# Patient Record
Sex: FEMALE | Race: BLACK OR AFRICAN AMERICAN | Employment: FULL TIME | URBAN - METROPOLITAN AREA
[De-identification: names, ages, dates, MRNs, and addresses within clinical notes are randomized per-mention and may not be internally consistent; named-entity substitution may affect disease eponyms.]

---

## 2017-01-10 ENCOUNTER — TELEPHONE (OUTPATIENT)
Dept: ORTHOPEDIC SURGERY | Age: 57
End: 2017-01-10

## 2017-01-10 ENCOUNTER — CARE COORDINATION (OUTPATIENT)
Dept: CARE COORDINATION | Age: 57
End: 2017-01-10

## 2017-01-18 ENCOUNTER — HOSPITAL ENCOUNTER (OUTPATIENT)
Dept: ULTRASOUND IMAGING | Age: 57
Discharge: HOME | End: 2017-01-18
Attending: OBSTETRICS & GYNECOLOGY | Admitting: OBSTETRICS & GYNECOLOGY

## 2017-01-18 DIAGNOSIS — N60.02 BREAST CYST, LEFT: ICD-10-CM

## 2017-01-23 ENCOUNTER — TELEPHONE (OUTPATIENT)
Dept: PRIMARY CARE CLINIC | Age: 57
End: 2017-01-23

## 2017-02-15 ENCOUNTER — PAT TELEPHONE (OUTPATIENT)
Dept: PREADMISSION TESTING | Age: 57
End: 2017-02-15

## 2017-02-15 VITALS — WEIGHT: 290 LBS | BODY MASS INDEX: 45.52 KG/M2 | HEIGHT: 67 IN

## 2017-02-16 ENCOUNTER — HOSPITAL ENCOUNTER (OUTPATIENT)
Dept: ENDOSCOPY | Age: 57
Discharge: HOME | End: 2017-02-16
Attending: INTERNAL MEDICINE | Admitting: INTERNAL MEDICINE

## 2017-02-16 VITALS
DIASTOLIC BLOOD PRESSURE: 82 MMHG | TEMPERATURE: 97.7 F | HEIGHT: 67 IN | BODY MASS INDEX: 44.71 KG/M2 | SYSTOLIC BLOOD PRESSURE: 153 MMHG | HEART RATE: 102 BPM | OXYGEN SATURATION: 95 % | WEIGHT: 284.83 LBS | RESPIRATION RATE: 18 BRPM

## 2017-02-16 LAB
ANION GAP SERPL CALCULATED.3IONS-SCNC: 14 MMOL/L (ref 3–16)
BUN BLDV-MCNC: 11 MG/DL (ref 7–20)
CALCIUM SERPL-MCNC: 9.1 MG/DL (ref 8.3–10.6)
CHLORIDE BLD-SCNC: 98 MMOL/L (ref 99–110)
CO2: 27 MMOL/L (ref 21–32)
CREAT SERPL-MCNC: 0.9 MG/DL (ref 0.6–1.1)
GFR AFRICAN AMERICAN: >60
GFR NON-AFRICAN AMERICAN: >60
GLUCOSE BLD-MCNC: 133 MG/DL (ref 70–99)
GLUCOSE BLD-MCNC: 185 MG/DL (ref 70–99)
GLUCOSE BLD-MCNC: 232 MG/DL (ref 70–99)
HCT VFR BLD CALC: 44.6 % (ref 36–48)
HEMOGLOBIN: 14 G/DL (ref 12–16)
MCH RBC QN AUTO: 24.3 PG (ref 26–34)
MCHC RBC AUTO-ENTMCNC: 31.4 G/DL (ref 31–36)
MCV RBC AUTO: 77.5 FL (ref 80–100)
PDW BLD-RTO: 15.5 % (ref 12.4–15.4)
PERFORMED ON: ABNORMAL
PERFORMED ON: ABNORMAL
PLATELET # BLD: 273 K/UL (ref 135–450)
PMV BLD AUTO: 8.2 FL (ref 5–10.5)
POTASSIUM SERPL-SCNC: 4.2 MMOL/L (ref 3.5–5.1)
RBC # BLD: 5.75 M/UL (ref 4–5.2)
SODIUM BLD-SCNC: 139 MMOL/L (ref 136–145)
WBC # BLD: 11.6 K/UL (ref 4–11)

## 2017-02-16 PROCEDURE — 93010 ELECTROCARDIOGRAM REPORT: CPT | Performed by: INTERNAL MEDICINE

## 2017-02-16 RX ORDER — ONDANSETRON 2 MG/ML
4 INJECTION INTRAMUSCULAR; INTRAVENOUS
Status: COMPLETED | OUTPATIENT
Start: 2017-02-16 | End: 2017-02-16

## 2017-02-16 RX ORDER — ONDANSETRON 2 MG/ML
4 INJECTION INTRAMUSCULAR; INTRAVENOUS EVERY 6 HOURS PRN
Status: CANCELLED | OUTPATIENT
Start: 2017-02-16

## 2017-02-16 RX ORDER — PANTOPRAZOLE SODIUM 40 MG/10ML
40 INJECTION, POWDER, LYOPHILIZED, FOR SOLUTION INTRAVENOUS 2 TIMES DAILY
Status: CANCELLED | OUTPATIENT
Start: 2017-02-16

## 2017-02-16 RX ORDER — LATANOPROST 50 UG/ML
1 SOLUTION/ DROPS OPHTHALMIC NIGHTLY
Status: CANCELLED | OUTPATIENT
Start: 2017-02-16

## 2017-02-16 RX ORDER — ACETAMINOPHEN 325 MG/1
650 TABLET ORAL EVERY 4 HOURS PRN
Status: CANCELLED | OUTPATIENT
Start: 2017-02-16

## 2017-02-16 RX ORDER — SODIUM CHLORIDE 0.9 % (FLUSH) 0.9 %
10 SYRINGE (ML) INJECTION PRN
Status: CANCELLED | OUTPATIENT
Start: 2017-02-16

## 2017-02-16 RX ORDER — TIMOLOL MALEATE 5 MG/ML
1 SOLUTION/ DROPS OPHTHALMIC DAILY
Status: CANCELLED | OUTPATIENT
Start: 2017-02-16

## 2017-02-16 RX ORDER — PROMETHAZINE HYDROCHLORIDE 25 MG/ML
12.5 INJECTION, SOLUTION INTRAMUSCULAR; INTRAVENOUS EVERY 6 HOURS PRN
Status: CANCELLED | OUTPATIENT
Start: 2017-02-16

## 2017-02-16 RX ORDER — SODIUM CHLORIDE 0.9 % (FLUSH) 0.9 %
10 SYRINGE (ML) INJECTION PRN
Status: DISCONTINUED | OUTPATIENT
Start: 2017-02-16 | End: 2017-02-17 | Stop reason: HOSPADM

## 2017-02-16 RX ORDER — LISINOPRIL 10 MG/1
40 TABLET ORAL
Status: CANCELLED | OUTPATIENT
Start: 2017-02-16

## 2017-02-16 RX ORDER — FLUTICASONE PROPIONATE 50 MCG
1 SPRAY, SUSPENSION (ML) NASAL DAILY
Status: CANCELLED | OUTPATIENT
Start: 2017-02-16

## 2017-02-16 RX ORDER — SODIUM CHLORIDE 0.9 % (FLUSH) 0.9 %
10 SYRINGE (ML) INJECTION EVERY 12 HOURS SCHEDULED
Status: DISCONTINUED | OUTPATIENT
Start: 2017-02-16 | End: 2017-02-17 | Stop reason: HOSPADM

## 2017-02-16 RX ORDER — PROMETHAZINE HYDROCHLORIDE 25 MG/ML
6.25 INJECTION, SOLUTION INTRAMUSCULAR; INTRAVENOUS ONCE
Status: COMPLETED | OUTPATIENT
Start: 2017-02-16 | End: 2017-02-16

## 2017-02-16 RX ORDER — SODIUM CHLORIDE 0.9 % (FLUSH) 0.9 %
10 SYRINGE (ML) INJECTION EVERY 12 HOURS SCHEDULED
Status: CANCELLED | OUTPATIENT
Start: 2017-02-16

## 2017-02-16 RX ORDER — SODIUM CHLORIDE 9 MG/ML
INJECTION, SOLUTION INTRAVENOUS CONTINUOUS
Status: DISCONTINUED | OUTPATIENT
Start: 2017-02-16 | End: 2017-02-17 | Stop reason: HOSPADM

## 2017-02-16 RX ORDER — 0.9 % SODIUM CHLORIDE 0.9 %
10 VIAL (ML) INJECTION DAILY
Status: CANCELLED | OUTPATIENT
Start: 2017-02-16

## 2017-02-16 RX ADMIN — PROMETHAZINE HYDROCHLORIDE 6.25 MG: 25 INJECTION, SOLUTION INTRAMUSCULAR; INTRAVENOUS at 14:40

## 2017-02-16 RX ADMIN — SODIUM CHLORIDE: 9 INJECTION, SOLUTION INTRAVENOUS at 10:55

## 2017-02-16 RX ADMIN — ONDANSETRON 4 MG: 2 INJECTION INTRAMUSCULAR; INTRAVENOUS at 13:24

## 2017-02-16 ASSESSMENT — PAIN SCALES - GENERAL
PAINLEVEL_OUTOF10: 8
PAINLEVEL_OUTOF10: 0

## 2017-02-16 ASSESSMENT — PAIN DESCRIPTION - PAIN TYPE
TYPE: SURGICAL PAIN

## 2017-02-16 ASSESSMENT — PAIN DESCRIPTION - LOCATION: LOCATION: OTHER (COMMENT)

## 2017-02-16 ASSESSMENT — PAIN - FUNCTIONAL ASSESSMENT: PAIN_FUNCTIONAL_ASSESSMENT: 0-10

## 2017-02-22 ENCOUNTER — CARE COORDINATION (OUTPATIENT)
Dept: CARE COORDINATION | Age: 57
End: 2017-02-22

## 2017-02-23 ENCOUNTER — TELEPHONE (OUTPATIENT)
Dept: ENDOCRINOLOGY | Age: 57
End: 2017-02-23

## 2017-02-23 ENCOUNTER — TELEPHONE (OUTPATIENT)
Dept: FAMILY MEDICINE CLINIC | Age: 57
End: 2017-02-23

## 2017-02-28 LAB
EKG ATRIAL RATE: 67 BPM
EKG DIAGNOSIS: NORMAL
EKG P AXIS: 32 DEGREES
EKG P-R INTERVAL: 148 MS
EKG Q-T INTERVAL: 424 MS
EKG QRS DURATION: 80 MS
EKG QTC CALCULATION (BAZETT): 448 MS
EKG R AXIS: -1 DEGREES
EKG T AXIS: 29 DEGREES
EKG VENTRICULAR RATE: 67 BPM

## 2017-03-07 ENCOUNTER — OFFICE VISIT (OUTPATIENT)
Dept: PRIMARY CARE CLINIC | Age: 57
End: 2017-03-07

## 2017-03-07 VITALS
TEMPERATURE: 97.3 F | WEIGHT: 274 LBS | SYSTOLIC BLOOD PRESSURE: 111 MMHG | BODY MASS INDEX: 42.91 KG/M2 | DIASTOLIC BLOOD PRESSURE: 65 MMHG | HEART RATE: 80 BPM

## 2017-03-07 DIAGNOSIS — R51.9 ACUTE INTRACTABLE HEADACHE, UNSPECIFIED HEADACHE TYPE: Primary | ICD-10-CM

## 2017-03-07 PROCEDURE — 99213 OFFICE O/P EST LOW 20 MIN: CPT | Performed by: FAMILY MEDICINE

## 2017-03-07 RX ORDER — ONDANSETRON 4 MG/1
8 TABLET, ORALLY DISINTEGRATING ORAL
COMMUNITY
Start: 2017-02-24 | End: 2017-03-29 | Stop reason: SDUPTHER

## 2017-03-07 RX ORDER — METOCLOPRAMIDE 10 MG/1
10 TABLET ORAL
COMMUNITY
Start: 2017-02-22 | End: 2017-03-29 | Stop reason: ALTCHOICE

## 2017-03-07 RX ORDER — SUMATRIPTAN 50 MG/1
50 TABLET, FILM COATED ORAL
Qty: 9 TABLET | Refills: 3 | Status: ON HOLD | OUTPATIENT
Start: 2017-03-07 | End: 2021-09-20 | Stop reason: ALTCHOICE

## 2017-03-07 RX ORDER — PROMETHAZINE HYDROCHLORIDE 25 MG/1
25 SUPPOSITORY RECTAL
COMMUNITY
Start: 2017-02-22 | End: 2017-07-18

## 2017-03-07 ASSESSMENT — PATIENT HEALTH QUESTIONNAIRE - PHQ9
SUM OF ALL RESPONSES TO PHQ QUESTIONS 1-9: 0
SUM OF ALL RESPONSES TO PHQ9 QUESTIONS 1 & 2: 0
1. LITTLE INTEREST OR PLEASURE IN DOING THINGS: 0
2. FEELING DOWN, DEPRESSED OR HOPELESS: 0

## 2017-03-29 ENCOUNTER — OFFICE VISIT (OUTPATIENT)
Dept: ENDOCRINOLOGY | Age: 57
End: 2017-03-29

## 2017-03-29 VITALS
RESPIRATION RATE: 16 BRPM | BODY MASS INDEX: 44.26 KG/M2 | HEART RATE: 82 BPM | HEIGHT: 67 IN | DIASTOLIC BLOOD PRESSURE: 76 MMHG | SYSTOLIC BLOOD PRESSURE: 121 MMHG | WEIGHT: 282 LBS | OXYGEN SATURATION: 98 %

## 2017-03-29 DIAGNOSIS — K31.84 GASTROPARESIS: ICD-10-CM

## 2017-03-29 LAB — HBA1C MFR BLD: 9.2 %

## 2017-03-29 PROCEDURE — 83036 HEMOGLOBIN GLYCOSYLATED A1C: CPT | Performed by: INTERNAL MEDICINE

## 2017-03-29 PROCEDURE — 99214 OFFICE O/P EST MOD 30 MIN: CPT | Performed by: INTERNAL MEDICINE

## 2017-03-30 LAB
CREATININE URINE: 144.5 MG/DL (ref 28–259)
MICROALBUMIN UR-MCNC: <1.2 MG/DL
MICROALBUMIN/CREAT UR-RTO: NORMAL MG/G (ref 0–30)

## 2017-04-10 ENCOUNTER — HOSPITAL ENCOUNTER (OUTPATIENT)
Dept: ENDOSCOPY | Age: 57
Discharge: HOME | End: 2017-04-10
Attending: INTERNAL MEDICINE | Admitting: INTERNAL MEDICINE

## 2017-04-10 VITALS
HEART RATE: 68 BPM | BODY MASS INDEX: 42.2 KG/M2 | DIASTOLIC BLOOD PRESSURE: 92 MMHG | SYSTOLIC BLOOD PRESSURE: 143 MMHG | HEIGHT: 67 IN | OXYGEN SATURATION: 98 % | RESPIRATION RATE: 17 BRPM | WEIGHT: 268.85 LBS | TEMPERATURE: 97.1 F

## 2017-04-10 LAB
GLUCOSE BLD-MCNC: 147 MG/DL (ref 70–99)
PERFORMED ON: ABNORMAL

## 2017-04-10 RX ORDER — SODIUM CHLORIDE 0.9 % (FLUSH) 0.9 %
10 SYRINGE (ML) INJECTION EVERY 12 HOURS SCHEDULED
Status: DISCONTINUED | OUTPATIENT
Start: 2017-04-10 | End: 2017-04-11 | Stop reason: HOSPADM

## 2017-04-10 RX ORDER — ONDANSETRON 2 MG/ML
4 INJECTION INTRAMUSCULAR; INTRAVENOUS
Status: ACTIVE | OUTPATIENT
Start: 2017-04-10 | End: 2017-04-10

## 2017-04-10 RX ORDER — ONDANSETRON 2 MG/ML
4 INJECTION INTRAMUSCULAR; INTRAVENOUS ONCE
Status: COMPLETED | OUTPATIENT
Start: 2017-04-10 | End: 2017-04-10

## 2017-04-10 RX ORDER — SODIUM CHLORIDE 0.9 % (FLUSH) 0.9 %
10 SYRINGE (ML) INJECTION PRN
Status: DISCONTINUED | OUTPATIENT
Start: 2017-04-10 | End: 2017-04-11 | Stop reason: HOSPADM

## 2017-04-10 RX ORDER — SODIUM CHLORIDE 9 MG/ML
INJECTION, SOLUTION INTRAVENOUS CONTINUOUS
Status: DISCONTINUED | OUTPATIENT
Start: 2017-04-10 | End: 2017-04-11 | Stop reason: HOSPADM

## 2017-04-10 RX ORDER — SODIUM CHLORIDE 9 MG/ML
INJECTION, SOLUTION INTRAVENOUS ONCE
Status: DISCONTINUED | OUTPATIENT
Start: 2017-04-10 | End: 2017-04-10 | Stop reason: ALTCHOICE

## 2017-04-10 RX ADMIN — SODIUM CHLORIDE: 9 INJECTION, SOLUTION INTRAVENOUS at 09:55

## 2017-04-10 RX ADMIN — ONDANSETRON 4 MG: 2 INJECTION INTRAMUSCULAR; INTRAVENOUS at 09:53

## 2017-04-10 ASSESSMENT — PAIN SCALES - GENERAL
PAINLEVEL_OUTOF10: 0
PAINLEVEL_OUTOF10: 0

## 2017-04-10 ASSESSMENT — PAIN - FUNCTIONAL ASSESSMENT: PAIN_FUNCTIONAL_ASSESSMENT: 0-10

## 2017-04-14 RX ORDER — NAPROXEN SODIUM 220 MG
1 TABLET ORAL DAILY
Qty: 100 SYRINGE | Refills: 3 | Status: SHIPPED | OUTPATIENT
Start: 2017-04-14 | End: 2022-10-24

## 2017-06-26 ENCOUNTER — TELEPHONE (OUTPATIENT)
Dept: GYNECOLOGY | Age: 57
End: 2017-06-26

## 2017-06-26 DIAGNOSIS — N60.02 BREAST CYST, LEFT: Primary | ICD-10-CM

## 2017-07-28 ENCOUNTER — TELEPHONE (OUTPATIENT)
Dept: ENDOCRINOLOGY | Age: 57
End: 2017-07-28

## 2017-07-31 ENCOUNTER — OFFICE VISIT (OUTPATIENT)
Dept: ENDOCRINOLOGY | Age: 57
End: 2017-07-31

## 2017-07-31 VITALS
BODY MASS INDEX: 42.82 KG/M2 | HEIGHT: 67 IN | HEART RATE: 86 BPM | OXYGEN SATURATION: 98 % | DIASTOLIC BLOOD PRESSURE: 74 MMHG | SYSTOLIC BLOOD PRESSURE: 130 MMHG | WEIGHT: 272.8 LBS | RESPIRATION RATE: 16 BRPM

## 2017-07-31 DIAGNOSIS — I15.2 HYPERTENSION COMPLICATING DIABETES (HCC): ICD-10-CM

## 2017-07-31 DIAGNOSIS — E11.59 HYPERTENSION COMPLICATING DIABETES (HCC): ICD-10-CM

## 2017-07-31 DIAGNOSIS — R11.2 NAUSEA AND VOMITING, INTRACTABILITY OF VOMITING NOT SPECIFIED, UNSPECIFIED VOMITING TYPE: ICD-10-CM

## 2017-07-31 LAB — HBA1C MFR BLD: 9.2 %

## 2017-07-31 PROCEDURE — 99214 OFFICE O/P EST MOD 30 MIN: CPT | Performed by: INTERNAL MEDICINE

## 2017-07-31 PROCEDURE — 83036 HEMOGLOBIN GLYCOSYLATED A1C: CPT | Performed by: INTERNAL MEDICINE

## 2017-08-16 ENCOUNTER — HOSPITAL ENCOUNTER (OUTPATIENT)
Dept: ULTRASOUND IMAGING | Age: 57
Discharge: HOME | End: 2017-08-17
Admitting: OBSTETRICS & GYNECOLOGY

## 2017-08-16 ENCOUNTER — HOSPITAL ENCOUNTER (OUTPATIENT)
Dept: WOMENS IMAGING | Age: 57
Discharge: HOME | End: 2017-08-16
Attending: OBSTETRICS & GYNECOLOGY | Admitting: OBSTETRICS & GYNECOLOGY

## 2017-08-16 DIAGNOSIS — N60.02 BREAST CYST, LEFT: ICD-10-CM

## 2017-08-16 DIAGNOSIS — R92.8 ABNORMAL MAMMOGRAM: ICD-10-CM

## 2017-09-22 ENCOUNTER — OFFICE VISIT (OUTPATIENT)
Dept: PRIMARY CARE CLINIC | Age: 57
End: 2017-09-22

## 2017-09-22 VITALS
TEMPERATURE: 97.3 F | OXYGEN SATURATION: 97 % | HEIGHT: 67 IN | BODY MASS INDEX: 41.44 KG/M2 | WEIGHT: 264 LBS | HEART RATE: 92 BPM

## 2017-09-22 DIAGNOSIS — R11.15 INTRACTABLE CYCLICAL VOMITING WITH NAUSEA: ICD-10-CM

## 2017-09-22 DIAGNOSIS — K52.9 AGE (ACUTE GASTROENTERITIS): Primary | ICD-10-CM

## 2017-09-22 PROCEDURE — 99213 OFFICE O/P EST LOW 20 MIN: CPT | Performed by: FAMILY MEDICINE

## 2017-09-22 RX ORDER — PROMETHAZINE HYDROCHLORIDE 25 MG/1
25 SUPPOSITORY RECTAL PRN
COMMUNITY
Start: 2017-09-13 | End: 2021-08-18

## 2017-09-25 RX ORDER — INSULIN HUMAN 500 [IU]/ML
INJECTION, SOLUTION SUBCUTANEOUS
Qty: 20 ML | Refills: 0 | Status: SHIPPED | OUTPATIENT
Start: 2017-09-25 | End: 2017-09-26 | Stop reason: SDUPTHER

## 2017-12-07 DIAGNOSIS — E11.59 HYPERTENSION COMPLICATING DIABETES (HCC): ICD-10-CM

## 2017-12-07 DIAGNOSIS — I15.2 HYPERTENSION COMPLICATING DIABETES (HCC): ICD-10-CM

## 2017-12-08 RX ORDER — INSULIN HUMAN 500 [IU]/ML
INJECTION, SOLUTION SUBCUTANEOUS
Qty: 10 ML | Refills: 0 | Status: SHIPPED | OUTPATIENT
Start: 2017-12-08 | End: 2017-12-11 | Stop reason: SDUPTHER

## 2017-12-08 RX ORDER — LISINOPRIL 40 MG/1
TABLET ORAL
Qty: 30 TABLET | Refills: 0 | Status: ON HOLD | OUTPATIENT
Start: 2017-12-08 | End: 2021-09-20 | Stop reason: ALTCHOICE

## 2017-12-08 NOTE — TELEPHONE ENCOUNTER
LOV: 7/31/17  NOV: none    Patient has canceled one appointment and no showed another since her last visit

## 2017-12-11 ENCOUNTER — TELEPHONE (OUTPATIENT)
Dept: ENDOCRINOLOGY | Age: 57
End: 2017-12-11

## 2017-12-11 NOTE — TELEPHONE ENCOUNTER
Pt called to get Humilin R 500 refilled, called Stella Childs on [address]. Her appt is scheduled for April 2018. If any questions, call pt back.

## 2020-11-03 PROBLEM — K21.9 ACID REFLUX: Status: RESOLVED | Noted: 2020-11-03 | Resolved: 2020-11-03

## 2021-06-11 ENCOUNTER — APPOINTMENT (OUTPATIENT)
Dept: GENERAL RADIOLOGY | Age: 61
End: 2021-06-11
Payer: COMMERCIAL

## 2021-06-11 ENCOUNTER — HOSPITAL ENCOUNTER (EMERGENCY)
Age: 61
Discharge: HOME | End: 2021-06-11
Payer: COMMERCIAL

## 2021-06-11 VITALS
HEART RATE: 73 BPM | HEIGHT: 67 IN | OXYGEN SATURATION: 100 % | RESPIRATION RATE: 16 BRPM | BODY MASS INDEX: 34.12 KG/M2 | WEIGHT: 217.37 LBS | SYSTOLIC BLOOD PRESSURE: 132 MMHG | DIASTOLIC BLOOD PRESSURE: 82 MMHG | TEMPERATURE: 97.8 F

## 2021-06-11 DIAGNOSIS — K31.84 DIABETIC GASTROPARESIS (HCC): Primary | ICD-10-CM

## 2021-06-11 DIAGNOSIS — E11.43 DIABETIC GASTROPARESIS (HCC): Primary | ICD-10-CM

## 2021-06-11 LAB
A/G RATIO: 1.2 (ref 1.1–2.2)
ALBUMIN SERPL-MCNC: 4.2 G/DL (ref 3.4–5)
ALP BLD-CCNC: 127 U/L (ref 40–129)
ALT SERPL-CCNC: 30 U/L (ref 10–40)
ANION GAP SERPL CALCULATED.3IONS-SCNC: 13 MMOL/L (ref 3–16)
AST SERPL-CCNC: 24 U/L (ref 15–37)
BASOPHILS ABSOLUTE: 0.1 K/UL (ref 0–0.2)
BASOPHILS RELATIVE PERCENT: 0.7 %
BILIRUB SERPL-MCNC: 1 MG/DL (ref 0–1)
BUN BLDV-MCNC: 17 MG/DL (ref 7–20)
CALCIUM SERPL-MCNC: 9.6 MG/DL (ref 8.3–10.6)
CHLORIDE BLD-SCNC: 87 MMOL/L (ref 99–110)
CO2: 31 MMOL/L (ref 21–32)
CREAT SERPL-MCNC: 0.8 MG/DL (ref 0.6–1.2)
EOSINOPHILS ABSOLUTE: 0.1 K/UL (ref 0–0.6)
EOSINOPHILS RELATIVE PERCENT: 0.6 %
GFR AFRICAN AMERICAN: >60
GFR NON-AFRICAN AMERICAN: >60
GLOBULIN: 3.4 G/DL
GLUCOSE BLD-MCNC: 235 MG/DL (ref 70–99)
HCT VFR BLD CALC: 46.6 % (ref 36–48)
HEMOGLOBIN: 15.6 G/DL (ref 12–16)
LACTIC ACID: 1.9 MMOL/L (ref 0.4–2)
LYMPHOCYTES ABSOLUTE: 5 K/UL (ref 1–5.1)
LYMPHOCYTES RELATIVE PERCENT: 29.8 %
MCH RBC QN AUTO: 25.9 PG (ref 26–34)
MCHC RBC AUTO-ENTMCNC: 33.4 G/DL (ref 31–36)
MCV RBC AUTO: 77.5 FL (ref 80–100)
MONOCYTES ABSOLUTE: 1.1 K/UL (ref 0–1.3)
MONOCYTES RELATIVE PERCENT: 6.8 %
NEUTROPHILS ABSOLUTE: 10.3 K/UL (ref 1.7–7.7)
NEUTROPHILS RELATIVE PERCENT: 62.1 %
PDW BLD-RTO: 14.3 % (ref 12.4–15.4)
PLATELET # BLD: 298 K/UL (ref 135–450)
PMV BLD AUTO: 8.3 FL (ref 5–10.5)
POTASSIUM REFLEX MAGNESIUM: 4 MMOL/L (ref 3.5–5.1)
PRO-BNP: 124 PG/ML (ref 0–124)
RBC # BLD: 6.02 M/UL (ref 4–5.2)
SODIUM BLD-SCNC: 131 MMOL/L (ref 136–145)
TOTAL PROTEIN: 7.6 G/DL (ref 6.4–8.2)
TROPONIN: <0.01 NG/ML
WBC # BLD: 16.6 K/UL (ref 4–11)

## 2021-06-11 PROCEDURE — 84484 ASSAY OF TROPONIN QUANT: CPT

## 2021-06-11 PROCEDURE — 96375 TX/PRO/DX INJ NEW DRUG ADDON: CPT

## 2021-06-11 PROCEDURE — 96365 THER/PROPH/DIAG IV INF INIT: CPT

## 2021-06-11 PROCEDURE — 96366 THER/PROPH/DIAG IV INF ADDON: CPT

## 2021-06-11 PROCEDURE — 99284 EMERGENCY DEPT VISIT MOD MDM: CPT

## 2021-06-11 PROCEDURE — 6360000002 HC RX W HCPCS: Performed by: PHYSICIAN ASSISTANT

## 2021-06-11 PROCEDURE — 83605 ASSAY OF LACTIC ACID: CPT

## 2021-06-11 PROCEDURE — 80053 COMPREHEN METABOLIC PANEL: CPT

## 2021-06-11 PROCEDURE — 2580000003 HC RX 258: Performed by: PHYSICIAN ASSISTANT

## 2021-06-11 PROCEDURE — 83880 ASSAY OF NATRIURETIC PEPTIDE: CPT

## 2021-06-11 PROCEDURE — 93005 ELECTROCARDIOGRAM TRACING: CPT | Performed by: PHYSICIAN ASSISTANT

## 2021-06-11 PROCEDURE — 71045 X-RAY EXAM CHEST 1 VIEW: CPT

## 2021-06-11 PROCEDURE — 85025 COMPLETE CBC W/AUTO DIFF WBC: CPT

## 2021-06-11 RX ORDER — 0.9 % SODIUM CHLORIDE 0.9 %
1000 INTRAVENOUS SOLUTION INTRAVENOUS ONCE
Status: COMPLETED | OUTPATIENT
Start: 2021-06-11 | End: 2021-06-11

## 2021-06-11 RX ORDER — METOCLOPRAMIDE HYDROCHLORIDE 5 MG/ML
20 INJECTION INTRAMUSCULAR; INTRAVENOUS ONCE
Status: COMPLETED | OUTPATIENT
Start: 2021-06-11 | End: 2021-06-11

## 2021-06-11 RX ORDER — KETOROLAC TROMETHAMINE 30 MG/ML
30 INJECTION, SOLUTION INTRAMUSCULAR; INTRAVENOUS ONCE
Status: COMPLETED | OUTPATIENT
Start: 2021-06-11 | End: 2021-06-11

## 2021-06-11 RX ORDER — ONDANSETRON 2 MG/ML
4 INJECTION INTRAMUSCULAR; INTRAVENOUS ONCE
Status: COMPLETED | OUTPATIENT
Start: 2021-06-11 | End: 2021-06-11

## 2021-06-11 RX ADMIN — Medication 25 MG: at 17:54

## 2021-06-11 RX ADMIN — SODIUM CHLORIDE 1000 ML: 9 INJECTION, SOLUTION INTRAVENOUS at 16:25

## 2021-06-11 RX ADMIN — METOCLOPRAMIDE HYDROCHLORIDE 20 MG: 5 INJECTION INTRAMUSCULAR; INTRAVENOUS at 16:25

## 2021-06-11 RX ADMIN — ONDANSETRON 4 MG: 2 INJECTION INTRAMUSCULAR; INTRAVENOUS at 17:54

## 2021-06-11 RX ADMIN — KETOROLAC TROMETHAMINE 30 MG: 30 INJECTION, SOLUTION INTRAMUSCULAR at 16:25

## 2021-06-11 ASSESSMENT — PAIN DESCRIPTION - ORIENTATION: ORIENTATION: UPPER

## 2021-06-11 ASSESSMENT — PAIN DESCRIPTION - PAIN TYPE: TYPE: ACUTE PAIN

## 2021-06-11 ASSESSMENT — PAIN DESCRIPTION - LOCATION: LOCATION: ABDOMEN

## 2021-06-11 ASSESSMENT — PAIN SCALES - GENERAL
PAINLEVEL_OUTOF10: 6
PAINLEVEL_OUTOF10: 6

## 2021-06-11 NOTE — ED NOTES
Bed: A-16  Expected date: 6/11/21  Expected time: 3:47 PM  Means of arrival: [facility]  Comments:  N/v     Billy Saba RN  06/11/21 8175

## 2021-06-11 NOTE — LETTER
[facility] Emergency Department  [address] [facility] [address]  Phone: [phone]               June 11, 2021    Patient: Basilia Roy   YOB: 1960   Date of Visit: 6/11/2021       To Whom It May Concern:    Basilia Roy was seen and treated in our emergency department on 6/11/2021 and may be excused from work that day.       Sincerely,       Bernabe Pete         Signature:__________________________________

## 2021-06-12 LAB
EKG ATRIAL RATE: 78 BPM
EKG DIAGNOSIS: NORMAL
EKG P AXIS: 60 DEGREES
EKG P-R INTERVAL: 146 MS
EKG Q-T INTERVAL: 450 MS
EKG QRS DURATION: 92 MS
EKG QTC CALCULATION (BAZETT): 513 MS
EKG R AXIS: 2 DEGREES
EKG T AXIS: 28 DEGREES
EKG VENTRICULAR RATE: 78 BPM

## 2021-06-12 PROCEDURE — 93010 ELECTROCARDIOGRAM REPORT: CPT | Performed by: INTERNAL MEDICINE

## 2021-06-12 NOTE — ED PROVIDER NOTES
Musculoskeletal:  Negative for myalgias, arthralgias, neck pain or stiffness. Neurological:  Negative for dizziness, seizures, syncope, focal weakness, numbness, headache. Except as noted above the remainder of the review of systems was reviewed and negative.        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 insulin regular human (HUMULIN R) 500 UNIT/ML concentrated injection vial INJECT 0.23ML( 23 UNITS ON U-100) INTO THE SKIN BEFORE BREAKFAST AND SUPPER, Disp-10 mL, R-0Needs to be seen before any more refillNormal      lisinopril (PRINIVIL;ZESTRIL) 40 MG tablet TAKE 1 TABLET BY MOUTH EVERY DAY, Disp-30 tablet, R-0Normal      !! insulin regular human (HUMULIN R) 500 UNIT/ML concentrated injection vial INJECT 0.23ML(23 UNITS IN U-100) INTO THE SKIN BEFORE BREAKFAST AND SUPPER, Disp-20 mL, R-0Normal      aspirin 81 MG tablet Take 81 mg by mouth dailyHistorical Med      promethazine (PHENERGAN) 25 MG suppository Place 25 mg rectally as neededHistorical Med 1 drop into both eyes nightly. !! - Potential duplicate medications found. Please discuss with provider. ALLERGIES     Avelox [moxifloxacin], Bactrim, Cefuroxime, Codeine, Medrol [methylprednisolone], Morphine, Niacin, Oat, Percocet [oxycodone-acetaminophen], Reglan [metoclopramide], Spironolactone, Statins, Sulfamethoxazole-trimethoprim, and Vicodin [hydrocodone-acetaminophen]    FAMILY HISTORY           Problem Relation Age of Onset    Hypertension Mother     Breast Cancer Mother     Colon Cancer Mother     Cancer Father         Pancreatic    Prostate Cancer Father     No Known Problems Brother     No Known Problems Sister     Hypertension Maternal Aunt     Cancer Maternal Uncle     Hypertension Maternal Grandmother     Stroke Maternal Grandmother     Cancer Maternal Grandfather     No Known Problems Paternal Aunt     No Known Problems Paternal Uncle     No Known Problems Paternal Grandmother     No Known Problems Paternal Grandfather     No Known Problems Other     Rheum Arthritis Neg Hx     Osteoarthritis Neg Hx     Asthma Neg Hx     Diabetes Neg Hx     Heart Failure Neg Hx     High Cholesterol Neg Hx     Migraines Neg Hx     Ovarian Cancer Neg Hx     Rashes/Skin Problems Neg Hx     Seizures Neg Hx     Thyroid Disease Neg Hx      Family Status   Relation Name Status    Mother   at age 59        cancer    Torres Saliva Father   at age 72        cancer   Torres Saliva Brother  [de-identified]    Sister  Alive    [address]  (Not Specified)    MUnc  (Not Specified)    MGM  (Not Specified)    MGF  (Not Specified)    PAunt  (Not Specified)    PUnc  (Not Specified)    PGM  (Not Specified)    PGF  (Not Specified)    Other  (Not Specified)    Neg Hx  (Not Specified)        SOCIAL HISTORY      reports that she quit smoking about 31 years ago. Her smoking use included cigarettes. She has never used smokeless tobacco. She reports current alcohol use. She reports current drug use.  Frequency: 2.00 times per week. Drug: Marijuana. PHYSICAL EXAM    (up to 7 for level 4, 8 or more for level 5)     ED Triage Vitals [06/11/21 1557]   BP Temp Temp Source Pulse Resp SpO2 Height Weight   (!) 171/96 97.8 °F (36.6 °C) Oral 79 12 100 % 5' 7\" (1.702 m) 217 lb 6 oz (98.6 kg)       Constitutional:  Appearing well-developed and well-nourished. No distress. HENT:  Normocephalic and atraumatic. Conjunctivae and EOM normal. PERRLA. Neck:  Normal range of motion. No tracheal deviation. Cardiovascular:  Normal rate, regular rhythm, normal heart sounds, intact distal pulses. Pulmonary/Chest:  Effort and breath sounds normal. No respiratory distress, wheezes or rales. Abdominal:  Soft. Bowel sounds normal.  Mild tenderness to palpation reported throughout the abdomen. No distension, mass, rebound or guarding. Musculoskeletal:  Normal range of motion. No edema exhibited. Neurological:  Alert and oriented to person, place, and time. No cranial nerve deficit observed. Skin:  Skin is warm and dry. Not diaphoretic. Psychiatric:  Normal mood, affect, behavior, judgment, thought content. DIAGNOSTIC RESULTS     RADIOLOGY:     Interpretation per the Radiologist below, if available at the time of this note:    XR CHEST PORTABLE   Final Result   Stable chest with no acute abnormality seen.              LABS:  Labs Reviewed   CBC WITH AUTO DIFFERENTIAL - Abnormal; Notable for the following components:       Result Value    WBC 16.6 (*)     RBC 6.02 (*)     MCV 77.5 (*)     MCH 25.9 (*)     Neutrophils Absolute 10.3 (*)     All other components within normal limits    Narrative:     Performed at:  [address] [address]   Phone [phone]   COMPREHENSIVE METABOLIC PANEL W/ REFLEX TO MG FOR LOW K - Abnormal; Notable for the following components:    Sodium 131 (*)     Chloride 87 (*)     Glucose 235 (*)     All other components within normal limits    Narrative:     Performed at:  [facility]  [address] [facility] [address]   Phone [phone]   TROPONIN    Narrative:     Performed at:  [facility]  [address] [facility] [address]   Phone [phone]   BRAIN NATRIURETIC PEPTIDE    Narrative:     Performed at:  [facility]  [address] [facility] [address]   Phone [phone]   LACTIC ACID, PLASMA    Narrative:     Performed at:  [facility]  [address] [facility] [address]   Phone [phone]   URINE RT REFLEX TO CULTURE       All other labs were within normal range or not returned as of this dictation. EMERGENCY DEPARTMENT COURSE and DIFFERENTIAL DIAGNOSIS/MDM:   Vitals:    Vitals:    06/11/21 1557 06/11/21 1945   BP: (!) 171/96 132/82   Pulse: 79 73   Resp: 12 16   Temp: 97.8 °F (36.6 °C)    TempSrc: Oral    SpO2: 100%    Weight: 217 lb 6 oz (98.6 kg)    Height: 5' 7\" (1.702 m)        The patient's condition in the ED was good, the patient was afebrile and nontoxic in appearance, and the patient's physical exam was unremarkable. No actual episodes of vomiting while in the ED. Normal vital signs. She oxygenated well on room air, no signs of respiratory distress. Laboratory work-up showed leukocytosis of 16.6, consistent with recent results. Sodium slightly low at 131, and serum glucose was 235. Patient was given IV fluids and medications in the ED, reported some improvement. She expressed great frustration with her chronic gastroparesis, because she apparently has found no solution for this. She was given a scopolamine patch at her recent ED visit and says it has not been working at all. No indication for abdominal imaging here today, as there are no new or unusual symptoms reported. There was no indication for hospitalization or further workup.   Patient will be discharged with instructions to follow-up with primary care or her gastroenterologist.  The patient verbalized understanding and agreement with this plan of care. The patient was advised to return to the emergency department if symptoms should significantly worsen or if new and concerning symptoms should appear. I estimate there is LOW risk for ACUTE APPENDICITIS, BOWEL OBSTRUCTION, CHOLECYSTITIS, DIVERTICULITIS, INCARCERATED HERNIA, PANCREATITIS, PERITONITIS, PELVIC INFLAMMATORY DISEASE, OVARIAN TORSION, PERFORATED BOWEL, BOWEL ISCHEMIA, CARDIAC ISCHEMIA, ECTOPIC PREGNANCY, TUBO-OVARIAN ABSCESS, or SEPSIS, thus I consider the discharge disposition reasonable. PROCEDURES:  None    FINAL IMPRESSION      1.  Diabetic gastroparesis [facility])          DISPOSITION/PLAN   DISPOSITION Decision To Discharge 06/11/2021 08:02:48 PM      PATIENT REFERRED TO:  your primary care provider or gastroenterologist    Call   as needed, for follow-up care      DISCHARGE MEDICATIONS:  Discharge Medication List as of 6/11/2021  8:10 PM          (Please note that portions of this note were completed with a voice recognition program.  Efforts were made to edit the dictations but occasionally words are mis-transcribed.)    Eliseo Roach, [address] [address], [place]  06/11/21 2049

## 2021-06-12 NOTE — ED PROVIDER NOTES
The Ekg interpreted by me shows  normal sinus rhythm with sinus arrhythmia with a rate of 78  Axis is   Normal  QTc is  Prolonged   Minimal voltage criteria for LVH, may be normal range. ST Segments: nonspecific changes  No significant change from prior EKG dated 02/18/17    I did not see or evaluate this patient. This is EKG interpretation only.      Sae Ag MD  06/11/21 9015

## 2021-08-18 ENCOUNTER — HOSPITAL ENCOUNTER (INPATIENT)
Age: 61
LOS: 3 days | Discharge: HOME | DRG: 074 | End: 2021-08-21
Attending: EMERGENCY MEDICINE | Admitting: INTERNAL MEDICINE
Payer: COMMERCIAL

## 2021-08-18 ENCOUNTER — APPOINTMENT (OUTPATIENT)
Dept: CT IMAGING | Age: 61
DRG: 074 | End: 2021-08-18
Payer: COMMERCIAL

## 2021-08-18 DIAGNOSIS — E87.20 LACTIC ACID ACIDOSIS: ICD-10-CM

## 2021-08-18 DIAGNOSIS — R11.2 INTRACTABLE NAUSEA AND VOMITING: Primary | ICD-10-CM

## 2021-08-18 DIAGNOSIS — E11.43 DIABETIC GASTROPARESIS (HCC): ICD-10-CM

## 2021-08-18 DIAGNOSIS — R11.2 NAUSEA AND VOMITING, INTRACTABILITY OF VOMITING NOT SPECIFIED, UNSPECIFIED VOMITING TYPE: ICD-10-CM

## 2021-08-18 DIAGNOSIS — K31.84 DIABETIC GASTROPARESIS (HCC): ICD-10-CM

## 2021-08-18 PROBLEM — I10 ESSENTIAL HYPERTENSION: Status: ACTIVE | Noted: 2021-08-18

## 2021-08-18 PROBLEM — R10.84 GENERALIZED ABDOMINAL PAIN: Status: ACTIVE | Noted: 2021-08-18

## 2021-08-18 LAB
A/G RATIO: 1.2 (ref 1.1–2.2)
ALBUMIN SERPL-MCNC: 4.6 G/DL (ref 3.4–5)
ALP BLD-CCNC: 148 U/L (ref 40–129)
ALT SERPL-CCNC: 20 U/L (ref 10–40)
ANION GAP SERPL CALCULATED.3IONS-SCNC: 16 MMOL/L (ref 3–16)
AST SERPL-CCNC: 19 U/L (ref 15–37)
BACTERIA: ABNORMAL /HPF
BASE EXCESS VENOUS: 5.5 MMOL/L
BASOPHILS ABSOLUTE: 0.1 K/UL (ref 0–0.2)
BASOPHILS RELATIVE PERCENT: 0.6 %
BETA-HYDROXYBUTYRATE: 0.74 MMOL/L (ref 0–0.27)
BILIRUB SERPL-MCNC: 0.6 MG/DL (ref 0–1)
BILIRUBIN URINE: NEGATIVE
BLOOD, URINE: ABNORMAL
BUN BLDV-MCNC: 18 MG/DL (ref 7–20)
CALCIUM SERPL-MCNC: 10.2 MG/DL (ref 8.3–10.6)
CARBOXYHEMOGLOBIN: 3.8 %
CHLORIDE BLD-SCNC: 95 MMOL/L (ref 99–110)
CLARITY: CLEAR
CO2: 24 MMOL/L (ref 21–32)
COLOR: YELLOW
CREAT SERPL-MCNC: 0.7 MG/DL (ref 0.6–1.2)
EOSINOPHILS ABSOLUTE: 0 K/UL (ref 0–0.6)
EOSINOPHILS RELATIVE PERCENT: 0 %
EPITHELIAL CELLS, UA: 2 /HPF (ref 0–5)
GFR AFRICAN AMERICAN: >60
GFR NON-AFRICAN AMERICAN: >60
GLOBULIN: 3.8 G/DL
GLUCOSE BLD-MCNC: 204 MG/DL (ref 70–99)
GLUCOSE BLD-MCNC: 287 MG/DL (ref 70–99)
GLUCOSE BLD-MCNC: 298 MG/DL (ref 70–99)
GLUCOSE URINE: >=1000 MG/DL
HCO3 VENOUS: 33 MMOL/L (ref 23–29)
HCT VFR BLD CALC: 51.6 % (ref 36–48)
HEMOGLOBIN: 16.8 G/DL (ref 12–16)
HYALINE CASTS: 1 /LPF (ref 0–8)
KETONES, URINE: 40 MG/DL
LACTIC ACID, SEPSIS: 4.4 MMOL/L (ref 0.4–1.9)
LEUKOCYTE ESTERASE, URINE: NEGATIVE
LIPASE: 34 U/L (ref 13–60)
LYMPHOCYTES ABSOLUTE: 1.6 K/UL (ref 1–5.1)
LYMPHOCYTES RELATIVE PERCENT: 7.8 %
MCH RBC QN AUTO: 26.2 PG (ref 26–34)
MCHC RBC AUTO-ENTMCNC: 32.5 G/DL (ref 31–36)
MCV RBC AUTO: 80.5 FL (ref 80–100)
METHEMOGLOBIN VENOUS: 0.6 %
MICROSCOPIC EXAMINATION: YES
MONOCYTES ABSOLUTE: 0.5 K/UL (ref 0–1.3)
MONOCYTES RELATIVE PERCENT: 2.4 %
NEUTROPHILS ABSOLUTE: 18.4 K/UL (ref 1.7–7.7)
NEUTROPHILS RELATIVE PERCENT: 89.2 %
NITRITE, URINE: NEGATIVE
O2 SAT, VEN: 45 %
O2 THERAPY: ABNORMAL
PCO2, VEN: 57.7 MMHG (ref 40–50)
PDW BLD-RTO: 15.6 % (ref 12.4–15.4)
PERFORMED ON: ABNORMAL
PERFORMED ON: ABNORMAL
PH UA: 5 (ref 5–8)
PH VENOUS: 7.37 (ref 7.35–7.45)
PLATELET # BLD: 274 K/UL (ref 135–450)
PMV BLD AUTO: 8.2 FL (ref 5–10.5)
PO2, VEN: <30 MMHG
POTASSIUM REFLEX MAGNESIUM: 4 MMOL/L (ref 3.5–5.1)
PROTEIN UA: >=300 MG/DL
RBC # BLD: 6.4 M/UL (ref 4–5.2)
RBC UA: 7 /HPF (ref 0–4)
SODIUM BLD-SCNC: 135 MMOL/L (ref 136–145)
SPECIFIC GRAVITY UA: >1.03 (ref 1–1.03)
TCO2 CALC VENOUS: 35 MMOL/L
TOTAL PROTEIN: 8.4 G/DL (ref 6.4–8.2)
URINE REFLEX TO CULTURE: ABNORMAL
URINE TYPE: ABNORMAL
UROBILINOGEN, URINE: 1 E.U./DL
WBC # BLD: 20.6 K/UL (ref 4–11)
WBC UA: 1 /HPF (ref 0–5)

## 2021-08-18 PROCEDURE — 1200000000 HC SEMI PRIVATE

## 2021-08-18 PROCEDURE — 2580000003 HC RX 258: Performed by: EMERGENCY MEDICINE

## 2021-08-18 PROCEDURE — 6360000002 HC RX W HCPCS: Performed by: EMERGENCY MEDICINE

## 2021-08-18 PROCEDURE — 96374 THER/PROPH/DIAG INJ IV PUSH: CPT

## 2021-08-18 PROCEDURE — 96375 TX/PRO/DX INJ NEW DRUG ADDON: CPT

## 2021-08-18 PROCEDURE — 96376 TX/PRO/DX INJ SAME DRUG ADON: CPT

## 2021-08-18 PROCEDURE — 80053 COMPREHEN METABOLIC PANEL: CPT

## 2021-08-18 PROCEDURE — 82010 KETONE BODYS QUAN: CPT

## 2021-08-18 PROCEDURE — 96361 HYDRATE IV INFUSION ADD-ON: CPT

## 2021-08-18 PROCEDURE — 36415 COLL VENOUS BLD VENIPUNCTURE: CPT

## 2021-08-18 PROCEDURE — 83605 ASSAY OF LACTIC ACID: CPT

## 2021-08-18 PROCEDURE — 6360000004 HC RX CONTRAST MEDICATION: Performed by: EMERGENCY MEDICINE

## 2021-08-18 PROCEDURE — 74177 CT ABD & PELVIS W/CONTRAST: CPT

## 2021-08-18 PROCEDURE — 99284 EMERGENCY DEPT VISIT MOD MDM: CPT

## 2021-08-18 PROCEDURE — 81001 URINALYSIS AUTO W/SCOPE: CPT

## 2021-08-18 PROCEDURE — 82803 BLOOD GASES ANY COMBINATION: CPT

## 2021-08-18 PROCEDURE — 6370000000 HC RX 637 (ALT 250 FOR IP): Performed by: INTERNAL MEDICINE

## 2021-08-18 PROCEDURE — 85025 COMPLETE CBC W/AUTO DIFF WBC: CPT

## 2021-08-18 PROCEDURE — 83690 ASSAY OF LIPASE: CPT

## 2021-08-18 RX ORDER — EZETIMIBE 10 MG/1
10 TABLET ORAL DAILY
COMMUNITY

## 2021-08-18 RX ORDER — SODIUM CHLORIDE 0.9 % (FLUSH) 0.9 %
5-40 SYRINGE (ML) INJECTION PRN
Status: DISCONTINUED | OUTPATIENT
Start: 2021-08-18 | End: 2021-08-18 | Stop reason: SDUPTHER

## 2021-08-18 RX ORDER — AMLODIPINE BESYLATE 5 MG/1
5 TABLET ORAL DAILY
COMMUNITY

## 2021-08-18 RX ORDER — PANTOPRAZOLE SODIUM 40 MG/1
40 TABLET, DELAYED RELEASE ORAL
Status: DISCONTINUED | OUTPATIENT
Start: 2021-08-19 | End: 2021-08-19

## 2021-08-18 RX ORDER — DEXTROSE MONOHYDRATE 50 MG/ML
100 INJECTION, SOLUTION INTRAVENOUS PRN
Status: DISCONTINUED | OUTPATIENT
Start: 2021-08-18 | End: 2021-08-21 | Stop reason: HOSPADM

## 2021-08-18 RX ORDER — FLUTICASONE PROPIONATE 50 MCG
1 SPRAY, SUSPENSION (ML) NASAL DAILY
Status: DISCONTINUED | OUTPATIENT
Start: 2021-08-19 | End: 2021-08-18

## 2021-08-18 RX ORDER — DEXTROSE MONOHYDRATE 25 G/50ML
12.5 INJECTION, SOLUTION INTRAVENOUS PRN
Status: DISCONTINUED | OUTPATIENT
Start: 2021-08-18 | End: 2021-08-21 | Stop reason: HOSPADM

## 2021-08-18 RX ORDER — DICYCLOMINE HYDROCHLORIDE 10 MG/1
10 CAPSULE ORAL EVERY 6 HOURS PRN
Status: DISCONTINUED | OUTPATIENT
Start: 2021-08-18 | End: 2021-08-21 | Stop reason: HOSPADM

## 2021-08-18 RX ORDER — INSULIN GLARGINE 100 [IU]/ML
64 INJECTION, SOLUTION SUBCUTANEOUS NIGHTLY
Status: ON HOLD | COMMUNITY
End: 2021-09-22 | Stop reason: SDUPTHER

## 2021-08-18 RX ORDER — LISINOPRIL 40 MG/1
40 TABLET ORAL DAILY
Status: DISCONTINUED | OUTPATIENT
Start: 2021-08-19 | End: 2021-08-21 | Stop reason: HOSPADM

## 2021-08-18 RX ORDER — SODIUM CHLORIDE 9 MG/ML
INJECTION, SOLUTION INTRAVENOUS CONTINUOUS
Status: DISCONTINUED | OUTPATIENT
Start: 2021-08-18 | End: 2021-08-19

## 2021-08-18 RX ORDER — ACETAMINOPHEN 325 MG/1
650 TABLET ORAL EVERY 4 HOURS PRN
Status: DISCONTINUED | OUTPATIENT
Start: 2021-08-18 | End: 2021-08-21 | Stop reason: HOSPADM

## 2021-08-18 RX ORDER — ALBUTEROL SULFATE 90 UG/1
2 AEROSOL, METERED RESPIRATORY (INHALATION) EVERY 4 HOURS PRN
Status: DISCONTINUED | OUTPATIENT
Start: 2021-08-18 | End: 2021-08-21 | Stop reason: HOSPADM

## 2021-08-18 RX ORDER — ONDANSETRON 2 MG/ML
4 INJECTION INTRAMUSCULAR; INTRAVENOUS EVERY 4 HOURS PRN
Status: DISCONTINUED | OUTPATIENT
Start: 2021-08-18 | End: 2021-08-21 | Stop reason: HOSPADM

## 2021-08-18 RX ORDER — SODIUM CHLORIDE 0.9 % (FLUSH) 0.9 %
10 SYRINGE (ML) INJECTION PRN
Status: DISCONTINUED | OUTPATIENT
Start: 2021-08-18 | End: 2021-08-21 | Stop reason: HOSPADM

## 2021-08-18 RX ORDER — TIMOLOL MALEATE 5 MG/ML
1 SOLUTION/ DROPS OPHTHALMIC DAILY
Status: DISCONTINUED | OUTPATIENT
Start: 2021-08-19 | End: 2021-08-21 | Stop reason: HOSPADM

## 2021-08-18 RX ORDER — PROMETHAZINE HYDROCHLORIDE 25 MG/ML
12.5 INJECTION, SOLUTION INTRAMUSCULAR; INTRAVENOUS ONCE
Status: COMPLETED | OUTPATIENT
Start: 2021-08-18 | End: 2021-08-18

## 2021-08-18 RX ORDER — DIPHENHYDRAMINE HYDROCHLORIDE 50 MG/ML
50 INJECTION INTRAMUSCULAR; INTRAVENOUS ONCE
Status: COMPLETED | OUTPATIENT
Start: 2021-08-18 | End: 2021-08-18

## 2021-08-18 RX ORDER — SODIUM CHLORIDE 0.9 % (FLUSH) 0.9 %
10 SYRINGE (ML) INJECTION EVERY 12 HOURS SCHEDULED
Status: DISCONTINUED | OUTPATIENT
Start: 2021-08-19 | End: 2021-08-21 | Stop reason: HOSPADM

## 2021-08-18 RX ORDER — POLYETHYLENE GLYCOL 3350 17 G/17G
17 POWDER, FOR SOLUTION ORAL DAILY PRN
Status: DISCONTINUED | OUTPATIENT
Start: 2021-08-18 | End: 2021-08-21 | Stop reason: HOSPADM

## 2021-08-18 RX ORDER — LIDOCAINE HYDROCHLORIDE 10 MG/ML
5 INJECTION, SOLUTION EPIDURAL; INFILTRATION; INTRACAUDAL; PERINEURAL ONCE
Status: DISCONTINUED | OUTPATIENT
Start: 2021-08-18 | End: 2021-08-21 | Stop reason: HOSPADM

## 2021-08-18 RX ORDER — LATANOPROST 50 UG/ML
1 SOLUTION/ DROPS OPHTHALMIC NIGHTLY
Status: DISCONTINUED | OUTPATIENT
Start: 2021-08-19 | End: 2021-08-21 | Stop reason: HOSPADM

## 2021-08-18 RX ORDER — SODIUM CHLORIDE 9 MG/ML
25 INJECTION, SOLUTION INTRAVENOUS PRN
Status: DISCONTINUED | OUTPATIENT
Start: 2021-08-18 | End: 2021-08-18 | Stop reason: SDUPTHER

## 2021-08-18 RX ORDER — ACETAMINOPHEN 650 MG/1
650 SUPPOSITORY RECTAL EVERY 4 HOURS PRN
Status: DISCONTINUED | OUTPATIENT
Start: 2021-08-18 | End: 2021-08-21 | Stop reason: HOSPADM

## 2021-08-18 RX ORDER — 0.9 % SODIUM CHLORIDE 0.9 %
30 INTRAVENOUS SOLUTION INTRAVENOUS ONCE
Status: COMPLETED | OUTPATIENT
Start: 2021-08-18 | End: 2021-08-18

## 2021-08-18 RX ORDER — SODIUM CHLORIDE 0.9 % (FLUSH) 0.9 %
5-40 SYRINGE (ML) INJECTION EVERY 12 HOURS SCHEDULED
Status: DISCONTINUED | OUTPATIENT
Start: 2021-08-18 | End: 2021-08-18 | Stop reason: SDUPTHER

## 2021-08-18 RX ORDER — NICOTINE POLACRILEX 4 MG
15 LOZENGE BUCCAL PRN
Status: DISCONTINUED | OUTPATIENT
Start: 2021-08-18 | End: 2021-08-21 | Stop reason: HOSPADM

## 2021-08-18 RX ORDER — PROMETHAZINE HYDROCHLORIDE 25 MG/ML
25 INJECTION, SOLUTION INTRAMUSCULAR; INTRAVENOUS EVERY 4 HOURS PRN
Status: DISCONTINUED | OUTPATIENT
Start: 2021-08-18 | End: 2021-08-18

## 2021-08-18 RX ORDER — SODIUM CHLORIDE 9 MG/ML
25 INJECTION, SOLUTION INTRAVENOUS PRN
Status: DISCONTINUED | OUTPATIENT
Start: 2021-08-18 | End: 2021-08-21 | Stop reason: HOSPADM

## 2021-08-18 RX ORDER — DIPHENHYDRAMINE HYDROCHLORIDE 50 MG/ML
50 INJECTION INTRAMUSCULAR; INTRAVENOUS EVERY 6 HOURS
Status: DISPENSED | OUTPATIENT
Start: 2021-08-18 | End: 2021-08-19

## 2021-08-18 RX ORDER — ASPIRIN 81 MG/1
81 TABLET, CHEWABLE ORAL DAILY
Status: DISCONTINUED | OUTPATIENT
Start: 2021-08-19 | End: 2021-08-21 | Stop reason: HOSPADM

## 2021-08-18 RX ADMIN — INSULIN LISPRO 2 UNITS: 100 INJECTION, SOLUTION INTRAVENOUS; SUBCUTANEOUS at 23:09

## 2021-08-18 RX ADMIN — PROMETHAZINE HYDROCHLORIDE 12.5 MG: 25 INJECTION INTRAMUSCULAR; INTRAVENOUS at 18:19

## 2021-08-18 RX ADMIN — IOPAMIDOL 75 ML: 755 INJECTION, SOLUTION INTRAVENOUS at 18:31

## 2021-08-18 RX ADMIN — SODIUM CHLORIDE 3000 ML: 9 INJECTION, SOLUTION INTRAVENOUS at 18:18

## 2021-08-18 RX ADMIN — PROMETHAZINE HYDROCHLORIDE 12.5 MG: 25 INJECTION INTRAMUSCULAR; INTRAVENOUS at 20:23

## 2021-08-18 RX ADMIN — DIPHENHYDRAMINE HYDROCHLORIDE 50 MG: 50 INJECTION, SOLUTION INTRAMUSCULAR; INTRAVENOUS at 18:19

## 2021-08-18 ASSESSMENT — PAIN SCALES - GENERAL: PAINLEVEL_OUTOF10: 8

## 2021-08-18 ASSESSMENT — PAIN DESCRIPTION - PAIN TYPE: TYPE: ACUTE PAIN

## 2021-08-18 ASSESSMENT — PAIN DESCRIPTION - LOCATION: LOCATION: ABDOMEN

## 2021-08-18 NOTE — LETTER
[address]  Phone: [phone]             August 21, 2021    Patient: Monserrat Macias   YOB: 1960   Date of Visit: 8/18/2021       To Whom It May Concern:    Monserrat Macias was seen and treated in our facility beginning 8/18/2021 until 8/21/21. She may return to work 8/23/21.       Sincerely,     [facility]         Signature:__________________________________

## 2021-08-18 NOTE — ED PROVIDER NOTES
[facility]  eMERGENCY dEPARTMENT eNCOUnter      Pt Name: Marvin Tate  MRN: [id number]  Armstrongfrisa 1960  Date of evaluation: 8/18/2021  Provider: Inés Hyatt MD    [address] [place] [address]       Chief Complaint   Patient presents with    Emesis         CRITICAL CARE TIME   Total Critical Care time was 0 minutes, excluding separately reportable procedures. There was a high probability of clinically significant/life threatening deterioration in the patient's condition which required my urgent intervention. HISTORY OF PRESENT ILLNESS  (Location/Symptom, Timing/Onset, Context/Setting, Quality, Duration, Modifying Factors, Severity.)   Marvin Tate is a 62 y.o. female who presents to the emergency department complaining of nausea, vomiting, abdominal pain. Patient states her symptoms started on Monday. She vomited multiple times. She states Tuesday morning she was able to keep her Phenergan down so she was somewhat better although she did vomit yesterday. She states this morning the vomiting started all over again and she vomited multiple times. She complains of pain across her entire upper abdomen. No back pain. No hematemesis. No diarrhea. She has a history of diabetic gastroparesis with similar symptoms. She states that she did take her insulin yesterday, but not today. She states her blood sugars have been running low at home. Nursing Notes were reviewed and I agree. REVIEW OF SYSTEMS    (2-9 systems for level 4, 10 or more for level 5)     General: No fever or chills. ENT: No nasal congestion sore throat or earache. Cardiovascular: No chest pain. No palpitation. Pulmonary: No cough or shortness of breath. GI: Upper abdominal pain, bilateral, epigastric. Nausea and vomiting. No hematemesis. No diarrhea. : No frequency urgency or dysuria. Musculoskeletal: Chronic back pain unchanged.     Except as noted above the remainder of the review of systems was reviewed and negative. 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    Gastroparesis     Hirsutism     Hyperlipidemia LDL goal < 70     Hypertension     Major depressive disorder with single episode 2009    Pancreatitis     Seasonal allergic rhinitis due to pollen 11/10/2016    Sleep apnea     Vitamin D deficiency          SURGICAL HISTORY       Past Surgical History:   Procedure Laterality Date     SECTION      ENDOMETRIAL ABLATION  5/3/12    HYSTEROSCOPY  5/3/12    MANDIBLE SURGERY      TUBAL LIGATION           CURRENT MEDICATIONS       Previous Medications    ACETAMINOPHEN (APAP EXTRA STRENGTH) 500 MG TABLET    Take 1 tablet by mouth every 6 hours as needed for Pain    ALBUTEROL SULFATE (PROAIR RESPICLICK) 295 (90 BASE) MCG/ACT AEROSOL POWDER INHALATION    Inhale 2 puffs into the lungs every 4 hours as needed for Wheezing or Shortness of Breath    ASPIRIN 81 MG TABLET    Take 81 mg by mouth daily    DICYCLOMINE (BENTYL) 10 MG CAPSULE    Take 1 capsule by mouth every 6 hours as needed (cramps)    ERGOCALCIFEROL (DRISDOL) 22813 UNITS CAPSULE    Take 1 capsule by mouth once a week    FLUNISOLIDE (AEROSPAN) 80 MCG/ACT AERS INHALER    Inhale 1 puff into the lungs 2 times daily    FLUTICASONE (FLONASE) 50 MCG/ACT NASAL SPRAY    1 spray by Nasal route daily    GLUCOSE BLOOD VI TEST STRIPS (EXACTECH TEST) STRIP    4 times a day As needed.     INSULIN PEN NEEDLE 31G X 5 MM MISC    Pt uses three times a day    INSULIN REGULAR HUMAN (HUMULIN R) 500 UNIT/ML CONCENTRATED INJECTION VIAL    INJECT Paternal Aunt     No Known Problems Paternal Uncle     No Known Problems Paternal Grandmother     No Known Problems Paternal Grandfather     No Known Problems Other     Rheum Arthritis Neg Hx     Osteoarthritis Neg Hx     Asthma Neg Hx     Diabetes Neg Hx     Heart Failure Neg Hx     High Cholesterol Neg Hx     Migraines Neg Hx     Ovarian Cancer Neg Hx     Rashes/Skin Problems Neg Hx     Seizures Neg Hx     Thyroid Disease Neg Hx           SOCIAL HISTORY       Social History     Socioeconomic History    Marital status:      Spouse name: None    Number of children: 3    Years of education: None    Highest education level: None   Occupational History    Occupation:    Tobacco Use    Smoking status: Former Smoker     Types: Cigarettes     Quit date: 1990     Years since quittin.6    Smokeless tobacco: Never Used   Substance and Sexual Activity    Alcohol use: Yes     Alcohol/week: 0.0 standard drinks     Comment: less than once a month    Drug use: Yes     Frequency: 2.0 times per week     Types: Marijuana     Comment: used yesterday    Sexual activity: Not Currently     Partners: Male   Other Topics Concern    None   Social History Narrative    None     Social Determinants of Health     Financial Resource Strain:     Difficulty of Paying Living Expenses:    Food Insecurity:     Worried About Running Out of Food in the Last Year:     Ran Out of Food in the Last Year:    Transportation Needs:     Lack of Transportation (Medical):      Lack of Transportation (Non-Medical):    Physical Activity:     Days of Exercise per Week:     Minutes of Exercise per Session:    Stress:     Feeling of Stress :    Social Connections:     Frequency of Communication with Friends and Family:     Frequency of Social Gatherings with Friends and Family:     Attends [employer] Services:     Active Member of Clubs or Organizations:     Attends Club or Organization Meetings:    [facility] Marital Status:    Intimate Partner Violence:     Fear of Current or Ex-Partner:     Emotionally Abused:     Physically Abused:     Sexually Abused:          PHYSICAL EXAM    (up to 7 for level 4, 8 or more for level 5)     ED Triage Vitals   BP Temp Temp Source Pulse Resp SpO2 Height Weight   08/18/21 1558 08/18/21 1558 08/18/21 1558 08/18/21 1558 08/18/21 1558 08/18/21 1558 08/18/21 1600 08/18/21 1600   (!) 201/96 97.9 °F (36.6 °C) Temporal 104 20 96 % 5' 7\" (1.702 m) 217 lb 2.5 oz (98.5 kg)       General: Alert moderately obese black female no acute distress. Head: Atraumatic and normocephalic. Eyes: No conjunctival injection. No pallor. Pupils equal round reactive. No icterus. ENT: Paresh Critchley is clear. Oropharynx is moist without erythema. Neck: Supple without adenopathy, nontender. Heart: Regular rate and rhythm. No murmurs or gallops noted. Lungs: Breath sounds equal bilaterally and clear. Abdomen: Obese, soft, some moderate diffuse upper abdominal tenderness poorly localized. Guarding but no rebound. No mass organomegaly. Bowel sounds are normal.  Musculoskeletal: No lower extremity edema. Intact symmetrical distal pulses. Skin: Warm and dry, good turgor. No pallor or cyanosis. No diaphoresis. Neuro: Awake, alert, oriented. No focal motor deficits. DIFFERENTIAL DIAGNOSIS   Differential includes but is not limited to gastritis, gastroenteritis, peptic ulcer disease, diabetic gastroparesis, pancreatitis, biliary colic, cholecystitis, small bowel obstruction, DKA.       DIAGNOSTIC RESULTS     EKG: All EKG's are interpreted by Aminata Baldwin MD in the absence of a cardiologist.      RADIOLOGY:   Non-plain film images such as CT, Ultrasound and MRI are read by the radiologist. Plain radiographic images are visualized and preliminarily interpreted Aminata Baldwin MD with the below findings:      Interpretation per the Radiologist below, if available at the time of this note:    CT ABDOMEN PELVIS W IV CONTRAST Additional Contrast? None   Final Result   No acute abdominal or pelvic abnormality.                ED BEDSIDE ULTRASOUND:   Performed by ED Physician - none    LABS:  Labs Reviewed   CBC WITH AUTO DIFFERENTIAL - Abnormal; Notable for the following components:       Result Value    WBC 20.6 (*)     RBC 6.40 (*)     Hemoglobin 16.8 (*)     Hematocrit 51.6 (*)     RDW 15.6 (*)     Neutrophils Absolute 18.4 (*)     All other components within normal limits    Narrative:     Performed at:  [address] [place] [address]   Phone [phone]   COMPREHENSIVE METABOLIC PANEL W/ REFLEX TO MG FOR LOW K - Abnormal; Notable for the following components:    Sodium 135 (*)     Chloride 95 (*)     Glucose 298 (*)     Total Protein 8.4 (*)     Alkaline Phosphatase 148 (*)     All other components within normal limits    Narrative:     Performed at:  [address] [facility] [place] [address]   Phone [phone]   URINE RT REFLEX TO CULTURE - Abnormal; Notable for the following components:    Glucose, Ur >=1000 (*)     Ketones, Urine 40 (*)     Blood, Urine SMALL (*)     Protein, UA >=300 (*)     All other components within normal limits    Narrative:     Performed at:  [address] [facility] [place] [address]   Phone [phone]   MICROSCOPIC URINALYSIS - Abnormal; Notable for the following components:    Bacteria, UA RARE (*)     RBC, UA 7 (*)     All other components within normal limits    Narrative:     Performed at:  [address] [facility] [place] [address]   Phone [phone]   BLOOD GAS, VENOUS - Abnormal; Notable for the following components:    pCO2, Bernard 57.7 (*)     HCO3, Venous 33 (*)     All other components within normal limits    Narrative:     Performed at:  [facility]) - again today. She has some upper abdominal discomfort. She has some tenderness on exam.  She states this feels like her typical gastroparesis. Her white blood cell count is elevated at 20,600 with a left shift. She has a lactic acidosis with a lactic acid of 4.4. Her CT abdomen pelvis shows no acute findings. She has no source of infection. Her urinalysis is unremarkable. She has no intra-abdominal source of infection. No respiratory source of infection. I think her lactic acidosis is likely from volume depletion. Based on her work-up I do not think she is in DKA. He been aggressively hydrated. She has been given antiemetics. She continues to have nausea and vomiting. She will need to be admitted for continued hydration and control of her intractable nausea and vomiting. The hospitalist was consulted for admission. Test results, diagnosis, and treatment plan were discussed with the patient. She understands the treatment plan and need for admission and is agreeable. CONSULTS:  IP CONSULT TO HOSPITALIST    PROCEDURES:  None    FINAL IMPRESSION      1. Intractable nausea and vomiting    2. Lactic acid acidosis    3. Diabetic gastroparesis [facility])          DISPOSITION/PLAN   DISPOSITION Decision To Admit 08/18/2021 08:15:59 PM      PATIENT REFERRED TO:  No follow-up provider specified.     DISCHARGE MEDICATIONS:  New Prescriptions    No medications on file       (Please note that portions of this note were completed with a voice recognition program.  Efforts were made to edit the dictations but occasionally words are mis-transcribed.)    Nataly Rodriguez MD  Attending Emergency Physician        Chelsea Maddox MD  08/18/21 2022

## 2021-08-19 ENCOUNTER — APPOINTMENT (OUTPATIENT)
Dept: ULTRASOUND IMAGING | Age: 61
DRG: 074 | End: 2021-08-19
Payer: COMMERCIAL

## 2021-08-19 ENCOUNTER — ANESTHESIA EVENT (OUTPATIENT)
Dept: ENDOSCOPY | Age: 61
DRG: 074 | End: 2021-08-19
Payer: COMMERCIAL

## 2021-08-19 LAB
ANION GAP SERPL CALCULATED.3IONS-SCNC: 10 MMOL/L (ref 3–16)
BASOPHILS ABSOLUTE: 0.1 K/UL (ref 0–0.2)
BASOPHILS RELATIVE PERCENT: 0.6 %
BUN BLDV-MCNC: 13 MG/DL (ref 7–20)
CALCIUM SERPL-MCNC: 9.4 MG/DL (ref 8.3–10.6)
CHLORIDE BLD-SCNC: 95 MMOL/L (ref 99–110)
CO2: 32 MMOL/L (ref 21–32)
CREAT SERPL-MCNC: 0.7 MG/DL (ref 0.6–1.2)
EOSINOPHILS ABSOLUTE: 0 K/UL (ref 0–0.6)
EOSINOPHILS RELATIVE PERCENT: 0.1 %
GFR AFRICAN AMERICAN: >60
GFR NON-AFRICAN AMERICAN: >60
GLUCOSE BLD-MCNC: 113 MG/DL (ref 70–99)
GLUCOSE BLD-MCNC: 114 MG/DL (ref 70–99)
GLUCOSE BLD-MCNC: 122 MG/DL (ref 70–99)
GLUCOSE BLD-MCNC: 167 MG/DL (ref 70–99)
GLUCOSE BLD-MCNC: 97 MG/DL (ref 70–99)
HCT VFR BLD CALC: 46.5 % (ref 36–48)
HEMOGLOBIN: 15.3 G/DL (ref 12–16)
LACTIC ACID, SEPSIS: 1.3 MMOL/L (ref 0.4–1.9)
LYMPHOCYTES ABSOLUTE: 3.1 K/UL (ref 1–5.1)
LYMPHOCYTES RELATIVE PERCENT: 14.6 %
MCH RBC QN AUTO: 25.7 PG (ref 26–34)
MCHC RBC AUTO-ENTMCNC: 33 G/DL (ref 31–36)
MCV RBC AUTO: 77.7 FL (ref 80–100)
MONOCYTES ABSOLUTE: 1.5 K/UL (ref 0–1.3)
MONOCYTES RELATIVE PERCENT: 7.1 %
NEUTROPHILS ABSOLUTE: 16.5 K/UL (ref 1.7–7.7)
NEUTROPHILS RELATIVE PERCENT: 77.6 %
PDW BLD-RTO: 14.8 % (ref 12.4–15.4)
PERFORMED ON: ABNORMAL
PERFORMED ON: NORMAL
PLATELET # BLD: 269 K/UL (ref 135–450)
PMV BLD AUTO: 8.3 FL (ref 5–10.5)
POTASSIUM REFLEX MAGNESIUM: 3.7 MMOL/L (ref 3.5–5.1)
RBC # BLD: 5.98 M/UL (ref 4–5.2)
SARS-COV-2, NAAT: NOT DETECTED
SODIUM BLD-SCNC: 137 MMOL/L (ref 136–145)
WBC # BLD: 21.3 K/UL (ref 4–11)

## 2021-08-19 PROCEDURE — 2500000003 HC RX 250 WO HCPCS: Performed by: INTERNAL MEDICINE

## 2021-08-19 PROCEDURE — 83605 ASSAY OF LACTIC ACID: CPT

## 2021-08-19 PROCEDURE — 6370000000 HC RX 637 (ALT 250 FOR IP): Performed by: INTERNAL MEDICINE

## 2021-08-19 PROCEDURE — 2580000003 HC RX 258: Performed by: NURSE PRACTITIONER

## 2021-08-19 PROCEDURE — C9113 INJ PANTOPRAZOLE SODIUM, VIA: HCPCS | Performed by: INTERNAL MEDICINE

## 2021-08-19 PROCEDURE — 87635 SARS-COV-2 COVID-19 AMP PRB: CPT

## 2021-08-19 PROCEDURE — 1200000000 HC SEMI PRIVATE

## 2021-08-19 PROCEDURE — 6370000000 HC RX 637 (ALT 250 FOR IP): Performed by: NURSE PRACTITIONER

## 2021-08-19 PROCEDURE — 80048 BASIC METABOLIC PNL TOTAL CA: CPT

## 2021-08-19 PROCEDURE — 85025 COMPLETE CBC W/AUTO DIFF WBC: CPT

## 2021-08-19 PROCEDURE — 76937 US GUIDE VASCULAR ACCESS: CPT

## 2021-08-19 PROCEDURE — 02HV33Z INSERTION OF INFUSION DEVICE INTO SUPERIOR VENA CAVA, PERCUTANEOUS APPROACH: ICD-10-PCS | Performed by: FAMILY MEDICINE

## 2021-08-19 PROCEDURE — 36569 INSJ PICC 5 YR+ W/O IMAGING: CPT

## 2021-08-19 PROCEDURE — 6360000002 HC RX W HCPCS: Performed by: INTERNAL MEDICINE

## 2021-08-19 PROCEDURE — C1751 CATH, INF, PER/CENT/MIDLINE: HCPCS

## 2021-08-19 PROCEDURE — 76705 ECHO EXAM OF ABDOMEN: CPT

## 2021-08-19 PROCEDURE — 2580000003 HC RX 258: Performed by: INTERNAL MEDICINE

## 2021-08-19 PROCEDURE — 36415 COLL VENOUS BLD VENIPUNCTURE: CPT

## 2021-08-19 RX ORDER — SODIUM CHLORIDE, SODIUM LACTATE, POTASSIUM CHLORIDE, CALCIUM CHLORIDE 600; 310; 30; 20 MG/100ML; MG/100ML; MG/100ML; MG/100ML
INJECTION, SOLUTION INTRAVENOUS CONTINUOUS
Status: ACTIVE | OUTPATIENT
Start: 2021-08-19 | End: 2021-08-20

## 2021-08-19 RX ORDER — PANTOPRAZOLE SODIUM 40 MG/10ML
40 INJECTION, POWDER, LYOPHILIZED, FOR SOLUTION INTRAVENOUS 2 TIMES DAILY
Status: DISCONTINUED | OUTPATIENT
Start: 2021-08-19 | End: 2021-08-21 | Stop reason: HOSPADM

## 2021-08-19 RX ORDER — ENALAPRILAT 2.5 MG/2ML
1.25 INJECTION INTRAVENOUS EVERY 6 HOURS PRN
Status: DISCONTINUED | OUTPATIENT
Start: 2021-08-19 | End: 2021-08-21 | Stop reason: HOSPADM

## 2021-08-19 RX ORDER — SODIUM CHLORIDE 9 MG/ML
10 INJECTION INTRAVENOUS 2 TIMES DAILY
Status: DISCONTINUED | OUTPATIENT
Start: 2021-08-19 | End: 2021-08-21 | Stop reason: HOSPADM

## 2021-08-19 RX ORDER — LANOLIN ALCOHOL/MO/W.PET/CERES
6 CREAM (GRAM) TOPICAL NIGHTLY PRN
Status: DISCONTINUED | OUTPATIENT
Start: 2021-08-19 | End: 2021-08-21 | Stop reason: HOSPADM

## 2021-08-19 RX ADMIN — Medication 10 ML: at 20:59

## 2021-08-19 RX ADMIN — ASPIRIN 81 MG: 81 TABLET, CHEWABLE ORAL at 12:44

## 2021-08-19 RX ADMIN — TIMOLOL MALEATE 1 DROP: 5 SOLUTION OPHTHALMIC at 08:30

## 2021-08-19 RX ADMIN — TIMOLOL MALEATE 1 DROP: 5 SOLUTION OPHTHALMIC at 20:58

## 2021-08-19 RX ADMIN — Medication 6 MG: at 23:30

## 2021-08-19 RX ADMIN — DICYCLOMINE HYDROCHLORIDE 10 MG: 10 CAPSULE ORAL at 01:24

## 2021-08-19 RX ADMIN — SODIUM CHLORIDE, POTASSIUM CHLORIDE, SODIUM LACTATE AND CALCIUM CHLORIDE: 600; 310; 30; 20 INJECTION, SOLUTION INTRAVENOUS at 17:30

## 2021-08-19 RX ADMIN — ONDANSETRON 4 MG: 2 INJECTION INTRAMUSCULAR; INTRAVENOUS at 01:14

## 2021-08-19 RX ADMIN — ENALAPRILAT 1.25 MG: 1.25 INJECTION INTRAVENOUS at 01:25

## 2021-08-19 RX ADMIN — SODIUM CHLORIDE: 9 INJECTION, SOLUTION INTRAVENOUS at 12:44

## 2021-08-19 RX ADMIN — SODIUM CHLORIDE: 9 INJECTION, SOLUTION INTRAVENOUS at 01:16

## 2021-08-19 RX ADMIN — SODIUM CHLORIDE, PRESERVATIVE FREE 10 ML: 5 INJECTION INTRAVENOUS at 01:36

## 2021-08-19 RX ADMIN — Medication 10 ML: at 06:26

## 2021-08-19 RX ADMIN — PANTOPRAZOLE SODIUM 40 MG: 40 INJECTION, POWDER, FOR SOLUTION INTRAVENOUS at 20:59

## 2021-08-19 RX ADMIN — Medication 10 ML: at 08:29

## 2021-08-19 RX ADMIN — PANTOPRAZOLE SODIUM 40 MG: 40 INJECTION, POWDER, FOR SOLUTION INTRAVENOUS at 08:29

## 2021-08-19 RX ADMIN — LISINOPRIL 40 MG: 40 TABLET ORAL at 08:28

## 2021-08-19 RX ADMIN — DIPHENHYDRAMINE HYDROCHLORIDE 50 MG: 50 INJECTION, SOLUTION INTRAMUSCULAR; INTRAVENOUS at 08:28

## 2021-08-19 RX ADMIN — ENOXAPARIN SODIUM 40 MG: 40 INJECTION SUBCUTANEOUS at 12:44

## 2021-08-19 RX ADMIN — PANTOPRAZOLE SODIUM 40 MG: 40 INJECTION, POWDER, FOR SOLUTION INTRAVENOUS at 03:30

## 2021-08-19 RX ADMIN — LATANOPROST 1 DROP: 50 SOLUTION OPHTHALMIC at 21:05

## 2021-08-19 ASSESSMENT — PAIN SCALES - GENERAL
PAINLEVEL_OUTOF10: 0

## 2021-08-19 ASSESSMENT — PAIN SCALES - WONG BAKER
WONGBAKER_NUMERICALRESPONSE: 0
WONGBAKER_NUMERICALRESPONSE: 0

## 2021-08-19 NOTE — ED NOTES
Unable to obtain another peripheral line in pt at this time. Only 1200ml of NS given.      Natividad Samayoa RN  08/18/21 2120

## 2021-08-19 NOTE — ACP (ADVANCE CARE PLANNING)
Advance Care Planning     Advance Care Planning Activator (Inpatient)  Conversation Note      Date of ACP Conversation: 8/19/2021     Conversation Conducted with: Patient with Decision Making Capacity    ACP Activator: Charlette Atkinson RN    Health Care Decision Maker:     Current Designated Health Care Decision Maker:     Primary Decision Maker: Rosa Elena Ng Child - [phone]    Care Preferences    Ventilation: \"If you were in your present state of health and suddenly became very ill and were unable to breathe on your own, what would your preference be about the use of a ventilator (breathing machine) if it were available to you? \"      Would the patient desire the use of ventilator (breathing machine)?: yes    \"If your health worsens and it becomes clear that your chance of recovery is unlikely, what would your preference be about the use of a ventilator (breathing machine) if it were available to you? \"     Would the patient desire the use of ventilator (breathing machine)?: No      Resuscitation  \"CPR works best to restart the heart when there is a sudden event, like a heart attack, in someone who is otherwise healthy. Unfortunately, CPR does not typically restart the heart for people who have serious health conditions or who are very sick. \"    \"In the event your heart stopped as a result of an underlying serious health condition, would you want attempts to be made to restart your heart (answer \"yes\" for attempt to resuscitate) or would you prefer a natural death (answer \"no\" for do not attempt to resuscitate)? \" yes       [] Yes   [x] No   Educated Patient / Felicicharlie Sandoval regarding differences between Advance Directives and portable DNR orders.     Length of ACP Conversation in minutes:      Conversation Outcomes:  [x] ACP discussion completed  [] Existing advance directive reviewed with patient; no changes to patient's previously recorded wishes  [] New Advance Directive completed  [] Portable Do Not Rescitate prepared for Provider review and signature  [] POLST/POST/MOLST/MOST prepared for Provider review and signature      Follow-up plan:    [] Schedule follow-up conversation to continue planning  [] Referred individual to Provider for additional questions/concerns   [] Advised patient/agent/surrogate to review completed ACP document and update if needed with changes in condition, patient preferences or care setting    [x] This note routed to one or more involved healthcare providers  Electronically signed by Justin Donohue RN on 8/19/2021 at 4:09 PM

## 2021-08-19 NOTE — PROGRESS NOTES
4 Eyes Skin Assessment     NAME:  Ama Barclay  YOB: 1960  MEDICAL RECORD NUMBER:  [id number]    The patient is being assess for  Admission    I agree that 2 RN's have performed a thorough Head to Toe Skin Assessment on the patient. ALL assessment sites listed below have been assessed. Areas assessed by both nurses:    Head, Face, Ears, Shoulders, Back, Chest, Arms, Elbows, Hands, Sacrum. Buttock, Coccyx, Ischium and Legs. Feet and Heels        Does the Patient have a Wound?  No noted wound(s)       Steve Prevention initiated:  No   Wound Care Orders initiated:  No    Pressure Injury (Stage 3,4, Unstageable, DTI, NWPT, and Complex wounds) if present place consult order under [de-identified] No    New and Established Ostomies if present place consult order under : No      Nurse 1 eSignature: Electronically signed by Lisa Craig RN on 8/19/21 at 6:30 AM EDT    **SHARE this note so that the co-signing nurse is able to place an eSignature**    Nurse 2 eSignature: Electronically signed by Megan Pepe RN on 8/19/21 at 6:51 AM EDT

## 2021-08-19 NOTE — H&P
[oxycodone-acetaminophen], Reglan [metoclopramide], Spironolactone, Statins, Sulfamethoxazole-trimethoprim, and Vicodin [hydrocodone-acetaminophen]    Medications Prior to Admission:    Prior to Admission medications    Medication Sig Start Date End Date Taking? Authorizing Provider   ezetimibe (ZETIA) 10 MG tablet Take 10 mg by mouth daily   Yes Historical Provider, MD   amLODIPine (NORVASC) 5 MG tablet Take 5 mg by mouth daily   Yes Historical Provider, MD   insulin glargine (BASAGLAR KWIKPEN) 100 UNIT/ML injection pen Inject 64 Units into the skin nightly   Yes Historical Provider, MD   insulin lispro (HUMALOG) 100 UNIT/ML injection vial Inject 18 Units into the skin 3 times daily (before meals)   Yes Historical Provider, MD   insulin lispro (HUMALOG) 100 UNIT/ML injection vial Inject into the skin 3 times daily (before meals) Per sliding scale. 2 units >150 then additional 2 units for every 50   Yes Historical Provider, MD   lisinopril (PRINIVIL;ZESTRIL) 40 MG tablet TAKE 1 TABLET BY MOUTH EVERY DAY 12/8/17  Yes Abraham Figueroa MD   aspirin 81 MG tablet Take 81 mg by mouth daily   Yes Historical Provider, MD   omeprazole (PRILOSEC) 40 MG delayed release capsule TAKE 1 CAPSULE BY MOUTH ONCE DAILY  Patient taking differently: Take 40 mg by mouth Daily with supper TAKE 1 CAPSULE BY MOUTH ONCE DAILY 11/10/16  Yes Abraham Figueroa MD   timolol (TIMOPTIC) 0.5 % ophthalmic solution Place 1 drop into both eyes daily  8/7/16  Yes Historical Provider, MD   ergocalciferol (DRISDOL) 72674 UNITS capsule Take 1 capsule by mouth once a week 5/24/16  Yes Rajiv Crocker MD   albuterol sulfate (PROAIR RESPICLICK) 774 (90 BASE) MCG/ACT aerosol powder inhalation Inhale 2 puffs into the lungs every 4 hours as needed for Wheezing or Shortness of Breath 5/20/16  Yes Rajiv Crocker MD   latanoprost (XALATAN) 0.005 % ophthalmic solution Place 1 drop into both eyes nightly.    Yes Historical Provider, MD   Insulin Syringe-Needle U-100 (INSULIN SYRINGE .5CC/31GX5/16\") 31G X 5/16\" 0.5 ML MISC 1 each by Does not apply route daily 4/14/17   Rekha Barfield MD   SUMAtriptan (IMITREX) 50 MG tablet Take 1 tablet by mouth once as needed for Migraine May repeat in 2 hours x one 3/7/17 3/7/17  Bogdan Dennis MD   acetaminophen (APAP EXTRA STRENGTH) 500 MG tablet Take 1 tablet by mouth every 6 hours as needed for Pain 11/18/16   Mike Tucker [place]   Tens Unit MISC by Does not apply route 11/10/16   Bogdan Dennis MD   Insulin Pen Needle 31G X 5 MM MISC Pt uses three times a day 9/17/15   Rekha Barfield MD   glucose blood VI test strips (EXACTECH TEST) strip 4 times a day As needed. 3/23/15   Rekha Barfield MD   Lancets MISC 3-4 times a day 2/26/15   Rekha Barfield MD       Family History:       Problem Relation Age of Onset    Hypertension Mother     Breast Cancer Mother     Colon Cancer Mother     Cancer Father         Pancreatic    Prostate Cancer Father     No Known Problems Brother     No Known Problems Sister     Hypertension Maternal Aunt     Cancer Maternal Uncle     Hypertension Maternal Grandmother     Stroke Maternal Grandmother     Cancer Maternal Grandfather     No Known Problems Paternal Aunt     No Known Problems Paternal Uncle     No Known Problems Paternal Grandmother     No Known Problems Paternal Grandfather     No Known Problems Other     Rheum Arthritis Neg Hx     Osteoarthritis Neg Hx     Asthma Neg Hx     Diabetes Neg Hx     Heart Failure Neg Hx     High Cholesterol Neg Hx     Migraines Neg Hx     Ovarian Cancer Neg Hx     Rashes/Skin Problems Neg Hx     Seizures Neg Hx     Thyroid Disease Neg Hx      Social History:   TOBACCO:   reports that she quit smoking about 31 years ago. Her smoking use included cigarettes. She has never used smokeless tobacco.  ETOH:   reports current alcohol use.   OCCUPATION:      REVIEW OF SYSTEMS:  A full review of systems was performed Negative 08/18/2021    RBCUA 7 08/18/2021    SPECGRAV >1.030 08/18/2021    UROBILINOGEN 1.0 08/18/2021    BILIRUBINUR Negative 08/18/2021    BILIRUBINUR NEGATIVE 04/09/2012    BLOODU SMALL 08/18/2021    GLUCOSEU >=1000 08/18/2021    GLUCOSEU NEGATIVE 04/09/2012    PROTEINU >=300 08/18/2021         RAD:   I have independently reviewed and interpreted the imaging studies below and based my recommendations to the patient on those findings. CT ABDOMEN PELVIS W IV CONTRAST Additional Contrast? None    Result Date: 8/18/2021  EXAMINATION: CT OF THE ABDOMEN AND PELVIS WITH CONTRAST 8/18/2021 5:40 pm TECHNIQUE: CT of the abdomen and pelvis was performed with the administration of intravenous contrast. Multiplanar reformatted images are provided for review. Dose modulation, iterative reconstruction, and/or weight based adjustment of the mA/kV was utilized to reduce the radiation dose to as low as reasonably achievable. COMPARISON: None. HISTORY: ORDERING SYSTEM PROVIDED HISTORY: intractable vomiting TECHNOLOGIST PROVIDED HISTORY: Reason for exam:->intractable vomiting Additional Contrast?->None Decision Support Exception - unselect if not a suspected or confirmed emergency medical condition->Emergency Medical Condition (MA) Reason for Exam: intractable vomiting Acuity: Acute Type of Exam: Initial FINDINGS: Lower Chest: The lung bases are without consolidation effusion. Visualized cardiac structures are unremarkable. Organs: The liver and spleen are normal size and overall attenuation. The gallbladder, pancreas, and adrenal glands are unremarkable. The kidneys are without obstructive uropathy. The urinary bladder is unremarkable. GI/Bowel: The stomach is unremarkable. Loops of small bowel are normal in caliber without evidence for obstruction. The colon contains air and fecal residue and is otherwise unremarkable. The appendix is normal.  There is no intraperitoneal free air or free fluid. Pelvis:  The uterus is age-appropriate. Peritoneum/Retroperitoneum: The psoas muscles are symmetric. The abdominal aorta is normal in caliber. The inferior vena cava is unremarkable. There is no retroperitoneal or mesenteric adenopathy. Bones/Soft Tissues: The extra-abdominal soft tissues are unremarkable. There is no acute osseous abnormality. No acute abdominal or pelvic abnormality. Assessment:   Principal Problem:    Intractable nausea and vomiting  Active Problems:    Asthma    Hyperlipidemia with target LDL less than 70    COPD (chronic obstructive pulmonary disease)    DMII (diabetes mellitus, type 2) ([facility])    Gastroparesis diabeticorum (HCC)    Generalized abdominal pain    Essential hypertension  Resolved Problems:    * No resolved hospital problems. *      Plan:       Intractable nausea and vomiting - Will provide symptomatic treatment with Zofran and/or Phenergan as needed, maintenance of fluids and electrolytes. Underlying cause is unclear; possibly Gastroparesis? Will consult GI     Generalized abdominal pain - worse across the top of her abdomen when having emesis. Possible gastritis? Protonix bid. Gastroparesis diabeticorum ([address] - will consult GI top see if this is playing a role    Asthma/COPD (chronic obstructive pulmonary disease) - without acute exacerbation. Will provide Nebulizer treatments as needed and continue home medications. Patient will be monitored closely, and deep breathing and coughing will be encouraged while awake. Diabetes mellitus II - SSI and carb control diet    Essential (primary) hypertension - continue home meds and monitor blood pressure    Hyperlipidemia - Hold Zetia while in the hospital        DVT Prophylaxis: Lovenox  Diet: ADULT DIET; Regular; 5 carb choices (75 gm/meal)  Code Status: Full Code  (Advanced care planning has been discussed with patient and/or responsible family member and is reflected in the code status.  Further orders associated with this have been entered if appropriate)    Disposition: Anticipate that patient will remain in the hospital for 2 to 3 days depending on further evaluation and clinical course    Please note that over 50 minutes was spent in evaluating the patient, review of records and results, discussion with staff/family, etc.    Min Rodrigues MD, MD

## 2021-08-19 NOTE — CONSULTS
GASTROENTEROLOGY INPATIENT CONSULTATION        IDENTIFYING DATA/REASON FOR CONSULTATION   PATIENT:  aJck Carrasco  MRN:  [id number]  ADMIT DATE: 8/18/2021  TIME OF EVALUATION: 8/19/2021 8:09 AM  HOSPITAL STAY:   LOS: 1 day     REASON FOR CONSULTATION: Intractable nausea vomiting    HISTORY OF PRESENT ILLNESS   Jack Carrasco is a 62 y.o. female with a PMH of DM type II, gastroparesis, HTN, HLD, COPD who presented on 8/18/2021 with intractable nausea and vomiting. We have been consulted regarding the same. Patient reports she woke up Monday morning nauseated. She vomited throughout the day. Symptoms persisted Tuesday and last Wednesday, progressively worsened. She had associated abdominal soreness which she attributes to the vomiting. Her last vomiting episode was at 1 AM last night. She denies any blood in her vomit. She denies any associated fevers but has been experiencing chills. She states symptoms are typical of her prior gastroparesis flares. She has a listed allergy to Reglan. She had an EGD and colonoscopy in 2019 which was normal other than colon polyps. A prior EUS in 2017 was unremarkable, findings did not suggest chronic pancreatitis. CT A/P unremarkable with no acute intra-abdominal findings. Blood work shows elevated white count of 21, mildly elevated alk phos of 148 otherwise normal LFTs, normal lipase. Lactic acid 4.4 on admission which has since normalized. Prior Endoscopic Evaluations:  EGD/colonoscopy 9/2019 with Dr. Karly Travis  Normal appearing esophagus, stomach, duodenum. Antral biopsies negative for H. Pylori  1 cecal polyp, 3 transverse polyps biopsies showed tubular adenomas    EGD/EUS 2/2017 with Dr. Uche Rodgers sliding hiatal hernia. Otherwise normal EGD. Fayrene Fail dilation performed to 46 Fr given dysphagia and small bowel biopsies taken to evaluate for sprue given the bloating and diarrhea. 2 criteria for chronic pancreatitis.   No etiology of her recent attack identified. Normal gallbladder and bile duct. Normal pancreatic duct size.     Colonoscopy 4/10/2017 with Dr. Jose Espinoza  1. Ascending Polyp 2. Transverse Polyps 3.  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    Gastroparesis     Hirsutism     Hyperlipidemia LDL goal < 70     Hypertension     Major depressive disorder with single episode 2009    Pancreatitis     Seasonal allergic rhinitis due to pollen 11/10/2016    Sleep apnea     Vitamin D deficiency      Past Surgical History:   Procedure Laterality Date     SECTION      ENDOMETRIAL ABLATION  5/3/12    HYSTEROSCOPY  5/3/12    MANDIBLE SURGERY      TUBAL LIGATION       Family History   Problem Relation Age of Onset    Hypertension Mother     Breast Cancer Mother     Colon Cancer Mother     Cancer Father         Pancreatic    Prostate Cancer Father     No Known Problems Brother     No Known Problems Sister     Hypertension Maternal Aunt     Cancer Maternal Uncle     Hypertension Maternal Grandmother     Stroke Maternal Grandmother     Cancer Maternal Grandfather     No Known Problems Paternal Aunt     No Known Problems Paternal Uncle     No Known Problems Paternal Grandmother     No Known Problems Paternal Grandfather     No Known Problems Other     Rheum Arthritis Neg Hx     Osteoarthritis Neg Hx     Asthma Neg Hx     Diabetes Neg Hx     Heart Failure Neg Hx     High Cholesterol Neg Hx     Migraines Neg Hx     Ovarian Cancer Neg Hx     Rashes/Skin Problems Neg Hx     Seizures Neg Hx  Thyroid Disease Neg Hx      Social History     Socioeconomic History    Marital status:      Spouse name: None    Number of children: 3    Years of education: None    Highest education level: None   Occupational History    Occupation:    Tobacco Use    Smoking status: Former Smoker     Types: Cigarettes     Quit date: 1990     Years since quittin.6    Smokeless tobacco: Never Used   Substance and Sexual Activity    Alcohol use: Yes     Alcohol/week: 0.0 standard drinks     Comment: less than once a month    Drug use: Yes     Frequency: 2.0 times per week     Types: Marijuana     Comment: used yesterday    Sexual activity: Not Currently     Partners: Male   Other Topics Concern    None   Social History Narrative    None     Social Determinants of Health     Financial Resource Strain:     Difficulty of Paying Living Expenses:    Food Insecurity:     Worried About Running Out of Food in the Last Year:     Ran Out of Food in the Last Year:    Transportation Needs:     Lack of Transportation (Medical):      Lack of Transportation (Non-Medical):    Physical Activity:     Days of Exercise per Week:     Minutes of Exercise per Session:    Stress:     Feeling of Stress :    Social Connections:     Frequency of Communication with Friends and Family:     Frequency of Social Gatherings with Friends and Family:     Attends Religion Services:     Active Member of Clubs or Organizations:     Attends Club or Organization Meetings:     Marital Status:    Intimate Partner Violence:     Fear of Current or Ex-Partner:     Emotionally Abused:     Physically Abused:     Sexually Abused:        MEDICATIONS   SCHEDULED:  pantoprazole, 40 mg, BID   And  sodium chloride (PF), 10 mL, BID  timolol, 1 drop, Daily  lisinopril, 40 mg, Daily  latanoprost, 1 drop, Nightly  aspirin, 81 mg, Daily  sodium chloride flush, 10 mL, 2 times per day  enoxaparin, 40 mg, Daily  insulin lispro, 0-12 Units, TID WC  insulin lispro, 0-6 Units, Nightly  diphenhydrAMINE, 50 mg, Q6H  lidocaine 1 % injection, 5 mL, Once      FLUIDS/DRIPS:     dextrose      sodium chloride      sodium chloride 100 mL/hr at 08/19/21 0116     PRNs: enalaprilat, 1.25 mg, Q6H PRN  dicyclomine, 10 mg, Q6H PRN  albuterol sulfate HFA, 2 puff, Q4H PRN  glucose, 15 g, PRN  dextrose, 12.5 g, PRN  glucagon (rDNA), 1 mg, PRN  dextrose, 100 mL/hr, PRN  sodium chloride flush, 10 mL, PRN  sodium chloride, 25 mL, PRN  ondansetron, 4 mg, Q4H PRN  polyethylene glycol, 17 g, Daily PRN  acetaminophen, 650 mg, Q4H PRN   Or  acetaminophen, 650 mg, Q4H PRN  promethazine, 25 mg, Q4H PRN      ALLERGIES:  She   Allergies   Allergen Reactions    Avelox [Moxifloxacin] Other (See Comments)     Weakness, tingling, tingling mouth    Bactrim      Remote h/o diarrhea, swollen eyes, and weakness, tachycardia and tongue swelling    Cefuroxime      Pt does not recall what happened with this    Codeine     Medrol [Methylprednisolone] Swelling     Ok to take nasal steroids    Morphine     Niacin Other (See Comments)    Oat      Throat swells    Percocet [Oxycodone-Acetaminophen]     Reglan [Metoclopramide] Other (See Comments)     States has something to do with a mass unknown reaction     Spironolactone      Increased potassium    Statins      Muscle cramps    Sulfamethoxazole-Trimethoprim Swelling    Vicodin [Hydrocodone-Acetaminophen]        REVIEW OF SYSTEMS   Pertinent ROS noted in HPI    PHYSICAL EXAM     Vitals:    08/18/21 2200 08/18/21 2310 08/19/21 0115 08/19/21 0615   BP: (!) 209/85 (!) 206/96 (!) 195/99 (!) 158/80   Pulse: 99 94  86   Resp: 16 16  17   Temp:  97.6 °F (36.4 °C)  97.9 °F (36.6 °C)   TempSrc:  Oral  Oral   SpO2: 97% 98%  99%   Weight:    216 lb 7.9 oz (98.2 kg)   Height:           I/O last 3 completed shifts:   In: 1200 [IV Piggyback:1200]  Out: -       Physical Exam:  General appearance: alert, cooperative, no distress, appears stated age  Eyes: Anicteric  Head: Normocephalic, without obvious abnormality  Lungs: clear to auscultation bilaterally, Normal Effort  Heart: regular rate and rhythm, normal S1 and S2, no murmurs or rubs  Abdomen: soft, non-distended, non-tender. Bowel sounds normal. No masses,  no organomegaly. Extremities: atraumatic, no cyanosis or edema  Skin: warm and dry, no jaundice  Neuro: Grossly intact, A&OX3      LABS AND IMAGING   Laboratory   Recent Labs     08/18/21  1629 08/19/21  0320   WBC 20.6* 21.3*   HGB 16.8* 15.3   HCT 51.6* 46.5   MCV 80.5 77.7*    269     Recent Labs     08/18/21  1629 08/19/21  0320   * 137   K 4.0 3.7   CL 95* 95*   CO2 24 32   BUN 18 13   CREATININE 0.7 0.7     Recent Labs     08/18/21  1629   AST 19   ALT 20   BILITOT 0.6   ALKPHOS 148*     Recent Labs     08/18/21  1629   LIPASE 34.0     No results for input(s): PROTIME, INR in the last 72 hours. Imaging  CT ABDOMEN PELVIS W IV CONTRAST Additional Contrast? None   Final Result   No acute abdominal or pelvic abnormality. ASSESSMENT AND RECOMMENDATIONS   62 y.o. female with a  PMH of DM type II, gastroparesis, HTN, HLD, COPD who presented on 8/18/2021 with:      1. Intractable nausea and vomiting suspected 2/2 gastroparesis flare. CT A/P with no acute findings. Blood work notable for elevated white count which appears chronic, and mildly elevated alk phos otherwise normal LFTs, normal lipase. Will check a right upper quadrant ultrasound to rule out gallbladder etiology. If negative and symptoms persist despite supportive care will consider EGD tomorrow to evaluate for gastritis, esophagitis, PUD. She has a listed allergy to metoclopramide. Continue antiemetics as needed. 2. leukocytosis: CT A/P negative for source, UA unremarkable. Checking right upper quadrant ultrasound. She has had elevations in her white count dating back to 2017.         RECOMMENDATIONS:    Will check right upper quadrant ultrasound  After ultrasound okay for clear liquid diet, monitor tolerance. Continue supportive care with antiemetics as needed, IV fluids. If you have any questions or need any further information, please feel free to contact our consult team.  Thank you for allowing us to participate in the care of Saleem Espinoza. The note was completed using Dragon voice recognition transcription. Every effort was made to ensure accuracy; however, inadvertent transcription errors may be present despite my best efforts to edit errors.       Domenic Tyson PA-C

## 2021-08-19 NOTE — PROGRESS NOTES
Patient A&O. Patient resting in bed at this time. Call light within reach. Will continue to monitor and reassess.  Electronically signed by Donna Silverio RN on 8/19/2021

## 2021-08-19 NOTE — PLAN OF CARE
Problem: Falls - Risk of:  Goal: Will remain free from falls  Description: Will remain free from falls  8/19/2021 1445 by Lili Madison RN  Outcome: Ongoing     Problem: Falls - Risk of:  Goal: Absence of physical injury  Description: Absence of physical injury  8/19/2021 1445 by Lili Madison RN  Outcome: Ongoing

## 2021-08-19 NOTE — PROGRESS NOTES
Upon arrival to place PICC line assessed chart for issues related to picc placement, check for consent, and did time out with RN Kianna. Pt. Tolerated PICC placement well, no difficulty accessing basilic vein and 3CG technology used to verify PICC tip placement. Positive P wave with no negative deflection. Printed wave form and placed In chart.  Reported off to JOHAN Hutchison

## 2021-08-19 NOTE — PROGRESS NOTES
BP is elevated. Patient has not been able to take anything by mouth.  Awaiting for PICC team. MD DR Riaz Zavala made aware

## 2021-08-19 NOTE — PROGRESS NOTES
Medication Reconciliation     List of medications patient is currently taking is complete. Source of information:   1. Conversation with patient at bedside  2. EPIC records        Notes regarding home medications:  1. Patient prescribed jardiance but has not picked up yet due to insurance issues  2. Confirmed basaglar 64 units nightly and humalog is 18 units plus sliding scale.  Sliding scale is 2 units for every 50mg/dL over 150.  3. Pt takes vitamin d on Sunday  Denies other otc/herbal use  Radha Coker, Pharmacy Intern 8/18/2021 9:03 PM

## 2021-08-19 NOTE — PROGRESS NOTES
Patient states that she is having a procedure today. This nurse inquired about this with REKHA Turcios.  REKHA Turcios, stated that patient is having an abdominal ultrasound today. Patient is ok to have asa and lovenox per  REKHA Turcios. Patient may also be on a clear liquid diet after the abdominal ultrasound per REKHA Turcios. See new order.  Electronically signed by Alondra Collazo RN on 8/19/2021

## 2021-08-19 NOTE — CARE COORDINATION
INITIAL CASE MANAGEMENT ASSESSMENT    Reviewed chart, met with patient to assess possible discharge needs. Explained Case Management role/services. Living Situation: Patient lives with Sister in an apartment with 5 steps to enter. ADLs: Independent     DME: No DME    PT/OT Recs: None     Active Services: None     Transportation: Active /Family will transport     Medications: [employer]/No barriers    PCP: CEASAR Snowden      HD/PD: N/A    PLAN/COMMENTS: Patient plans to return to home. SW/CM provided contact information for patient or family to call with any questions. SW/CM will follow and assist as needed.   Electronically signed by Radha Pratt RN on 8/19/2021 at 4:05 PM

## 2021-08-19 NOTE — PROGRESS NOTES
Hospitalist Progress Note      PCP: CEASAR Paniagua    Date of Admission: 8/18/2021    Chief Complaint: ongoing nausea and vomiting    Hospital Course: This is a 20-year-old female with a history of hypertension, hyperlipidemia, diabetes, COPD and gastroparesis who presented to the ED with complaints of recurrent nausea and vomiting. She also complains of associated abdominal pain. It is intermittent but over the past few months has noticed that it is becoming more frequent and she is concerned about being able to care for herself and keep her job. No hematemesis. Subjective:   She does feel better but has little to eat. No active nausea or vomiting.        Medications:  Reviewed    Infusion Medications    dextrose      sodium chloride      sodium chloride 100 mL/hr at 08/19/21 0116     Scheduled Medications    pantoprazole  40 mg Intravenous BID    And    sodium chloride (PF)  10 mL Intravenous BID    timolol  1 drop Both Eyes Daily    lisinopril  40 mg Oral Daily    latanoprost  1 drop Both Eyes Nightly    aspirin  81 mg Oral Daily    sodium chloride flush  10 mL Intravenous 2 times per day    enoxaparin  40 mg Subcutaneous Daily    insulin lispro  0-12 Units Subcutaneous TID WC    insulin lispro  0-6 Units Subcutaneous Nightly    diphenhydrAMINE  50 mg Intravenous Q6H    lidocaine 1 % injection  5 mL Intradermal Once     PRN Meds: enalaprilat, dicyclomine, albuterol sulfate HFA, glucose, dextrose, glucagon (rDNA), dextrose, sodium chloride flush, sodium chloride, ondansetron, polyethylene glycol, acetaminophen **OR** acetaminophen, promethazine      Intake/Output Summary (Last 24 hours) at 8/19/2021 0917  Last data filed at 8/18/2021 2125  Gross per 24 hour   Intake 1200 ml   Output --   Net 1200 ml       Physical Exam Performed:    BP (!) 158/80   Pulse 86   Temp 97.9 °F (36.6 °C) (Oral)   Resp 17   Ht 5' 7\" (1.702 m)   Wt 216 lb 7.9 oz (98.2 kg)   SpO2 99%   BMI 33.91 vomiting [R11.2]     Generalized abdominal pain [R10.84]     Essential hypertension [I10]     Gastroparesis diabeticorum (HCC) [E11.43, K31.84]     DMII (diabetes mellitus, type 2) ([facility] [address] [E11.9]     COPD (chronic obstructive pulmonary disease) [J44.9]     Hyperlipidemia with target LDL less than 70 [E78.5]     Asthma [J45.909]      Abdominal pain  Intractable nausea and vomiting with h/o gastroparesis  - GI consulted and planning for EGD tomorrow. -NPO after midnight  - anti-emetics as needed    Diabetes mellitus type 2  - accu checks  - insulin as needed  - monitor hypoglycemia giveb NPO    Essential hypertension  -Continue lisinopril    Hyperlipidemia, unspecified  - zetia    Asthma/COPD without acute exacerbation  OBese BMI 33    DVT Prophylaxis: lovenox  Diet: ADULT DIET; Regular; 5 carb choices (75 gm/meal)  Code Status: Full Code    PT/OT Eval Status: n/a    Dispo - d/c after egd?     Lolis Solano, SEVEN - CNP

## 2021-08-19 NOTE — PROGRESS NOTES
Patient admitted to room 4123. Alert and oriented X4. Call light within reach. Will continue to monitor. Awaiting for PICC team to place IV.

## 2021-08-20 ENCOUNTER — ANESTHESIA (OUTPATIENT)
Dept: ENDOSCOPY | Age: 61
DRG: 074 | End: 2021-08-20
Payer: COMMERCIAL

## 2021-08-20 VITALS — DIASTOLIC BLOOD PRESSURE: 53 MMHG | SYSTOLIC BLOOD PRESSURE: 106 MMHG | OXYGEN SATURATION: 100 %

## 2021-08-20 LAB
ALBUMIN SERPL-MCNC: 3.3 G/DL (ref 3.4–5)
ALP BLD-CCNC: 103 U/L (ref 40–129)
ALT SERPL-CCNC: 13 U/L (ref 10–40)
ANION GAP SERPL CALCULATED.3IONS-SCNC: 10 MMOL/L (ref 3–16)
AST SERPL-CCNC: 16 U/L (ref 15–37)
BASOPHILS ABSOLUTE: 0.1 K/UL (ref 0–0.2)
BASOPHILS RELATIVE PERCENT: 0.7 %
BILIRUB SERPL-MCNC: 0.8 MG/DL (ref 0–1)
BILIRUBIN DIRECT: <0.2 MG/DL (ref 0–0.3)
BILIRUBIN, INDIRECT: ABNORMAL MG/DL (ref 0–1)
BUN BLDV-MCNC: 11 MG/DL (ref 7–20)
CALCIUM SERPL-MCNC: 8.7 MG/DL (ref 8.3–10.6)
CHLORIDE BLD-SCNC: 101 MMOL/L (ref 99–110)
CO2: 28 MMOL/L (ref 21–32)
CREAT SERPL-MCNC: 0.9 MG/DL (ref 0.6–1.2)
EOSINOPHILS ABSOLUTE: 0.1 K/UL (ref 0–0.6)
EOSINOPHILS RELATIVE PERCENT: 1.2 %
GFR AFRICAN AMERICAN: >60
GFR NON-AFRICAN AMERICAN: >60
GLUCOSE BLD-MCNC: 100 MG/DL (ref 70–99)
GLUCOSE BLD-MCNC: 110 MG/DL (ref 70–99)
GLUCOSE BLD-MCNC: 123 MG/DL (ref 70–99)
GLUCOSE BLD-MCNC: 170 MG/DL (ref 70–99)
GLUCOSE BLD-MCNC: 93 MG/DL (ref 70–99)
GLUCOSE BLD-MCNC: 94 MG/DL (ref 70–99)
GLUCOSE BLD-MCNC: 95 MG/DL (ref 70–99)
HCT VFR BLD CALC: 38.3 % (ref 36–48)
HEMOGLOBIN: 13.1 G/DL (ref 12–16)
LYMPHOCYTES ABSOLUTE: 3.5 K/UL (ref 1–5.1)
LYMPHOCYTES RELATIVE PERCENT: 32.9 %
MAGNESIUM: 1.9 MG/DL (ref 1.8–2.4)
MCH RBC QN AUTO: 26.6 PG (ref 26–34)
MCHC RBC AUTO-ENTMCNC: 34.1 G/DL (ref 31–36)
MCV RBC AUTO: 78.1 FL (ref 80–100)
MONOCYTES ABSOLUTE: 0.7 K/UL (ref 0–1.3)
MONOCYTES RELATIVE PERCENT: 6.8 %
NEUTROPHILS ABSOLUTE: 6.3 K/UL (ref 1.7–7.7)
NEUTROPHILS RELATIVE PERCENT: 58.4 %
PDW BLD-RTO: 15 % (ref 12.4–15.4)
PERFORMED ON: ABNORMAL
PERFORMED ON: NORMAL
PERFORMED ON: NORMAL
PLATELET # BLD: 202 K/UL (ref 135–450)
PMV BLD AUTO: 8.1 FL (ref 5–10.5)
POTASSIUM REFLEX MAGNESIUM: 3.5 MMOL/L (ref 3.5–5.1)
RBC # BLD: 4.91 M/UL (ref 4–5.2)
SODIUM BLD-SCNC: 139 MMOL/L (ref 136–145)
TOTAL PROTEIN: 6 G/DL (ref 6.4–8.2)
WBC # BLD: 10.7 K/UL (ref 4–11)

## 2021-08-20 PROCEDURE — 7100000000 HC PACU RECOVERY - FIRST 15 MIN: Performed by: INTERNAL MEDICINE

## 2021-08-20 PROCEDURE — 88342 IMHCHEM/IMCYTCHM 1ST ANTB: CPT

## 2021-08-20 PROCEDURE — 1200000000 HC SEMI PRIVATE

## 2021-08-20 PROCEDURE — 3700000000 HC ANESTHESIA ATTENDED CARE: Performed by: INTERNAL MEDICINE

## 2021-08-20 PROCEDURE — 6360000002 HC RX W HCPCS: Performed by: NURSE ANESTHETIST, CERTIFIED REGISTERED

## 2021-08-20 PROCEDURE — 3609012400 HC EGD TRANSORAL BIOPSY SINGLE/MULTIPLE: Performed by: INTERNAL MEDICINE

## 2021-08-20 PROCEDURE — 2709999900 HC NON-CHARGEABLE SUPPLY: Performed by: INTERNAL MEDICINE

## 2021-08-20 PROCEDURE — 88312 SPECIAL STAINS GROUP 1: CPT

## 2021-08-20 PROCEDURE — 6370000000 HC RX 637 (ALT 250 FOR IP): Performed by: INTERNAL MEDICINE

## 2021-08-20 PROCEDURE — 7100000001 HC PACU RECOVERY - ADDTL 15 MIN: Performed by: INTERNAL MEDICINE

## 2021-08-20 PROCEDURE — 0DB58ZX EXCISION OF ESOPHAGUS, VIA NATURAL OR ARTIFICIAL OPENING ENDOSCOPIC, DIAGNOSTIC: ICD-10-PCS | Performed by: INTERNAL MEDICINE

## 2021-08-20 PROCEDURE — 2580000003 HC RX 258: Performed by: NURSE ANESTHETIST, CERTIFIED REGISTERED

## 2021-08-20 PROCEDURE — 2580000003 HC RX 258: Performed by: NURSE PRACTITIONER

## 2021-08-20 PROCEDURE — 3700000001 HC ADD 15 MINUTES (ANESTHESIA): Performed by: INTERNAL MEDICINE

## 2021-08-20 PROCEDURE — 80048 BASIC METABOLIC PNL TOTAL CA: CPT

## 2021-08-20 PROCEDURE — 93005 ELECTROCARDIOGRAM TRACING: CPT | Performed by: PHYSICIAN ASSISTANT

## 2021-08-20 PROCEDURE — 2580000003 HC RX 258: Performed by: INTERNAL MEDICINE

## 2021-08-20 PROCEDURE — 6360000002 HC RX W HCPCS: Performed by: INTERNAL MEDICINE

## 2021-08-20 PROCEDURE — C9113 INJ PANTOPRAZOLE SODIUM, VIA: HCPCS | Performed by: INTERNAL MEDICINE

## 2021-08-20 PROCEDURE — 0DB78ZX EXCISION OF STOMACH, PYLORUS, VIA NATURAL OR ARTIFICIAL OPENING ENDOSCOPIC, DIAGNOSTIC: ICD-10-PCS | Performed by: INTERNAL MEDICINE

## 2021-08-20 PROCEDURE — 88341 IMHCHEM/IMCYTCHM EA ADD ANTB: CPT

## 2021-08-20 PROCEDURE — 85025 COMPLETE CBC W/AUTO DIFF WBC: CPT

## 2021-08-20 PROCEDURE — 2500000003 HC RX 250 WO HCPCS: Performed by: NURSE ANESTHETIST, CERTIFIED REGISTERED

## 2021-08-20 PROCEDURE — 88305 TISSUE EXAM BY PATHOLOGIST: CPT

## 2021-08-20 PROCEDURE — 0DB88ZX EXCISION OF SMALL INTESTINE, VIA NATURAL OR ARTIFICIAL OPENING ENDOSCOPIC, DIAGNOSTIC: ICD-10-PCS | Performed by: INTERNAL MEDICINE

## 2021-08-20 PROCEDURE — 80076 HEPATIC FUNCTION PANEL: CPT

## 2021-08-20 PROCEDURE — 83735 ASSAY OF MAGNESIUM: CPT

## 2021-08-20 RX ORDER — PROPOFOL 10 MG/ML
INJECTION, EMULSION INTRAVENOUS PRN
Status: DISCONTINUED | OUTPATIENT
Start: 2021-08-20 | End: 2021-08-20 | Stop reason: SDUPTHER

## 2021-08-20 RX ORDER — GLYCOPYRROLATE 0.2 MG/ML
INJECTION INTRAMUSCULAR; INTRAVENOUS PRN
Status: DISCONTINUED | OUTPATIENT
Start: 2021-08-20 | End: 2021-08-20 | Stop reason: SDUPTHER

## 2021-08-20 RX ORDER — SODIUM CHLORIDE, SODIUM LACTATE, POTASSIUM CHLORIDE, CALCIUM CHLORIDE 600; 310; 30; 20 MG/100ML; MG/100ML; MG/100ML; MG/100ML
INJECTION, SOLUTION INTRAVENOUS CONTINUOUS PRN
Status: DISCONTINUED | OUTPATIENT
Start: 2021-08-20 | End: 2021-08-20 | Stop reason: SDUPTHER

## 2021-08-20 RX ORDER — ONDANSETRON 2 MG/ML
4 INJECTION INTRAMUSCULAR; INTRAVENOUS
Status: DISCONTINUED | OUTPATIENT
Start: 2021-08-20 | End: 2021-08-20

## 2021-08-20 RX ORDER — LIDOCAINE HYDROCHLORIDE 20 MG/ML
INJECTION, SOLUTION EPIDURAL; INFILTRATION; INTRACAUDAL; PERINEURAL PRN
Status: DISCONTINUED | OUTPATIENT
Start: 2021-08-20 | End: 2021-08-20 | Stop reason: SDUPTHER

## 2021-08-20 RX ADMIN — INSULIN LISPRO 1 UNITS: 100 INJECTION, SOLUTION INTRAVENOUS; SUBCUTANEOUS at 20:27

## 2021-08-20 RX ADMIN — PANTOPRAZOLE SODIUM 40 MG: 40 INJECTION, POWDER, FOR SOLUTION INTRAVENOUS at 20:25

## 2021-08-20 RX ADMIN — ENOXAPARIN SODIUM 40 MG: 40 INJECTION SUBCUTANEOUS at 20:26

## 2021-08-20 RX ADMIN — GLYCOPYRROLATE 0.1 MG: 0.2 INJECTION, SOLUTION INTRAMUSCULAR; INTRAVENOUS at 13:53

## 2021-08-20 RX ADMIN — LATANOPROST 1 DROP: 50 SOLUTION OPHTHALMIC at 20:25

## 2021-08-20 RX ADMIN — PANTOPRAZOLE SODIUM 40 MG: 40 INJECTION, POWDER, FOR SOLUTION INTRAVENOUS at 09:21

## 2021-08-20 RX ADMIN — TIMOLOL MALEATE 1 DROP: 5 SOLUTION OPHTHALMIC at 09:21

## 2021-08-20 RX ADMIN — Medication 10 ML: at 20:26

## 2021-08-20 RX ADMIN — Medication 10 ML: at 21:00

## 2021-08-20 RX ADMIN — Medication 10 ML: at 09:21

## 2021-08-20 RX ADMIN — LIDOCAINE HYDROCHLORIDE 80 MG: 20 INJECTION, SOLUTION EPIDURAL; INFILTRATION; INTRACAUDAL; PERINEURAL at 13:55

## 2021-08-20 RX ADMIN — LISINOPRIL 40 MG: 40 TABLET ORAL at 09:21

## 2021-08-20 RX ADMIN — ASPIRIN 81 MG: 81 TABLET, CHEWABLE ORAL at 20:27

## 2021-08-20 RX ADMIN — SODIUM CHLORIDE, SODIUM LACTATE, POTASSIUM CHLORIDE, AND CALCIUM CHLORIDE: .6; .31; .03; .02 INJECTION, SOLUTION INTRAVENOUS at 13:53

## 2021-08-20 RX ADMIN — SODIUM CHLORIDE, POTASSIUM CHLORIDE, SODIUM LACTATE AND CALCIUM CHLORIDE: 600; 310; 30; 20 INJECTION, SOLUTION INTRAVENOUS at 05:18

## 2021-08-20 RX ADMIN — PROPOFOL 100 MG: 10 INJECTION, EMULSION INTRAVENOUS at 13:55

## 2021-08-20 RX ADMIN — PROPOFOL 180 MCG/KG/MIN: 10 INJECTION, EMULSION INTRAVENOUS at 13:57

## 2021-08-20 ASSESSMENT — PAIN DESCRIPTION - PAIN TYPE: TYPE: ACUTE PAIN

## 2021-08-20 ASSESSMENT — PULMONARY FUNCTION TESTS
PIF_VALUE: 1
PIF_VALUE: 0
PIF_VALUE: 1

## 2021-08-20 ASSESSMENT — PAIN - FUNCTIONAL ASSESSMENT: PAIN_FUNCTIONAL_ASSESSMENT: 0-10

## 2021-08-20 ASSESSMENT — PAIN SCALES - GENERAL
PAINLEVEL_OUTOF10: 0

## 2021-08-20 ASSESSMENT — PAIN SCALES - WONG BAKER
WONGBAKER_NUMERICALRESPONSE: 0

## 2021-08-20 NOTE — PLAN OF CARE
Problem: Falls - Risk of:  Goal: Will remain free from falls  Description: Will remain free from falls  8/20/2021 1143 by Terra Chaparro RN  Outcome: Ongoing   Bed in lowest position, call light in reach, nonskid footwear on pt, hourly rounding in place.      Problem: Falls - Risk of:  Goal: Absence of physical injury  Description: Absence of physical injury  8/20/2021 1143 by Terra Chaparro RN  Outcome: Ongoing

## 2021-08-20 NOTE — ANESTHESIA PRE PROCEDURE
Department of Anesthesiology  Preprocedure Note       Name:  Wicho Walton   Age:  62 y.o.  :  1960                                          MRN:  [id number]         Date:  2021      Surgeon: Charlette Unger):  July Grider MD    Procedure: Procedure(s):  ESOPHAGOGASTRODUODENOSCOPY    Medications prior to admission:   Prior to Admission medications    Medication Sig Start Date End Date Taking? Authorizing Provider   ezetimibe (ZETIA) 10 MG tablet Take 10 mg by mouth daily   Yes Historical Provider, MD   amLODIPine (NORVASC) 5 MG tablet Take 5 mg by mouth daily   Yes Historical Provider, MD   insulin glargine (BASAGLAR KWIKPEN) 100 UNIT/ML injection pen Inject 64 Units into the skin nightly   Yes Historical Provider, MD   insulin lispro (HUMALOG) 100 UNIT/ML injection vial Inject 18 Units into the skin 3 times daily (before meals)   Yes Historical Provider, MD   insulin lispro (HUMALOG) 100 UNIT/ML injection vial Inject into the skin 3 times daily (before meals) Per sliding scale.  2 units >150 then additional 2 units for every 50   Yes Historical Provider, MD   lisinopril (PRINIVIL;ZESTRIL) 40 MG tablet TAKE 1 TABLET BY MOUTH EVERY DAY 17  Yes Carol Bernardo MD   aspirin 81 MG tablet Take 81 mg by mouth daily   Yes Historical Provider, MD   omeprazole (PRILOSEC) 40 MG delayed release capsule TAKE 1 CAPSULE BY MOUTH ONCE DAILY  Patient taking differently: Take 40 mg by mouth Daily with supper TAKE 1 CAPSULE BY MOUTH ONCE DAILY 11/10/16  Yes Carol Bernardo MD   timolol (TIMOPTIC) 0.5 % ophthalmic solution Place 1 drop into both eyes daily  16  Yes Historical Provider, MD   ergocalciferol (DRISDOL) 94722 UNITS capsule Take 1 capsule by mouth once a week 16  Yes Adele Hampton MD   albuterol sulfate (PROAIR RESPICLICK) 124 (90 BASE) MCG/ACT aerosol powder inhalation Inhale 2 puffs into the lungs every 4 hours as needed for Wheezing or Shortness of Breath 16  Yes Adele Hampton MD   latanoprost (XALATAN) 0.005 % ophthalmic solution Place 1 drop into both eyes nightly. Yes Historical Provider, MD   Insulin Syringe-Needle U-100 (INSULIN SYRINGE .5CC/31GX5/16\") 31G X 5/16\" 0.5 ML MISC 1 each by Does not apply route daily 4/14/17   Giles Paiz MD   SUMAtriptan (IMITREX) 50 MG tablet Take 1 tablet by mouth once as needed for Migraine May repeat in 2 hours x one 3/7/17 3/7/17  Maite Domínguez MD   acetaminophen (APAP EXTRA STRENGTH) 500 MG tablet Take 1 tablet by mouth every 6 hours as needed for Pain 11/18/16   Bernabe Montesinos   Tens Unit MISC by Does not apply route 11/10/16   Maite Domínguez MD   Insulin Pen Needle 31G X 5 MM MISC Pt uses three times a day 9/17/15   Giles Paiz MD   glucose blood VI test strips (EXACTECH TEST) strip 4 times a day As needed.  3/23/15   Giles Paiz MD   Lancets MISC 3-4 times a day 2/26/15   Giles Paiz MD       Current medications:    Current Facility-Administered Medications   Medication Dose Route Frequency Provider Last Rate Last Admin    enalaprilat (VASOTEC) injection 1.25 mg  1.25 mg Intravenous Q6H PRN Jose Garcia MD   1.25 mg at 08/19/21 0125    pantoprazole (PROTONIX) injection 40 mg  40 mg Intravenous BID Jose Garcia MD   40 mg at 08/20/21 4261    And    sodium chloride (PF) 0.9 % injection 10 mL  10 mL Intravenous BID Jose Garcia MD   10 mL at 08/19/21 0829    melatonin tablet 6 mg  6 mg Oral Nightly PRN Liddie Scale, APRN - CNP   6 mg at 08/19/21 2330    timolol (TIMOPTIC) 0.5 % ophthalmic solution 1 drop  1 drop Both Eyes Daily Jose Garcia MD   1 drop at 08/20/21 0921    lisinopril (PRINIVIL;ZESTRIL) tablet 40 mg  40 mg Oral Daily Jose Garcia MD   40 mg at 08/20/21 0921    latanoprost (XALATAN) 0.005 % ophthalmic solution 1 drop  1 drop Both Eyes Nightly Jose Garcia MD   1 drop at 08/19/21 2778    dicyclomine (BENTYL) capsule 10 mg  10 mg Oral Q6H PRN Jose Garcia MD tingling mouth    Bactrim      Remote h/o diarrhea, swollen eyes, and weakness, tachycardia and tongue swelling    Cefuroxime      Pt does not recall what happened with this    Codeine     Medrol [Methylprednisolone] Swelling     Ok to take nasal steroids    Morphine     Niacin Other (See Comments)    Oat      Throat swells    Percocet [Oxycodone-Acetaminophen]     Reglan [Metoclopramide] Other (See Comments)     States has something to do with a mass unknown reaction     Spironolactone      Increased potassium    Statins      Muscle cramps    Sulfamethoxazole-Trimethoprim Swelling    Vicodin [Hydrocodone-Acetaminophen]        Problem List:    Patient Active Problem List   Diagnosis Code    PMB (postmenopausal bleeding) N95.0    Complex endometrial hyperplasia N85.01    Asthma J45.909    Vitamin D deficiency E55.9    Hyperlipidemia with target LDL less than 70 E78.5    Elevated testosterone level in female R74.80    COPD (chronic obstructive pulmonary disease) J44.9    Hirsutism L68.0    DMII (diabetes mellitus, type 2) ([facility]) E11.9    Panlobular emphysema ([facility]) J43.1    Hypertension complicating diabetes ([facility] [address] E11.59, I15.2    Vertigo R42    Gastroparesis diabeticorum ([facility]) E11.43, K31.84    Major depressive disorder with single episode F32.9    Morbid obesity due to excess calories ([facility]) E66.01    Atherosclerotic PVD with intermittent claudication ([facility]) I70.219    Sleep apnea G47.30    Gastroesophageal reflux disease without esophagitis K21.9    Seasonal allergic rhinitis due to pollen J30.1    Mild persistent asthma without complication O83.36    Chronic midline low back pain with bilateral sciatica M54.41, M54.42, G89.29    Pain in both lower extremities M79.604, M79.605    Intractable nausea and vomiting R11.2    Generalized abdominal pain R10.84    Essential hypertension I10       Past Medical History:        Diagnosis Date    Acid reflux     Anxiety     ASCUS (atypical squamous cells of undetermined significance) on Pap smear 2013    Asthma     Chronic midline low back pain with bilateral sciatica 11/10/2016    Chronic midline low back pain with bilateral sciatica     Diabetes mellitus, type 2 [facility])     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    Gastroparesis     Hirsutism     Hyperlipidemia LDL goal < 70     Hypertension     Major depressive disorder with single episode 2009    Pancreatitis     Seasonal allergic rhinitis due to pollen 11/10/2016    Sleep apnea     Vitamin D deficiency        Past Surgical History:        Procedure Laterality Date     SECTION      ENDOMETRIAL ABLATION  5/3/12    HYSTEROSCOPY  5/3/12    MANDIBLE SURGERY      TUBAL LIGATION         Social History:    Social History     Tobacco Use    Smoking status: Former Smoker     Types: Cigarettes     Quit date: 1990     Years since quittin.6    Smokeless tobacco: Never Used   Substance Use Topics    Alcohol use:  Yes     Alcohol/week: 0.0 standard drinks     Comment: less than once a month                                Counseling given: Not Answered      Vital Signs (Current):   Vitals:    21 2300 21 0412 21 0900 21 1200   BP:  137/73 136/68 (!) 15377   Pulse: 78 68 64 61   Resp:  18 18 16   Temp:  98.2 °F (36.8 °C) 97.3 °F (36.3 °C) 98.1 °F (36.7 °C)   TempSrc:  Oral Oral Temporal   SpO2:  99% 98% 100%   Weight:  224 lb 13.9 oz (102 kg)     Height:                                                  BP Readings from Last 3 Encounters:   21 (!) 153/77   21 132/82   17 130/74       NPO Status: Time of last liquid consumption:                         Time of last solid consumption:                         Date of last liquid consumption: 21                        Date of last solid food consumption: 21    BMI:   Wt Readings from Last 3 Encounters:   21 224 lb 13.9 oz (102 kg) 06/11/21 217 lb 6 oz (98.6 kg)   09/22/17 264 lb (119.7 kg)     Body mass index is 35.22 kg/m².     CBC:   Lab Results   Component Value Date    WBC 10.7 08/20/2021    RBC 4.91 08/20/2021    HGB 13.1 08/20/2021    HCT 38.3 08/20/2021    MCV 78.1 08/20/2021    RDW 15.0 08/20/2021     08/20/2021       CMP:   Lab Results   Component Value Date     08/20/2021    K 3.5 08/20/2021     08/20/2021    CO2 28 08/20/2021    BUN 11 08/20/2021    CREATININE 0.9 08/20/2021    GFRAA >60 08/20/2021    GFRAA >60 05/30/2013    AGRATIO 1.2 08/18/2021    LABGLOM >60 08/20/2021    GLUCOSE 93 08/20/2021    PROT 6.0 08/20/2021    PROT 6.9 01/31/2013    CALCIUM 8.7 08/20/2021    BILITOT 0.8 08/20/2021    ALKPHOS 103 08/20/2021    AST 16 08/20/2021    ALT 13 08/20/2021       POC Tests:   Recent Labs     08/20/21  1145   POCGLU 94       Coags:   Lab Results   Component Value Date    PROTIME 10.9 12/21/2014    INR 1.01 12/21/2014    APTT 26.7 05/03/2012       HCG (If Applicable): No results found for: PREGTESTUR, PREGSERUM, HCG, HCGQUANT     ABGs: No results found for: PHART, PO2ART, KFW6IGY, PVB2JOD, BEART, R3BJUUNG     Type & Screen (If Applicable):  Lab Results   Component Value Date    LABABO B 04/30/2012    LABRH Negative 04/30/2012       Drug/Infectious Status (If Applicable):  Lab Results   Component Value Date    HEPCAB Non-Reactive (Negative) 04/20/2012       COVID-19 Screening (If Applicable):   Lab Results   Component Value Date    COVID19 Not Detected 08/19/2021           Anesthesia Evaluation  Patient summary reviewed no history of anesthetic complications:   Airway: Mallampati: II  TM distance: >3 FB   Neck ROM: full  Mouth opening: > = 3 FB Dental:      Comment: Missing teeth    Pulmonary: breath sounds clear to auscultation  (+) COPD:  sleep apnea:  asthma:                            Cardiovascular:    (+) hypertension:, hyperlipidemia    (-) valvular problems/murmurs, past MI, CAD, CABG/stent, dysrhythmias,  CHF and no pulmonary hypertension      Rhythm: regular  Rate: normal                    Neuro/Psych:   (+) neuromuscular disease:, psychiatric history:depression/anxiety    (-) seizures, TIA and CVA           GI/Hepatic/Renal:   (+) GERD:,      (-) PUD, hepatitis, liver disease and no renal disease       Endo/Other:    (+) DiabetesType II DM, , .    (-) hypothyroidism, hyperthyroidism, blood dyscrasia               Abdominal:             Vascular: Other Findings:           Anesthesia Plan      MAC     ASA 3       Induction: intravenous. Anesthetic plan and risks discussed with patient. Plan discussed with CRNA. This pre-anesthesia assessment may be used as a history and physical.    DOS STAFF ADDENDUM:    Pt seen and examined, chart reviewed (including anesthesia, drug and allergy history). No interval changes to history and physical examination. Anesthetic plan, risks, benefits, alternatives, and personnel involved discussed with patient. Patient verbalized an understanding and agrees to proceed.       Keith Mays MD  August 20, 2021  12:53 PM      Keith Mays MD   8/20/2021

## 2021-08-20 NOTE — OP NOTE
Endoscopy Note    Patient: Verónica Yoder  : 1960  Acct#:     Procedure: Esophagogastroduodenoscopy with biopsy                         Date:  2021     Surgeon:   Nimisha Jordan MD    Referring Physician:  CEASAR Dean    Indications: This is a 62 y.o. female  who presents for EGD due to nausea and vomiting. Postoperative Diagnosis:    1. Severe LA grade D esophagitis with punctate ulcerations in the mid and distal esophagus. Targeted biopsies were obtained from the ulcer bed and surrounding mucosa to evaluate for viral esophagitis. 2.  Patchy gastric erythema without ulceration or erosion. Biopsies were obtained from the gastric antrum, incisura and body to evaluate for H. Pylori. 3. Patchy erythema in the duodenal bulb and second portion of the duodenum. Biopsies obtained from the duodenal bulb and the second portion of the duodenum to evaluate for Celiac disease and alternative pathology for erythema. Anesthesia:  MAC    Consent:  The patient or their legal guardian has signed an informed consent, and is aware of the potential risks, benefits, alternatives, and potential complications of this procedure. These include, but are not limited to hemorrhage, bleeding, post procedural pain, perforation, phlebitis, aspiration, hypotension, hypoxia, cardiovascular events such as arryhthmia, and possibly death. Description of Procedure: The patient was then taken to the endoscopy suite, placed in the left lateral decubitus position and the above IV sedation was administrered. The Olympus video endoscope was placed through the patient's oropharynx without difficulty to the extent of the 2nd portion of the duodenum. Both forward and retroflexed views of the stomach were obtained. Findings:    Esophagus: The esophagus was notable for severe LA grade D esophagitis with punctate ulcerations in the mid and distal esophagus.   Targeted biopsies were obtained from the ulcer bed and surrounding mucosa to evaluate for viral esophagitis. The Z line was located 39 cm from the incisors, without evidence of Radford's esophagus. Stomach: The stomach was examined on forward and retroflexed views. Patchy gastric erythema was present in the antrum and body, without ulceration or erosion. Biopsies were obtained from the gastric antrum, incisura and body to evaluate for H. Pylori. Duodenum: The first and 2nd portions of the duodenum were notable for patchy erythema. Biopsies were obtained from the duodenal bulb and the second portion of the duodenum to evaluate for Celiac disease and alternative pathology for erythema. The scope was then withdrawn back into the stomach, it was decompressed, and the scope was completely withdrawn. The patient tolerated the procedure well and was taken to the post anesthesia care unit in good condition. Estimated Blood Loss: Minimal     Impression:   1) See post procedure diagnoses    Recommendations:   - Follow-up pathology results in 7 days, by calling the office at [phone].  - Resume regular medications. - Resume diet as tolerated. - Repeat EGD in 12 weeks to ensure healing.  - Further recommendations pending result of pathology specimen.  - Ok for discharge from GI standpoint when tolerating a PO diet. Will arrange for follow-up.     DADA Blakely [address] [facility]

## 2021-08-20 NOTE — PROGRESS NOTES
Pt A&Ox4, pleasant demeanor, denies pain at this time. LS CTA, bowel sounds active/present. Reports last episode of emesis 8/18/2021 in the evening. Bed in lowest position, call light in reach, nonskid footwear on pt, possessions in reach, UAL in room, steady gait, calls appropriately, refuses bed alarm, hourly rounding in place.  Electronically signed by Hermelindo Miller, RN on 8/20/2021 at 9:21 AM

## 2021-08-20 NOTE — H&P
Pre-operative History and Physical    Patient: Chirag Ramirez  : 1960  Acct#:     Intended Procedure:  EGD    HISTORY OF PRESENT ILLNESS:  The patient is a 62 y.o. female  who presents for EGD due to nausea and vomiting. 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 Current Outpatient Medications on File Prior to Encounter   Medication Sig Dispense Refill    ezetimibe (ZETIA) 10 MG tablet Take 10 mg by mouth daily      amLODIPine (NORVASC) 5 MG tablet Take 5 mg by mouth daily      insulin glargine (BASAGLAR KWIKPEN) 100 UNIT/ML injection pen Inject 64 Units into the skin nightly      insulin lispro (HUMALOG) 100 UNIT/ML injection vial Inject 18 Units into the skin 3 times daily (before meals)      insulin lispro (HUMALOG) 100 UNIT/ML injection vial Inject into the skin 3 times daily (before meals) Per sliding scale.  2 units >150 then additional 2 units for every 50      lisinopril (PRINIVIL;ZESTRIL) 40 MG tablet TAKE 1 TABLET BY MOUTH EVERY DAY 30 tablet 0    aspirin 81 MG tablet Take 81 mg by mouth daily      omeprazole (PRILOSEC) 40 MG delayed release capsule TAKE 1 CAPSULE BY MOUTH ONCE DAILY (Patient taking differently: Take 40 mg by mouth Daily with supper TAKE 1 CAPSULE BY MOUTH ONCE DAILY) 90 capsule 2    timolol (TIMOPTIC) 0.5 % ophthalmic solution Place 1 drop into both eyes daily       ergocalciferol (DRISDOL) 66161 UNITS capsule Take 1 capsule by mouth once a week 13 capsule 1    albuterol sulfate (PROAIR RESPICLICK) 140 (90 BASE) MCG/ACT aerosol powder inhalation Inhale 2 puffs into the lungs every 4 hours as needed for Wheezing or Shortness of Breath 1 Inhaler 5    latanoprost (XALATAN) 0.005 % ophthalmic solution Place 1 drop into both eyes nightly.  Insulin Syringe-Needle U-100 (INSULIN SYRINGE .5CC/31GX5/16\") 31G X 5/16\" 0.5 ML MISC 1 each by Does not apply route daily 100 Syringe 3    SUMAtriptan (IMITREX) 50 MG tablet Take 1 tablet by mouth once as needed for Migraine May repeat in 2 hours x one 9 tablet 3    acetaminophen (APAP EXTRA STRENGTH) 500 MG tablet Take 1 tablet by mouth every 6 hours as needed for Pain 60 tablet 0    Tens Unit MISC by Does not apply route 1 each 0    Insulin Pen Needle 31G X 5 MM MISC Pt uses three times a day 450 each 3    glucose blood VI test strips (EXACTECH TEST) strip 4 times a day As needed. 400 each 3    Lancets MISC 3-4 times a day 100 each 3        Allergies: Avelox [moxifloxacin], Bactrim, Cefuroxime, Codeine, Medrol [methylprednisolone], Morphine, Niacin, Oat, Percocet [oxycodone-acetaminophen], Reglan [metoclopramide], Spironolactone, Statins, Sulfamethoxazole-trimethoprim, and Vicodin [hydrocodone-acetaminophen]    Social History:   TOBACCO:   reports that she quit smoking about 31 years ago. Her smoking use included cigarettes. She has never used smokeless tobacco.  ETOH:   reports current alcohol use. DRUGS:   reports current drug use. Frequency: 2.00 times per week. Drug: Marijuana.     PHYSICAL EXAM:      Vital Signs: BP 137/73   Pulse 68   Temp 98.2 °F (36.8 °C) (Oral)   Resp 18   Ht 5' 7\" (1.702 m)   Wt 224 lb 13.9 oz (102 kg)   SpO2 99%   BMI 35.22 kg/m²    Airway: No stridor or wheezing noted. Good air movement  Pulmonary: without wheezes. Clear to auscultation  Cardiac:regular rate and rhythm without loud murmurs  Abdomen:soft, nontender,  Bowel sounds present    Pre-Procedure Assessment / Plan:  1) EGD    ASA Grade:  ASA 2 - Patient with mild systemic disease with no functional limitations  Mallampati Classification:  Class II    Level of Sedation Plan:MAC    Post Procedure plan: Return to same level of care    I assessed the patient and find that the patient is in satisfactory condition to proceed with the planned procedure and sedation plan. I have explained the risk, benefits, and alternatives to the procedure; the patient understands and agrees to proceed.        Isael Burrell MD  8/20/2021

## 2021-08-20 NOTE — PROGRESS NOTES
Medications administered and monitored by CRNA, see anesthesia record.     Electronically signed by Amado Cesar RN on 8/20/2021 at 2:06 PM

## 2021-08-20 NOTE — ANESTHESIA POSTPROCEDURE EVALUATION
Department of Anesthesiology  Postprocedure Note    Patient: Leo Betancur  MRN: [id number]  YOB: 1960  Date of evaluation: 8/20/2021  Time:  3:26 PM     Procedure Summary     Date: 08/20/21 Room / Location: [address] [place] [address]    Anesthesia Start: 5559 Anesthesia Stop: 5912    Procedure: EGD BIOPSY (N/A ) Diagnosis:       Nausea and vomiting, intractability of vomiting not specified, unspecified vomiting type      (NAUSEA AND VOMITING)    Surgeons: Galina Talley MD Responsible Provider: Ángel Gutiérrez MD    Anesthesia Type: MAC ASA Status: 3          Anesthesia Type: MAC    Abril Phase I: Abril Score: 10    Abril Phase II:      Last vitals: Reviewed and per EMR flowsheets.        Anesthesia Post Evaluation    Patient location during evaluation: PACU  Patient participation: complete - patient participated  Level of consciousness: awake and alert  Airway patency: patent  Nausea & Vomiting: no nausea and no vomiting  Complications: no  Cardiovascular status: hemodynamically stable  Respiratory status: acceptable  Hydration status: stable

## 2021-08-20 NOTE — PLAN OF CARE
Problem: Falls - Risk of:  Goal: Will remain free from falls  Description: Will remain free from falls  8/20/2021 0334 by Lynad Salamanca RN  Outcome: Ongoing  8/19/2021 1445 by Ebony Quiroga RN  Outcome: Ongoing  Goal: Absence of physical injury  Description: Absence of physical injury  8/20/2021 0334 by Lynda Salamanca RN  Outcome: Ongoing  8/19/2021 1445 by Ebony Quiroga RN  Outcome: Ongoing

## 2021-08-20 NOTE — PROGRESS NOTES
Hospitalist Progress Note    CC: Intractable nausea and vomiting      Admit date: 8/18/2021  Days in hospital:  2    Subjective/interval history: ***    O2 status:    ROS:   {Ros - complete:36671} {DESC; UNCONSCIOUS,VENTILATED,UNABLE TO SPEAK FA:11245}. Objective:    /73   Pulse 68   Temp 98.2 °F (36.8 °C) (Oral)   Resp 18   Ht 5' 7\" (1.702 m)   Wt 224 lb 13.9 oz (102 kg)   SpO2 99%   BMI 35.22 kg/m²     Gen: alert, NAD  HEENT: NC/AT, moist mucous membranes, no oropharyngeal erythema or exudate  Neck: supple, trachea midline, no anterior cervical or SC LAD  Heart: Normal s1/s2, RRR, no murmurs, gallops, or rubs. Lungs: clear bilaterally, no wheezing, no rales, no rhonchi, no use of accessory muscles  Abd: bowel sounds present, soft, nontender, nondistended, no masses  Extrem: No clubbing, cyanosis, no edema  Skin: no rashes or lesions  Psych: A & O x3, affect appropriate  Neuro: grossly intact, moves all four extremities spontaneously.   Cap refill: +2 sec    Medications:  Scheduled Meds:   pantoprazole  40 mg Intravenous BID    And    sodium chloride (PF)  10 mL Intravenous BID    timolol  1 drop Both Eyes Daily    lisinopril  40 mg Oral Daily    latanoprost  1 drop Both Eyes Nightly    aspirin  81 mg Oral Daily    sodium chloride flush  10 mL Intravenous 2 times per day    enoxaparin  40 mg Subcutaneous Daily    insulin lispro  0-12 Units Subcutaneous TID WC    insulin lispro  0-6 Units Subcutaneous Nightly    lidocaine 1 % injection  5 mL Intradermal Once       PRN Meds:  enalaprilat, melatonin, dicyclomine, albuterol sulfate HFA, glucose, dextrose, glucagon (rDNA), dextrose, sodium chloride flush, sodium chloride, ondansetron, polyethylene glycol, acetaminophen **OR** acetaminophen, promethazine    IV:   dextrose      sodium chloride           Intake/Output Summary (Last 24 hours) at 8/20/2021 0905  Last data filed at 8/20/2021 0650  Gross per 24 hour   Intake 2438.33 ml   Output    Net 2438. 33 ml       Results:  CBC:   Recent Labs     08/18/21  1629 08/19/21  0320 08/20/21  0520   WBC 20.6* 21.3* 10.7   HGB 16.8* 15.3 13.1   HCT 51.6* 46.5 38.3   MCV 80.5 77.7* 78.1*    269 202     BMP:   Recent Labs     08/18/21  1629 08/19/21  0320 08/20/21  0520   * 137 139   K 4.0 3.7 3.5   CL 95* 95* 101   CO2 24 32 28   BUN 18 13 11   CREATININE 0.7 0.7 0.9     Mag: No results for input(s): MAG in the last 72 hours. Phos:   Lab Results   Component Value Date    PHOS 4.1 02/21/2017     No components found for: GLU    LIVER PROFILE:   Recent Labs     08/18/21 1629 08/20/21  0520   AST 19 16   ALT 20 13   LIPASE 34.0  --    BILIDIR  --  <0.2   BILITOT 0.6 0.8   ALKPHOS 148* 103     PT/INR: No results for input(s): PROTIME, INR in the last 72 hours. APTT: No results for input(s): APTT in the last 72 hours. UA:  Recent Labs     08/18/21 1629   COLORU YELLOW   PHUR 5.0   WBCUA 1   RBCUA 7*   BACTERIA RARE*   CLARITYU Clear   SPECGRAV >1.030   LEUKOCYTESUR Negative   UROBILINOGEN 1.0   BILIRUBINUR Negative   BLOODU SMALL*   GLUCOSEU >=1000*       Invalid input(s): ABG  Lab Results   Component Value Date    CALCIUM 8.7 08/20/2021    PHOS 4.1 02/21/2017       Assessment:    Principal Problem:    Intractable nausea and vomiting  Active Problems:    Asthma    Hyperlipidemia with target LDL less than 70    COPD (chronic obstructive pulmonary disease)    DMII (diabetes mellitus, type 2) (HCC)    Gastroparesis diabeticorum (HCC)    Generalized abdominal pain    Essential hypertension  Resolved Problems:    * No resolved hospital problems. [facility] course:  A 62y.o. year-old female with a history of hypertension, hyperlipidemia, type II diabetes mellitus, COPD/Asthma, and gastroparesis, presented to the emergency room for evaluation following a 1 to 2-day history of recurrent episodes of nausea and vomiting.   She states that she has generalized, gnawing abdominal pain which is worse across the entire upper abdomen when she has emesis. In the ed a ct abdomen was nonacute. Plan:  Abdominal pain  Intractable nausea and vomiting - Will provide symptomatic treatment with Zofran and/or Phenergan as needed, maintenance of fluids and electrolytes. Underlying cause is unclear; possibly Gastroparesis? consulted GI - egd today    Leukocytosis - resolved  - possibly reactive?  Per oncology this was the presumption  - monitor for signs of infection    Lactic acidosis   - due to recurrent vomiting, resolved with ivf    Chronic conditions - continue home meds unless otherwise stated  Gastroparesis diabeticorum  Asthma/COPD (chronic obstructive pulmonary disease) - without acute exacerbation.    Diabetes mellitus II - SSI and carb control diet  Essential (primary) hypertension - continue home meds and monitor blood pressure  Hyperlipidemia - Hold Zetia while in the hospital    Code status:  ***  DVT prophylaxis: [] Lovenox  [] SQ Heparin  [] SCDs because of *** [] warfarin/oral direct thrombin inhibitor [] Encourage ambulation      Disposition:  [] Home [] Rehab [] Psych [] SNF  [] LTAC  [] Transfer to ICU  [] Transfer to PCU [] Other: ***      Electronically signed by Froylan Nichols DO on 8/20/2021 at 9:05 AM

## 2021-08-20 NOTE — PROGRESS NOTES
Pt arrived to pacu from endo sedate with opa. VSS on monitor. Pt starts coughing opa removed. Pt denies pain and nausea. O2 switched to 2L nc. Ab soft. Pt falls easily back to sleep.

## 2021-08-21 VITALS
RESPIRATION RATE: 18 BRPM | TEMPERATURE: 98.3 F | DIASTOLIC BLOOD PRESSURE: 67 MMHG | BODY MASS INDEX: 35.47 KG/M2 | SYSTOLIC BLOOD PRESSURE: 138 MMHG | HEART RATE: 56 BPM | WEIGHT: 225.97 LBS | HEIGHT: 67 IN | OXYGEN SATURATION: 98 %

## 2021-08-21 LAB
GLUCOSE BLD-MCNC: 111 MG/DL (ref 70–99)
GLUCOSE BLD-MCNC: 156 MG/DL (ref 70–99)
PERFORMED ON: ABNORMAL
PERFORMED ON: ABNORMAL

## 2021-08-21 PROCEDURE — 6370000000 HC RX 637 (ALT 250 FOR IP): Performed by: INTERNAL MEDICINE

## 2021-08-21 PROCEDURE — C9113 INJ PANTOPRAZOLE SODIUM, VIA: HCPCS | Performed by: INTERNAL MEDICINE

## 2021-08-21 PROCEDURE — 2580000003 HC RX 258: Performed by: INTERNAL MEDICINE

## 2021-08-21 PROCEDURE — 6360000002 HC RX W HCPCS: Performed by: INTERNAL MEDICINE

## 2021-08-21 PROCEDURE — 6360000002 HC RX W HCPCS: Performed by: PHYSICIAN ASSISTANT

## 2021-08-21 RX ORDER — LORAZEPAM 2 MG/ML
1 INJECTION INTRAMUSCULAR ONCE
Status: COMPLETED | OUTPATIENT
Start: 2021-08-21 | End: 2021-08-21

## 2021-08-21 RX ADMIN — INSULIN LISPRO 2 UNITS: 100 INJECTION, SOLUTION INTRAVENOUS; SUBCUTANEOUS at 12:27

## 2021-08-21 RX ADMIN — Medication 10 ML: at 08:35

## 2021-08-21 RX ADMIN — Medication 10 ML: at 08:36

## 2021-08-21 RX ADMIN — PANTOPRAZOLE SODIUM 40 MG: 40 INJECTION, POWDER, FOR SOLUTION INTRAVENOUS at 08:35

## 2021-08-21 RX ADMIN — ENOXAPARIN SODIUM 40 MG: 40 INJECTION SUBCUTANEOUS at 08:35

## 2021-08-21 RX ADMIN — ASPIRIN 81 MG: 81 TABLET, CHEWABLE ORAL at 08:35

## 2021-08-21 RX ADMIN — LORAZEPAM 1 MG: 2 INJECTION INTRAMUSCULAR; INTRAVENOUS at 02:45

## 2021-08-21 RX ADMIN — LISINOPRIL 40 MG: 40 TABLET ORAL at 08:35

## 2021-08-21 RX ADMIN — TIMOLOL MALEATE 1 DROP: 5 SOLUTION OPHTHALMIC at 08:35

## 2021-08-21 ASSESSMENT — PAIN SCALES - WONG BAKER
WONGBAKER_NUMERICALRESPONSE: 0

## 2021-08-21 ASSESSMENT — PAIN SCALES - GENERAL
PAINLEVEL_OUTOF10: 0

## 2021-08-21 NOTE — PLAN OF CARE
Problem: Falls - Risk of:  Goal: Will remain free from falls  Description: Will remain free from falls  8/20/2021 2245 by Juan Luis Tate RN  Outcome: Ongoing  8/20/2021 1143 by Rashel Khanna RN  Outcome: Ongoing  Goal: Absence of physical injury  Description: Absence of physical injury  8/20/2021 2245 by Juan Luis Tate RN  Outcome: Ongoing  8/20/2021 1143 by Rashel Khanna RN  Outcome: Ongoing

## 2021-08-21 NOTE — PLAN OF CARE
Problem: Falls - Risk of:  Goal: Will remain free from falls  Description: Will remain free from falls  8/21/2021 0233 by Georgie García RN  Outcome: Ongoing  8/20/2021 2245 by Georgie García RN  Outcome: Ongoing  Goal: Absence of physical injury  Description: Absence of physical injury  8/21/2021 0233 by Georgie García RN  Outcome: Ongoing  8/20/2021 2245 by Georgie García RN  Outcome: Ongoing     Problem: Pain:  Goal: Pain level will decrease  Description: Pain level will decrease  Outcome: Ongoing  Goal: Control of acute pain  Description: Control of acute pain  Outcome: Ongoing  Goal: Control of chronic pain  Description: Control of chronic pain  Outcome: Ongoing     Problem: SAFETY  Goal: Free from accidental physical injury  Outcome: Ongoing  Goal: Free from intentional harm  Outcome: Ongoing     Problem: DAILY CARE  Goal: Daily care needs are met  Outcome: Ongoing     Problem: PAIN  Goal: Patient's pain/discomfort is manageable  Outcome: Ongoing     Problem: SKIN INTEGRITY  Goal: Skin integrity is maintained or improved  Outcome: Ongoing     Problem: KNOWLEDGE DEFICIT  Goal: Patient/S.O. demonstrates understanding of disease process, treatment plan, medications, and discharge instructions.   Outcome: Ongoing     Problem: DISCHARGE BARRIERS  Goal: Patient's continuum of care needs are met  Outcome: Ongoing

## 2021-08-21 NOTE — FLOWSHEET NOTE
Pt A&O x4 and able to respond to commands. Pt resting comfortably in chair. Pt denies N&V, diarrhea, SOB, pain. Pt reports her abd feels heavy all the time and symptoms are worse in the AM. Pt tolerated morning medications without complications. VSS. Pt shows no signs of distress. Pt expresses no needs at this time. Call light is within reach. Bedside table is near. Will continue to monitor.        Electronically signed by Bjorn Gvoea RN on 8/21/2021 at 12:21 PM

## 2021-08-21 NOTE — PLAN OF CARE
Problem: Falls - Risk of:  Goal: Will remain free from falls  Description: Will remain free from falls  8/21/2021 1217 by Nolan Smith RN  Outcome: Ongoing     Problem: Falls - Risk of:  Goal: Absence of physical injury  Description: Absence of physical injury  8/21/2021 1217 by Nolan Smith RN  Outcome: Ongoing     Problem: Pain:  Goal: Pain level will decrease  Description: Pain level will decrease  8/21/2021 1217 by Nolan Smith RN  Outcome: Ongoing     Problem: Pain:  Goal: Control of acute pain  Description: Control of acute pain  8/21/2021 1217 by Nolan Smith RN  Outcome: Ongoing     Problem: Pain:  Goal: Control of chronic pain  Description: Control of chronic pain  8/21/2021 1217 by Nolan Smith RN  Outcome: Ongoing     Problem: SAFETY  Goal: Free from accidental physical injury  8/21/2021 1217 by Nolan Smith RN  Outcome: Ongoing     Problem: DAILY CARE  Goal: Daily care needs are met  8/21/2021 1217 by Nolan Smith RN  Outcome: Ongoing     Problem: PAIN  Goal: Patient's pain/discomfort is manageable  8/21/2021 1217 by Nolan Smith RN  Outcome: Ongoing     Problem: SKIN INTEGRITY  Goal: Skin integrity is maintained or improved  8/21/2021 1217 by Nolan Smith RN  Outcome: Ongoing     Problem: KNOWLEDGE DEFICIT  Goal: Patient/S.O. demonstrates understanding of disease process, treatment plan, medications, and discharge instructions.   8/21/2021 1217 by Nolan Smith RN  Outcome: Ongoing     Problem: DISCHARGE BARRIERS  Goal: Patient's continuum of care needs are met  8/21/2021 1217 by Nolan Smith RN  Outcome: Ongoing

## 2021-08-21 NOTE — PROGRESS NOTES
INPATIENT CONSULTATION:    IDENTIFYING DATA/REASON FOR CONSULTATION   PATIENT:  Bin Doll  MRN:  [id number]  ADMIT DATE: 8/18/2021  TIME OF EVALUATION: 8/21/2021 7:20 AM  HOSPITAL STAY:   LOS: 3 days   CONSULTING PHYSICIAN: Brianna Cha DO   REASON FOR CONSULTATION: Nausea and vomiting    Subjective:    Patient seen and examined in follow-up. Patient tolerated EGD 8/20/21. She notes she tolerated a regular diet yesterday evening without further nausea or vomiting. She is tolerating breakfast this morning, and would like to return home. She reports a small nonbloody, nonmelenic bowel movement yesterday.     MEDICATIONS   SCHEDULED:  pantoprazole, 40 mg, BID   And  sodium chloride (PF), 10 mL, BID  timolol, 1 drop, Daily  lisinopril, 40 mg, Daily  latanoprost, 1 drop, Nightly  aspirin, 81 mg, Daily  sodium chloride flush, 10 mL, 2 times per day  enoxaparin, 40 mg, Daily  insulin lispro, 0-12 Units, TID WC  insulin lispro, 0-6 Units, Nightly  lidocaine 1 % injection, 5 mL, Once      FLUIDS/DRIPS:     dextrose      sodium chloride       PRNs: enalaprilat, 1.25 mg, Q6H PRN  melatonin, 6 mg, Nightly PRN  dicyclomine, 10 mg, Q6H PRN  albuterol sulfate HFA, 2 puff, Q4H PRN  glucose, 15 g, PRN  dextrose, 12.5 g, PRN  glucagon (rDNA), 1 mg, PRN  dextrose, 100 mL/hr, PRN  sodium chloride flush, 10 mL, PRN  sodium chloride, 25 mL, PRN  ondansetron, 4 mg, Q4H PRN  polyethylene glycol, 17 g, Daily PRN  acetaminophen, 650 mg, Q4H PRN   Or  acetaminophen, 650 mg, Q4H PRN  promethazine, 25 mg, Q4H PRN      ALLERGIES:    Allergies   Allergen Reactions    Avelox [Moxifloxacin] Other (See Comments)     Weakness, tingling, tingling mouth    Bactrim      Remote h/o diarrhea, swollen eyes, and weakness, tachycardia and tongue swelling    Cefuroxime      Pt does not recall what happened with this    Codeine     Medrol [Methylprednisolone] Swelling     Ok to take nasal steroids    Morphine     Niacin Other (See Comments)  Oat      Throat swells    Percocet [Oxycodone-Acetaminophen]     Reglan [Metoclopramide] Other (See Comments)     States has something to do with a mass unknown reaction     Spironolactone      Increased potassium    Statins      Muscle cramps    Sulfamethoxazole-Trimethoprim Swelling    Vicodin [Hydrocodone-Acetaminophen]          PHYSICAL EXAM     Vitals:    08/20/21 1954 08/20/21 2100 08/20/21 2346 08/21/21 0546   BP: 134/76  123/61 135/71   Pulse: 74 81 84 56   Resp: 16  16 14   Temp: 98.1 °F (36.7 °C)  98.1 °F (36.7 °C) 98.1 °F (36.7 °C)   TempSrc: Oral  Oral Oral   SpO2:   97% 98%   Weight:    225 lb 15.5 oz (102.5 kg)   Height:           I/O last 3 completed shifts: In: 951.1 [P.O.:240; I.V.:711.1]  Out: -     Physical Exam:  Gen: Resting in bed, NAD   HEENT: normocephalic, atraumatic. No scleral icterus.    CV: RRR no MRG   Pul: CTAB   Abd: Good bowel sounds throughout, soft, NT/ND, no masses, no HSM   Ext: No edema   Neuro: No asterixis   Skin: No jaundice, spider angiomas, dubon erythema     LABS AND IMAGING     Recent Results (from the past 24 hour(s))   POCT Glucose    Collection Time: 08/20/21  7:36 AM   Result Value Ref Range    POC Glucose 110 (H) 70 - 99 mg/dl    Performed on ACCU-CHEK    POCT Glucose    Collection Time: 08/20/21 11:21 AM   Result Value Ref Range    POC Glucose 123 (H) 70 - 99 mg/dl    Performed on ACCU-CHEK    POCT Glucose    Collection Time: 08/20/21 11:45 AM   Result Value Ref Range    POC Glucose 94 70 - 99 mg/dl    Performed on ACCU-CHEK    POCT Glucose    Collection Time: 08/20/21  1:03 PM   Result Value Ref Range    POC Glucose 95 70 - 99 mg/dl    Performed on ACCU-CHEK    POCT Glucose    Collection Time: 08/20/21  4:06 PM   Result Value Ref Range    POC Glucose 100 (H) 70 - 99 mg/dl    Performed on ACCU-CHEK    POCT Glucose    Collection Time: 08/20/21  7:55 PM   Result Value Ref Range    POC Glucose 170 (H) 70 - 99 mg/dl    Performed on ACCU-CHEK      Other Labs    Imaging    EGD 8/20/21:  1. Severe LA grade D esophagitis with punctate ulcerations in the mid and distal esophagus. Targeted biopsies were obtained from the ulcer bed and surrounding mucosa to evaluate for viral esophagitis. 2.  Patchy gastric erythema without ulceration or erosion. Biopsies were obtained from the gastric antrum, incisura and body to evaluate for H. Pylori. 3. Patchy erythema in the duodenal bulb and second portion of the duodenum. Biopsies obtained from the duodenal bulb and the second portion of the duodenum to evaluate for Celiac disease and alternative pathology for erythema. ASSESSMENT AND RECOMMENDATIONS   Yuly Linda is a 79-year-old female past medical history of DM-II complicated by gastroparesis, hypertension, hyperlipidemia and COPD who presented to [facility] [place] 8/18/2021 with abdominal pain, nausea and vomiting.     1. Nausea/vomiting: Given history, suspect flare of gastroparesis. Uncontrolled hypertension may also be contributing. EGD 8/20/21 negative for peptic ulcer disease and GOO, but notable for esophagitis likely due to vomiting as well as gastric erythema and duodenal erythema, with biopsies pending. 2. Leukocytosis: Etiology uncertain. While this may be reactive given flare of gastroparesis, she has a history of chronic significant leukocytosis. She was seen in the recent past by hematology/oncology Dr. Skye Blair, and this was thought to be reactive due to chronic inflammation. Per note, work-up included a negative JAK2, BCR ABL, and flow cytometry. 3. Polycythemia: Again previously seen by hematology/oncology Dr. Izabel Mesa, with negative JAK2 mutation testing with plan for repeat phlebotomy as needed. RECOMMENDATIONS:    - Antiemetics  - Discharge home today with PRN antiemetics, and outpatient follow-up with Elliot Solorzano MD.    Thank you for allowing me to participate in this patient's care. No further GI work-up needed at this time.  We will sign off, however please contact us with any additional questions at [phone]. Ramón Argueta.  [address]

## 2021-08-21 NOTE — DISCHARGE SUMMARY
Hospital Medicine Discharge Summary    Patient: Alejandro Mark     Gender: female  : 1960   Age: 62 y.o. MRN: [id number]    Code Status: Full Code     Primary Care Provider: Bernabe Snowden    Admit Date: 2021   Discharge Date:   21    Admitting Physician: Yossi Hutchinson MD  Discharge Physician: Maria A Fontaine DO     Discharge Diagnoses: Active Hospital Problems    Diagnosis Date Noted    Intractable nausea and vomiting [R11.2] 2021    Generalized abdominal pain [R10.84] 2021    Essential hypertension [I10] 2021    Gastroparesis diabeticorum ([facility] [address] [E11.43, K31.84] 2016    DMII (diabetes mellitus, type 2) ([facility] [address] [E11.9] 2016    COPD (chronic obstructive pulmonary disease) [J44.9] 2013    Hyperlipidemia with target LDL less than 70 [E78.5]     Asthma [J45.909]        Hospital Course: A 62y.o. year-old female with a history of hypertension, hyperlipidemia, type II diabetes mellitus, COPD/Asthma, and gastroparesis, presented to the emergency room for evaluation following a 1 to 2-day history of recurrent episodes of nausea and vomiting.  She states that she has generalized, gnawing abdominal pain which is worse across the entire upper abdomen when she has emesis. In the ed a ct abdomen was nonacute.   Today she feels much better and has tolerated a carb control diet. She wants to go home.     Work up completed, and improved with treatment as below. was discharged today in stable condition. Abdominal pain, nausea and vomiting- improved  gastroparesis   - symptomatic treatment with Zofran and/or Phenergan as needed  - maintenance of fluids and electrolytes replaced  likely flare of Gastroparesis  - consulted GI - egd with esophagitis - likely due to vomiting - gastric and duodenal biopsies taken to assess for h pylori and other etiologies.  GI wall call with results and she is to follow up with Dr Callum Linares in the office     Leukocytosis - 08/20/2021     08/20/2021    CO2 28 08/20/2021    BUN 11 08/20/2021    CREATININE 0.9 08/20/2021    CALCIUM 8.7 08/20/2021    PHOS 4.1 02/21/2017    BNP 83.3 05/03/2012    ALKPHOS 103 08/20/2021    ALT 13 08/20/2021    AST 16 08/20/2021    BILITOT 0.8 08/20/2021    BILIDIR <0.2 08/20/2021    LABALBU 3.3 08/20/2021    LDLCALC 169 03/29/2017    TRIG 158 03/29/2017     Lab Results   Component Value Date    INR 1.01 12/21/2014    INR 1.16 (H) 05/03/2012    INR 1.04 04/08/2012       Radiology:  US ABDOMEN LIMITED Specify organ? LIVER, GALLBLADDER   Final Result   No cholelithiasis or cholecystitis      Increased echogenicity throughout the liver, compatible with diffuse   hepatocellular disease such as fatty infiltration         CT ABDOMEN PELVIS W IV CONTRAST Additional Contrast? None   Final Result   No acute abdominal or pelvic abnormality. Discharge Medications:   Current Discharge Medication List        Current Discharge Medication List        Current Discharge Medication List      CONTINUE these medications which have NOT CHANGED    Details   ezetimibe (ZETIA) 10 MG tablet Take 10 mg by mouth daily      amLODIPine (NORVASC) 5 MG tablet Take 5 mg by mouth daily      insulin glargine (BASAGLAR KWIKPEN) 100 UNIT/ML injection pen Inject 64 Units into the skin nightly      !! insulin lispro (HUMALOG) 100 UNIT/ML injection vial Inject 18 Units into the skin 3 times daily (before meals)      ! ! insulin lispro (HUMALOG) 100 UNIT/ML injection vial Inject into the skin 3 times daily (before meals) Per sliding scale.  2 units >150 then additional 2 units for every 50      lisinopril (PRINIVIL;ZESTRIL) 40 MG tablet TAKE 1 TABLET BY MOUTH EVERY DAY  Qty: 30 tablet, Refills: 0    Associated Diagnoses: Hypertension complicating diabetes (HCC)      aspirin 81 MG tablet Take 81 mg by mouth daily      omeprazole (PRILOSEC) 40 MG delayed release capsule TAKE 1 CAPSULE BY MOUTH ONCE DAILY  Qty: 90 capsule, Refills: 2 Associated Diagnoses: Gastroparesis diabeticorum (HCC)      timolol (TIMOPTIC) 0.5 % ophthalmic solution Place 1 drop into both eyes daily       ergocalciferol (DRISDOL) 54871 UNITS capsule Take 1 capsule by mouth once a week  Qty: 13 capsule, Refills: 1      albuterol sulfate (PROAIR RESPICLICK) 298 (90 BASE) MCG/ACT aerosol powder inhalation Inhale 2 puffs into the lungs every 4 hours as needed for Wheezing or Shortness of Breath  Qty: 1 Inhaler, Refills: 5    Associated Diagnoses: Pulmonary emphysema, unspecified emphysema type (HCC)      latanoprost (XALATAN) 0.005 % ophthalmic solution Place 1 drop into both eyes nightly. Insulin Syringe-Needle U-100 (INSULIN SYRINGE .5CC/31GX5/16\") 31G X 5/16\" 0.5 ML MISC 1 each by Does not apply route daily  Qty: 100 Syringe, Refills: 3      SUMAtriptan (IMITREX) 50 MG tablet Take 1 tablet by mouth once as needed for Migraine May repeat in 2 hours x one  Qty: 9 tablet, Refills: 3    Associated Diagnoses: Acute intractable headache, unspecified headache type      acetaminophen (APAP EXTRA STRENGTH) 500 MG tablet Take 1 tablet by mouth every 6 hours as needed for Pain  Qty: 60 tablet, Refills: 0      Tens Unit MISC by Does not apply route  Qty: 1 each, Refills: 0    Associated Diagnoses: Chronic midline low back pain with bilateral sciatica      Insulin Pen Needle 31G X 5 MM MISC Pt uses three times a day  Qty: 450 each, Refills: 3    Associated Diagnoses: Type II or unspecified type diabetes mellitus without mention of complication, uncontrolled; Hypertension due to endocrine disorder; Hirsutism; Elevated testosterone level in female      glucose blood VI test strips (EXACTECH TEST) strip 4 times a day As needed. Qty: 400 each, Refills: 3      Lancets MISC 3-4 times a day  Qty: 100 each, Refills: 3       !! - Potential duplicate medications found. Please discuss with provider.         Current Discharge Medication List      STOP taking these medications       insulin regular human (HUMULIN R) 500 UNIT/ML concentrated injection vial Comments:   Reason for Stopping:         insulin regular human (HUMULIN R) 500 UNIT/ML concentrated injection vial Comments:   Reason for Stopping:         promethazine (PHENERGAN) 25 MG suppository Comments:   Reason for Stopping:         dicyclomine (BENTYL) 10 MG capsule Comments:   Reason for Stopping:         flunisolide (AEROSPAN) 80 MCG/ACT AERS inhaler Comments:   Reason for Stopping:         tretinoin (RETIN-A) 0.025 % cream Comments:   Reason for Stopping:         ondansetron (ZOFRAN) 4 MG tablet Comments:   Reason for Stopping:         fluticasone (FLONASE) 50 MCG/ACT nasal spray Comments:   Reason for Stopping: Follow-up appointments:  one week    Provider Follow-up:    pcp    Condition at Discharge:  Stable    The patient was seen and examined on day of discharge and this discharge summary is in conjunction with any daily progress note from day of discharge. Time Spent on discharge is 45 minutes  in the examination, evaluation, counseling and review of medications and discharge plan. Signed:    Meg Castano DO   8/21/2021      Thank you CEASAR Chavez for the opportunity to be involved in this patient's care. If you have any questions or concerns please feel free to contact me at [phone].

## 2021-08-21 NOTE — CARE COORDINATION
CASE MANAGEMENT DISCHARGE SUMMARY:    DISCHARGE DATE: 8/21/2021    DISCHARGED TO HOME     TRANSPORTATION: Family     PREFERRED PHARMACY: MANJEET Dorman, [facility] Case Management  [phone]  Electronically signed by MANJEET Valentin, MEGAN on 8/21/2021 at 2:51 PM

## 2021-08-23 LAB
EKG ATRIAL RATE: 76 BPM
EKG DIAGNOSIS: NORMAL
EKG P AXIS: 62 DEGREES
EKG P-R INTERVAL: 154 MS
EKG Q-T INTERVAL: 400 MS
EKG QRS DURATION: 80 MS
EKG QTC CALCULATION (BAZETT): 450 MS
EKG R AXIS: 10 DEGREES
EKG T AXIS: 36 DEGREES
EKG VENTRICULAR RATE: 76 BPM

## 2021-08-23 PROCEDURE — 93010 ELECTROCARDIOGRAM REPORT: CPT | Performed by: INTERNAL MEDICINE

## 2021-08-24 ENCOUNTER — CARE COORDINATION (OUTPATIENT)
Dept: CASE MANAGEMENT | Age: 61
End: 2021-08-24

## 2021-08-24 NOTE — CARE COORDINATION
[facility] Transitions Initial Follow Up Call    Call within 2 business days of discharge: Yes    Patient: Yuly Linda Patient : 1960   MRN: [id number]  Reason for Admission: Intractable N/V  Discharge Date: 21 RARS: Readmission Risk Score: 15      Last Discharge [facility]       Complaint Diagnosis Description Type Department Provider    21 Emesis Intractable nausea and vomiting . .. ED to Hosp-Admission (Discharged) (ADMITTED) [facility] Brianna Cha DO; Franky Barrett ... Transitions of Care Initial Call    Was this an external facility discharge? No Discharge Facility: N/A    Challenges to be reviewed by the provider   Additional needs identified to be addressed with provider: No               Method of communication with provider : none        Was this a readmission? No  Patient stated reason for admission: N/V/D    Care Transition Nurse (CTN) contacted the patient by telephone to perform post hospital discharge assessment. Verified name and  with patient as identifiers. Provided introduction to self, and explanation of the CTN role. CTN reviewed discharge instructions, medical action plan and red flags with patient who verbalized understanding. Patient given an opportunity to ask questions and does not have any further questions or concerns at this time. Were discharge instructions available to patient? Yes. Reviewed appropriate site of care based on symptoms and resources available to patient including: PCP. The patient agrees to contact the PCP office for questions related to their healthcare. Medication reconciliation was performed with no one. Patient declines. Eliz Alvarez states her meds are the same as when she was in the hospital. She states her phone is about to die and she is not anywhere that she can charge it and wants to complete the call as quickly as possible., who verbalizes understanding of administration of home medications.  Advised obtaining a 90-day supply of all daily and as-needed medications. Discussed COVID vaccination status: Yes. Aftabjoao Burch states she did not receive the COVID vaccine. CTN provided contact information. Non-face-to-face services provided:  Obtained and reviewed discharge summary and/or continuity of care documents    Care Transitions 24 Hour Call    Do you have any ongoing symptoms?: No  Do you have a copy of your discharge instructions?: Yes  Do you have all of your prescriptions and are they filled?: Yes  Have you been contacted by a [address]?: No  Have you scheduled your follow up appointment?: No  Were you discharged with any Home Care or Post Acute Services: No  Do you feel like you have everything you need to keep you well at home?: Yes  Care Transitions Interventions       Initial attempt at [facility] discharge phone call. Aftabmojgan Burch states she is doing \"fine\". She states she spoke with her [facility]XP yesterday. Aftab Burch states she usually checks her BG level on a daily basis, but has not done so yet today. She denies any N/V. She c/o diarrhea today. She denies any abdominal pain. Aftab Burch denies any needs at this time. No additional CTN calls - Non [facility] PCP. Follow Up  No future appointments.     Unknown JOHAN Mckinney

## 2021-09-19 ENCOUNTER — APPOINTMENT (OUTPATIENT)
Dept: CT IMAGING | Age: 61
DRG: 074 | End: 2021-09-19
Payer: COMMERCIAL

## 2021-09-19 ENCOUNTER — HOSPITAL ENCOUNTER (INPATIENT)
Age: 61
LOS: 3 days | Discharge: HOME | DRG: 074 | End: 2021-09-22
Attending: EMERGENCY MEDICINE | Admitting: INTERNAL MEDICINE
Payer: COMMERCIAL

## 2021-09-19 DIAGNOSIS — E86.0 DEHYDRATION: ICD-10-CM

## 2021-09-19 DIAGNOSIS — R11.2 INTRACTABLE VOMITING WITH NAUSEA, UNSPECIFIED VOMITING TYPE: Primary | ICD-10-CM

## 2021-09-19 LAB
A/G RATIO: 1.4 (ref 1.1–2.2)
ALBUMIN SERPL-MCNC: 4.9 G/DL (ref 3.4–5)
ALP BLD-CCNC: 148 U/L (ref 40–129)
ALT SERPL-CCNC: 19 U/L (ref 10–40)
ANION GAP SERPL CALCULATED.3IONS-SCNC: 21 MMOL/L (ref 3–16)
AST SERPL-CCNC: 18 U/L (ref 15–37)
BACTERIA: ABNORMAL /HPF
BASOPHILS ABSOLUTE: 0 K/UL (ref 0–0.2)
BASOPHILS RELATIVE PERCENT: 0.2 %
BILIRUB SERPL-MCNC: 0.8 MG/DL (ref 0–1)
BILIRUBIN URINE: NEGATIVE
BLOOD, URINE: NEGATIVE
BUN BLDV-MCNC: 16 MG/DL (ref 7–20)
CALCIUM SERPL-MCNC: 9.9 MG/DL (ref 8.3–10.6)
CHLORIDE BLD-SCNC: 89 MMOL/L (ref 99–110)
CLARITY: CLEAR
CO2: 23 MMOL/L (ref 21–32)
COLOR: YELLOW
CREAT SERPL-MCNC: 0.9 MG/DL (ref 0.6–1.2)
EOSINOPHILS ABSOLUTE: 0 K/UL (ref 0–0.6)
EOSINOPHILS RELATIVE PERCENT: 0 %
EPITHELIAL CELLS, UA: 2 /HPF (ref 0–5)
GFR AFRICAN AMERICAN: >60
GFR NON-AFRICAN AMERICAN: >60
GLOBULIN: 3.6 G/DL
GLUCOSE BLD-MCNC: 170 MG/DL (ref 70–99)
GLUCOSE BLD-MCNC: 340 MG/DL (ref 70–99)
GLUCOSE URINE: 500 MG/DL
HCT VFR BLD CALC: 47.7 % (ref 36–48)
HEMOGLOBIN: 16.1 G/DL (ref 12–16)
HYALINE CASTS: 2 /LPF (ref 0–8)
KETONES, URINE: 15 MG/DL
LEUKOCYTE ESTERASE, URINE: NEGATIVE
LIPASE: 43 U/L (ref 13–60)
LYMPHOCYTES ABSOLUTE: 2 K/UL (ref 1–5.1)
LYMPHOCYTES RELATIVE PERCENT: 10.9 %
MCH RBC QN AUTO: 26.1 PG (ref 26–34)
MCHC RBC AUTO-ENTMCNC: 33.7 G/DL (ref 31–36)
MCV RBC AUTO: 77.5 FL (ref 80–100)
MICROSCOPIC EXAMINATION: YES
MONOCYTES ABSOLUTE: 0.6 K/UL (ref 0–1.3)
MONOCYTES RELATIVE PERCENT: 3.2 %
NEUTROPHILS ABSOLUTE: 15.9 K/UL (ref 1.7–7.7)
NEUTROPHILS RELATIVE PERCENT: 85.7 %
NITRITE, URINE: NEGATIVE
PDW BLD-RTO: 14.9 % (ref 12.4–15.4)
PERFORMED ON: ABNORMAL
PH UA: 6.5 (ref 5–8)
PLATELET # BLD: 295 K/UL (ref 135–450)
PMV BLD AUTO: 8.4 FL (ref 5–10.5)
POTASSIUM REFLEX MAGNESIUM: 4.1 MMOL/L (ref 3.5–5.1)
PROTEIN UA: 100 MG/DL
RBC # BLD: 6.15 M/UL (ref 4–5.2)
RBC UA: ABNORMAL /HPF (ref 0–4)
SODIUM BLD-SCNC: 133 MMOL/L (ref 136–145)
SPECIFIC GRAVITY UA: >1.03 (ref 1–1.03)
TOTAL PROTEIN: 8.5 G/DL (ref 6.4–8.2)
URINE TYPE: ABNORMAL
UROBILINOGEN, URINE: 1 E.U./DL
WBC # BLD: 18.5 K/UL (ref 4–11)
WBC UA: 2 /HPF (ref 0–5)

## 2021-09-19 PROCEDURE — 93005 ELECTROCARDIOGRAM TRACING: CPT | Performed by: EMERGENCY MEDICINE

## 2021-09-19 PROCEDURE — 1200000000 HC SEMI PRIVATE

## 2021-09-19 PROCEDURE — 6370000000 HC RX 637 (ALT 250 FOR IP): Performed by: EMERGENCY MEDICINE

## 2021-09-19 PROCEDURE — 81001 URINALYSIS AUTO W/SCOPE: CPT

## 2021-09-19 PROCEDURE — 96372 THER/PROPH/DIAG INJ SC/IM: CPT

## 2021-09-19 PROCEDURE — 2580000003 HC RX 258: Performed by: INTERNAL MEDICINE

## 2021-09-19 PROCEDURE — 83690 ASSAY OF LIPASE: CPT

## 2021-09-19 PROCEDURE — 85025 COMPLETE CBC W/AUTO DIFF WBC: CPT

## 2021-09-19 PROCEDURE — 96361 HYDRATE IV INFUSION ADD-ON: CPT

## 2021-09-19 PROCEDURE — 2580000003 HC RX 258: Performed by: EMERGENCY MEDICINE

## 2021-09-19 PROCEDURE — 6370000000 HC RX 637 (ALT 250 FOR IP): Performed by: INTERNAL MEDICINE

## 2021-09-19 PROCEDURE — 96374 THER/PROPH/DIAG INJ IV PUSH: CPT

## 2021-09-19 PROCEDURE — 74177 CT ABD & PELVIS W/CONTRAST: CPT

## 2021-09-19 PROCEDURE — 6360000002 HC RX W HCPCS: Performed by: EMERGENCY MEDICINE

## 2021-09-19 PROCEDURE — 80053 COMPREHEN METABOLIC PANEL: CPT

## 2021-09-19 PROCEDURE — 99284 EMERGENCY DEPT VISIT MOD MDM: CPT

## 2021-09-19 PROCEDURE — 6360000004 HC RX CONTRAST MEDICATION: Performed by: EMERGENCY MEDICINE

## 2021-09-19 PROCEDURE — 6360000002 HC RX W HCPCS: Performed by: INTERNAL MEDICINE

## 2021-09-19 RX ORDER — LISINOPRIL 40 MG/1
1 TABLET ORAL DAILY
Status: DISCONTINUED | OUTPATIENT
Start: 2021-09-19 | End: 2021-09-19

## 2021-09-19 RX ORDER — PROMETHAZINE HYDROCHLORIDE 25 MG/ML
6.25 INJECTION, SOLUTION INTRAMUSCULAR; INTRAVENOUS EVERY 6 HOURS PRN
Status: DISCONTINUED | OUTPATIENT
Start: 2021-09-19 | End: 2021-09-19 | Stop reason: CLARIF

## 2021-09-19 RX ORDER — DICYCLOMINE HYDROCHLORIDE 10 MG/ML
20 INJECTION INTRAMUSCULAR ONCE
Status: COMPLETED | OUTPATIENT
Start: 2021-09-19 | End: 2021-09-19

## 2021-09-19 RX ORDER — PROMETHAZINE HYDROCHLORIDE 25 MG/ML
12.5 INJECTION, SOLUTION INTRAMUSCULAR; INTRAVENOUS ONCE
Status: COMPLETED | OUTPATIENT
Start: 2021-09-19 | End: 2021-09-19

## 2021-09-19 RX ORDER — 0.9 % SODIUM CHLORIDE 0.9 %
500 INTRAVENOUS SOLUTION INTRAVENOUS ONCE
Status: COMPLETED | OUTPATIENT
Start: 2021-09-19 | End: 2021-09-19

## 2021-09-19 RX ORDER — ALBUTEROL SULFATE 90 UG/1
2 AEROSOL, METERED RESPIRATORY (INHALATION) EVERY 4 HOURS PRN
Status: DISCONTINUED | OUTPATIENT
Start: 2021-09-19 | End: 2021-09-22 | Stop reason: HOSPADM

## 2021-09-19 RX ORDER — POTASSIUM CHLORIDE 7.45 MG/ML
10 INJECTION INTRAVENOUS PRN
Status: DISCONTINUED | OUTPATIENT
Start: 2021-09-19 | End: 2021-09-22 | Stop reason: HOSPADM

## 2021-09-19 RX ORDER — SODIUM CHLORIDE 0.9 % (FLUSH) 0.9 %
10 SYRINGE (ML) INJECTION PRN
Status: DISCONTINUED | OUTPATIENT
Start: 2021-09-19 | End: 2021-09-22 | Stop reason: HOSPADM

## 2021-09-19 RX ORDER — DEXTROSE MONOHYDRATE 25 G/50ML
12.5 INJECTION, SOLUTION INTRAVENOUS PRN
Status: DISCONTINUED | OUTPATIENT
Start: 2021-09-19 | End: 2021-09-22 | Stop reason: HOSPADM

## 2021-09-19 RX ORDER — AMLODIPINE BESYLATE 5 MG/1
5 TABLET ORAL DAILY
Status: DISCONTINUED | OUTPATIENT
Start: 2021-09-20 | End: 2021-09-22 | Stop reason: HOSPADM

## 2021-09-19 RX ORDER — SODIUM CHLORIDE 9 MG/ML
25 INJECTION, SOLUTION INTRAVENOUS PRN
Status: DISCONTINUED | OUTPATIENT
Start: 2021-09-19 | End: 2021-09-22 | Stop reason: HOSPADM

## 2021-09-19 RX ORDER — MAGNESIUM SULFATE IN WATER 40 MG/ML
2000 INJECTION, SOLUTION INTRAVENOUS PRN
Status: DISCONTINUED | OUTPATIENT
Start: 2021-09-19 | End: 2021-09-22 | Stop reason: HOSPADM

## 2021-09-19 RX ORDER — INSULIN GLARGINE 100 [IU]/ML
64 INJECTION, SOLUTION SUBCUTANEOUS NIGHTLY
Status: DISCONTINUED | OUTPATIENT
Start: 2021-09-19 | End: 2021-09-20

## 2021-09-19 RX ORDER — ASPIRIN 81 MG/1
81 TABLET, CHEWABLE ORAL DAILY
Status: DISCONTINUED | OUTPATIENT
Start: 2021-09-20 | End: 2021-09-22 | Stop reason: HOSPADM

## 2021-09-19 RX ORDER — PANTOPRAZOLE SODIUM 40 MG/1
40 TABLET, DELAYED RELEASE ORAL
Status: DISCONTINUED | OUTPATIENT
Start: 2021-09-20 | End: 2021-09-20

## 2021-09-19 RX ORDER — LISINOPRIL 10 MG/1
10 TABLET ORAL DAILY
Status: DISCONTINUED | OUTPATIENT
Start: 2021-09-20 | End: 2021-09-22 | Stop reason: HOSPADM

## 2021-09-19 RX ORDER — SODIUM CHLORIDE, SODIUM LACTATE, POTASSIUM CHLORIDE, AND CALCIUM CHLORIDE .6; .31; .03; .02 G/100ML; G/100ML; G/100ML; G/100ML
500 INJECTION, SOLUTION INTRAVENOUS ONCE
Status: COMPLETED | OUTPATIENT
Start: 2021-09-19 | End: 2021-09-19

## 2021-09-19 RX ORDER — SODIUM CHLORIDE, SODIUM LACTATE, POTASSIUM CHLORIDE, CALCIUM CHLORIDE 600; 310; 30; 20 MG/100ML; MG/100ML; MG/100ML; MG/100ML
INJECTION, SOLUTION INTRAVENOUS CONTINUOUS
Status: DISCONTINUED | OUTPATIENT
Start: 2021-09-19 | End: 2021-09-22 | Stop reason: HOSPADM

## 2021-09-19 RX ORDER — PROMETHAZINE HYDROCHLORIDE 25 MG/1
12.5 TABLET ORAL EVERY 6 HOURS PRN
Status: DISCONTINUED | OUTPATIENT
Start: 2021-09-19 | End: 2021-09-22 | Stop reason: HOSPADM

## 2021-09-19 RX ORDER — SODIUM CHLORIDE 0.9 % (FLUSH) 0.9 %
10 SYRINGE (ML) INJECTION EVERY 12 HOURS SCHEDULED
Status: DISCONTINUED | OUTPATIENT
Start: 2021-09-19 | End: 2021-09-22 | Stop reason: HOSPADM

## 2021-09-19 RX ORDER — ONDANSETRON 2 MG/ML
4 INJECTION INTRAMUSCULAR; INTRAVENOUS EVERY 6 HOURS PRN
Status: DISCONTINUED | OUTPATIENT
Start: 2021-09-19 | End: 2021-09-22 | Stop reason: HOSPADM

## 2021-09-19 RX ORDER — DEXTROSE MONOHYDRATE 50 MG/ML
100 INJECTION, SOLUTION INTRAVENOUS PRN
Status: DISCONTINUED | OUTPATIENT
Start: 2021-09-19 | End: 2021-09-22 | Stop reason: HOSPADM

## 2021-09-19 RX ORDER — NICOTINE POLACRILEX 4 MG
15 LOZENGE BUCCAL PRN
Status: DISCONTINUED | OUTPATIENT
Start: 2021-09-19 | End: 2021-09-22 | Stop reason: HOSPADM

## 2021-09-19 RX ADMIN — IOPAMIDOL 75 ML: 755 INJECTION, SOLUTION INTRAVENOUS at 14:56

## 2021-09-19 RX ADMIN — DICYCLOMINE HYDROCHLORIDE 20 MG: 10 INJECTION, SOLUTION INTRAMUSCULAR at 16:24

## 2021-09-19 RX ADMIN — INSULIN LISPRO 1 UNITS: 100 INJECTION, SOLUTION INTRAVENOUS; SUBCUTANEOUS at 21:04

## 2021-09-19 RX ADMIN — PROMETHAZINE HYDROCHLORIDE 6.25 MG: 25 INJECTION INTRAMUSCULAR; INTRAVENOUS at 21:30

## 2021-09-19 RX ADMIN — INSULIN LISPRO 8 UNITS: 100 INJECTION, SOLUTION INTRAVENOUS; SUBCUTANEOUS at 15:36

## 2021-09-19 RX ADMIN — SODIUM CHLORIDE 500 ML: 9 INJECTION, SOLUTION INTRAVENOUS at 14:30

## 2021-09-19 RX ADMIN — SODIUM CHLORIDE, PRESERVATIVE FREE 10 ML: 5 INJECTION INTRAVENOUS at 20:02

## 2021-09-19 RX ADMIN — PROMETHAZINE HYDROCHLORIDE 12.5 MG: 25 INJECTION INTRAMUSCULAR; INTRAVENOUS at 13:47

## 2021-09-19 RX ADMIN — INSULIN GLARGINE 64 UNITS: 100 INJECTION, SOLUTION SUBCUTANEOUS at 21:04

## 2021-09-19 RX ADMIN — DICYCLOMINE HYDROCHLORIDE 20 MG: 10 INJECTION, SOLUTION INTRAMUSCULAR at 14:28

## 2021-09-19 RX ADMIN — SODIUM CHLORIDE 500 ML: 9 INJECTION, SOLUTION INTRAVENOUS at 16:21

## 2021-09-19 RX ADMIN — PROMETHAZINE HYDROCHLORIDE 12.5 MG: 25 INJECTION INTRAMUSCULAR; INTRAVENOUS at 16:14

## 2021-09-19 RX ADMIN — ONDANSETRON 4 MG: 2 INJECTION INTRAMUSCULAR; INTRAVENOUS at 18:46

## 2021-09-19 RX ADMIN — SODIUM CHLORIDE, POTASSIUM CHLORIDE, SODIUM LACTATE AND CALCIUM CHLORIDE: 600; 310; 30; 20 INJECTION, SOLUTION INTRAVENOUS at 22:38

## 2021-09-19 RX ADMIN — SODIUM CHLORIDE, POTASSIUM CHLORIDE, SODIUM LACTATE AND CALCIUM CHLORIDE 500 ML: 600; 310; 30; 20 INJECTION, SOLUTION INTRAVENOUS at 19:59

## 2021-09-19 ASSESSMENT — ENCOUNTER SYMPTOMS
DIARRHEA: 0
VOMITING: 1
ABDOMINAL PAIN: 1
NAUSEA: 1
SHORTNESS OF BREATH: 0

## 2021-09-19 ASSESSMENT — PAIN DESCRIPTION - ORIENTATION: ORIENTATION: LEFT;RIGHT

## 2021-09-19 ASSESSMENT — PAIN SCALES - WONG BAKER: WONGBAKER_NUMERICALRESPONSE: 2

## 2021-09-19 ASSESSMENT — PAIN DESCRIPTION - DESCRIPTORS: DESCRIPTORS: CONSTANT;TENDER

## 2021-09-19 ASSESSMENT — PAIN SCALES - GENERAL
PAINLEVEL_OUTOF10: 2
PAINLEVEL_OUTOF10: 0
PAINLEVEL_OUTOF10: 9

## 2021-09-19 ASSESSMENT — PAIN DESCRIPTION - FREQUENCY: FREQUENCY: CONTINUOUS

## 2021-09-19 ASSESSMENT — PAIN DESCRIPTION - LOCATION: LOCATION: ABDOMEN;LEG

## 2021-09-19 NOTE — ED NOTES
Report called to [facility] on 3W, SBAR report given with no further questions at this time.       Nerissa Lira RN  09/19/21 [id number]

## 2021-09-19 NOTE — PROGRESS NOTES
4 Eyes Skin Assessment     NAME:  Amanda Gutierrez  YOB: 1960  MEDICAL RECORD NUMBER:  [id number]    The patient is being assess for  Admission    I agree that 2 RN's have performed a thorough Head to Toe Skin Assessment on the patient. ALL assessment sites listed below have been assessed. Areas assessed by both nurses:    Head, Face, Ears, Shoulders, Back, Chest, Arms, Elbows, Hands and Sacrum. Buttock, Coccyx, Ischium        Does the Patient have a Wound?  No noted wound(s)       Steve Prevention initiated:  Yes   Wound Care Orders initiated:  No    Pressure Injury (Stage 3,4, Unstageable, DTI, NWPT, and Complex wounds) if present place consult order under [de-identified] No    New and Established Ostomies if present place consult order under : No      Nurse 1 eSignature: Electronically signed by Alina Antony RN on 9/19/21 at 6:27 PM EDT    **SHARE this note so that the co-signing nurse is able to place an eSignature**    Nurse 2 eSignature: Electronically signed by Rachid Arteaga RN on 9/19/21 at 6:37 PM EDT

## 2021-09-19 NOTE — LETTER
[facility]  Phone: [phone]    No name on file. September 22, 2021     Patient: Juhi Josue   YOB: 1960   Date of Visit: 9/19/2021       To Whom It May Concern: It is my medical opinion that Juhi Josue may return to full duty immediately with no restrictions. Medina Smith was admitted on 9/19/2021 and discharged 9/22/2021. If you have any questions or concerns, please don't hesitate to call. Sincerely,    Electronically signed by Shahnaz Valiente RN on 9/22/2021 at 8:45 AM      No name on file.

## 2021-09-19 NOTE — PROGRESS NOTES
Pt arrived to room 3110 from ER. Still complaining of nausea, does not want to take anything by mouth except for ice chips. Pt ambulated from stretcher to bathroom and then to the bed with a SBA. Pt is now resting in bed. Call light is within reach.    Electronically signed by Rosa Machado RN on 9/19/2021 at 6:19 PM

## 2021-09-19 NOTE — H&P
Hospital Medicine History & Physical      PCP: CEASAR Cason    Date of Admission: 2021    Chief Complaint: Intractable nausea vomiting    History Of Present Illness:  Patient is a 61-year-old female with past medical history of hypertension, diabetes mellitus, gastroparesis who presented to hospital for nausea vomiting for 3 to 4 days. According to the patient she regularly uses marijuana, the last time she used her insulin at home was about 1 week ago. Patient mention she has not been able to hold down food otherwise denied fevers chills. Patient also mentioned she has diffuse abdominal pain 9/10 intensity, associated with nausea vomiting denies diarrhea fevers chills. 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    Gastroparesis     Hirsutism     Hyperlipidemia LDL goal < 70     Hypertension     Major depressive disorder with single episode 2009    Pancreatitis     Seasonal allergic rhinitis due to pollen 11/10/2016    Sleep apnea     Vitamin D deficiency        Past Surgical History:          Procedure Laterality Date     SECTION      ENDOMETRIAL ABLATION  5/3/12    HYSTEROSCOPY  5/3/12    MANDIBLE SURGERY      TUBAL LIGATION      UPPER GASTROINTESTINAL ENDOSCOPY N/A 2021    EGD BIOPSY performed by Eddie Jiménez MD at [facility]       Medications Prior to Admission:      Prior to Admission medications    Medication Sig Start Date End Date Taking?  Authorizing Provider   ezetimibe (ZETIA) 10 MG tablet Take 10 mg by mouth daily   Yes Historical Provider, MD   amLODIPine (NORVASC) 5 MG tablet Take 5 mg by mouth daily   Yes Historical Provider, MD   insulin glargine (BASAGLAR KWIKPEN) 100 UNIT/ML injection pen Inject 64 Units into the skin nightly   Yes Historical Provider, MD   insulin lispro (HUMALOG) 100 UNIT/ML injection vial Inject 18 Units into the skin 3 times daily (before meals)   Yes Historical Provider, MD   insulin lispro (HUMALOG) 100 UNIT/ML injection vial Inject into the skin 3 times daily (before meals) Per sliding scale. 2 units >150 then additional 2 units for every 50   Yes Historical Provider, MD   lisinopril (PRINIVIL;ZESTRIL) 40 MG tablet TAKE 1 TABLET BY MOUTH EVERY DAY 12/8/17  Yes Shari Flores MD   aspirin 81 MG tablet Take 81 mg by mouth daily   Yes Historical Provider, MD   Insulin Syringe-Needle U-100 (INSULIN SYRINGE .5CC/31GX5/16\") 31G X 5/16\" 0.5 ML MISC 1 each by Does not apply route daily 4/14/17  Yes Nitin Morse MD   acetaminophen (APAP EXTRA STRENGTH) 500 MG tablet Take 1 tablet by mouth every 6 hours as needed for Pain 11/18/16  Yes CEASAR Crandall   omeprazole (PRILOSEC) 40 MG delayed release capsule TAKE 1 CAPSULE BY MOUTH ONCE DAILY  Patient taking differently: Take 40 mg by mouth Daily with supper TAKE 1 CAPSULE BY MOUTH ONCE DAILY 11/10/16  Yes Shari Flores MD   timolol (TIMOPTIC) 0.5 % ophthalmic solution Place 1 drop into both eyes daily  8/7/16  Yes Historical Provider, MD   ergocalciferol (DRISDOL) 73315 UNITS capsule Take 1 capsule by mouth once a week 5/24/16  Yes Faizan Valdes MD   albuterol sulfate (PROAIR RESPICLICK) 686 (90 BASE) MCG/ACT aerosol powder inhalation Inhale 2 puffs into the lungs every 4 hours as needed for Wheezing or Shortness of Breath 5/20/16  Yes Faizan Valdes MD   Insulin Pen Needle 31G X 5 MM MISC Pt uses three times a day 9/17/15  Yes Nitin Morse MD   glucose blood VI test strips (EXACTECH TEST) strip 4 times a day As needed.  3/23/15  Yes Nitin Morse MD   Lancets MISC 3-4 times a day 2/26/15  Yes Javier De León gastroparesis who presented to hospital for nausea vomiting for 3 to 4 days. According to the patient she regularly uses marijuana, the last time she used her insulin at home was about 1 week ago. Patient mention she has not been able to hold down food otherwise denied fevers chills. Patient also mentioned she has diffuse abdominal pain 9/10 intensity, associated with nausea vomiting denies diarrhea fevers chills. Assessment  Intractable nausea vomiting abdominal pain  Tachycardia likely secondary to dehydration  Hyperglycemia secondary to medication noncompliance  Hyponatremia  Leukocytosis likely reactive      Plan  Aggressive IV fluid therapy with normal saline, as needed Phenergan, monitor BMP, insulin sliding scale  Get liquid diet for now  UA not suggestive of UTI  As needed labetalol with parameters  Resume home medications  DVT prophylaxis-Lovenox  Diet: No diet orders on file  Code Status: Prior    PT/OT Eval Status: ordered    Dispo - pending clinical improvement       Karen Parr MD    The note was completed using EMR and Dragon dictation system. Every effort was made to ensure accuracy; however, inadvertent computerized transcription errors may be present. Thank you CEASAR Joel for the opportunity to be involved in this patient's care. If you have any questions or concerns please feel free to contact me at [phone].     Karen Parr MD

## 2021-09-19 NOTE — ED PROVIDER NOTES
[address] [facility]      Pt Name: Saleem Espinoza  MRN: [id number]  [place] 1960  Date of evaluation: 9/19/2021  Provider: Valentín Rose MD    [address] [place] [address]       Chief Complaint   Patient presents with    Emesis     C/O Nausea and vomiting for the past 3 or 4 days. HISTORY OF PRESENT ILLNESS   (Location/Symptom, Timing/Onset, Context/Setting, Quality, Duration, Modifying Factors, Severity)  Note limiting factors. Saleem Espinoza is a 62 y.o. female who presents to the emergency department with intractable nausea and vomiting. HPI    This is a pleasant 45-year-old -American female with history of diabetes and intermittent diabetic gastroparesis who presents with 3 to 4 days of intractable nausea and vomiting is unable to tolerate p.o. She also complains of diffuse abdominal pain which is severe more when she is vomiting and rates it about 9 out of 10 at that time. No real focality to this pain seems to be diffuse and she denies any fevers chills urgency dysuria frequency or systemic signs symptoms of infection. Nursing Notes were reviewed. REVIEW OF SYSTEMS    (2-9 systems for level 4, 10 or more for level 5)     Review of Systems   Constitutional: Negative for chills and fever. Respiratory: Negative for shortness of breath. Cardiovascular: Negative for chest pain. Gastrointestinal: Positive for abdominal pain, nausea and vomiting. Negative for diarrhea. Genitourinary: Negative for dysuria, frequency and urgency. All other systems reviewed and are negative. Except as noted above the remainder of the review of systems was reviewed and negative.        PAST MEDICAL HISTORY     Past Medical History:   Diagnosis Date    Acid reflux     Anxiety     ASCUS (atypical squamous cells of undetermined significance) on Pap smear 6/2013    Asthma     Chronic midline low back pain with bilateral sciatica 11/10/2016    Chronic midline low back pain with bilateral sciatica     Diabetes mellitus, type 2 ([address]     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    Gastroparesis     Hirsutism     Hyperlipidemia LDL goal < 70     Hypertension     Major depressive disorder with single episode 2009    Pancreatitis     Seasonal allergic rhinitis due to pollen 11/10/2016    Sleep apnea     Vitamin D deficiency          SURGICAL HISTORY       Past Surgical History:   Procedure Laterality Date     SECTION      ENDOMETRIAL ABLATION  5/3/12    HYSTEROSCOPY  5/3/12    MANDIBLE SURGERY      TUBAL LIGATION      UPPER GASTROINTESTINAL ENDOSCOPY N/A 2021    EGD BIOPSY performed by Franchesca Jimenez MD at [address]        Previous Medications    ACETAMINOPHEN (APAP EXTRA STRENGTH) 500 MG TABLET    Take 1 tablet by mouth every 6 hours as needed for Pain    ALBUTEROL SULFATE (PROAIR RESPICLICK) 300 (90 BASE) MCG/ACT AEROSOL POWDER INHALATION    Inhale 2 puffs into the lungs every 4 hours as needed for Wheezing or Shortness of Breath    AMLODIPINE (NORVASC) 5 MG TABLET    Take 5 mg by mouth daily    ASPIRIN 81 MG TABLET    Take 81 mg by mouth daily    ERGOCALCIFEROL (DRISDOL) 49718 UNITS CAPSULE    Take 1 capsule by mouth once a week    EZETIMIBE (ZETIA) 10 MG TABLET    Take 10 mg by mouth daily    GLUCOSE BLOOD VI TEST STRIPS (EXACTECH TEST) STRIP    4 times a day As needed. INSULIN GLARGINE (BASAGLAR KWIKPEN) 100 UNIT/ML INJECTION PEN    Inject 64 Units into the skin nightly    INSULIN LISPRO (HUMALOG) 100 UNIT/ML INJECTION VIAL    Inject 18 Units into the skin 3 times daily (before meals)    INSULIN LISPRO (HUMALOG) 100 UNIT/ML INJECTION VIAL    Inject into the skin 3 times daily (before meals) Per sliding scale.  2 units >150 then additional 2 units for every 50    INSULIN PEN NEEDLE 31G X 5 MM MISC    Pt uses three times a day    INSULIN SYRINGE-NEEDLE U-100 (INSULIN SYRINGE .5CC/31GX5/16\") 31G X 5/16\" 0.5 ML MISC    1 each by Does not apply route daily    LANCETS MISC    3-4 times a day    LATANOPROST (XALATAN) 0.005 % OPHTHALMIC SOLUTION    Place 1 drop into both eyes nightly. LISINOPRIL (PRINIVIL;ZESTRIL) 40 MG TABLET    TAKE 1 TABLET BY MOUTH EVERY DAY    OMEPRAZOLE (PRILOSEC) 40 MG DELAYED RELEASE CAPSULE    TAKE 1 CAPSULE BY MOUTH ONCE DAILY    SUMATRIPTAN (IMITREX) 50 MG TABLET    Take 1 tablet by mouth once as needed for Migraine May repeat in 2 hours x one    TENS UNIT MISC    by Does not apply route    TIMOLOL (TIMOPTIC) 0.5 % OPHTHALMIC SOLUTION    Place 1 drop into both eyes daily        ALLERGIES     Avelox [moxifloxacin], Bactrim, Cefuroxime, Codeine, Medrol [methylprednisolone], Morphine, Niacin, Oat, Percocet [oxycodone-acetaminophen], Reglan [metoclopramide], Spironolactone, Statins, Sulfamethoxazole-trimethoprim, and Vicodin [hydrocodone-acetaminophen]    FAMILY HISTORY       Family History   Problem Relation Age of Onset    Hypertension Mother     Breast Cancer Mother     Colon Cancer Mother     Cancer Father         Pancreatic    Prostate Cancer Father     No Known Problems Brother     No Known Problems Sister     Hypertension Maternal Aunt     Cancer Maternal Uncle     Hypertension Maternal Grandmother     Stroke Maternal Grandmother     Cancer Maternal Grandfather     No Known Problems Paternal Aunt     No Known Problems Paternal Uncle     No Known Problems Paternal Grandmother     No Known Problems Paternal Grandfather     No Known Problems Other     Rheum Arthritis Neg Hx     Osteoarthritis Neg Hx     Asthma Neg Hx     Diabetes Neg Hx     Heart Failure Neg Hx     High Cholesterol Neg Hx     Migraines Neg Hx     Ovarian Cancer Neg Hx     Rashes/Skin Problems Neg Hx     Seizures Neg Hx     Thyroid Disease Neg Hx           SOCIAL HISTORY       Social History     Socioeconomic History    Marital status:   Spouse name: Not on file    Number of children: 3    Years of education: Not on file    Highest education level: Not on file   Occupational History    Occupation:    Tobacco Use    Smoking status: Former Smoker     Types: Cigarettes     Quit date: 1990     Years since quittin.7    Smokeless tobacco: Never Used   Substance and Sexual Activity    Alcohol use: Yes     Alcohol/week: 0.0 standard drinks     Comment: less than once a month    Drug use: Yes     Frequency: 2.0 times per week     Types: Marijuana     Comment: used yesterday    Sexual activity: Not Currently     Partners: Male   Other Topics Concern    Not on file   Social History Narrative    Not on file     Social Determinants of Health     Financial Resource Strain:     Difficulty of Paying Living Expenses:    Food Insecurity:     Worried About Running Out of Food in the Last Year:     Ran Out of Food in the Last Year:    Transportation Needs:     Lack of Transportation (Medical):      Lack of Transportation (Non-Medical):    Physical Activity:     Days of Exercise per Week:     Minutes of Exercise per Session:    Stress:     Feeling of Stress :    Social Connections:     Frequency of Communication with Friends and Family:     Frequency of Social Gatherings with Friends and Family:     Attends Alevism Services:     Active Member of Clubs or Organizations:     Attends Club or Organization Meetings:     Marital Status:    Intimate Partner Violence:     Fear of Current or Ex-Partner:     Emotionally Abused:     Physically Abused:     Sexually Abused:        SCREENINGS                        PHYSICAL EXAM    (up to 7 for level 4, 8 or more for level 5)     ED Triage Vitals   BP Temp Temp src Pulse Resp SpO2 Height Weight   21 1304 21 1318 -- 21 1316 21 1316 21 1304 21 1319 21 1319   (!) 200/83 98.8 °F (37.1 °C)  115 20 98 % 5' 7\" (1.702 m) 227 lb (103 kg) Physical Exam  Constitutional:       General: She is not in acute distress. Appearance: Normal appearance. She is not toxic-appearing. HENT:      Head: Normocephalic and atraumatic. Right Ear: Tympanic membrane and ear canal normal.      Left Ear: Tympanic membrane and ear canal normal.      Nose: Nose normal.      Mouth/Throat:      Mouth: Mucous membranes are moist.      Pharynx: No oropharyngeal exudate. Eyes:      Extraocular Movements: Extraocular movements intact. Pupils: Pupils are equal, round, and reactive to light. Cardiovascular:      Rate and Rhythm: Normal rate. Pulses: Normal pulses. Heart sounds: No murmur heard. No friction rub. No gallop. Pulmonary:      Effort: Pulmonary effort is normal.      Breath sounds: Normal breath sounds. Abdominal:      Palpations: Abdomen is soft. Tenderness: There is generalized abdominal tenderness. There is no guarding or rebound. Musculoskeletal:         General: Normal range of motion. Cervical back: Normal range of motion and neck supple. Skin:     General: Skin is warm and dry. Neurological:      General: No focal deficit present. Mental Status: She is alert and oriented to person, place, and time. Psychiatric:         Mood and Affect: Mood normal.         Behavior: Behavior normal.         DIAGNOSTIC RESULTS     EKG: All EKG's are interpreted by the Emergency Department Physician who either signs or Co-signs this chart in the absence of a cardiologist.        RADIOLOGY:   Non-plain film images such as CT, Ultrasound and MRI are read by the radiologist. Plain radiographic images are visualized and preliminarily interpreted by the emergency physician with the below findings:        Interpretation per the Radiologist below, if available at the time of this note:    CT ABDOMEN PELVIS W IV CONTRAST Additional Contrast? None   Final Result   No acute abdominopelvic abnormality.                ED BEDSIDE ULTRASOUND:   Performed by ED Physician - none    LABS:  Labs Reviewed   CBC WITH AUTO DIFFERENTIAL - Abnormal; Notable for the following components:       Result Value    WBC 18.5 (*)     RBC 6.15 (*)     Hemoglobin 16.1 (*)     MCV 77.5 (*)     Neutrophils Absolute 15.9 (*)     All other components within normal limits    Narrative:     Performed at:  [address] [facility] [employer]   Phone [phone]   COMPREHENSIVE METABOLIC PANEL W/ REFLEX TO MG FOR LOW K - Abnormal; Notable for the following components:    Sodium 133 (*)     Chloride 89 (*)     Anion Gap 21 (*)     Glucose 340 (*)     Total Protein 8.5 (*)     Alkaline Phosphatase 148 (*)     All other components within normal limits    Narrative:     Performed at:  [address] [employer] [facility]   Phone [phone]   URINALYSIS - Abnormal; Notable for the following components:    Glucose, Ur 500 (*)     Ketones, Urine 15 (*)     Protein,  (*)     All other components within normal limits    Narrative:     Performed at:  [address] [employer]   Phone [phone]   MICROSCOPIC URINALYSIS - Abnormal; Notable for the following components:    Bacteria, UA 1+ (*)     All other components within normal limits    Narrative:     Performed at:  [address] [facility] [employer]   Phone [phone]   LIPASE    Narrative:     Performed at:  [facility]  [address] [place] [address] [employer] [facility]   Phone [phone]       All other labs were within normal range or not returned as of this dictation.     EMERGENCY DEPARTMENT COURSE and DIFFERENTIAL DIAGNOSIS/MDM:   Vitals:    Vitals:    09/19/21 1431 09/19/21 1515 09/19/21 1531 09/19/21 1546   BP: (!) 182/78 (!) 183/98 (!) 178/96 (!) 184/87 Pulse: 96 102 99 102   Resp: 20 18 22 25   Temp:       SpO2: 99%  96% 98%   Weight:       Height:           Above history and physical exam were performed. I reviewed her past medical past surgical social family history. She received Phenergan and Bentyl which have helped her in the past the emergency department. Her CT of her abdomen revealed no acute intra-abdominal or pelvic abnormality, this was done secondary to the abdominal pain plus leukocytosis. MDM    I considered the diagnosis of intra-abdominal surgical issue however her CT is essentially unremarkable. At this point time I left with dehydration secondary to likely diabetic gastroparesis. Multiple rounds of medication and IV fluids were attempted which improved her symptoms somewhat but she still is unable to tolerate p.o. and still feels very nauseous. At this point, I believe she merits admission for gentle hydration and further work-up and evaluation. REASSESSMENT          CRITICAL CARE TIME     CONSULTS:  None    PROCEDURES:  Unless otherwise noted below, none     Procedures      FINAL IMPRESSION      1. Intractable vomiting with nausea, unspecified vomiting type    2. Dehydration          DISPOSITION/PLAN   DISPOSITION    Decision to Admit    PATIENT REFERRED TO:  No follow-up provider specified. DISCHARGE MEDICATIONS:  New Prescriptions    No medications on file     Controlled Substances Monitoring:     No flowsheet data found.     (Please note that portions of this note were completed with a voice recognition program.  Efforts were made to edit the dictations but occasionally words are mis-transcribed.)    Jackie Tucker MD (electronically signed)  Attending Emergency Physician         Jackie Tucker MD  09/19/21 8380

## 2021-09-20 ENCOUNTER — APPOINTMENT (OUTPATIENT)
Dept: ULTRASOUND IMAGING | Age: 61
DRG: 074 | End: 2021-09-20
Payer: COMMERCIAL

## 2021-09-20 LAB
ANION GAP SERPL CALCULATED.3IONS-SCNC: 15 MMOL/L (ref 3–16)
BUN BLDV-MCNC: 12 MG/DL (ref 7–20)
CALCIUM SERPL-MCNC: 9.1 MG/DL (ref 8.3–10.6)
CHLORIDE BLD-SCNC: 97 MMOL/L (ref 99–110)
CO2: 27 MMOL/L (ref 21–32)
CREAT SERPL-MCNC: 0.7 MG/DL (ref 0.6–1.2)
EKG ATRIAL RATE: 102 BPM
EKG DIAGNOSIS: NORMAL
EKG P AXIS: 75 DEGREES
EKG P-R INTERVAL: 152 MS
EKG Q-T INTERVAL: 366 MS
EKG QRS DURATION: 88 MS
EKG QTC CALCULATION (BAZETT): 477 MS
EKG R AXIS: 1 DEGREES
EKG T AXIS: 65 DEGREES
EKG VENTRICULAR RATE: 102 BPM
GFR AFRICAN AMERICAN: >60
GFR NON-AFRICAN AMERICAN: >60
GLUCOSE BLD-MCNC: 108 MG/DL (ref 70–99)
GLUCOSE BLD-MCNC: 113 MG/DL (ref 70–99)
GLUCOSE BLD-MCNC: 117 MG/DL (ref 70–99)
GLUCOSE BLD-MCNC: 118 MG/DL (ref 70–99)
GLUCOSE BLD-MCNC: 121 MG/DL (ref 70–99)
GLUCOSE BLD-MCNC: 126 MG/DL (ref 70–99)
GLUCOSE BLD-MCNC: 51 MG/DL (ref 70–99)
GLUCOSE BLD-MCNC: 62 MG/DL (ref 70–99)
GLUCOSE BLD-MCNC: 67 MG/DL (ref 70–99)
GLUCOSE BLD-MCNC: 69 MG/DL (ref 70–99)
GLUCOSE BLD-MCNC: 78 MG/DL (ref 70–99)
GLUCOSE BLD-MCNC: 94 MG/DL (ref 70–99)
HCT VFR BLD CALC: 42.7 % (ref 36–48)
HEMOGLOBIN: 14.1 G/DL (ref 12–16)
MAGNESIUM: 1.6 MG/DL (ref 1.8–2.4)
MCH RBC QN AUTO: 25.8 PG (ref 26–34)
MCHC RBC AUTO-ENTMCNC: 33 G/DL (ref 31–36)
MCV RBC AUTO: 78.4 FL (ref 80–100)
PDW BLD-RTO: 15.1 % (ref 12.4–15.4)
PERFORMED ON: ABNORMAL
PERFORMED ON: NORMAL
PERFORMED ON: NORMAL
PLATELET # BLD: 254 K/UL (ref 135–450)
PMV BLD AUTO: 8 FL (ref 5–10.5)
POTASSIUM REFLEX MAGNESIUM: 3.5 MMOL/L (ref 3.5–5.1)
RBC # BLD: 5.45 M/UL (ref 4–5.2)
SODIUM BLD-SCNC: 139 MMOL/L (ref 136–145)
WBC # BLD: 16.3 K/UL (ref 4–11)

## 2021-09-20 PROCEDURE — 93010 ELECTROCARDIOGRAM REPORT: CPT | Performed by: INTERNAL MEDICINE

## 2021-09-20 PROCEDURE — 1200000000 HC SEMI PRIVATE

## 2021-09-20 PROCEDURE — 97530 THERAPEUTIC ACTIVITIES: CPT

## 2021-09-20 PROCEDURE — 36415 COLL VENOUS BLD VENIPUNCTURE: CPT

## 2021-09-20 PROCEDURE — 80048 BASIC METABOLIC PNL TOTAL CA: CPT

## 2021-09-20 PROCEDURE — 6360000002 HC RX W HCPCS: Performed by: INTERNAL MEDICINE

## 2021-09-20 PROCEDURE — 94760 N-INVAS EAR/PLS OXIMETRY 1: CPT

## 2021-09-20 PROCEDURE — 85027 COMPLETE CBC AUTOMATED: CPT

## 2021-09-20 PROCEDURE — 83735 ASSAY OF MAGNESIUM: CPT

## 2021-09-20 PROCEDURE — 76705 ECHO EXAM OF ABDOMEN: CPT

## 2021-09-20 PROCEDURE — 97165 OT EVAL LOW COMPLEX 30 MIN: CPT

## 2021-09-20 PROCEDURE — 2580000003 HC RX 258: Performed by: INTERNAL MEDICINE

## 2021-09-20 PROCEDURE — 97162 PT EVAL MOD COMPLEX 30 MIN: CPT

## 2021-09-20 PROCEDURE — 6370000000 HC RX 637 (ALT 250 FOR IP): Performed by: PHYSICIAN ASSISTANT

## 2021-09-20 PROCEDURE — 6370000000 HC RX 637 (ALT 250 FOR IP): Performed by: INTERNAL MEDICINE

## 2021-09-20 RX ORDER — PANTOPRAZOLE SODIUM 40 MG/1
40 TABLET, DELAYED RELEASE ORAL
Status: DISCONTINUED | OUTPATIENT
Start: 2021-09-20 | End: 2021-09-22 | Stop reason: HOSPADM

## 2021-09-20 RX ORDER — LISINOPRIL 10 MG/1
10 TABLET ORAL DAILY
COMMUNITY

## 2021-09-20 RX ADMIN — MAGNESIUM SULFATE HEPTAHYDRATE 2000 MG: 40 INJECTION, SOLUTION INTRAVENOUS at 09:07

## 2021-09-20 RX ADMIN — SODIUM CHLORIDE, POTASSIUM CHLORIDE, SODIUM LACTATE AND CALCIUM CHLORIDE: 600; 310; 30; 20 INJECTION, SOLUTION INTRAVENOUS at 21:41

## 2021-09-20 RX ADMIN — ASPIRIN 81 MG: 81 TABLET, CHEWABLE ORAL at 09:00

## 2021-09-20 RX ADMIN — SODIUM CHLORIDE, POTASSIUM CHLORIDE, SODIUM LACTATE AND CALCIUM CHLORIDE: 600; 310; 30; 20 INJECTION, SOLUTION INTRAVENOUS at 05:45

## 2021-09-20 RX ADMIN — ENOXAPARIN SODIUM 40 MG: 40 INJECTION SUBCUTANEOUS at 09:00

## 2021-09-20 RX ADMIN — AMLODIPINE BESYLATE 5 MG: 5 TABLET ORAL at 09:00

## 2021-09-20 RX ADMIN — PANTOPRAZOLE SODIUM 40 MG: 40 TABLET, DELAYED RELEASE ORAL at 17:07

## 2021-09-20 RX ADMIN — SODIUM CHLORIDE, PRESERVATIVE FREE 10 ML: 5 INJECTION INTRAVENOUS at 21:41

## 2021-09-20 RX ADMIN — INSULIN LISPRO 18 UNITS: 100 INJECTION, SOLUTION INTRAVENOUS; SUBCUTANEOUS at 12:21

## 2021-09-20 RX ADMIN — PANTOPRAZOLE SODIUM 40 MG: 40 TABLET, DELAYED RELEASE ORAL at 05:40

## 2021-09-20 RX ADMIN — LISINOPRIL 10 MG: 10 TABLET ORAL at 09:00

## 2021-09-20 ASSESSMENT — PAIN SCALES - GENERAL: PAINLEVEL_OUTOF10: 0

## 2021-09-20 NOTE — PROGRESS NOTES
oriented  Capillary Refill: Brisk,3 seconds, normal   Peripheral Pulses: +2 palpable, equal bilaterally       Labs:   Recent Labs     09/19/21  1342 09/20/21  0442   WBC 18.5* 16.3*   HGB 16.1* 14.1   HCT 47.7 42.7    254     Recent Labs     09/19/21  1342 09/20/21  0442   * 139   K 4.1 3.5   CL 89* 97*   CO2 23 27   BUN 16 12   CREATININE 0.9 0.7   CALCIUM 9.9 9.1     Recent Labs     09/19/21  1342   AST 18   ALT 19   BILITOT 0.8   ALKPHOS 148*     No results for input(s): INR in the last 72 hours. No results for input(s): Winford Colquitt in the last 72 hours. Urinalysis:      Lab Results   Component Value Date    NITRU Negative 09/19/2021    WBCUA 2 09/19/2021    BACTERIA 1+ 09/19/2021    RBCUA 0-2 09/19/2021    BLOODU Negative 09/19/2021    SPECGRAV >1.030 09/19/2021    GLUCOSEU 500 09/19/2021    GLUCOSEU NEGATIVE 04/09/2012       Radiology:  US ABDOMEN LIMITED Specify organ? LIVER, GALLBLADDER   Final Result   Fatty infiltration of the liver. CT ABDOMEN PELVIS W IV CONTRAST Additional Contrast? None   Final Result   No acute abdominopelvic abnormality. Assessment/Plan:    Active Hospital Problems    Diagnosis     Intractable nausea and vomiting [R11.2]      1. Nausea and vomiting likely due to gastroparesis, no plan for immediate EGD at this time,. Right upper quadrant ultrasound negative for cholelithiasis, GI consulted will follow on further recommendations  2. Uncontrolled essential hypertension, likely due to nausea and vomiting, resume p.o. medications, much better controlled now  3. Uncontrolled diabetes mellitus on admission, better controlled now  4. Polycythemia, apparently patient is following up with hematology as outpatient  5. High anion gap metabolic acidosis on admission, improved now  6. Hypomagnesemia, replace per protocol  7. Leukocytosis, appears chronic, follow-up as outpatient    Diet: ADULT DIET;  Clear Liquid; 3 carb choices (45 gm/meal)  Code Status: Full Code        Kristina Hagan MD

## 2021-09-20 NOTE — PROGRESS NOTES
Occupational Therapy   Occupational Therapy Initial Assessment  Date: 2021   Patient Name: Luz Crowley  MRN: [id number]     : 1960    Date of Service: 2021    Discharge Recommendations:  Home with assist PRN  OT Equipment Recommendations  Equipment Needed: No    Luz Crowley scored a 22/24 on the AM-PAC ADL Inpatient form. At this time, no further OT is recommended upon discharge due to pt functioning very near baseline. Recommend patient returns to prior setting with prior services. Assessment   Performance deficits / Impairments: Decreased balance  Assessment: 63 yo female admitted  for intactable nausea and vomitting suspected 2/2 diabetic gastroparesis . PMH: DM, HTN, COPD. PTA, pt lives with son and sister and was independent with ADL, IADL, work and fxl mobility no AD. Today, pt functioning very near baseline. Pt Mod I bed mobility, SUP tx and fxl mobility without AD within room, and SBA socks seated. Pt very aware/verbalizes of minimal deficits of lower endurance that occurs with similar episodes of nausea/vomitting and energy conservation stragies. BUE WFL for self care. Anticipate SBA/SUP LB and independent UB ADL based on balance, endurance, cognition, pain and PLOF. Anticipate pt safe to d/c home with PRN assist and no further acute OT warranted  Prognosis: Good  Decision Making: Low Complexity  OT Education: OT Role;Transfer Training;Plan of Care  REQUIRES OT FOLLOW UP: No  Activity Tolerance  Activity Tolerance: Patient Tolerated treatment well  Safety Devices  Safety Devices in place: Yes  Type of devices: Nurse notified;Call light within reach; Left in chair         Patient Diagnosis(es): The primary encounter diagnosis was Intractable vomiting with nausea, unspecified vomiting type. A diagnosis of Dehydration was also pertinent to this visit.      has a past medical history of Acid reflux, Anxiety, ASCUS (atypical squamous cells of undetermined significance) on Pap smear, Asthma, Chronic midline low back pain with bilateral sciatica, Chronic midline low back pain with bilateral sciatica, Diabetes mellitus, type 2 ([address] Disease of blood and blood forming organ, Gastroparesis, Hirsutism, Hyperlipidemia LDL goal < 70, Hypertension, Major depressive disorder with single episode, Pancreatitis, Seasonal allergic rhinitis due to pollen, Sleep apnea, and Vitamin D deficiency. has a past surgical history that includes  section; Tubal ligation; Mandible surgery; hysteroscopy (5/3/12); Endometrial ablation (5/3/12); and Upper gastrointestinal endoscopy (N/A, 2021). Restrictions  Restrictions/Precautions  Restrictions/Precautions: Fall Risk    Subjective   General  Chart Reviewed: Yes  Patient assessed for rehabilitation services?: Yes  Additional Pertinent Hx: 63 yo female admitted  for intactable nausea and vomitting suspected 2/2 diabetic gastroparesis . PMH: DM, HTN, COPD.   Family / Caregiver Present: No  Referring Practitioner: Aga Sanchez MD  Diagnosis: nausea and vomitting  Subjective  Subjective: Pt resting in bed upon arrival and agreeable to OT/PT eval. Pt with no reports of pain  General Comment  Comments: RN ok to see  Patient Currently in Pain: Denies  Vital Signs  Patient Currently in Pain: Denies  Social/Functional History  Social/Functional History  Lives With: Family (sister and son)  Type of Home: Apartment  Home Layout: One level, Laundry in basement  Home Access: Stairs to enter with rails  Entrance Stairs - Number of Steps: 5 +1  Entrance Stairs - Rails: Left  Bathroom Shower/Tub: Tub/Shower unit  Bathroom Toilet: Standard  Bathroom Equipment:  (uses folding chair in shower \"when sick\")  ADL Assistance: Independent  Homemaking Assistance: Independent  Ambulation Assistance: Independent  Transfer Assistance: Independent  Active : Yes  Occupation: Full time employment  Type of occupation:  from home  Additional Comments: fall in office few weeks ago \"feel back off office chair\"       Objective   Vision: Within Functional Limits  Hearing: Within functional limits    Orientation  Overall Orientation Status: Within Normal Limits     Balance  Sitting Balance: Independent  Standing Balance: Supervision (standing ~2 min during discussion of vertigo)  Functional Mobility  Functional - Mobility Device: No device  Activity:  (EOB>within room>recliner)  Assist Level: Supervision  Functional Mobility Comments: no unsteadiness or LOB- supervision required d/t vertigo at baseline  ADL  LE Dressing: Stand by assistance (seated EOB adjusts socks)  Additional Comments: Anticipate SBA/SUP LB and independent UB ADL based on balance, endurance, cognition, pain and PLOF  Tone RUE  RUE Tone: Normotonic  Tone LUE  LUE Tone: Normotonic  Coordination  Movements Are Fluid And Coordinated: Yes     Bed mobility  Supine to Sit: Modified independent (HOB elevated)  Sit to Supine: Unable to assess  Scooting: Modified independent  Transfers  Sit to stand: Supervision  Stand to sit: Supervision  Transfer Comments: supervision with no AD for safety d/t reporting vertigo at baseline     Cognition  Overall Cognitive Status: WFL  Cognition Comment: very aware of potential deficits and ways to manage (Vertigo)        Sensation  Overall Sensation Status:  (not assessed formally)        LUE AROM (degrees)  LUE AROM : WFL  RUE AROM (degrees)  RUE AROM : WFL  LUE Strength  Gross LUE Strength: WFL  RUE Strength  Gross RUE Strength: WFL                 Plan   Plan  Times per week: 0    AM-PAC Score        AM-[facility] Inpatient Daily Activity Raw Score: 22 (09/20/21 1038)  AM-PAC Inpatient ADL T-Scale Score : 47.1 (09/20/21 1038)  ADL Inpatient CMS 0-100% Score: 25.8 (09/20/21 1038)  ADL Inpatient CMS G-Code Modifier : CJ (09/20/21 1038)    Goals  Short term goals  Short term goal 1: none identified       Therapy Time   Individual Concurrent Group Co-treatment   Time In 0920 Time Out 1000         Minutes 40         Timed Code Treatment Minutes: 25 Minutes (15 eval. 25 TA)       Rosaura Ferreira, PITA, OTR/L

## 2021-09-20 NOTE — PROGRESS NOTES
Yes  Patient assessed for rehabilitation services?: Yes  Additional Pertinent Hx: here due to Nausea and vomiting related to diabetic gastroparesis  Response To Previous Treatment: Not applicable  Family / Caregiver Present: No  Follows Commands: Within Functional Limits  Subjective  Subjective: arrived to room along with OT to patient resting in bed - alert oriented and agreeable to PTOT assessments and up to ambulate in room to demonstrate her mobility  Pain Screening  Patient Currently in Pain: Denies  Orientation  Orientation  Overall Orientation Status: Within Functional Limits  Social/Functional History  Social/Functional History  Lives With: Family (sister and son)  Type of Home: Apartment  Home Layout: One level, Laundry in basement  Home Access: Stairs to enter with rails  Entrance Stairs - Number of Steps: 5 +1  Entrance Stairs - Rails: Left  Bathroom Shower/Tub: Tub/Shower unit  Bathroom Toilet: Standard  Bathroom Equipment:  (uses folding chair in shower \"when sick\")  ADL Assistance: Independent  Homemaking Assistance: Independent  Ambulation Assistance: Independent  Transfer Assistance: Independent  Active : Yes  Occupation: Full time employment  Type of occupation:  from home  Additional Comments: fall in office few weeks ago \"feel back off office chair\"  Cognition   Cognition  Overall Cognitive Status: WFL  Cognition Comment: very aware of potential deficits and ways to manage (Vertigo)  Objective  ROM and strength grossly wfl  Motor Control  Gross Motor?: WFL  Sensation  Overall Sensation Status:  (not assessed formally)  Bed mobility  Supine to Sit: Modified independent  Sit to Supine: Unable to assess  Scooting: Modified independent  Transfers  Sit to Stand: Supervision;Modified independent  Stand to sit: Supervision;Modified independent  Ambulation  Ambulation?: Yes  Ambulation 1  Surface: level tile  Device: No Device  Assistance: Supervision  Quality of Gait: step through pattern with good base of support  Distance: in room for ~40 feet  Comments: overall stable  Stairs/Curb  Stairs?: No  Balance  Comments: steady with transfers and ambulation  Plan   Plan  Times per week: 3-5  Current Treatment Recommendations: Functional Mobility Training  Safety Devices  Type of devices:  All fall risk precautions in place, Call light within reach, Left in chair, Nurse notified Rudolph Salguero)    AM-PAC Score  AM-PAC Inpatient Mobility Raw Score : 22 (09/20/21 1027)  AM-PAC Inpatient T-Scale Score : 53.28 (09/20/21 1027)  Mobility Inpatient CMS 0-100% Score: 20.91 (09/20/21 1027)  Mobility Inpatient CMS G-Code Modifier : Heber Cords (09/20/21 1027)    Goals  Short term goals  Time Frame for Short term goals: 1-2 days  Short term goal 1: bed mobility at modified independent  Short term goal 2: transfers at modified independent  Short term goal 3: ambulation at modified independent without AD for household distances       -steps as needed for home entry at [facility]  Patient Goals   Patient goals : feel better       Therapy Time   Individual Concurrent Group Co-treatment   Time In 0920         Time Out 1000         Minutes 111 Dariel Nielson, PT

## 2021-09-20 NOTE — PROGRESS NOTES
Pt is alert and oriented x4, resting quietly in bed. Nightly medication administered and intake tolerated well. No complaints of nausea, vomiting, or pain at this time. Call light within reach. Fall precautions in place. No other needs made known at this time. Will continue to monitor.     Electronically signed by Freddie Byrne RN on 9/20/2021 at 5:20 AM

## 2021-09-20 NOTE — PROGRESS NOTES
Patient received 18u of scheduled insulin after lunch today. Patient had a clear liquid lunch and ate 75% of it. Patient started to have symptoms of hypoglycemia. Patient's blood sugar was 51. Patient had 2 apple juices and half of a cranberry juice and felt better. Her blood sugar was re-checked and was 62. Patient then started felling hypoglycemic again a little later and had 2 sugar packets and a popsicle. Patients blood sugar was re-checked and is now 78. Will continue to monitor and assess.   Electronically signed by Billie Cavlert RN on 9/20/2021 at 3:55 PM

## 2021-09-20 NOTE — CONSULTS
bulb and the second portion of the duodenum to evaluate for Celiac disease and alternative pathology for erythema.       PAST MEDICAL, SURGICAL, FAMILY, and SOCIAL HISTORY     Past Medical History:   Diagnosis Date    Acid reflux     Anxiety     ASCUS (atypical squamous cells of undetermined significance) on Pap smear 2013    Asthma     Chronic midline low back pain with bilateral sciatica 11/10/2016    Chronic midline low back pain with bilateral sciatica     Diabetes mellitus, type 2 ([address]     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    Gastroparesis     Hirsutism     Hyperlipidemia LDL goal < 70     Hypertension     Major depressive disorder with single episode 2009    Pancreatitis     Seasonal allergic rhinitis due to pollen 11/10/2016    Sleep apnea     Vitamin D deficiency      Past Surgical History:   Procedure Laterality Date     SECTION      ENDOMETRIAL ABLATION  5/3/12    HYSTEROSCOPY  5/3/12    MANDIBLE SURGERY      TUBAL LIGATION      UPPER GASTROINTESTINAL ENDOSCOPY N/A 2021    EGD BIOPSY performed by Mally Cornelius MD at [address] History   Problem Relation Age of Onset    Hypertension Mother     Breast Cancer Mother     Colon Cancer Mother     Cancer Father         Pancreatic    Prostate Cancer Father     No Known Problems Brother     No Known Problems Sister     Hypertension Maternal Aunt     Cancer Maternal Uncle     Hypertension Maternal Grandmother     Stroke Maternal Grandmother     Cancer Maternal Grandfather     No Known Problems Paternal Aunt     No Known Problems Paternal Uncle     No Known Problems Paternal Grandmother     No Known Problems Paternal Grandfather     No Known Problems Other     Rheum Arthritis Neg Hx     Osteoarthritis Neg Hx     Asthma Neg Hx     Diabetes Neg Hx     Heart Failure Neg Hx     High Cholesterol Neg Hx     Migraines Neg Hx     Ovarian Cancer Neg Hx     Rashes/Skin Problems Neg Hx     Seizures Neg Hx     Thyroid Disease Neg Hx      Social History     Socioeconomic History    Marital status:      Spouse name: Not on file    Number of children: 3    Years of education: Not on file    Highest education level: Not on file   Occupational History    Occupation:    Tobacco Use    Smoking status: Former Smoker     Types: Cigarettes     Quit date: 1990     Years since quittin.7    Smokeless tobacco: Never Used   Substance and Sexual Activity    Alcohol use: Yes     Alcohol/week: 0.0 standard drinks     Comment: less than once a month    Drug use: Yes     Frequency: 2.0 times per week     Types: Marijuana     Comment: used yesterday    Sexual activity: Not Currently     Partners: Male   Other Topics Concern    Not on file   Social History Narrative    Not on file     Social Determinants of Health     Financial Resource Strain:     Difficulty of Paying Living Expenses:    Food Insecurity:     Worried About Running Out of Food in the Last Year:     Ran Out of Food in the Last Year:    Transportation Needs:     Lack of Transportation (Medical):      Lack of Transportation (Non-Medical):    Physical Activity:     Days of Exercise per Week:     Minutes of Exercise per Session:    Stress:     Feeling of Stress :    Social Connections:     Frequency of Communication with Friends and Family:     Frequency of Social Gatherings with Friends and Family:     Attends Islam Services:     Active Member of Clubs or Organizations:     Attends Club or Organization Meetings:     Marital Status:    Intimate Partner Violence:     Fear of Current or Ex-Partner:     Emotionally Abused:     Physically Abused:     Sexually Abused:        MEDICATIONS   SCHEDULED:  sodium chloride flush, 10 mL, 2 times per day  enoxaparin, 40 mg, Daily  insulin lispro, 0-12 Units, TID WC  insulin lispro, 0-6 Units, Nightly  amLODIPine, 5 mg, Daily  aspirin, 81 mg, Daily  insulin glargine, 64 Units, Nightly  insulin lispro, 18 Units, TID AC  pantoprazole, 40 mg, QAM AC  lisinopril, 10 mg, Daily      FLUIDS/DRIPS:     sodium chloride      dextrose      lactated ringers 100 mL/hr at 09/20/21 0545     PRNs: sodium chloride flush, 10 mL, PRN  sodium chloride, 25 mL, PRN  potassium chloride, 10 mEq, PRN  magnesium sulfate, 2,000 mg, PRN  magnesium hydroxide, 30 mL, Daily PRN  promethazine, 12.5 mg, Q6H PRN   Or  ondansetron, 4 mg, Q6H PRN  glucose, 15 g, PRN  dextrose, 12.5 g, PRN  glucagon (rDNA), 1 mg, PRN  dextrose, 100 mL/hr, PRN  albuterol sulfate HFA, 2 puff, Q4H PRN  promethazine, 6.25 mg, Q6H PRN      ALLERGIES:  She   Allergies   Allergen Reactions    Avelox [Moxifloxacin] Other (See Comments)     Weakness, tingling, tingling mouth    Bactrim      Remote h/o diarrhea, swollen eyes, and weakness, tachycardia and tongue swelling    Cefuroxime      Pt does not recall what happened with this    Codeine     Medrol [Methylprednisolone] Swelling     Ok to take nasal steroids    Morphine     Niacin Other (See Comments)    Oat      Throat swells    Percocet [Oxycodone-Acetaminophen]     Reglan [Metoclopramide] Other (See Comments)     States has something to do with a mass unknown reaction     Spironolactone      Increased potassium    Statins      Muscle cramps    Sulfamethoxazole-Trimethoprim Swelling    Vicodin [Hydrocodone-Acetaminophen]        REVIEW OF SYSTEMS   Pertinent ROS noted in HPI    PHYSICAL EXAM     Vitals:    09/19/21 1814 09/20/21 0002 09/20/21 0411 09/20/21 0700   BP: (!) 170/92 134/74 116/70    Pulse: 105 76 84    Resp: 18 16 16    Temp: 99 °F (37.2 °C) 98.8 °F (37.1 °C) 97.8 °F (36.6 °C)    TempSrc: Oral Oral Oral    SpO2: 96% 97% 96%    Weight:    219 lb 9.3 oz (99.6 kg)   Height:           I/O last 3 completed shifts:   In: 290 [P.O.:240; IV Piggyback:50]  Out: -       Physical Exam:  General appearance: alert, cooperative, no distress, appears stated age  Eyes: Anicteric  Head: Normocephalic, without obvious abnormality  Lungs: clear to auscultation bilaterally, Normal Effort  Heart: regular rate and rhythm, normal S1 and S2, no murmurs or rubs  Abdomen: soft, non-distended, non-tender. Bowel sounds normal. No masses,  no organomegaly. Extremities: atraumatic, no cyanosis or edema  Skin: warm and dry, no jaundice  Neuro: Grossly intact, A&OX3      LABS AND IMAGING   Laboratory   Recent Labs     09/19/21  1342 09/20/21  0442   WBC 18.5* 16.3*   HGB 16.1* 14.1   HCT 47.7 42.7   MCV 77.5* 78.4*    254     Recent Labs     09/19/21  1342 09/20/21  0442   * 139   K 4.1 3.5   CL 89* 97*   CO2 23 27   BUN 16 12   CREATININE 0.9 0.7     Recent Labs     09/19/21  1342   AST 18   ALT 19   BILITOT 0.8   ALKPHOS 148*     Recent Labs     09/19/21  1342   LIPASE 43.0     No results for input(s): PROTIME, INR in the last 72 hours. Imaging  CT ABDOMEN PELVIS W IV CONTRAST Additional Contrast? None   Final Result   No acute abdominopelvic abnormality. US ABDOMEN LIMITED Specify organ? LIVER, GALLBLADDER    (Results Pending)         ASSESSMENT AND RECOMMENDATIONS   62 y.o. female with a PMH of PMH of DM type II, gastroparesis, hypertension, hyperlipidemia, COPD, marijuana use who presented on 9/19/2021 with nausea, vomiting, and abdominal pain. IMPRESSION:  1. Recurrent nausea, vomiting, abdominal pain suspected 2/2 diabetic gastroparesis +/- cyclic vomiting syndrome from marijuana use. Work up notable for elevated Alk phos and leukocytosis. CT A/P was unremarkable. RUQ US last month was unremarkable other than fatty infiltration of the liver. CARROLL 2017 negative. Will repeat RUQ US given recurrent symptoms and elevated white count. Ok to start clear liquids after US. Monitor tolerance. Continue supportive care. She had a recent EGD last month that showed LA Grade D Esophagitis.   Will continue PPI BID        RECOMMENDATIONS:    -RUQ US  -Clear liquid diet after US  -PPI BID  -Supportive care with IVFs and antiemetics as needed  -Monitor diet tolerance  -Further input and recommendations by Dr. Phyllis Lee to follow. If you have any questions or need any further information, please feel free to contact our consult team.  Thank you for allowing us to participate in the care of Erendira Trevizo. The note was completed using Dragon voice recognition transcription. Every effort was made to ensure accuracy; however, inadvertent transcription errors may be present despite my best efforts to edit errors.       Francisco Valdes PA-C

## 2021-09-20 NOTE — PLAN OF CARE
Problem: Skin Integrity:  Goal: Will show no infection signs and symptoms  Description: Will show no infection signs and symptoms  9/20/2021 0801 by Jovana Palm RN  Outcome: Ongoing  Note: Pt is free of signs and symptoms of infection. Vital signs stable. Will monitor. 9/20/2021 0520 by Radhames Scott RN  Outcome: Ongoing  Note: Pt assessed for skin break down. Skin was warm and dry to touch. Will continue to monitor and assess. 9/20/2021 0520 by Radhames Scott RN  Note: Pt assessed for skin break down. Skin was warm and dry to touch. Will continue to monitor and assess. Goal: Absence of new skin breakdown  Description: Absence of new skin breakdown  9/20/2021 0801 by Jovana Palm RN  Outcome: Ongoing  Note: Patient will remain free from new skin breakdown. Precautions in place. Will monitor and assess. 9/20/2021 0520 by Radhames Scott RN  Outcome: Ongoing     Problem: Falls - Risk of:  Goal: Will remain free from falls  Description: Will remain free from falls  9/20/2021 0801 by Jovana Palm RN  Outcome: Ongoing  Note: Fall risk assessment completed. Fall precautions in place. Call light within reach. Pt educated on calling for assistance before getting up. Walkway free of clutter. Will continue to monitor. 9/20/2021 0520 by Radhames Scott RN  Outcome: Ongoing  Note: Fall risk assessment completed. Fall precautions in place. Bed in lowest position, wheels locked, bed/chair exit alarm in place, call light within reach, and non skid footwear on. Walkway free of clutter. Pt alert and oriented and able to make needs known. Pt educated to use call light when needing to get up, and pt utilizes call light to make needs known. Will continue to monitor. 9/20/2021 0520 by Radhames Scott RN  Note: Fall risk assessment completed. Fall precautions in place. Bed in lowest position, wheels locked, bed/chair exit alarm in place, call light within reach, and non skid footwear on.  Walkway free of clutter. Pt alert and oriented and able to make needs known. Pt educated to use call light when needing to get up, and pt utilizes call light to make needs known. Will continue to monitor. Goal: Absence of physical injury  Description: Absence of physical injury  9/20/2021 0801 by Eva Leon RN  Outcome: Ongoing  Note: Patient will remain free from physical injury. Safety precautions are in place. Will continue to monitor and assess.    9/20/2021 0520 by Jorje Cheadle, RN  Outcome: Ongoing

## 2021-09-20 NOTE — PROGRESS NOTES
Held patient's 18u of insulin. MD notified.   Electronically signed by Vilma Matthews RN on 9/20/2021 at 9:45 AM

## 2021-09-20 NOTE — PLAN OF CARE
Problem: Skin Integrity:  Goal: Will show no infection signs and symptoms  Description: Will show no infection signs and symptoms  9/20/2021 0520 by Naz Hawthorne RN  Outcome: Ongoing  Note: Pt assessed for skin break down. Skin was warm and dry to touch. Will continue to monitor and assess. 9/20/2021 0520 by Naz Hawthorne RN  Note: Pt assessed for skin break down. Skin was warm and dry to touch. Will continue to monitor and assess. Goal: Absence of new skin breakdown  Description: Absence of new skin breakdown  Outcome: Ongoing     Problem: Falls - Risk of:  Goal: Will remain free from falls  Description: Will remain free from falls  9/20/2021 0520 by Naz Hawthorne RN  Outcome: Ongoing  Note: Fall risk assessment completed. Fall precautions in place. Bed in lowest position, wheels locked, bed/chair exit alarm in place, call light within reach, and non skid footwear on. Walkway free of clutter. Pt alert and oriented and able to make needs known. Pt educated to use call light when needing to get up, and pt utilizes call light to make needs known. Will continue to monitor. 9/20/2021 0520 by Naz Hawthorne RN  Note: Fall risk assessment completed. Fall precautions in place. Bed in lowest position, wheels locked, bed/chair exit alarm in place, call light within reach, and non skid footwear on. Walkway free of clutter. Pt alert and oriented and able to make needs known. Pt educated to use call light when needing to get up, and pt utilizes call light to make needs known. Will continue to monitor.     Goal: Absence of physical injury  Description: Absence of physical injury  Outcome: Ongoing

## 2021-09-21 LAB
ANION GAP SERPL CALCULATED.3IONS-SCNC: 10 MMOL/L (ref 3–16)
BUN BLDV-MCNC: 11 MG/DL (ref 7–20)
CALCIUM SERPL-MCNC: 9.2 MG/DL (ref 8.3–10.6)
CHLORIDE BLD-SCNC: 99 MMOL/L (ref 99–110)
CO2: 27 MMOL/L (ref 21–32)
CREAT SERPL-MCNC: 0.7 MG/DL (ref 0.6–1.2)
GFR AFRICAN AMERICAN: >60
GFR NON-AFRICAN AMERICAN: >60
GLUCOSE BLD-MCNC: 114 MG/DL (ref 70–99)
GLUCOSE BLD-MCNC: 115 MG/DL (ref 70–99)
GLUCOSE BLD-MCNC: 120 MG/DL (ref 70–99)
GLUCOSE BLD-MCNC: 125 MG/DL (ref 70–99)
GLUCOSE BLD-MCNC: 158 MG/DL (ref 70–99)
GLUCOSE BLD-MCNC: 169 MG/DL (ref 70–99)
GLUCOSE BLD-MCNC: 98 MG/DL (ref 70–99)
HCT VFR BLD CALC: 43.7 % (ref 36–48)
HEMOGLOBIN: 14.2 G/DL (ref 12–16)
MCH RBC QN AUTO: 25.8 PG (ref 26–34)
MCHC RBC AUTO-ENTMCNC: 32.5 G/DL (ref 31–36)
MCV RBC AUTO: 79.4 FL (ref 80–100)
PDW BLD-RTO: 15.2 % (ref 12.4–15.4)
PERFORMED ON: ABNORMAL
PERFORMED ON: NORMAL
PLATELET # BLD: 249 K/UL (ref 135–450)
PMV BLD AUTO: 7.9 FL (ref 5–10.5)
POTASSIUM REFLEX MAGNESIUM: 3.7 MMOL/L (ref 3.5–5.1)
RBC # BLD: 5.5 M/UL (ref 4–5.2)
SODIUM BLD-SCNC: 136 MMOL/L (ref 136–145)
WBC # BLD: 13.4 K/UL (ref 4–11)

## 2021-09-21 PROCEDURE — 6360000002 HC RX W HCPCS: Performed by: INTERNAL MEDICINE

## 2021-09-21 PROCEDURE — 80048 BASIC METABOLIC PNL TOTAL CA: CPT

## 2021-09-21 PROCEDURE — 6370000000 HC RX 637 (ALT 250 FOR IP): Performed by: INTERNAL MEDICINE

## 2021-09-21 PROCEDURE — 2580000003 HC RX 258: Performed by: INTERNAL MEDICINE

## 2021-09-21 PROCEDURE — 6370000000 HC RX 637 (ALT 250 FOR IP): Performed by: PHYSICIAN ASSISTANT

## 2021-09-21 PROCEDURE — 84080 ASSAY ALKALINE PHOSPHATASES: CPT

## 2021-09-21 PROCEDURE — 84075 ASSAY ALKALINE PHOSPHATASE: CPT

## 2021-09-21 PROCEDURE — 36415 COLL VENOUS BLD VENIPUNCTURE: CPT

## 2021-09-21 PROCEDURE — 85027 COMPLETE CBC AUTOMATED: CPT

## 2021-09-21 PROCEDURE — 94761 N-INVAS EAR/PLS OXIMETRY MLT: CPT

## 2021-09-21 PROCEDURE — 1200000000 HC SEMI PRIVATE

## 2021-09-21 RX ORDER — POLYETHYLENE GLYCOL 3350 17 G/17G
17 POWDER, FOR SOLUTION ORAL DAILY
Status: DISCONTINUED | OUTPATIENT
Start: 2021-09-21 | End: 2021-09-22 | Stop reason: HOSPADM

## 2021-09-21 RX ORDER — INSULIN GLARGINE 100 [IU]/ML
10 INJECTION, SOLUTION SUBCUTANEOUS NIGHTLY
Status: DISCONTINUED | OUTPATIENT
Start: 2021-09-21 | End: 2021-09-22 | Stop reason: HOSPADM

## 2021-09-21 RX ADMIN — AMLODIPINE BESYLATE 5 MG: 5 TABLET ORAL at 09:11

## 2021-09-21 RX ADMIN — ASPIRIN 81 MG: 81 TABLET, CHEWABLE ORAL at 09:11

## 2021-09-21 RX ADMIN — SODIUM CHLORIDE, POTASSIUM CHLORIDE, SODIUM LACTATE AND CALCIUM CHLORIDE: 600; 310; 30; 20 INJECTION, SOLUTION INTRAVENOUS at 07:53

## 2021-09-21 RX ADMIN — POLYETHYLENE GLYCOL 3350 17 G: 17 POWDER, FOR SOLUTION ORAL at 09:11

## 2021-09-21 RX ADMIN — INSULIN GLARGINE 10 UNITS: 100 INJECTION, SOLUTION SUBCUTANEOUS at 22:58

## 2021-09-21 RX ADMIN — PANTOPRAZOLE SODIUM 40 MG: 40 TABLET, DELAYED RELEASE ORAL at 15:13

## 2021-09-21 RX ADMIN — LISINOPRIL 10 MG: 10 TABLET ORAL at 09:11

## 2021-09-21 RX ADMIN — ENOXAPARIN SODIUM 40 MG: 40 INJECTION SUBCUTANEOUS at 09:11

## 2021-09-21 RX ADMIN — PANTOPRAZOLE SODIUM 40 MG: 40 TABLET, DELAYED RELEASE ORAL at 06:05

## 2021-09-21 RX ADMIN — INSULIN LISPRO 1 UNITS: 100 INJECTION, SOLUTION INTRAVENOUS; SUBCUTANEOUS at 22:58

## 2021-09-21 ASSESSMENT — PAIN SCALES - GENERAL: PAINLEVEL_OUTOF10: 0

## 2021-09-21 NOTE — PLAN OF CARE
Problem: Skin Integrity:  Goal: Will show no infection signs and symptoms  Description: Will show no infection signs and symptoms  Outcome: Ongoing  Note:  No signs of redness, warmth or swelling noted. Afebrile. Education provided on signs and symptoms of  infections. Problem: Skin Integrity:  Goal: Absence of new skin breakdown  Description: Absence of new skin breakdown  Outcome: Ongoing  Note: Skin assessment complete. No new signs of skin breakdown noted. Repositioning patient with pillows at two hour intervals. Heels elevated off bed. Problem: Falls - Risk of:  Goal: Absence of physical injury  Description: Absence of physical injury  Outcome: Ongoing  Note: Patient remains free from physical injury. Patient educated on safety precautions. Will continue to monitor to ensure patient remains free from physical injury throughout remainder of shift. Problem: Falls - Risk of:  Goal: Will remain free from falls  Description: Will remain free from falls  Note: Fall risk assessment completed . Fall precautions in place, side rails 2/4 up, call light in reach, educated pt on calling for assistance when needed, room clear of clutter. Pt verbalized understanding.

## 2021-09-21 NOTE — PROGRESS NOTES
Patient is resting in bed and is alert and oriented x4. Patient denies any pain at this time. Patient stated Dr. Claudio Davila said she could try a regular diet for lunch and dinner and possibly have her discharged tonight after dinner if both are tolerated well by the patient. IV fluids are infusing. Morning medication given and head to toe assessment is complete. All needs are met and safety precautions are in place. No further questions or concerns at this time. Will continue to monitor and assess.   Electronically signed by Tere Marin RN on 9/21/2021 at 10:42 AM

## 2021-09-21 NOTE — PROGRESS NOTES
Hospitalist Progress Note      PCP: CEASAR Jon    Date of Admission: 9/19/2021        Subjective: Improved nausea and no vomiting, tolerated food. Denies any abdominal pain at this time      Medications:  Reviewed    Infusion Medications    sodium chloride      dextrose      lactated ringers 100 mL/hr at 09/21/21 0753     Scheduled Medications    polyethylene glycol  17 g Oral Daily    pantoprazole  40 mg Oral BID AC    sodium chloride flush  10 mL IntraVENous 2 times per day    enoxaparin  40 mg SubCUTAneous Daily    insulin lispro  0-12 Units SubCUTAneous TID WC    insulin lispro  0-6 Units SubCUTAneous Nightly    amLODIPine  5 mg Oral Daily    aspirin  81 mg Oral Daily    lisinopril  10 mg Oral Daily     PRN Meds: sodium chloride flush, sodium chloride, potassium chloride, magnesium sulfate, magnesium hydroxide, promethazine **OR** ondansetron, glucose, dextrose, glucagon (rDNA), dextrose, albuterol sulfate HFA, promethazine      Intake/Output Summary (Last 24 hours) at 9/21/2021 1420  Last data filed at 9/21/2021 1210  Gross per 24 hour   Intake 1411 ml   Output --   Net 1411 ml       Physical Exam Performed:    /69   Pulse 70   Temp 98.8 °F (37.1 °C) (Oral)   Resp 17   Ht 5' 7\" (1.702 m)   Wt 219 lb 2.2 oz (99.4 kg)   SpO2 96%   BMI 34.32 kg/m²     General appearance: No apparent distress  Neck: Supple  Respiratory:  Normal respiratory effort. Clear to auscultation, bilaterally without Rales/Wheezes/Rhonchi. Cardiovascular: Regular rate and rhythm with normal S1/S2 without murmurs, rubs or gallops. Abdomen: Soft, non-tender, non-distended  Musculoskeletal: No clubbing, cyanosis   Skin: Skin color, texture, turgor normal.  No rashes or lesions.   Neurologic:  No focal weakness  Psychiatric: Alert and oriented  Capillary Refill: Brisk,3 seconds, normal   Peripheral Pulses: +2 palpable, equal bilaterally       Labs:   Recent Labs     09/19/21  1342 09/20/21  0442 09/21/21  0516   WBC 18.5* 16.3* 13.4*   HGB 16.1* 14.1 14.2   HCT 47.7 42.7 43.7    254 249     Recent Labs     09/19/21  1342 09/20/21  0442 09/21/21  0516   * 139 136   K 4.1 3.5 3.7   CL 89* 97* 99   CO2 23 27 27   BUN 16 12 11   CREATININE 0.9 0.7 0.7   CALCIUM 9.9 9.1 9.2     Recent Labs     09/19/21  1342   AST 18   ALT 19   BILITOT 0.8   ALKPHOS 148*     No results for input(s): INR in the last 72 hours. No results for input(s): Viktor Hug in the last 72 hours. Urinalysis:      Lab Results   Component Value Date    NITRU Negative 09/19/2021    WBCUA 2 09/19/2021    BACTERIA 1+ 09/19/2021    RBCUA 0-2 09/19/2021    BLOODU Negative 09/19/2021    SPECGRAV >1.030 09/19/2021    GLUCOSEU 500 09/19/2021    GLUCOSEU NEGATIVE 04/09/2012       Radiology:  US ABDOMEN LIMITED Specify organ? LIVER, GALLBLADDER   Final Result   Fatty infiltration of the liver. CT ABDOMEN PELVIS W IV CONTRAST Additional Contrast? None   Final Result   No acute abdominopelvic abnormality. Assessment/Plan:    Active Hospital Problems    Diagnosis     Intractable nausea and vomiting [R11.2]      1. Nausea and vomiting likely due to gastroparesis, no plan for immediate EGD at this time, pain improved, tolerating p.o. intake. 2.  Uncontrolled essential hypertension, likely due to nausea and vomiting, resume p.o. medications, much better controlled now  3. Uncontrolled diabetes mellitus on admission, better controlled now, will continue to monitor as it appears fluctuating. 4.  Polycythemia, apparently patient is following up with hematology as outpatient  5. High anion gap metabolic acidosis on admission, improved now  6. Leukocytosis, appears chronic, follow-up as outpatient      Diet: ADULT DIET;  Regular; 4 carb choices (60 gm/meal)  Code Status: Full Code      Angelica Jiang MD

## 2021-09-21 NOTE — PLAN OF CARE
Problem: Skin Integrity:  Goal: Will show no infection signs and symptoms  Description: Will show no infection signs and symptoms  9/21/2021 0737 by Lien Hernandez RN  Outcome: Ongoing  Note: Pt is free of signs and symptoms of infection. Vital signs stable. Will monitor. 9/21/2021 0214 by Nadine Berger RN  Outcome: Ongoing  Note:  No signs of redness, warmth or swelling noted. Afebrile. Education provided on signs and symptoms of  infections. Goal: Absence of new skin breakdown  Description: Absence of new skin breakdown  9/21/2021 0737 by Lien Hernandez RN  Outcome: Ongoing  Note: Patient will remain free from new skin breakdown. Precautions in place. Will monitor and assess. 9/21/2021 0214 by Nadine Berger RN  Outcome: Ongoing  Note: Skin assessment complete. No new signs of skin breakdown noted. Repositioning patient with pillows at two hour intervals. Heels elevated off bed. Problem: Falls - Risk of:  Goal: Will remain free from falls  Description: Will remain free from falls  9/21/2021 0737 by Lien Hernandez RN  Outcome: Ongoing  Note: Fall risk assessment completed. Fall precautions in place. Call light within reach. Pt educated on calling for assistance before getting up. Walkway free of clutter. Will continue to monitor. 9/21/2021 0214 by Nadine Berger RN  Note: Fall risk assessment completed . Fall precautions in place, side rails 2/4 up, call light in reach, educated pt on calling for assistance when needed, room clear of clutter. Pt verbalized understanding. Goal: Absence of physical injury  Description: Absence of physical injury  9/21/2021 0737 by Lien Hernandez RN  Outcome: Ongoing  Note: Patient will remain free from physical injury. Safety precautions are in place. Will continue to monitor and assess. 9/21/2021 0214 by Nadine Berger RN  Outcome: Ongoing  Note: Patient remains free from physical injury. Patient educated on safety precautions. Will continue to monitor to ensure patient remains free from physical injury throughout remainder of shift. Lab Facility: [id number]

## 2021-09-21 NOTE — CARE COORDINATION
[facility]  Diabetes Education   Progress Note       NAME:  Rachel Snow  MEDICAL RECORD NUMBER:  [id number]  AGE: 62 y.o. GENDER: female  : 1960  TODAY'S DATE:  2021    Subjective   Reason for Diabetic Education Evaluation and Assessment: hypoglycemia     Renetta agrees to meet with em for diabetes education.       Visit Type: evaluation      Rachel Snow is a 62 y.o. female referred by:     [] Physician  [] Nursing  [x] Chart Review   [] Other:     PAST MEDICAL HISTORY        Diagnosis Date    Acid reflux     Anxiety     ASCUS (atypical squamous cells of undetermined significance) on Pap smear 2013    Asthma     Chronic midline low back pain with bilateral sciatica 11/10/2016    Chronic midline low back pain with bilateral sciatica     Diabetes mellitus, type 2 ([address]     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    Gastroparesis     Hirsutism     Hyperlipidemia LDL goal < 70     Hypertension     Major depressive disorder with single episode 2009    Pancreatitis     Seasonal allergic rhinitis due to pollen 11/10/2016    Sleep apnea     Vitamin D deficiency        PAST SURGICAL HISTORY    Past Surgical History:   Procedure Laterality Date     SECTION      ENDOMETRIAL ABLATION  5/3/12    HYSTEROSCOPY  5/3/12    MANDIBLE SURGERY      TUBAL LIGATION      UPPER GASTROINTESTINAL ENDOSCOPY N/A 2021    EGD BIOPSY performed by Latonia Jay MD at [address]    Family History   Problem Relation Age of Onset    Hypertension Mother     Breast Cancer Mother     Colon Cancer Mother     Cancer Father         Pancreatic    Prostate Cancer Father     No Known Problems Brother     No Known Problems Sister     Hypertension Maternal Aunt     Cancer Maternal Uncle     Hypertension Maternal Grandmother     Stroke Maternal Grandmother     Cancer Maternal Grandfather     No Known Problems Paternal Aunt  No Known Problems Paternal Uncle     No Known Problems Paternal Grandmother     No Known Problems Paternal Grandfather     No Known Problems Other     Rheum Arthritis Neg Hx     Osteoarthritis Neg Hx     Asthma Neg Hx     Diabetes Neg Hx     Heart Failure Neg Hx     High Cholesterol Neg Hx     Migraines Neg Hx     Ovarian Cancer Neg Hx     Rashes/Skin Problems Neg Hx     Seizures Neg Hx     Thyroid Disease Neg Hx        SOCIAL HISTORY    Social History     Tobacco Use    Smoking status: Former Smoker     Types: Cigarettes     Quit date: 1990     Years since quittin.7    Smokeless tobacco: Never Used   Substance Use Topics    Alcohol use:  Yes     Alcohol/week: 0.0 standard drinks     Comment: less than once a month    Drug use: Yes     Frequency: 2.0 times per week     Types: Marijuana     Comment: used yesterday       ALLERGIES    Allergies   Allergen Reactions    Avelox [Moxifloxacin] Other (See Comments)     Weakness, tingling, tingling mouth    Bactrim      Remote h/o diarrhea, swollen eyes, and weakness, tachycardia and tongue swelling    Cefuroxime      Pt does not recall what happened with this    Codeine     Medrol [Methylprednisolone] Swelling     Ok to take nasal steroids    Morphine     Niacin Other (See Comments)    Oat      Throat swells    Percocet [Oxycodone-Acetaminophen]     Reglan [Metoclopramide] Other (See Comments)     States has something to do with a mass unknown reaction     Spironolactone      Increased potassium    Statins      Muscle cramps    Sulfamethoxazole-Trimethoprim Swelling    Vicodin [Hydrocodone-Acetaminophen]        MEDICATIONS     polyethylene glycol  17 g Oral Daily    pantoprazole  40 mg Oral BID AC    sodium chloride flush  10 mL IntraVENous 2 times per day    enoxaparin  40 mg SubCUTAneous Daily    insulin lispro  0-12 Units SubCUTAneous TID WC    insulin lispro  0-6 Units SubCUTAneous Nightly    amLODIPine  5 mg Oral Daily  aspirin  81 mg Oral Daily    lisinopril  10 mg Oral Daily       Objective        Patient Active Problem List   Diagnosis Code    PMB (postmenopausal bleeding) N95.0    Complex endometrial hyperplasia N85.01    Asthma J45.909    Vitamin D deficiency E55.9    Hyperlipidemia with target LDL less than 70 E78.5    Elevated testosterone level in female R74.80    COPD (chronic obstructive pulmonary disease) J44.9    Hirsutism L68.0    DMII (diabetes mellitus, type 2) ([facility]) E11.9    Panlobular emphysema ([facility]) J43.1    Hypertension complicating diabetes ([facility] [address] E11.59, I15.2    Vertigo R42    Gastroparesis diabeticorum ([facility]) E11.43, K31.84    Major depressive disorder with single episode F32.9    Morbid obesity due to excess calories ([facility]) E66.01    Atherosclerotic PVD with intermittent claudication ([facility]) I70.219    Sleep apnea G47.30    Gastroesophageal reflux disease without esophagitis K21.9    Seasonal allergic rhinitis due to pollen J30.1    Mild persistent asthma without complication Y26.84    Chronic midline low back pain with bilateral sciatica M54.41, M54.42, G89.29    Pain in both lower extremities M79.604, M79.605    Intractable nausea and vomiting R11.2    Generalized abdominal pain R10.84    Essential hypertension I10        /69   Pulse 70   Temp 98.8 °F (37.1 °C) (Oral)   Resp 17   Ht 5' 7\" (1.702 m)   Wt 219 lb 2.2 oz (99.4 kg)   SpO2 96%   BMI 34.32 kg/m²     HgBA1c:    Lab Results   Component Value Date    LABA1C 9.2 07/31/2017       Recent Labs     09/21/21  0211 09/21/21  0603 09/21/21  1116 09/21/21  1257   POCGLU 98 115* 125* 169*       BUN/Creatinine:    Lab Results   Component Value Date    BUN 11 09/21/2021    CREATININE 0.7 09/21/2021       Assessment        Diabetes Management and Education    Does the patient have a Primary Care Physician? Yes, CEASAR Puente       Does the patient require new medication instruction?  TBD  Discussed hypoglycemia with home dose of Lantus. Person responsible for administration of Insulin/Medication:        [x] Self     [] Caregiver       [] Spouse       [] Other Family Member   []  Other    Insulin Instruction:  insulin pen  Injection Site:   [x] location    [x] rotation     Level of patient/caregiver understanding able to:      [x] Verbalized Understanding   [] Demonstrated Understanding       [] Teach Back       [] Needs Reinforcement     []  Other:        Does the patient/caregiver monitor Blood Glucoses? Yes  Reviewed glycemic control targets, testing frequency and when to call PCP. Level of patient/caregiver understanding able to:        [x] Verbalized Understanding   [] Demonstrated Understanding       [] Teach Back       [] Needs Reinforcement     []  Other:      Does the patient/caregiver follow a Meal Plan? Yes - eats smaller meals. Denies need for carb review. Reviewed importance of eating three meals per day. Level of patient/caregiver understanding able to:       [x] Verbalized Understanding   [] Demonstrated Understanding       [] Teach Back       [] Needs Reinforcement     []  Other:      Does the patient/caregiver understand S/S of Hypoglycemia? Yes  Reviewed symptoms, prevention and treatment. Level of patient/caregiver understanding able to:       [x] Verbalized Understanding   [] Demonstrated Understanding       [] Teach Back       [] Needs Reinforcement     []  Other:                   Plan        Ongoing diabetes education and blood glucose monitoring.                                            Discharge Plan:  Discharge needs: no prescription needs identified        Teaching Time Diabetes Education:  20 minutes     Electronically signed by Birgit Chaves on 9/21/2021 at 1:39 PM

## 2021-09-21 NOTE — PROGRESS NOTES
INPATIENT PROGRESS NOTE        IDENTIFYING DATA/REASON FOR CONSULTATION   PATIENT:  Monserrat Macias  MRN:  [id number]  ADMIT DATE: 9/19/2021  TIME OF EVALUATION: 9/21/2021 7:32 AM  HOSPITAL STAY:   LOS: 2 days   CONSULTING PHYSICIAN: Henry Mendes MD   REASON FOR CONSULTATION: nausea, vomiting, abdominal pain    Subjective:    Patient seen in follow up for nausea, vomiting and upper abdominal pain  Her abdomen remains sore. She tolerated mash potatoes and fanny crackers yesterday. She is passing flatus but has not had a BM.   She states she feels constipated    MEDICATIONS   SCHEDULED:  pantoprazole, 40 mg, BID AC  sodium chloride flush, 10 mL, 2 times per day  enoxaparin, 40 mg, Daily  insulin lispro, 0-12 Units, TID WC  insulin lispro, 0-6 Units, Nightly  amLODIPine, 5 mg, Daily  aspirin, 81 mg, Daily  lisinopril, 10 mg, Daily      FLUIDS/DRIPS:     sodium chloride      dextrose      lactated ringers 100 mL/hr at 09/20/21 2141     PRNs: sodium chloride flush, 10 mL, PRN  sodium chloride, 25 mL, PRN  potassium chloride, 10 mEq, PRN  magnesium sulfate, 2,000 mg, PRN  magnesium hydroxide, 30 mL, Daily PRN  promethazine, 12.5 mg, Q6H PRN   Or  ondansetron, 4 mg, Q6H PRN  glucose, 15 g, PRN  dextrose, 12.5 g, PRN  glucagon (rDNA), 1 mg, PRN  dextrose, 100 mL/hr, PRN  albuterol sulfate HFA, 2 puff, Q4H PRN  promethazine, 6.25 mg, Q6H PRN      ALLERGIES:    Allergies   Allergen Reactions    Avelox [Moxifloxacin] Other (See Comments)     Weakness, tingling, tingling mouth    Bactrim      Remote h/o diarrhea, swollen eyes, and weakness, tachycardia and tongue swelling    Cefuroxime      Pt does not recall what happened with this    Codeine     Medrol [Methylprednisolone] Swelling     Ok to take nasal steroids    Morphine     Niacin Other (See Comments)    Oat      Throat swells    Percocet [Oxycodone-Acetaminophen]     Reglan [Metoclopramide] Other (See Comments)     States has something to do with a mass unknown reaction     Spironolactone      Increased potassium    Statins      Muscle cramps    Sulfamethoxazole-Trimethoprim Swelling    Vicodin [Hydrocodone-Acetaminophen]          PHYSICAL EXAM   VITALS:  /69   Pulse 70   Temp 98.2 °F (36.8 °C) (Oral)   Resp 16   Ht 5' 7\" (1.702 m)   Wt 219 lb 2.2 oz (99.4 kg)   SpO2 99%   BMI 34.32 kg/m²   TEMPERATURE:  Current - Temp: 98.2 °F (36.8 °C); Max - Temp  Av.9 °F (36.6 °C)  Min: 97.4 °F (36.3 °C)  Max: 98.2 °F (36.8 °C)    Physical Exam:  General appearance: alert, cooperative, no distress, appears stated age  Eyes: Anicteric  Head: Normocephalic, without obvious abnormality  Lungs: clear to auscultation bilaterally, Normal Effort  Heart: regular rate and rhythm, normal S1 and S2, no murmurs or rubs  Abdomen: soft, non-distended, non-tender. Bowel sounds normal.   Extremities: atraumatic, no cyanosis or edema  Skin: warm and dry, no jaundice  Neuro: Grossly intact, A&OX3    LABS AND IMAGING   Laboratory   Recent Labs     21  1342 21  0442 21  0516   WBC 18.5* 16.3* 13.4*   HGB 16.1* 14.1 14.2   HCT 47.7 42.7 43.7   MCV 77.5* 78.4* 79.4*    254 249     Recent Labs     21  1342 21  0442 21  0516   * 139 136   K 4.1 3.5 3.7   CL 89* 97* 99   CO2 23 27 27   BUN 16 12 11   CREATININE 0.9 0.7 0.7     Recent Labs     21  1342   AST 18   ALT 19   BILITOT 0.8   ALKPHOS 148*     Recent Labs     21  1342   LIPASE 43.0     No results for input(s): PROTIME, INR in the last 72 hours. Imaging  US ABDOMEN LIMITED Specify organ? LIVER, GALLBLADDER   Final Result   Fatty infiltration of the liver. CT ABDOMEN PELVIS W IV CONTRAST Additional Contrast? None   Final Result   No acute abdominopelvic abnormality.              Endoscopy      ASSESSMENT AND RECOMMENDATIONS   Yuly Linda is a 62 y.o. female with PMH of DM type II, gastroparesis, hypertension, hyperlipidemia, COPD, marijuana use who presented on 9/19/2021 with nausea, vomiting, and abdominal pain. 1. Recurrent nausea, vomiting, abdominal pain   -possible 2/2 diabetic gastroparesis +/- cyclic vomiting syndrome from marijuana use +/- secondary to poorly controlled hypertension. Work up notable for elevated Alk phos and leukocytosis. RUQ US showed fatty infiltration of the liver. CT A/P was unremarkable. EGD last month that showed LA Grade D Esophagitis. On ppi bid   2. Elevated alk phos  -RUQ US unremarkable. Will fractionate alk phos and check AMA  3. DM type II, management per Primary team  4. HTN, management per Primary team  5. Leukocytosis, chronic  6. Polycytemia, chronic      RECOMMENDATIONS:    Adv diet as tolerated  Continue PPI BID  Miralax daily to promote BMs  Fractionate alk phos, check AMA      If you have any questions or need any further information, please feel free to contact us [phone]. Thank you for allowing us to participate in the care of Ish Triana. The note was completed using Dragon voice recognition transcription. Every effort was made to ensure accuracy; however, inadvertent transcription errors may be present despite my best efforts to edit errors.     Wendie MCDONOUGH

## 2021-09-22 VITALS
OXYGEN SATURATION: 97 % | RESPIRATION RATE: 18 BRPM | BODY MASS INDEX: 34.39 KG/M2 | HEART RATE: 62 BPM | HEIGHT: 67 IN | SYSTOLIC BLOOD PRESSURE: 143 MMHG | DIASTOLIC BLOOD PRESSURE: 72 MMHG | WEIGHT: 219.14 LBS | TEMPERATURE: 98.3 F

## 2021-09-22 LAB
ANION GAP SERPL CALCULATED.3IONS-SCNC: 15 MMOL/L (ref 3–16)
BUN BLDV-MCNC: 9 MG/DL (ref 7–20)
CALCIUM SERPL-MCNC: 8.8 MG/DL (ref 8.3–10.6)
CHLORIDE BLD-SCNC: 101 MMOL/L (ref 99–110)
CO2: 26 MMOL/L (ref 21–32)
CREAT SERPL-MCNC: 0.7 MG/DL (ref 0.6–1.2)
GFR AFRICAN AMERICAN: >60
GFR NON-AFRICAN AMERICAN: >60
GLUCOSE BLD-MCNC: 104 MG/DL (ref 70–99)
GLUCOSE BLD-MCNC: 87 MG/DL (ref 70–99)
HCT VFR BLD CALC: 41.6 % (ref 36–48)
HEMOGLOBIN: 13.6 G/DL (ref 12–16)
MCH RBC QN AUTO: 25.8 PG (ref 26–34)
MCHC RBC AUTO-ENTMCNC: 32.8 G/DL (ref 31–36)
MCV RBC AUTO: 78.8 FL (ref 80–100)
PDW BLD-RTO: 14.8 % (ref 12.4–15.4)
PERFORMED ON: ABNORMAL
PLATELET # BLD: 207 K/UL (ref 135–450)
PMV BLD AUTO: 7.9 FL (ref 5–10.5)
POTASSIUM REFLEX MAGNESIUM: 3.6 MMOL/L (ref 3.5–5.1)
RBC # BLD: 5.28 M/UL (ref 4–5.2)
SODIUM BLD-SCNC: 142 MMOL/L (ref 136–145)
WBC # BLD: 10.8 K/UL (ref 4–11)

## 2021-09-22 PROCEDURE — 80048 BASIC METABOLIC PNL TOTAL CA: CPT

## 2021-09-22 PROCEDURE — 6370000000 HC RX 637 (ALT 250 FOR IP): Performed by: INTERNAL MEDICINE

## 2021-09-22 PROCEDURE — 94761 N-INVAS EAR/PLS OXIMETRY MLT: CPT

## 2021-09-22 PROCEDURE — 36415 COLL VENOUS BLD VENIPUNCTURE: CPT

## 2021-09-22 PROCEDURE — 6370000000 HC RX 637 (ALT 250 FOR IP): Performed by: PHYSICIAN ASSISTANT

## 2021-09-22 PROCEDURE — 85027 COMPLETE CBC AUTOMATED: CPT

## 2021-09-22 RX ORDER — POLYETHYLENE GLYCOL 3350 17 G/17G
17 POWDER, FOR SOLUTION ORAL DAILY PRN
Qty: 527 G | Refills: 0 | Status: SHIPPED | OUTPATIENT
Start: 2021-09-22 | End: 2021-10-22

## 2021-09-22 RX ORDER — INSULIN GLARGINE 100 [IU]/ML
10 INJECTION, SOLUTION SUBCUTANEOUS NIGHTLY
Qty: 5 PEN | Refills: 0 | Status: SHIPPED | OUTPATIENT
Start: 2021-09-22 | End: 2022-09-07

## 2021-09-22 RX ORDER — PANTOPRAZOLE SODIUM 40 MG/1
40 TABLET, DELAYED RELEASE ORAL
Qty: 60 TABLET | Refills: 0 | Status: SHIPPED | OUTPATIENT
Start: 2021-09-22 | End: 2022-02-24

## 2021-09-22 RX ADMIN — PANTOPRAZOLE SODIUM 40 MG: 40 TABLET, DELAYED RELEASE ORAL at 06:44

## 2021-09-22 RX ADMIN — ASPIRIN 81 MG: 81 TABLET, CHEWABLE ORAL at 08:46

## 2021-09-22 RX ADMIN — AMLODIPINE BESYLATE 5 MG: 5 TABLET ORAL at 08:46

## 2021-09-22 RX ADMIN — LISINOPRIL 10 MG: 10 TABLET ORAL at 08:46

## 2021-09-22 ASSESSMENT — PAIN SCALES - GENERAL: PAINLEVEL_OUTOF10: 0

## 2021-09-22 NOTE — DISCHARGE SUMMARY
Hospital Medicine Discharge Summary    Patient ID: Chirag Ramirez      Patient's PCP: CEASAR Joel    Admit Date: 9/19/2021     Discharge Date:   09/21/2021    Admitting Physician: Karen Parr MD     Discharge Physician: Real Huddleston MD     Discharge Diagnoses: Active Hospital Problems    Diagnosis     Intractable nausea and vomiting [R11.2]        The patient was seen and examined on day of discharge and this discharge summary is in conjunction with any daily progress note from day of discharge. Hospital Course:     From HPI:\"Patient is a 59-year-old female with past medical history of hypertension, diabetes mellitus, gastroparesis who presented to hospital for nausea vomiting for 3 to 4 days. According to the patient she regularly uses marijuana, the last time she used her insulin at home was about 1 week ago. Patient mention she has not been able to hold down food otherwise denied fevers chills. Patient also mentioned she has diffuse abdominal pain 9/10 intensity, associated with nausea vomiting denies diarrhea fevers chills. \"      1.  Nausea and vomiting likely due to gastroparesis, no plan for immediate EGD at this time, pain improved, tolerating p.o. intake.   Will discharge home today discussed the need to follow-up with GI especially to follow-up on her elevated alkaline phosphatase and liver function test she verbalized understanding and willingness to do that  2.  Uncontrolled essential hypertension, likely due to nausea and vomiting, resume p.o. medications, much better controlled now  3.  Uncontrolled diabetes mellitus on admission, better controlled now, we restarted her on low-dose Lantus and her blood sugar is still very controlled I will not increase doses at this time I will keep her on the same dose that she received in the hospital which is 10 units of Lantus along with sliding scale discussed with patient to follow-up on her blood sugar and update her primary care physician if needed to do any adjustment. She verbalized understanding and agreement  4.  Polycythemia, apparently patient is following up with hematology as outpatient  5.  High anion gap metabolic acidosis on admission, improved now  6. Leukocytosis, appears chronic, follow-up as outpatient          Physical Exam Performed:     BP (!) 143/72   Pulse 62   Temp 98.3 °F (36.8 °C) (Oral)   Resp 18   Ht 5' 7\" (1.702 m)   Wt 219 lb 2.2 oz (99.4 kg)   SpO2 97%   BMI 34.32 kg/m²       General appearance:  No apparent distress  HEENT:  Normal cephalic  Neck: Supple  Respiratory:  Normal respiratory effort. Clear to auscultation, bilaterally without Rales/Wheezes/Rhonchi. Cardiovascular:  Regular rate and rhythm with normal S1/S2 without murmurs, rubs or gallops. Abdomen: Soft, non-tender, non-distended   Musculoskeletal:  No clubbing, cyanosis   Skin: Skin color, texture, turgor normal.  No rashes or lesions. Neurologic:  NO FOCAL WEAKNESS   Psychiatric:  Alert and oriented  Capillary Refill: Brisk,< 3 seconds   Peripheral Pulses: +2 palpable, equal bilaterally       Labs: For convenience and continuity at follow-up the following most recent labs are provided:      CBC:    Lab Results   Component Value Date    WBC 10.8 09/22/2021    HGB 13.6 09/22/2021    HCT 41.6 09/22/2021     09/22/2021       Renal:    Lab Results   Component Value Date     09/22/2021    K 3.6 09/22/2021     09/22/2021    CO2 26 09/22/2021    BUN 9 09/22/2021    CREATININE 0.7 09/22/2021    CALCIUM 8.8 09/22/2021    PHOS 4.1 02/21/2017         Significant Diagnostic Studies    Radiology:   [address] [place] organ? LIVER, GALLBLADDER   Final Result   Fatty infiltration of the liver. CT ABDOMEN PELVIS W IV CONTRAST Additional Contrast? None   Final Result   No acute abdominopelvic abnormality.                 Consults:     IP CONSULT TO HOSPITALIST  IP CONSULT TO GI    Disposition:  home     Condition at Discharge: Stable    Discharge Instructions/Follow-up:  PCP, GI    Code Status:  Full Code     Activity: activity as tolerated    Diet: diabetic diet      Discharge Medications:     Current Discharge Medication List           Details   polyethylene glycol (GLYCOLAX) 17 g packet Take 17 g by mouth daily as needed for Constipation  Qty: 527 g, Refills: 0      pantoprazole (PROTONIX) 40 MG tablet Take 1 tablet by mouth 2 times daily (before meals)  Qty: 60 tablet, Refills: 0              Details   insulin glargine (BASAGLAR KWIKPEN) 100 UNIT/ML injection pen Inject 10 Units into the skin nightly  Qty: 5 pen, Refills: 0              Details   Papaya Enzyme CHEW Take 3-4 tablets by mouth daily as needed (Indigestion)      lisinopril (PRINIVIL;ZESTRIL) 10 MG tablet Take 10 mg by mouth daily      ezetimibe (ZETIA) 10 MG tablet Take 10 mg by mouth daily      amLODIPine (NORVASC) 5 MG tablet Take 5 mg by mouth daily      insulin lispro (HUMALOG) 100 UNIT/ML injection vial Inject into the skin 3 times daily (before meals) Per sliding scale.  2 units >150 then additional 2 units for every 50      aspirin 81 MG tablet Take 81 mg by mouth daily      Insulin Syringe-Needle U-100 (INSULIN SYRINGE .5CC/31GX5/16\") 31G X 5/16\" 0.5 ML MISC 1 each by Does not apply route daily  Qty: 100 Syringe, Refills: 3      acetaminophen (APAP EXTRA STRENGTH) 500 MG tablet Take 1 tablet by mouth every 6 hours as needed for Pain  Qty: 60 tablet, Refills: 0      timolol (TIMOPTIC) 0.5 % ophthalmic solution Place 1 drop into both eyes daily       ergocalciferol (DRISDOL) 06005 UNITS capsule Take 1 capsule by mouth once a week  Qty: 13 capsule, Refills: 1      albuterol sulfate (PROAIR RESPICLICK) 715 (90 BASE) MCG/ACT aerosol powder inhalation Inhale 2 puffs into the lungs every 4 hours as needed for Wheezing or Shortness of Breath  Qty: 1 Inhaler, Refills: 5    Associated Diagnoses: Pulmonary emphysema, unspecified emphysema type (HCC)      Insulin Pen Needle 31G X 5 MM MISC Pt uses three times a day  Qty: 450 each, Refills: 3    Associated Diagnoses: Type II or unspecified type diabetes mellitus without mention of complication, uncontrolled; Hypertension due to endocrine disorder; Hirsutism; Elevated testosterone level in female      Lancets MISC 3-4 times a day  Qty: 100 each, Refills: 3      latanoprost (XALATAN) 0.005 % ophthalmic solution Place 1 drop into both eyes nightly. glucose blood VI test strips (EXACTECH TEST) strip 4 times a day As needed. Qty: 400 each, Refills: 3             Time Spent on discharge is more than 45 minutes in the examination, evaluation, counseling and review of medications and discharge plan. Signed:    Macrina Gray MD   9/22/2021      Thank you CEASAR Gandhi for the opportunity to be involved in this patient's care. If you have any questions or concerns please feel free to contact me at [phone].

## 2021-09-22 NOTE — PLAN OF CARE
Problem: Skin Integrity:  Goal: Will show no infection signs and symptoms  Description: Will show no infection signs and symptoms  9/22/2021 0732 by Shahnaz Valiente RN  Outcome: Ongoing  Note: Will show no signs/symptoms infection. Electronically signed by Shahnaz Valiente RN on 9/22/2021 at 7:33 AM    9/22/2021 0203 by Carlin Donnelly RN  Outcome: Ongoing  Note: No signs of redness, warmth or swelling noted. Afebrile. Education provided on signs and symptoms of infections. Goal: Absence of new skin breakdown  Description: Absence of new skin breakdown  9/22/2021 0732 by Shahnaz Valiente RN  Outcome: Ongoing  Note: Absence of new skin breakdown. Electronically signed by Shahnaz Valiente RN on 9/22/2021 at 7:33 AM    9/22/2021 0203 by Carlin Donnelly RN  Outcome: Ongoing  Note: Skin assessment complete. No new signs of skin breakdown noted. Repositioning patient with pillows at two hour intervals. Heels elevated off bed. Problem: Falls - Risk of:  Goal: Will remain free from falls  Description: Will remain free from falls  9/22/2021 0732 by Shahnaz Valiente RN  Outcome: Ongoing  Note: Will remain free from falls. Electronically signed by Shahnaz Valiente RN on 9/22/2021 at 7:33 AM    9/22/2021 0203 by Carlin Donnelly RN  Outcome: Ongoing  Note: Fall risk assessment completed every shift. All precautions in place. Pt has call light within reach at all times. Room clear of clutter. Pt aware to call for assistance when getting up if needed. Goal: Absence of physical injury  Description: Absence of physical injury  9/22/2021 0732 by Shahnaz Valiente RN  Outcome: Ongoing  Note: Absence of physical injury. Electronically signed by Shahnaz Valiente RN on 9/22/2021 at 7:33 AM    9/22/2021 0203 by Carlin Donnelly RN  Outcome: Ongoing  Note: Patient remains free from physical injury. Patient educated on safety precautions.  Will continue to monitor to ensure patient remains free from physical injury throughout remainder of shift.

## 2021-09-22 NOTE — PROGRESS NOTES
INPATIENT PROGRESS NOTE        IDENTIFYING DATA/REASON FOR CONSULTATION   PATIENT:  Jeananne Hashimoto  MRN:  [id number]  ADMIT DATE: 9/19/2021  TIME OF EVALUATION: 9/22/2021 7:28 AM  HOSPITAL STAY:   LOS: 3 days   CONSULTING PHYSICIAN: Henry Erwin MD   REASON FOR CONSULTATION: nausea, vomiting, abdominal pain    Subjective:    Patient seen in follow up for nausea, vomiting and upper abdominal pain  Her abdomen remains sore. She tolerated a regular diet and had a small [place] I stool overnight. She continues to feel constipated, but reports her nausea and vomiting have resolved. She would like to return home.     MEDICATIONS   SCHEDULED:  polyethylene glycol, 17 g, Daily  insulin glargine, 10 Units, Nightly  pantoprazole, 40 mg, BID AC  sodium chloride flush, 10 mL, 2 times per day  enoxaparin, 40 mg, Daily  insulin lispro, 0-12 Units, TID WC  insulin lispro, 0-6 Units, Nightly  amLODIPine, 5 mg, Daily  aspirin, 81 mg, Daily  lisinopril, 10 mg, Daily      FLUIDS/DRIPS:     sodium chloride      dextrose      lactated ringers 100 mL/hr at 09/21/21 0753     PRNs: sodium chloride flush, 10 mL, PRN  sodium chloride, 25 mL, PRN  potassium chloride, 10 mEq, PRN  magnesium sulfate, 2,000 mg, PRN  magnesium hydroxide, 30 mL, Daily PRN  promethazine, 12.5 mg, Q6H PRN   Or  ondansetron, 4 mg, Q6H PRN  glucose, 15 g, PRN  dextrose, 12.5 g, PRN  glucagon (rDNA), 1 mg, PRN  dextrose, 100 mL/hr, PRN  albuterol sulfate HFA, 2 puff, Q4H PRN  promethazine, 6.25 mg, Q6H PRN      ALLERGIES:    Allergies   Allergen Reactions    Avelox [Moxifloxacin] Other (See Comments)     Weakness, tingling, tingling mouth    Bactrim      Remote h/o diarrhea, swollen eyes, and weakness, tachycardia and tongue swelling    Cefuroxime      Pt does not recall what happened with this    Codeine     Medrol [Methylprednisolone] Swelling     Ok to take nasal steroids    Morphine     Niacin Other (See Comments)    Oat      Throat swells    Percocet [Oxycodone-Acetaminophen]     Reglan [Metoclopramide] Other (See Comments)     States has something to do with a mass unknown reaction     Spironolactone      Increased potassium    Statins      Muscle cramps    Sulfamethoxazole-Trimethoprim Swelling    Vicodin [Hydrocodone-Acetaminophen]          PHYSICAL EXAM   VITALS:  /64   Pulse 71   Temp 97.5 °F (36.4 °C) (Oral)   Resp 16   Ht 5' 7\" (1.702 m)   Wt 219 lb 2.2 oz (99.4 kg)   SpO2 96%   BMI 34.32 kg/m²   TEMPERATURE:  Current - Temp:  (refused); Max - Temp  Av.2 °F (36.8 °C)  Min: 97.5 °F (36.4 °C)  Max: 98.8 °F (37.1 °C)    Physical Exam:  Gen: Resting in bed, NAD  HEENT: Normocephalic, atraumatic, no scleral icterus   CV: RRR no MRG   Pul: CTAB   Abd: Good bowel sounds throughout, no scars, soft, NT/ND, no masses, no HSM   Ext: No edema, moves all 4 extremities. Neuro: Moves all four extremities, no gross deficits, follows commands   Skin: No jaundice, spider angiomas, palmar erythema    LABS AND IMAGING   Laboratory   Recent Labs     21  0442 21  0516 21  0547   WBC 16.3* 13.4* 10.8   HGB 14.1 14.2 13.6   HCT 42.7 43.7 41.6   MCV 78.4* 79.4* 78.8*    249 207     Recent Labs     21  1342 21  0442 21  0516   * 139 136   K 4.1 3.5 3.7   CL 89* 97* 99   CO2 23 27 27   BUN 16 12 11   CREATININE 0.9 0.7 0.7     Recent Labs     21  1342   AST 18   ALT 19   BILITOT 0.8   ALKPHOS 148*     Recent Labs     21  1342   LIPASE 43.0     No results for input(s): PROTIME, INR in the last 72 hours. Imaging  US ABDOMEN LIMITED Specify organ? LIVER, GALLBLADDER   Final Result   Fatty infiltration of the liver. CT ABDOMEN PELVIS W IV CONTRAST Additional Contrast? None   Final Result   No acute abdominopelvic abnormality.              Endoscopy      ASSESSMENT AND RECOMMENDATIONS   Snow Medina is a 81-year-old female past medical history of DM-II complicated by gastroparesis, hypertension, hyperlipidemia and COPD who presented to [facility] [place] 8/18/2021 with abdominal pain, nausea and vomiting.     1. Nausea/vomiting: Given history, suspect flare of gastroparesis.  Uncontrolled hypertension may also be contributing as patient has a history of poorly controlled hypertension, and had significantly elevated blood pressures, including diastolic pressures on admission.  Will defer EGD at this time. RUQ US negative for cholelithiasis. She is intolerant of Reglan. 2. Elevated alkaline phosphatase: Will fractionate and obtain AMA. 3. Hypertension: Poorly controlled. Will defer to primary team for intervention. 4. Leukocytosis: Chronic. She has been seen by oncology Dr. Sheron Macias, and this was thought to be reactive due to chronic inflammation.  Per note, work-up included a negative JAK2, BCR ABL, and flow cytometry. 5. Polycythemia: Again previously seen by hematology oncology Dr. Jacqueline Gruber, with negative JAK2 mutation testing with plan for repeat phlebotomy as needed. 6. Hypomagnesemia: 2/2 #1. Replaced, and monitoring.     Recommendations:  - Low fat diet.  - Ok from GI for discharge with outpatient f/u of alkaline phosphatase elevation with Radha MCDONOUGH. If you have any questions or need any further information, please feel free to contact us [phone]. Thank you for allowing us to participate in the care of Bin Doll. The note was completed using Dragon voice recognition transcription. Every effort was made to ensure accuracy; however, inadvertent transcription errors may be present despite my best efforts to edit errors.     Sai Whiting MD

## 2021-09-22 NOTE — PLAN OF CARE
Problem: Skin Integrity:  Goal: Will show no infection signs and symptoms  Description: Will show no infection signs and symptoms  Outcome: Ongoing  Note: No signs of redness, warmth or swelling noted. Afebrile. Education provided on signs and symptoms of infections. Problem: Skin Integrity:  Goal: Absence of new skin breakdown  Description: Absence of new skin breakdown  Outcome: Ongoing  Note: Skin assessment complete. No new signs of skin breakdown noted. Repositioning patient with pillows at two hour intervals. Heels elevated off bed. Problem: Falls - Risk of:  Goal: Will remain free from falls  Description: Will remain free from falls  Outcome: Ongoing  Note: Fall risk assessment completed every shift. All precautions in place. Pt has call light within reach at all times. Room clear of clutter. Pt aware to call for assistance when getting up if needed. Problem: Falls - Risk of:  Goal: Absence of physical injury  Description: Absence of physical injury  Outcome: Ongoing  Note: Patient remains free from physical injury. Patient educated on safety precautions. Will continue to monitor to ensure patient remains free from physical injury throughout remainder of shift.

## 2021-09-22 NOTE — PLAN OF CARE
Problem: Skin Integrity:  Goal: Will show no infection signs and symptoms  Description: Will show no infection signs and symptoms  9/22/2021 0912 by Cyndi Card RN  Outcome: Completed  9/22/2021 0732 by Cyndi Card RN  Outcome: Ongoing  Note: Will show no signs/symptoms infection. Electronically signed by Cyndi Card RN on 9/22/2021 at 7:33 AM    9/22/2021 0203 by Alicia Monique RN  Outcome: Ongoing  Note: No signs of redness, warmth or swelling noted. Afebrile. Education provided on signs and symptoms of infections. Goal: Absence of new skin breakdown  Description: Absence of new skin breakdown  9/22/2021 0912 by Cyndi Card RN  Outcome: Completed  9/22/2021 0732 by Cyndi Card RN  Outcome: Ongoing  Note: Absence of new skin breakdown. Electronically signed by Cyndi Card RN on 9/22/2021 at 7:33 AM    9/22/2021 0203 by Alicia Monique RN  Outcome: Ongoing  Note: Skin assessment complete. No new signs of skin breakdown noted. Repositioning patient with pillows at two hour intervals. Heels elevated off bed. Problem: Falls - Risk of:  Goal: Will remain free from falls  Description: Will remain free from falls  9/22/2021 0912 by Cyndi Card RN  Outcome: Completed  9/22/2021 0732 by Cyndi Card RN  Outcome: Ongoing  Note: Will remain free from falls. Electronically signed by Cyndi Card RN on 9/22/2021 at 7:33 AM    9/22/2021 0203 by Alicia Monique RN  Outcome: Ongoing  Note: Fall risk assessment completed every shift. All precautions in place. Pt has call light within reach at all times. Room clear of clutter. Pt aware to call for assistance when getting up if needed. Goal: Absence of physical injury  Description: Absence of physical injury  9/22/2021 0912 by Cyndi Card RN  Outcome: Completed  9/22/2021 0732 by Cyndi Card RN  Outcome: Ongoing  Note: Absence of physical injury.   Electronically signed by Cyndi Card RN on 9/22/2021 at 7:33 AM    9/22/2021 0203 by Sumaya Melendez RN  Outcome: Ongoing  Note: Patient remains free from physical injury. Patient educated on safety precautions. Will continue to monitor to ensure patient remains free from physical injury throughout remainder of shift.

## 2021-09-24 LAB
ALK PHOS OTHER CALC: 0 U/L
ALK PHOSPHATASE: 107 U/L (ref 40–120)
ALKALINE PHOSPHATASE BONE FRACTION: 35 U/L (ref 0–55)
ALKALINE PHOSPHATASE LIVER FRACTION: 72 U/L (ref 0–94)

## 2021-09-30 ENCOUNTER — APPOINTMENT (OUTPATIENT)
Dept: GENERAL RADIOLOGY | Age: 61
End: 2021-09-30
Payer: COMMERCIAL

## 2021-09-30 ENCOUNTER — APPOINTMENT (OUTPATIENT)
Dept: CT IMAGING | Age: 61
End: 2021-09-30
Payer: COMMERCIAL

## 2021-09-30 ENCOUNTER — HOSPITAL ENCOUNTER (EMERGENCY)
Age: 61
Discharge: HOME | End: 2021-10-01
Attending: EMERGENCY MEDICINE
Payer: COMMERCIAL

## 2021-09-30 DIAGNOSIS — R42 DIZZINESS: Primary | ICD-10-CM

## 2021-09-30 LAB
A/G RATIO: 1.3 (ref 1.1–2.2)
ALBUMIN SERPL-MCNC: 4.2 G/DL (ref 3.4–5)
ALP BLD-CCNC: 117 U/L (ref 40–129)
ALT SERPL-CCNC: 16 U/L (ref 10–40)
ANION GAP SERPL CALCULATED.3IONS-SCNC: 15 MMOL/L (ref 3–16)
AST SERPL-CCNC: 16 U/L (ref 15–37)
BASOPHILS ABSOLUTE: 0.1 K/UL (ref 0–0.2)
BASOPHILS RELATIVE PERCENT: 1 %
BILIRUB SERPL-MCNC: 0.3 MG/DL (ref 0–1)
BILIRUBIN URINE: NEGATIVE
BLOOD, URINE: NEGATIVE
BUN BLDV-MCNC: 10 MG/DL (ref 7–20)
CALCIUM SERPL-MCNC: 9.6 MG/DL (ref 8.3–10.6)
CHLORIDE BLD-SCNC: 102 MMOL/L (ref 99–110)
CLARITY: CLEAR
CO2: 24 MMOL/L (ref 21–32)
COLOR: YELLOW
CREAT SERPL-MCNC: 0.8 MG/DL (ref 0.6–1.2)
EOSINOPHILS ABSOLUTE: 0.5 K/UL (ref 0–0.6)
EOSINOPHILS RELATIVE PERCENT: 3.3 %
GFR AFRICAN AMERICAN: >60
GFR NON-AFRICAN AMERICAN: >60
GLOBULIN: 3.3 G/DL
GLUCOSE BLD-MCNC: 205 MG/DL (ref 70–99)
GLUCOSE URINE: NEGATIVE MG/DL
HCT VFR BLD CALC: 43.2 % (ref 36–48)
HEMOGLOBIN: 14.1 G/DL (ref 12–16)
KETONES, URINE: NEGATIVE MG/DL
LEUKOCYTE ESTERASE, URINE: NEGATIVE
LYMPHOCYTES ABSOLUTE: 4.4 K/UL (ref 1–5.1)
LYMPHOCYTES RELATIVE PERCENT: 29.6 %
MCH RBC QN AUTO: 25.9 PG (ref 26–34)
MCHC RBC AUTO-ENTMCNC: 32.6 G/DL (ref 31–36)
MCV RBC AUTO: 79.6 FL (ref 80–100)
MICROSCOPIC EXAMINATION: NORMAL
MONOCYTES ABSOLUTE: 0.8 K/UL (ref 0–1.3)
MONOCYTES RELATIVE PERCENT: 5.4 %
NEUTROPHILS ABSOLUTE: 9 K/UL (ref 1.7–7.7)
NEUTROPHILS RELATIVE PERCENT: 60.7 %
NITRITE, URINE: NEGATIVE
PDW BLD-RTO: 14.9 % (ref 12.4–15.4)
PH UA: 5 (ref 5–8)
PLATELET # BLD: 293 K/UL (ref 135–450)
PMV BLD AUTO: 7.8 FL (ref 5–10.5)
POTASSIUM REFLEX MAGNESIUM: 4.1 MMOL/L (ref 3.5–5.1)
PROTEIN UA: NEGATIVE MG/DL
RBC # BLD: 5.43 M/UL (ref 4–5.2)
SODIUM BLD-SCNC: 141 MMOL/L (ref 136–145)
SPECIFIC GRAVITY UA: >1.03 (ref 1–1.03)
TOTAL PROTEIN: 7.5 G/DL (ref 6.4–8.2)
TROPONIN: <0.01 NG/ML
URINE REFLEX TO CULTURE: NORMAL
URINE TYPE: NORMAL
UROBILINOGEN, URINE: 0.2 E.U./DL
WBC # BLD: 14.8 K/UL (ref 4–11)

## 2021-09-30 PROCEDURE — 93005 ELECTROCARDIOGRAM TRACING: CPT | Performed by: EMERGENCY MEDICINE

## 2021-09-30 PROCEDURE — 2580000003 HC RX 258: Performed by: EMERGENCY MEDICINE

## 2021-09-30 PROCEDURE — 70450 CT HEAD/BRAIN W/O DYE: CPT

## 2021-09-30 PROCEDURE — 71045 X-RAY EXAM CHEST 1 VIEW: CPT

## 2021-09-30 PROCEDURE — 99285 EMERGENCY DEPT VISIT HI MDM: CPT

## 2021-09-30 PROCEDURE — 80053 COMPREHEN METABOLIC PANEL: CPT

## 2021-09-30 PROCEDURE — 96374 THER/PROPH/DIAG INJ IV PUSH: CPT

## 2021-09-30 PROCEDURE — 70498 CT ANGIOGRAPHY NECK: CPT

## 2021-09-30 PROCEDURE — 96375 TX/PRO/DX INJ NEW DRUG ADDON: CPT

## 2021-09-30 PROCEDURE — 6370000000 HC RX 637 (ALT 250 FOR IP): Performed by: EMERGENCY MEDICINE

## 2021-09-30 PROCEDURE — 6360000002 HC RX W HCPCS: Performed by: EMERGENCY MEDICINE

## 2021-09-30 PROCEDURE — 6360000004 HC RX CONTRAST MEDICATION: Performed by: PHYSICIAN ASSISTANT

## 2021-09-30 PROCEDURE — 81003 URINALYSIS AUTO W/O SCOPE: CPT

## 2021-09-30 PROCEDURE — 84484 ASSAY OF TROPONIN QUANT: CPT

## 2021-09-30 PROCEDURE — 85025 COMPLETE CBC W/AUTO DIFF WBC: CPT

## 2021-09-30 RX ORDER — DIPHENHYDRAMINE HYDROCHLORIDE 50 MG/ML
25 INJECTION INTRAMUSCULAR; INTRAVENOUS ONCE
Status: COMPLETED | OUTPATIENT
Start: 2021-09-30 | End: 2021-09-30

## 2021-09-30 RX ORDER — ACETAMINOPHEN 325 MG/1
650 TABLET ORAL ONCE
Status: COMPLETED | OUTPATIENT
Start: 2021-09-30 | End: 2021-09-30

## 2021-09-30 RX ORDER — PROCHLORPERAZINE EDISYLATE 5 MG/ML
10 INJECTION INTRAMUSCULAR; INTRAVENOUS ONCE
Status: COMPLETED | OUTPATIENT
Start: 2021-09-30 | End: 2021-09-30

## 2021-09-30 RX ORDER — KETOROLAC TROMETHAMINE 15 MG/ML
15 INJECTION, SOLUTION INTRAMUSCULAR; INTRAVENOUS ONCE
Status: COMPLETED | OUTPATIENT
Start: 2021-09-30 | End: 2021-09-30

## 2021-09-30 RX ORDER — MECLIZINE HCL 12.5 MG/1
50 TABLET ORAL ONCE
Status: COMPLETED | OUTPATIENT
Start: 2021-09-30 | End: 2021-09-30

## 2021-09-30 RX ORDER — 0.9 % SODIUM CHLORIDE 0.9 %
1000 INTRAVENOUS SOLUTION INTRAVENOUS ONCE
Status: COMPLETED | OUTPATIENT
Start: 2021-09-30 | End: 2021-10-01

## 2021-09-30 RX ORDER — KETOROLAC TROMETHAMINE 15 MG/ML
15 INJECTION, SOLUTION INTRAMUSCULAR; INTRAVENOUS ONCE
Status: DISCONTINUED | OUTPATIENT
Start: 2021-09-30 | End: 2021-09-30

## 2021-09-30 RX ADMIN — IOPAMIDOL 75 ML: 755 INJECTION, SOLUTION INTRAVENOUS at 18:23

## 2021-09-30 RX ADMIN — MECLIZINE 50 MG: 12.5 TABLET ORAL at 22:18

## 2021-09-30 RX ADMIN — PROCHLORPERAZINE EDISYLATE 10 MG: 5 INJECTION INTRAMUSCULAR; INTRAVENOUS at 22:20

## 2021-09-30 RX ADMIN — KETOROLAC TROMETHAMINE 15 MG: 15 INJECTION, SOLUTION INTRAMUSCULAR; INTRAVENOUS at 22:20

## 2021-09-30 RX ADMIN — DIPHENHYDRAMINE HYDROCHLORIDE 25 MG: 50 INJECTION, SOLUTION INTRAMUSCULAR; INTRAVENOUS at 22:19

## 2021-09-30 RX ADMIN — ACETAMINOPHEN 650 MG: 325 TABLET ORAL at 22:18

## 2021-09-30 RX ADMIN — SODIUM CHLORIDE 1000 ML: 9 INJECTION, SOLUTION INTRAVENOUS at 22:17

## 2021-09-30 ASSESSMENT — ENCOUNTER SYMPTOMS
EYE PAIN: 0
NAUSEA: 0
PHOTOPHOBIA: 0
SHORTNESS OF BREATH: 0
ABDOMINAL PAIN: 0
EYE REDNESS: 0
VOMITING: 0
DIARRHEA: 0
COUGH: 0

## 2021-09-30 ASSESSMENT — PAIN SCALES - GENERAL
PAINLEVEL_OUTOF10: 6
PAINLEVEL_OUTOF10: 6

## 2021-09-30 NOTE — ED TRIAGE NOTES
Pt presents to ED via PV with c/o of \"head fullness. \" reports feels off balance x3 days. Hx of vertigo. Resp even and unlabored. A/ox4. No acute distress noted. Denies any need at this time. Call light within reach. Bed in lowest position. Will continue to monitor.

## 2021-09-30 NOTE — ED PROVIDER NOTES
[address] [facility]        Pt Name: Aicha Bellamy  MRN: [id number]  [place] 1960  Date of evaluation: 9/30/2021  Provider: CEASAR Duran  PCP: CEASAR Esposito  Note Started: 6:15 PM EDT        I have seen and evaluated this patient with my supervising physician Savanah Brown, [facility]       Chief Complaint   Patient presents with    Dizziness     pt also reports pressure in her ears. reports feeling \"off balance\". states she has vertigo, but this feels different. HISTORY OF PRESENT ILLNESS   (Location, Timing/Onset, Context/Setting, Quality, Duration, Modifying Factors, Severity, Associated Signs and Symptoms)  Note limiting factors. Chief Complaint: Dizziness    Aicha Bellamy is a 62 y.o. female who presents to the emergency department today with complaints of dizziness. Her past medical history includes asthma, diabetes, hyperlipidemia, hypertension, vertigo. She states the symptoms have been present for the last 3 to 4 days. She describes it as a heaviness in the left side of her head, with a sensation of feeling off balance. She denies any chest pain, shortness of breath, numbness or weakness in her extremities that is new or different. She does suffer from neuropathy, states it is not any different than her baseline. She has tried taking Compazine, which has not helped improve her symptoms. She denies fevers, chills, trauma. She has no further complaints at this time. Nursing Notes were all reviewed and agreed with or any disagreements were addressed in the HPI. REVIEW OF SYSTEMS    (2-9 systems for level 4, 10 or more for level 5)     Review of Systems   Constitutional: Negative for chills and fever. Eyes: Negative for photophobia, pain and redness. Respiratory: Negative for cough and shortness of breath. Cardiovascular: Negative for chest pain and palpitations. Gastrointestinal: Negative for abdominal pain, diarrhea, nausea and vomiting. Genitourinary: Negative for dysuria, frequency and urgency. Neurological: Positive for dizziness and light-headedness. Negative for tremors, syncope, speech difficulty, weakness and numbness. Positives and Pertinent negatives as per HPI. Except as noted above in the ROS, all other systems were reviewed and negative.        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    Gastroparesis     Hirsutism     Hyperlipidemia LDL goal < 70     Hypertension     Major depressive disorder with single episode 2009    Pancreatitis     Seasonal allergic rhinitis due to pollen 11/10/2016    Sleep apnea     Vitamin D deficiency          SURGICAL HISTORY     Past Surgical History:   Procedure Laterality Date     SECTION      ENDOMETRIAL ABLATION  5/3/12    HYSTEROSCOPY  5/3/12    MANDIBLE SURGERY      TUBAL LIGATION      UPPER GASTROINTESTINAL ENDOSCOPY N/A 2021    EGD BIOPSY performed by Ashby Gaucher, MD at [facility]       Discharge Medication List as of 10/1/2021 12:18 AM      CONTINUE these medications which have NOT CHANGED    Details   insulin glargine (BASAGLAR KWIKPEN) 100 UNIT/ML injection pen Inject 10 Units into the skin nightly, Disp-5 pen, R-0Normal      polyethylene glycol (GLYCOLAX) 17 g packet Take 17 g by mouth daily as needed for Constipation, Disp-527 g, R-0Normal      pantoprazole (PROTONIX) 40 MG tablet Take 1 tablet by mouth 2 times daily (before meals), Disp-60 tablet, R-0Normal      Papaya Enzyme CHEW Take 3-4 tablets by mouth daily as needed (Indigestion)Historical Med      lisinopril (PRINIVIL;ZESTRIL) 10 MG tablet Take 10 mg by mouth dailyHistorical Med      ezetimibe (ZETIA) 10 MG tablet Take 10 mg by mouth dailyHistorical Med      amLODIPine (NORVASC) 5 MG tablet Take 5 mg by mouth dailyHistorical Med      insulin lispro (HUMALOG) 100 UNIT/ML injection vial Inject into the skin 3 times daily (before meals) Per sliding scale. 2 units >150 then additional 2 units for every 50Historical Med      aspirin 81 MG tablet Take 81 mg by mouth dailyHistorical Med      Insulin Syringe-Needle U-100 (INSULIN SYRINGE .5CC/31GX5/16\") 31G X 5/16\" 0.5 ML MISC DAILY Starting 4/14/2017, Until Discontinued, Disp-100 Syringe, R-3, Normal      acetaminophen (APAP EXTRA STRENGTH) 500 MG tablet Take 1 tablet by mouth every 6 hours as needed for Pain, Disp-60 tablet, R-0      timolol (TIMOPTIC) 0.5 % ophthalmic solution Place 1 drop into both eyes daily       ergocalciferol (DRISDOL) 16218 UNITS capsule Take 1 capsule by mouth once a week, Disp-13 capsule, R-1      albuterol sulfate (PROAIR RESPICLICK) 590 (90 BASE) MCG/ACT aerosol powder inhalation Inhale 2 puffs into the lungs every 4 hours as needed for Wheezing or Shortness of Breath, Disp-1 Inhaler, R-5      Insulin Pen Needle 31G X 5 MM MISC Disp-450 each, R-3, NormalPt uses three times a day      glucose blood VI test strips (EXACTECH TEST) strip Disp-400 each, R-3, Normal4 times a day As needed. Lancets MISC Disp-100 each, R-3, Normal3-4 times a day      latanoprost (XALATAN) 0.005 % ophthalmic solution Place 1 drop into both eyes nightly.                ALLERGIES     Avelox [moxifloxacin], Bactrim, Cefuroxime, Codeine, Medrol [methylprednisolone], Morphine, Niacin, Oat, Percocet [oxycodone-acetaminophen], Reglan [metoclopramide], Spironolactone, Statins, Sulfamethoxazole-trimethoprim, and Vicodin [hydrocodone-acetaminophen]    FAMILYHISTORY       Family History   Problem Relation Age of Onset    Hypertension Mother     Breast Cancer Mother     Colon Cancer Mother     Cancer Father         Pancreatic    Prostate Cancer Father     No Known Problems Brother     No Known Problems Sister     Hypertension Maternal Aunt     Cancer Maternal Uncle     Hypertension Maternal Grandmother     Stroke Maternal Grandmother     Cancer Maternal Grandfather     No Known Problems Paternal Aunt     No Known Problems Paternal Uncle     No Known Problems Paternal Grandmother     No Known Problems Paternal Grandfather     No Known Problems Other     Rheum Arthritis Neg Hx     Osteoarthritis Neg Hx     Asthma Neg Hx     Diabetes Neg Hx     Heart Failure Neg Hx     High Cholesterol Neg Hx     Migraines Neg Hx     Ovarian Cancer Neg Hx     Rashes/Skin Problems Neg Hx     Seizures Neg Hx     Thyroid Disease Neg Hx           SOCIAL HISTORY       Social History     Tobacco Use    Smoking status: Former Smoker     Types: Cigarettes     Quit date: 1990     Years since quittin.7    Smokeless tobacco: Never Used   Substance Use Topics    Alcohol use: Yes     Alcohol/week: 0.0 standard drinks     Comment: less than once a month    Drug use: Yes     Frequency: 2.0 times per week     Types: Marijuana     Comment: used yesterday       SCREENINGS   NIH Stroke Scale  Interval: Baseline  Level of Consciousness (1a. ): Alert  LOC Questions (1b. ):  Answers both correctly  LOC Commands (1c. ): Performs both tasks correctly  Best Gaze (2. ): Normal  Visual (3. ): No visual loss  Facial Palsy (4. ): Normal symmetrical movement  Motor Arm, Left (5a. ): No drift  Motor Arm, Right (5b. ): No drift  Motor Leg, Left (6a. ): No drift  Motor Leg, Right (6b. ): No drift  Limb Ataxia (7. ): Absent  Sensory (8. ): Normal  Best Language (9. ): No aphasia  Dysarthria (10. ): Normal  Extinction and Inattention (11): No abnormality  Total: 0Glasgow Coma Scale  Eye Opening: time.      GCS: GCS eye subscore is 4. GCS verbal subscore is 5. GCS motor subscore is 6. Cranial Nerves: Cranial nerves are intact. No cranial nerve deficit. Sensory: No sensory deficit. Motor: Motor function is intact. No weakness. Coordination: Coordination normal. Finger-Nose-Finger Test normal.      Deep Tendon Reflexes: Reflexes normal.      Reflex Scores:       Brachioradialis reflexes are 2+ on the right side and 2+ on the left side. Patellar reflexes are 2+ on the right side and 2+ on the left side. Comments: Negative test of skew. NIHSS 0. No truncal ataxia. Psychiatric:         Mood and Affect: Mood normal.         Behavior: Behavior normal. Behavior is cooperative. Thought Content: Thought content normal.         DIAGNOSTIC RESULTS   LABS:    Labs Reviewed   CBC WITH AUTO DIFFERENTIAL - Abnormal; Notable for the following components:       Result Value    WBC 14.8 (*)     RBC 5.43 (*)     MCV 79.6 (*)     MCH 25.9 (*)     Neutrophils Absolute 9.0 (*)     All other components within normal limits    Narrative:     Performed at:  [address] [place] [facility] [address]   Phone [phone]   COMPREHENSIVE METABOLIC PANEL W/ REFLEX TO MG FOR LOW K - Abnormal; Notable for the following components:    Glucose 205 (*)     All other components within normal limits    Narrative:     Performed at:  [address] [place] [address] [address]   Phone [phone]   TROPONIN    Narrative:     Performed at:  [address] [place] [address]   Phone [phone]   URINE RT REFLEX TO CULTURE    Narrative:     Performed at:  [address] [place] [address]   Phone [phone]       When ordered only abnormal lab results are displayed.  All other labs were within normal range or not returned as of this dictation. EKG: When ordered, EKG's are interpreted by the Emergency Department Physician in the absence of a cardiologist.  Please see their note for interpretation of EKG. RADIOLOGY:   Non-plain film images such as CT, Ultrasound and MRI are read by the radiologist. Plain radiographic images are visualized and preliminarily interpreted by the ED Provider with the below findings:        Interpretation per the Radiologist below, if available at the time of this note:    XR CHEST PORTABLE   Final Result   No acute cardiopulmonary process. CT HEAD WO CONTRAST   Final Result   No acute intracranial abnormality. CTA HEAD NECK W CONTRAST   Final Result   Unremarkable CTA of the head and neck. No large vessel occlusion. No results found.         PROCEDURES   Unless otherwise noted below, none     Procedures    CRITICAL CARE TIME   N/A    CONSULTS:  None      EMERGENCY DEPARTMENT COURSE and DIFFERENTIAL DIAGNOSIS/MDM:   Vitals:    Vitals:    09/30/21 2216 09/30/21 2332 10/01/21 0002 10/01/21 0017   BP: (!) 145/70 (!) 136/52 (!) 165/79 (!) 123/91   Pulse: 71 68 60 61   Resp: 18 14 13 14   Temp:       TempSrc:       SpO2: 99%      Weight:       Height:           Patient was given the following medications:  Medications   iopamidol (ISOVUE-370) 76 % injection 75 mL (75 mLs IntraVENous Given 9/30/21 1823)   0.9 % sodium chloride bolus (0 mLs IntraVENous Stopped 10/1/21 0029)   meclizine (ANTIVERT) tablet 50 mg (50 mg Oral Given 9/30/21 2218)   diphenhydrAMINE (BENADRYL) injection 25 mg (25 mg IntraVENous Given 9/30/21 2219)   prochlorperazine (COMPAZINE) injection 10 mg (10 mg IntraVENous Given 9/30/21 2220)   acetaminophen (TYLENOL) tablet 650 mg (650 mg Oral Given 9/30/21 2218)   ketorolac (TORADOL) injection 15 mg (15 mg IntraVENous Given 9/30/21 2220)           ED COURSE & MEDICAL DECISION MAKING    - The patient presented to the ER with complaints of dizziness. Vital signs were reviewed. Exam with WDWN female in no acute distress. Peripheral IV placed. Labs, Imaging ordered. - Pertinent Labs & Imaging studies reviewed. (See chart for details)   -  Patient seen and evaluated in the emergency department. -  Triage and nursing notes reviewed and incorporated. -  Old chart records reviewed and incorporated. -   I have seen and evaluated this patient with my supervising physician Moy Penaloza DO.  -  Differential diagnosis includes: benign positional vertigo, labyrinthitis/otitis, sepsis, dehydration/orthostasis, vasovagal reaction, stroke, TIA, intracranial bleed, migraine, anxiety, ACS/dysrhythmia, medication side effects, head trauma  -  Work-up included:  See above  -  ED treatment included:   IV fluids, meclizine, Benadryl, Compazine, Tylenol, Toradol  -  Results discussed with patient and/or family. Labs show white blood cell count 14.8, no concerning anemia. Metabolic panel with no concerning abnormalities. Her troponin is less than 0.01, urine with no evidence of infection. Imaging studies show no acute process on chest x-ray, CT of the head with no acute intracranial normality, CTA with no large vessel occlusion or other acute abnormality. Please see attending physician's note for EKG interpretation. Patient feels mildly improved after medications, fluids given in the emergency department. She does ambulate to and from the bathroom with a normal gait and no assistance. At this time, we recommend discharge, as the patient is stable for outpatient follow-up with her PCP.  The patient and/or family is agreeable with plan of care and disposition.  -  Disposition:   Discharge  - Critical Care: Because of high probability of sudden clinical deterioration of the patient's condition or  further deterioration, critical care time included my full attention to the patient's condition, including chart data review, documentation, medication ordering, reviewing the patient's old records, reevaluation patient's cardiac, pulmonary and neurological status. Reassessment of vital signs. Consultations with ED attending and admitting physician. Ordering, interpreting reviewing diagnostic testing. Therefore, my critical care time was 21 minutes of direct attention to the patient's condition did not include time spent on separately billable procedures. FINAL IMPRESSION      1.  Dizziness          DISPOSITION/PLAN   DISPOSITION Decision To Discharge 10/01/2021 12:13:30 AM      PATIENT REFERRED TO:  CEASAR Garrido  [address] [place] [address]  [phone]    In 2 days        DISCHARGE MEDICATIONS:  Discharge Medication List as of 10/1/2021 12:18 AM      START taking these medications    Details   cetirizine (ZYRTEC) 10 MG tablet Take 1 tablet by mouth daily, Disp-30 tablet, R-0Print      meclizine (ANTIVERT) 25 MG tablet Take 1 tablet by mouth 3 times daily as needed for Dizziness or Nausea, Disp-20 tablet, R-0Print             DISCONTINUED MEDICATIONS:  Discharge Medication List as of 10/1/2021 12:18 AM                 (Please note that portions of this note were completed with a voice recognition program.  Efforts were made to edit the dictations but occasionally words are mis-transcribed.)    CEASAR Olson (electronically signed)            Bernabe Olson  10/01/21 0217

## 2021-09-30 NOTE — LETTER
[facility] Emergency Department  [address] [facility] [address]  Phone: [phone]               October 1, 2021    Patient: Nadeem Romeo   YOB: 1960   Date of Visit: 9/30/2021       To Whom It May Concern:    Nadeem Romeo was seen and treated in our emergency department on 9/30/2021. She may return to work on 10/2/21.     Sincerely,               Signature:__________________________________

## 2021-10-01 VITALS
TEMPERATURE: 99.3 F | SYSTOLIC BLOOD PRESSURE: 123 MMHG | RESPIRATION RATE: 14 BRPM | HEART RATE: 61 BPM | WEIGHT: 228.84 LBS | DIASTOLIC BLOOD PRESSURE: 91 MMHG | OXYGEN SATURATION: 99 % | HEIGHT: 67 IN | BODY MASS INDEX: 35.92 KG/M2

## 2021-10-01 LAB
EKG ATRIAL RATE: 74 BPM
EKG DIAGNOSIS: NORMAL
EKG P AXIS: 55 DEGREES
EKG P-R INTERVAL: 154 MS
EKG Q-T INTERVAL: 408 MS
EKG QRS DURATION: 94 MS
EKG QTC CALCULATION (BAZETT): 452 MS
EKG R AXIS: -4 DEGREES
EKG T AXIS: 48 DEGREES
EKG VENTRICULAR RATE: 74 BPM

## 2021-10-01 PROCEDURE — 93010 ELECTROCARDIOGRAM REPORT: CPT | Performed by: INTERNAL MEDICINE

## 2021-10-01 RX ORDER — MECLIZINE HYDROCHLORIDE 25 MG/1
25 TABLET ORAL 3 TIMES DAILY PRN
Qty: 20 TABLET | Refills: 0 | Status: SHIPPED | OUTPATIENT
Start: 2021-10-01 | End: 2021-10-11

## 2021-10-01 RX ORDER — CETIRIZINE HYDROCHLORIDE 10 MG/1
10 TABLET ORAL DAILY
Qty: 30 TABLET | Refills: 0 | Status: SHIPPED | OUTPATIENT
Start: 2021-10-01 | End: 2021-10-31

## 2021-10-01 ASSESSMENT — PAIN SCALES - GENERAL: PAINLEVEL_OUTOF10: 0

## 2021-10-01 NOTE — ED PROVIDER NOTES
I independently performed a history and physical on Suraj Rubio. All diagnostic, treatment, and disposition decisions were made by myself in conjunction with the advanced practice provider. Briefly, this is a 62 y.o. female here for dizziness ongoing for the past 3 to 4 days. Patient states she feels she is spinning, reports history of vertigo in the past however her symptoms feel different today. Associated with right-sided throbbing headache, patient also reports history of migraines which have been similar in the past.  Symptoms are worsened with sitting up, standing and turning her head, modestly relieved with rest.  Associated with nausea. She denies any fevers, chills, neck stiffness, vision changes, weakness or numbness. .    On exam, patient afebrile and nontoxic. No distress. Heart RRR. Lungs CTAB. Abdomen soft, nondistended, nontender to palpation in all quadrants. TMs nonerythematous, not bulging, landmarks visible bilaterally. A&Ox4. PERRL, no nystagmus, normal test of skew. CN 2-12 intact. 5/5 motor and sensation grossly intact all extremities. No pronator drift. Normal finger to nose, normal heel to shin. EKG  EKG was reviewed by emergency department physician in the absence of a cardiologist    Narrow complex sinus rhythm, rate 74, normal axis, normal IN and QRS intervals, normal Qtc, no ST elevations or depressions, normal t-wave morphology, impression sinus rhythm with sinus arrhythmia, no STEMI      Screenings  NIH Stroke Scale  Interval: Baseline  Level of Consciousness (1a. ): Alert  LOC Questions (1b. ):  Answers both correctly  LOC Commands (1c. ): Performs both tasks correctly  Best Gaze (2. ): Normal  Visual (3. ): No visual loss  Facial Palsy (4. ): Normal symmetrical movement  Motor Arm, Left (5a. ): No drift  Motor Arm, Right (5b. ): No drift  Motor Leg, Left (6a. ): No drift  Motor Leg, Right (6b. ): No drift  Limb Ataxia (7. ): Absent  Sensory (8. ): Normal  Best Language (9. ): No aphasia  Dysarthria (10. ): Normal  Extinction and Inattention (11): No abnormality  Total: 0Glasgow Coma Scale  Eye Opening: Spontaneous  Best Verbal Response: Oriented  Best Motor Response: Obeys commands  Caleb Coma Scale Score: 15        MDM    Patient afebrile and nontoxic. No distress. EKG without evidence of acute ischemia, troponin normal, ACS or malignant dysrhythmia is not evident. Neuro exam is nonfocal, NIHSS of 0, low suspicion for CVA/TIA. Given her dizziness, posterior CVA was especially considered however patient's presentation and exam is not consistent with this diagnosis. Regardless, patient's symptom onset 3 or 4 days prior to arrival to the emergency department and would not be within the time window for consideration of thrombolytic therapy. Laboratory cup is reassuring, no evidence of endorgan dysfunction or clinically significant electrolyte derangement. Patient felt improved with ED treatment, she did state her symptoms were mildly persistent, however she was able to ambulate multiple times in the emergency department without distress. Suspect peripheral vertigo. Given ongoing COVID-19 pandemic, initial outpatient treatment and close PCP follow-up is the most reasonable approach. Patient is agreeable with this plan and feels comfortable turning to home. Strict immediate return precautions were discussed at length. Management decisions relating to this patient encounter were made during the [facility]- public health emergency. As a result, admission to the hospital and further ED observation/treatment pose increased risk due to multiple factors. At this point in time, the risk of hospital admission or further ED observation/treatment of this patient is deemed to be greater than the risk of discharge. I engaged in a shared decision-making conversation with the patient. The patient agrees and wishes to go home.       Patient Referrals:  Patrick Ulloa, [address]  [phone]    In 2 days        Discharge Medications:  Discharge Medication List as of 10/1/2021 12:18 AM      START taking these medications    Details   cetirizine (ZYRTEC) 10 MG tablet Take 1 tablet by mouth daily, Disp-30 tablet, R-0Print      meclizine (ANTIVERT) 25 MG tablet Take 1 tablet by mouth 3 times daily as needed for Dizziness or Nausea, Disp-20 tablet, R-0Print             FINAL IMPRESSION  1. Dizziness        Blood pressure (!) 123/91, pulse 61, temperature 99.3 °F (37.4 °C), temperature source Oral, resp. rate 14, height 5' 7\" (1.702 m), weight 228 lb 13.4 oz (103.8 kg), SpO2 99 %, not currently breastfeeding. For further details of [facility] emergency department encounter, please see documentation by advanced practice provider, CEASAR Neely.     Eleni Almazan DO (electronically signed)  Attending Emergency Physician       Eleni Almazan DO  10/02/21 1248

## 2021-10-01 NOTE — ED NOTES
Discharge instructions and prescriptions discussed/given to pt, all questions answered. Pt left ambulatory, steady gait. No signs of acute distress noted.       Cody Parish RN  10/01/21 8615

## 2021-10-01 NOTE — ED NOTES
Ambulated patient with no complications. She stated that she felt better and the only time she felt dizzy was when she turned around to walk back. Patient was able to walk in a straight line with no complications.      Dominic Mena  09/30/21 9631

## 2021-10-14 ENCOUNTER — HOSPITAL ENCOUNTER (INPATIENT)
Age: 61
LOS: 1 days | Discharge: HOME | DRG: 074 | End: 2021-10-16
Attending: INTERNAL MEDICINE | Admitting: INTERNAL MEDICINE
Payer: COMMERCIAL

## 2021-10-14 ENCOUNTER — APPOINTMENT (OUTPATIENT)
Dept: CT IMAGING | Age: 61
DRG: 074 | End: 2021-10-14
Payer: COMMERCIAL

## 2021-10-14 DIAGNOSIS — E86.0 DEHYDRATION: ICD-10-CM

## 2021-10-14 DIAGNOSIS — R11.2 INTRACTABLE NAUSEA AND VOMITING: Primary | ICD-10-CM

## 2021-10-14 DIAGNOSIS — D72.829 LEUKOCYTOSIS, UNSPECIFIED TYPE: ICD-10-CM

## 2021-10-14 DIAGNOSIS — N17.9 AKI (ACUTE KIDNEY INJURY) (HCC): ICD-10-CM

## 2021-10-14 DIAGNOSIS — N30.00 ACUTE CYSTITIS WITHOUT HEMATURIA: ICD-10-CM

## 2021-10-14 DIAGNOSIS — K31.84 GASTROPARESIS: ICD-10-CM

## 2021-10-14 LAB
A/G RATIO: 1.2 (ref 1.1–2.2)
ALBUMIN SERPL-MCNC: 4.5 G/DL (ref 3.4–5)
ALP BLD-CCNC: 128 U/L (ref 40–129)
ALT SERPL-CCNC: 19 U/L (ref 10–40)
ANION GAP SERPL CALCULATED.3IONS-SCNC: 19 MMOL/L (ref 3–16)
AST SERPL-CCNC: 19 U/L (ref 15–37)
BACTERIA: ABNORMAL /HPF
BASOPHILS ABSOLUTE: 0.1 K/UL (ref 0–0.2)
BASOPHILS RELATIVE PERCENT: 0.4 %
BILIRUB SERPL-MCNC: 0.7 MG/DL (ref 0–1)
BILIRUBIN URINE: ABNORMAL
BLOOD, URINE: ABNORMAL
BUN BLDV-MCNC: 24 MG/DL (ref 7–20)
CALCIUM SERPL-MCNC: 10.1 MG/DL (ref 8.3–10.6)
CHLORIDE BLD-SCNC: 88 MMOL/L (ref 99–110)
CLARITY: ABNORMAL
CO2: 25 MMOL/L (ref 21–32)
COLOR: ABNORMAL
COMMENT UA: ABNORMAL
CREAT SERPL-MCNC: 1.3 MG/DL (ref 0.6–1.2)
EOSINOPHILS ABSOLUTE: 0 K/UL (ref 0–0.6)
EOSINOPHILS RELATIVE PERCENT: 0.1 %
EPITHELIAL CELLS, UA: 9 /HPF (ref 0–5)
GFR AFRICAN AMERICAN: 50
GFR NON-AFRICAN AMERICAN: 42
GLOBULIN: 3.9 G/DL
GLUCOSE BLD-MCNC: 299 MG/DL (ref 70–99)
GLUCOSE BLD-MCNC: 341 MG/DL (ref 70–99)
GLUCOSE URINE: ABNORMAL MG/DL
HCT VFR BLD CALC: 48.4 % (ref 36–48)
HEMOGLOBIN: 15.7 G/DL (ref 12–16)
HYALINE CASTS: >40 /LPF (ref 0–2)
KETONES, URINE: ABNORMAL MG/DL
LACTIC ACID: 1.5 MMOL/L (ref 0.4–2)
LEUKOCYTE ESTERASE, URINE: ABNORMAL
LIPASE: 47 U/L (ref 13–60)
LYMPHOCYTES ABSOLUTE: 3.9 K/UL (ref 1–5.1)
LYMPHOCYTES RELATIVE PERCENT: 24.3 %
MCH RBC QN AUTO: 25.7 PG (ref 26–34)
MCHC RBC AUTO-ENTMCNC: 32.5 G/DL (ref 31–36)
MCV RBC AUTO: 79 FL (ref 80–100)
MICROSCOPIC EXAMINATION: YES
MONOCYTES ABSOLUTE: 1.1 K/UL (ref 0–1.3)
MONOCYTES RELATIVE PERCENT: 6.8 %
NEUTROPHILS ABSOLUTE: 10.9 K/UL (ref 1.7–7.7)
NEUTROPHILS RELATIVE PERCENT: 68.4 %
NITRITE, URINE: ABNORMAL
PDW BLD-RTO: 14.5 % (ref 12.4–15.4)
PERFORMED ON: ABNORMAL
PH UA: ABNORMAL (ref 5–8)
PLATELET # BLD: 314 K/UL (ref 135–450)
PMV BLD AUTO: 8.2 FL (ref 5–10.5)
POTASSIUM REFLEX MAGNESIUM: 3.7 MMOL/L (ref 3.5–5.1)
PROTEIN UA: ABNORMAL MG/DL
RBC # BLD: 6.13 M/UL (ref 4–5.2)
RBC UA: ABNORMAL /HPF (ref 0–4)
SODIUM BLD-SCNC: 132 MMOL/L (ref 136–145)
SPECIFIC GRAVITY UA: >=1.03 (ref 1–1.03)
TOTAL PROTEIN: 8.4 G/DL (ref 6.4–8.2)
URINE REFLEX TO CULTURE: YES
URINE TYPE: ABNORMAL
UROBILINOGEN, URINE: ABNORMAL E.U./DL
WBC # BLD: 15.9 K/UL (ref 4–11)
WBC UA: 28 /HPF (ref 0–5)

## 2021-10-14 PROCEDURE — 96365 THER/PROPH/DIAG IV INF INIT: CPT

## 2021-10-14 PROCEDURE — 2500000003 HC RX 250 WO HCPCS: Performed by: PHYSICIAN ASSISTANT

## 2021-10-14 PROCEDURE — 96372 THER/PROPH/DIAG INJ SC/IM: CPT

## 2021-10-14 PROCEDURE — 87040 BLOOD CULTURE FOR BACTERIA: CPT

## 2021-10-14 PROCEDURE — 99283 EMERGENCY DEPT VISIT LOW MDM: CPT

## 2021-10-14 PROCEDURE — 93005 ELECTROCARDIOGRAM TRACING: CPT | Performed by: EMERGENCY MEDICINE

## 2021-10-14 PROCEDURE — 6360000002 HC RX W HCPCS: Performed by: PHYSICIAN ASSISTANT

## 2021-10-14 PROCEDURE — 96375 TX/PRO/DX INJ NEW DRUG ADDON: CPT

## 2021-10-14 PROCEDURE — 83690 ASSAY OF LIPASE: CPT

## 2021-10-14 PROCEDURE — 2580000003 HC RX 258: Performed by: PHYSICIAN ASSISTANT

## 2021-10-14 PROCEDURE — 74176 CT ABD & PELVIS W/O CONTRAST: CPT

## 2021-10-14 PROCEDURE — 36415 COLL VENOUS BLD VENIPUNCTURE: CPT

## 2021-10-14 PROCEDURE — 80053 COMPREHEN METABOLIC PANEL: CPT

## 2021-10-14 PROCEDURE — 96367 TX/PROPH/DG ADDL SEQ IV INF: CPT

## 2021-10-14 PROCEDURE — 85025 COMPLETE CBC W/AUTO DIFF WBC: CPT

## 2021-10-14 PROCEDURE — 83605 ASSAY OF LACTIC ACID: CPT

## 2021-10-14 PROCEDURE — 87086 URINE CULTURE/COLONY COUNT: CPT

## 2021-10-14 PROCEDURE — 93005 ELECTROCARDIOGRAM TRACING: CPT | Performed by: PHYSICIAN ASSISTANT

## 2021-10-14 PROCEDURE — 81001 URINALYSIS AUTO W/SCOPE: CPT

## 2021-10-14 RX ORDER — 0.9 % SODIUM CHLORIDE 0.9 %
1000 INTRAVENOUS SOLUTION INTRAVENOUS ONCE
Status: COMPLETED | OUTPATIENT
Start: 2021-10-14 | End: 2021-10-14

## 2021-10-14 RX ORDER — ONDANSETRON 2 MG/ML
4 INJECTION INTRAMUSCULAR; INTRAVENOUS ONCE
Status: COMPLETED | OUTPATIENT
Start: 2021-10-14 | End: 2021-10-14

## 2021-10-14 RX ORDER — HALOPERIDOL 5 MG/ML
2 INJECTION INTRAMUSCULAR ONCE
Status: COMPLETED | OUTPATIENT
Start: 2021-10-14 | End: 2021-10-14

## 2021-10-14 RX ORDER — DICYCLOMINE HYDROCHLORIDE 10 MG/ML
20 INJECTION INTRAMUSCULAR ONCE
Status: COMPLETED | OUTPATIENT
Start: 2021-10-14 | End: 2021-10-14

## 2021-10-14 RX ADMIN — SODIUM CHLORIDE 1000 ML: 9 INJECTION, SOLUTION INTRAVENOUS at 21:53

## 2021-10-14 RX ADMIN — Medication 12.5 MG: at 19:11

## 2021-10-14 RX ADMIN — HALOPERIDOL LACTATE 2 MG: 5 INJECTION, SOLUTION INTRAMUSCULAR at 21:53

## 2021-10-14 RX ADMIN — PIPERACILLIN AND TAZOBACTAM 3375 MG: 3; .375 INJECTION, POWDER, LYOPHILIZED, FOR SOLUTION INTRAVENOUS at 21:59

## 2021-10-14 RX ADMIN — ONDANSETRON 4 MG: 2 INJECTION INTRAMUSCULAR; INTRAVENOUS at 19:42

## 2021-10-14 RX ADMIN — DICYCLOMINE HYDROCHLORIDE 20 MG: 20 INJECTION, SOLUTION INTRAMUSCULAR at 19:42

## 2021-10-14 RX ADMIN — FAMOTIDINE 20 MG: 10 INJECTION, SOLUTION INTRAVENOUS at 19:42

## 2021-10-14 RX ADMIN — SODIUM CHLORIDE 1000 ML: 9 INJECTION, SOLUTION INTRAVENOUS at 19:10

## 2021-10-14 ASSESSMENT — ENCOUNTER SYMPTOMS
ABDOMINAL PAIN: 1
EYES NEGATIVE: 1
VOMITING: 1
DIARRHEA: 0
CHEST TIGHTNESS: 0
NAUSEA: 1
SHORTNESS OF BREATH: 0

## 2021-10-14 ASSESSMENT — PAIN DESCRIPTION - DESCRIPTORS: DESCRIPTORS: DISCOMFORT

## 2021-10-14 ASSESSMENT — PAIN DESCRIPTION - LOCATION: LOCATION: ABDOMEN

## 2021-10-14 ASSESSMENT — PAIN SCALES - GENERAL: PAINLEVEL_OUTOF10: 10

## 2021-10-14 ASSESSMENT — PAIN DESCRIPTION - PAIN TYPE: TYPE: ACUTE PAIN

## 2021-10-14 NOTE — ED PROVIDER NOTES
[address] [address] [address] [facility] [address]  Dept: [phone]  Loc: [phone]  eMERGENCYdEPARTMENT eNCOUnter      Pt Name: Viki Camejo  MRN: [id number]  [place] 1960  Date of evaluation: 10/14/2021  Provider:Dimple Pickens PA-C    CHIEF COMPLAINT       Chief Complaint   Patient presents with    Emesis     pt reports diabtetic paresis. ongoing since tuesday.  Fatigue       CRITICAL CARE TIME   Total Critical Care time was 35 minutes, excluding separately reportable procedures. There was a high probability of clinically significant/life threatening deterioration in the patient's condition which required my urgentintervention. HISTORY OF PRESENT ILLNESS  (Location/Symptom, Timing/Onset, Context/Setting, Quality, Duration,Modifying Factors, Severity.)   Viki Camejo is a 62 y.o. female with a past medical history of hypertension, type 2 diabetes, gastroparesis who presents to the emergency department by private vehicle. Patient states 1 week ago after taking a newly prescribed enzyme supplement she began having generalized abdominal pain, nausea and vomiting. She stopped taking supplement, but symptoms persisted. Symptoms have progressively worsened, to the point where she can no longer keep down any food or liquid. This has been ongoing for the past 2 to 3 days. She has generalized abdominal pain rated 10/10 in severity. Pain describes a deep aching sensation. She has history of gastroparesis, states symptoms she is experiencing consistent previous episodes of gastroparesis. She has been admitted to the hospital man times previously for same symtpoms. Last admitted 9/19-9/22. She denies any fevers, chills, urinary symptoms. She does smoke marijuana. Nursing Notes were reviewedand agreed with or any disagreements were addressed in the HPI.     REVIEW OF SYSTEMS    (2-9 systems for level 4, 10 or more for level 5)     Review of Systems   Constitutional: Positive for fatigue. Negative for chills and fever. HENT: Negative. Eyes: Negative. Respiratory: Negative for chest tightness and shortness of breath. Gastrointestinal: Positive for abdominal pain, nausea and vomiting. Negative for diarrhea. Genitourinary: Negative. Musculoskeletal: Negative for arthralgias and myalgias. Neurological: Negative for dizziness and light-headedness. Psychiatric/Behavioral: Negative for behavioral problems and confusion. Except as noted above the remainder of the review of systems was reviewed and negative.        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    Gastroparesis     Hirsutism     History of blood transfusion     Hyperlipidemia LDL goal < 70     Hypertension     Major depressive disorder with single episode 2009    Pancreatitis     Seasonal allergic rhinitis due to pollen 11/10/2016    Sleep apnea     Thyroid disease     Vitamin D deficiency        SURGICAL HISTORY           Procedure Laterality Date     SECTION      ENDOMETRIAL ABLATION  5/3/12    ENDOSCOPY, COLON, DIAGNOSTIC      HYSTEROSCOPY  5/3/12    MANDIBLE SURGERY      SKIN BIOPSY      TUBAL LIGATION      UPPER GASTROINTESTINAL ENDOSCOPY N/A 2021    EGD BIOPSY performed by Jj Tolnetino MD at [address]     [unfilled]    ALLERGIES     Avelox [moxifloxacin], Bactrim, Cefuroxime, Codeine, Medrol [methylprednisolone], Morphine, Niacin, Oat, Percocet [oxycodone-acetaminophen], Reglan [metoclopramide], Spironolactone, Statins, Sulfamethoxazole-trimethoprim, and Vicodin [hydrocodone-acetaminophen]    FAMILY HISTORY           Problem Relation Age of Onset    Hypertension Mother     Breast Cancer Mother     Colon Cancer Mother     Cancer Father         Pancreatic    Prostate Cancer Father     No Known Problems Brother     No Known Problems Sister     Hypertension Maternal Aunt     Cancer Maternal Uncle     Hypertension Maternal Grandmother     Stroke Maternal Grandmother     Cancer Maternal Grandfather     No Known Problems Paternal Aunt     No Known Problems Paternal Uncle     No Known Problems Paternal Grandmother     No Known Problems Paternal Grandfather     No Known Problems Other     Rheum Arthritis Neg Hx     Osteoarthritis Neg Hx     Asthma Neg Hx     Diabetes Neg Hx     Heart Failure Neg Hx     High Cholesterol Neg Hx     Migraines Neg Hx     Ovarian Cancer Neg Hx     Rashes/Skin Problems Neg Hx     Seizures Neg Hx     Thyroid Disease Neg Hx      Family Status   Relation Name Status    Mother   at age 59        cancer    [facility] Father   at age 72        cancer   [facility] Brother  [de-identified]    Sister  Alive    [address]  (Not Specified)    MUnc  (Not Specified)    MGM  (Not Specified)    MGF  (Not Specified)    PAunt  (Not Specified)    PUnc  (Not Specified)    PGM  (Not Specified)    PGF  (Not Specified)    Other  (Not Specified)    Neg Hx  (Not Specified)        SOCIAL HISTORY      reports that she quit smoking about 31 years ago. Her smoking use included cigarettes. She has never used smokeless tobacco. She reports current alcohol use. She reports current drug use. Frequency: 2.00 times per week. Drug: Marijuana.     PHYSICAL EXAM    (up to 7 for level 4, 8 or more for level 5)     ED Triage Vitals [10/14/21 1804]   Enc Vitals Group      BP (!) 151/94      Pulse 115      Resp 18      Temp 97.1 °F (36.2 °C)      Temp Source Temporal      SpO2 94 % Weight 215 lb 6.2 oz (97.7 kg)      Height 5' 7\" (1.702 m)      Head Circumference       Peak Flow       Pain Score       Pain Loc       Pain Edu? Excl. in [address]? Physical Exam  Constitutional:       Appearance: Normal appearance. HENT:      Head: Normocephalic and atraumatic. Cardiovascular:      Rate and Rhythm: Regular rhythm. Tachycardia present. Pulmonary:      Effort: Pulmonary effort is normal. No respiratory distress. Breath sounds: Normal breath sounds. Abdominal:      Tenderness: There is no guarding or rebound. Comments: Generalized upper abdominal tenderness to palpation. Musculoskeletal:         General: Normal range of motion. Cervical back: Normal range of motion and neck supple. Skin:     General: Skin is warm. Neurological:      General: No focal deficit present. Mental Status: She is alert and oriented to person, place, and time. Psychiatric:         Mood and Affect: Mood normal.         Behavior: Behavior normal.           DIAGNOSTIC RESULTS     EKG: All EKG's are interpreted by the Emergency Department Physician who either signs or Co-signs this chart in the absence of a cardiologist.    RADIOLOGY:   Non-plain film images such as CT, Ultrasound and MRI are read by the radiologist. Plain radiographic images are visualized and preliminarilyinterpreted by the emergency physician with the below findings:    Interpretation per the Radiologist below,if available at the time of this note:    CT ABDOMEN PELVIS WO CONTRAST Additional Contrast? None   Final Result   1. No acute findings in the abdomen and pelvis. 2. Distal esophageal thickening is nonspecific though likely represents   esophagitis.                LABS:  Labs Reviewed   CBC WITH AUTO DIFFERENTIAL - Abnormal; Notable for the following components:       Result Value    WBC 15.9 (*)     RBC 6.13 (*)     Hematocrit 48.4 (*)     MCV 79.0 (*)     MCH 25.7 (*)     Neutrophils Absolute 10.9 (*) All other components within normal limits    Narrative:     Performed at:  [facility]  [address] [facility] [address]   Phone [phone]   COMPREHENSIVE METABOLIC PANEL W/ REFLEX TO MG FOR LOW K - Abnormal; Notable for the following components:    Sodium 132 (*)     Chloride 88 (*)     Anion Gap 19 (*)     Glucose 341 (*)     BUN 24 (*)     CREATININE 1.3 (*)     GFR Non- 42 (*)     GFR  50 (*)     Total Protein 8.4 (*)     All other components within normal limits    Narrative:     Performed at:  [facility]  [address] [facility] [facility] [address]   Phone [phone]   URINE RT REFLEX TO CULTURE - Abnormal; Notable for the following components:    Color, UA ORANGE (*)     Clarity, UA CLOUDY (*)     Glucose, Ur Color Interfer (*)     Bilirubin Urine Color Interfer (*)     Ketones, Urine Color Interfer (*)     Blood, Urine Color Interfer (*)     pH, UA Color Interfer (*)     Protein, UA Color Interfer (*)     Urobilinogen, Urine Color Interfer (*)     Nitrite, Urine Color Interfer (*)     Leukocyte Esterase, Urine Color Interfer (*)     All other components within normal limits    Narrative:     Performed at:  [facility]  [address] [facility] [address]   Phone [phone]   MICROSCOPIC URINALYSIS - Abnormal; Notable for the following components:    Hyaline Casts, UA >40 (*)     RBC, UA 5-10 (*)     Bacteria, UA 3+ (*)     WBC, UA 28 (*)     Epithelial Cells, UA 9 (*)     All other components within normal limits    Narrative:     Performed at:  [address] [address] [address] [facility] [address]   Phone [phone]   POCT GLUCOSE - Abnormal; Notable for the following components:    POC Glucose 299 (*)     All other components within normal limits    Narrative:     Performed at:  [facility]  [address] [facility] [address]   Phone [phone]   CULTURE, URINE   CULTURE, BLOOD 1   CULTURE, BLOOD 2   LIPASE    Narrative:     Performed at:  [facility]  [address] [facility] [address]   Phone [phone]   LACTIC ACID, PLASMA    Narrative:     Performed at:  [facility]  [address] [facility] [address]   Phone [phone]   COMPREHENSIVE METABOLIC PANEL W/ REFLEX TO MG FOR LOW K   CBC   OSMOLALITY, URINE   CREATININE, RANDOM URINE   TSH WITH REFLEX   URIC ACID   SODIUM, URINE, RANDOM       All other labs were within normal range or not returned as of this dictation. EMERGENCY DEPARTMENT COURSE and DIFFERENTIAL DIAGNOSIS/MDM:   Vitals:    Vitals:    10/14/21 2315 10/14/21 2330 10/15/21 0100 10/15/21 0105   BP: (!) 216/106 (!) 180/76 (!) 190/108 (!) 179/84   Pulse:  102  100   Resp:    16   Temp:    97.8 °F (36.6 °C)   TempSrc:    Oral   SpO2: 97% 95% 97% 98%   Weight:       Height:           MDM     Patient presents ED with HPI noted above. Patient with intractable nausea and vomiting. She was given IV Phenergan, IV Zofran and finally IV Haldol. Haldol seemed to work best for symptoms. IV fluid was given. She was given IM Bentyl for gastroparesis. Patient with leukocytosis WBC of 15.9 thus CT abdomen pelvis without contrast obtained. CT showed no acute abdominal findings. Distal esophageal thickening likely representing esophagitis found. Patient also with an SHAY. Creatinine 1.3, previous creatinine 0.8. BUN elevated 24. Suspect all this secondary to dehydration. Lactic acid normal.    Patient was found to have a urinary tract infection in the ED. Given tachycardia and leukocytosis she does meet SIRS criteria. Reviewing patient's previous records patient consistently with leukocytosis throughout previous evaluations. I do feel as tachycardia secondary to dehydration. She has had no fevers. Do not suspect patient is septic from a UTI however did cover with IV antibiotics in the ED. She has a cephalosporin allergy. Did treat with IV Zosyn initially pending further monitoring evaluation. Consultation made to hospitalist regarding admission. CONSULTS:  None    PROCEDURES:  Procedures    FINAL IMPRESSION      1. Intractable nausea and vomiting    2. SHAY (acute kidney injury) ([address]    3. Gastroparesis    4. Dehydration    5. Acute cystitis without hematuria    6. Leukocytosis, unspecified type          DISPOSITION/PLAN   @[facility]@    PATIENT REFERRED TO:  No follow-up provider specified.     DISCHARGE MEDICATIONS:  Current Discharge Medication List          (Please note that portions of this note were completed with a voice recognition program.  Efforts were made to edit the dictations but occasionally words are mis-transcribed.)    [address] [facility]          [address] [place] [address]  10/15/21 8702

## 2021-10-14 NOTE — LETTER
[address]  Phone: [phone]             October 16, 2021    Patient: Roman Marquis   YOB: 1960   Date of Visit: 10/14/2021       To Whom It May Concern:    Roman Marquis was seen and treated in our facility  beginning 10/14/2021 until 10/16/2021. She may return to work on 10/18/2021.        Sincerely,       Neha Smallwood RN         Signature:__________________________________

## 2021-10-15 PROBLEM — N17.9 AKI (ACUTE KIDNEY INJURY) (HCC): Status: ACTIVE | Noted: 2021-10-15

## 2021-10-15 LAB
A/G RATIO: 1.2 (ref 1.1–2.2)
ALBUMIN SERPL-MCNC: 4.1 G/DL (ref 3.4–5)
ALP BLD-CCNC: 117 U/L (ref 40–129)
ALT SERPL-CCNC: 14 U/L (ref 10–40)
ANION GAP SERPL CALCULATED.3IONS-SCNC: 16 MMOL/L (ref 3–16)
AST SERPL-CCNC: 17 U/L (ref 15–37)
BILIRUB SERPL-MCNC: 0.8 MG/DL (ref 0–1)
BUN BLDV-MCNC: 14 MG/DL (ref 7–20)
CALCIUM SERPL-MCNC: 9.1 MG/DL (ref 8.3–10.6)
CHLORIDE BLD-SCNC: 92 MMOL/L (ref 99–110)
CO2: 24 MMOL/L (ref 21–32)
CREAT SERPL-MCNC: 0.8 MG/DL (ref 0.6–1.2)
CREATININE URINE: 61.1 MG/DL (ref 28–259)
EKG ATRIAL RATE: 103 BPM
EKG ATRIAL RATE: 108 BPM
EKG DIAGNOSIS: NORMAL
EKG DIAGNOSIS: NORMAL
EKG P AXIS: 58 DEGREES
EKG P AXIS: 66 DEGREES
EKG P-R INTERVAL: 134 MS
EKG P-R INTERVAL: 156 MS
EKG Q-T INTERVAL: 350 MS
EKG Q-T INTERVAL: 356 MS
EKG QRS DURATION: 84 MS
EKG QRS DURATION: 86 MS
EKG QTC CALCULATION (BAZETT): 466 MS
EKG QTC CALCULATION (BAZETT): 469 MS
EKG R AXIS: -14 DEGREES
EKG R AXIS: 9 DEGREES
EKG T AXIS: 46 DEGREES
EKG T AXIS: 53 DEGREES
EKG VENTRICULAR RATE: 103 BPM
EKG VENTRICULAR RATE: 108 BPM
GFR AFRICAN AMERICAN: >60
GFR NON-AFRICAN AMERICAN: >60
GLOBULIN: 3.3 G/DL
GLUCOSE BLD-MCNC: 136 MG/DL (ref 70–99)
GLUCOSE BLD-MCNC: 184 MG/DL (ref 70–99)
GLUCOSE BLD-MCNC: 247 MG/DL (ref 70–99)
GLUCOSE BLD-MCNC: 252 MG/DL (ref 70–99)
GLUCOSE BLD-MCNC: 261 MG/DL (ref 70–99)
HCT VFR BLD CALC: 46.5 % (ref 36–48)
HEMOGLOBIN: 15.1 G/DL (ref 12–16)
MCH RBC QN AUTO: 25.3 PG (ref 26–34)
MCHC RBC AUTO-ENTMCNC: 32.4 G/DL (ref 31–36)
MCV RBC AUTO: 78.1 FL (ref 80–100)
OSMOLALITY URINE: 346 MOSM/KG (ref 390–1070)
PDW BLD-RTO: 14.4 % (ref 12.4–15.4)
PERFORMED ON: ABNORMAL
PLATELET # BLD: 274 K/UL (ref 135–450)
PMV BLD AUTO: 7.9 FL (ref 5–10.5)
POTASSIUM REFLEX MAGNESIUM: 4.2 MMOL/L (ref 3.5–5.1)
RBC # BLD: 5.96 M/UL (ref 4–5.2)
SODIUM BLD-SCNC: 132 MMOL/L (ref 136–145)
SODIUM URINE: 72 MMOL/L
TOTAL PROTEIN: 7.4 G/DL (ref 6.4–8.2)
TSH REFLEX: 0.42 UIU/ML (ref 0.27–4.2)
URIC ACID, SERUM: 4.9 MG/DL (ref 2.6–6)
URINE CULTURE, ROUTINE: NORMAL
WBC # BLD: 14.1 K/UL (ref 4–11)

## 2021-10-15 PROCEDURE — 6370000000 HC RX 637 (ALT 250 FOR IP): Performed by: NURSE PRACTITIONER

## 2021-10-15 PROCEDURE — 6360000002 HC RX W HCPCS: Performed by: NURSE PRACTITIONER

## 2021-10-15 PROCEDURE — 6370000000 HC RX 637 (ALT 250 FOR IP): Performed by: FAMILY MEDICINE

## 2021-10-15 PROCEDURE — 2580000003 HC RX 258: Performed by: NURSE PRACTITIONER

## 2021-10-15 PROCEDURE — 84550 ASSAY OF BLOOD/URIC ACID: CPT

## 2021-10-15 PROCEDURE — 80053 COMPREHEN METABOLIC PANEL: CPT

## 2021-10-15 PROCEDURE — 36415 COLL VENOUS BLD VENIPUNCTURE: CPT

## 2021-10-15 PROCEDURE — 93010 ELECTROCARDIOGRAM REPORT: CPT | Performed by: INTERNAL MEDICINE

## 2021-10-15 PROCEDURE — 85027 COMPLETE CBC AUTOMATED: CPT

## 2021-10-15 PROCEDURE — 84443 ASSAY THYROID STIM HORMONE: CPT

## 2021-10-15 PROCEDURE — 83935 ASSAY OF URINE OSMOLALITY: CPT

## 2021-10-15 PROCEDURE — 1200000000 HC SEMI PRIVATE

## 2021-10-15 PROCEDURE — 82570 ASSAY OF URINE CREATININE: CPT

## 2021-10-15 PROCEDURE — 84300 ASSAY OF URINE SODIUM: CPT

## 2021-10-15 RX ORDER — DEXTROSE MONOHYDRATE 50 MG/ML
100 INJECTION, SOLUTION INTRAVENOUS PRN
Status: DISCONTINUED | OUTPATIENT
Start: 2021-10-15 | End: 2021-10-16 | Stop reason: HOSPADM

## 2021-10-15 RX ORDER — TIMOLOL MALEATE 5 MG/ML
1 SOLUTION/ DROPS OPHTHALMIC DAILY
Status: DISCONTINUED | OUTPATIENT
Start: 2021-10-15 | End: 2021-10-16 | Stop reason: HOSPADM

## 2021-10-15 RX ORDER — DICYCLOMINE HYDROCHLORIDE 10 MG/ML
20 INJECTION INTRAMUSCULAR 4 TIMES DAILY PRN
Status: DISCONTINUED | OUTPATIENT
Start: 2021-10-15 | End: 2021-10-16 | Stop reason: HOSPADM

## 2021-10-15 RX ORDER — ACETAMINOPHEN 650 MG/1
650 SUPPOSITORY RECTAL EVERY 6 HOURS PRN
Status: DISCONTINUED | OUTPATIENT
Start: 2021-10-15 | End: 2021-10-16 | Stop reason: HOSPADM

## 2021-10-15 RX ORDER — SODIUM CHLORIDE 0.9 % (FLUSH) 0.9 %
5-40 SYRINGE (ML) INJECTION EVERY 12 HOURS SCHEDULED
Status: DISCONTINUED | OUTPATIENT
Start: 2021-10-15 | End: 2021-10-16 | Stop reason: HOSPADM

## 2021-10-15 RX ORDER — ASPIRIN 81 MG/1
81 TABLET, CHEWABLE ORAL DAILY
Status: DISCONTINUED | OUTPATIENT
Start: 2021-10-15 | End: 2021-10-16 | Stop reason: HOSPADM

## 2021-10-15 RX ORDER — DEXTROSE MONOHYDRATE 25 G/50ML
12.5 INJECTION, SOLUTION INTRAVENOUS PRN
Status: DISCONTINUED | OUTPATIENT
Start: 2021-10-15 | End: 2021-10-16 | Stop reason: HOSPADM

## 2021-10-15 RX ORDER — EZETIMIBE 10 MG/1
10 TABLET ORAL DAILY
Status: DISCONTINUED | OUTPATIENT
Start: 2021-10-15 | End: 2021-10-16 | Stop reason: HOSPADM

## 2021-10-15 RX ORDER — SODIUM CHLORIDE 9 MG/ML
INJECTION, SOLUTION INTRAVENOUS CONTINUOUS
Status: DISCONTINUED | OUTPATIENT
Start: 2021-10-15 | End: 2021-10-16 | Stop reason: HOSPADM

## 2021-10-15 RX ORDER — PROMETHAZINE HYDROCHLORIDE 25 MG/1
12.5 TABLET ORAL EVERY 6 HOURS PRN
Status: DISCONTINUED | OUTPATIENT
Start: 2021-10-15 | End: 2021-10-16 | Stop reason: HOSPADM

## 2021-10-15 RX ORDER — SODIUM CHLORIDE 0.9 % (FLUSH) 0.9 %
5-40 SYRINGE (ML) INJECTION PRN
Status: DISCONTINUED | OUTPATIENT
Start: 2021-10-15 | End: 2021-10-16 | Stop reason: HOSPADM

## 2021-10-15 RX ORDER — PANTOPRAZOLE SODIUM 40 MG/1
40 TABLET, DELAYED RELEASE ORAL
Status: DISCONTINUED | OUTPATIENT
Start: 2021-10-15 | End: 2021-10-16 | Stop reason: HOSPADM

## 2021-10-15 RX ORDER — INSULIN GLARGINE 100 [IU]/ML
10 INJECTION, SOLUTION SUBCUTANEOUS NIGHTLY
Status: DISCONTINUED | OUTPATIENT
Start: 2021-10-15 | End: 2021-10-16 | Stop reason: HOSPADM

## 2021-10-15 RX ORDER — LISINOPRIL 10 MG/1
10 TABLET ORAL DAILY
Status: DISCONTINUED | OUTPATIENT
Start: 2021-10-15 | End: 2021-10-16 | Stop reason: HOSPADM

## 2021-10-15 RX ORDER — AMLODIPINE BESYLATE 5 MG/1
5 TABLET ORAL DAILY
Status: DISCONTINUED | OUTPATIENT
Start: 2021-10-15 | End: 2021-10-16 | Stop reason: HOSPADM

## 2021-10-15 RX ORDER — ACETAMINOPHEN 325 MG/1
650 TABLET ORAL EVERY 6 HOURS PRN
Status: DISCONTINUED | OUTPATIENT
Start: 2021-10-15 | End: 2021-10-16 | Stop reason: HOSPADM

## 2021-10-15 RX ORDER — NICOTINE POLACRILEX 4 MG
15 LOZENGE BUCCAL PRN
Status: DISCONTINUED | OUTPATIENT
Start: 2021-10-15 | End: 2021-10-16 | Stop reason: HOSPADM

## 2021-10-15 RX ORDER — POLYETHYLENE GLYCOL 3350 17 G/17G
17 POWDER, FOR SOLUTION ORAL DAILY PRN
Status: DISCONTINUED | OUTPATIENT
Start: 2021-10-15 | End: 2021-10-16 | Stop reason: HOSPADM

## 2021-10-15 RX ORDER — CETIRIZINE HYDROCHLORIDE 10 MG/1
10 TABLET ORAL DAILY
Status: DISCONTINUED | OUTPATIENT
Start: 2021-10-15 | End: 2021-10-16 | Stop reason: HOSPADM

## 2021-10-15 RX ORDER — ONDANSETRON 4 MG/1
4 TABLET, ORALLY DISINTEGRATING ORAL EVERY 8 HOURS PRN
Status: DISCONTINUED | OUTPATIENT
Start: 2021-10-15 | End: 2021-10-16

## 2021-10-15 RX ORDER — ONDANSETRON 2 MG/ML
4 INJECTION INTRAMUSCULAR; INTRAVENOUS EVERY 6 HOURS PRN
Status: DISCONTINUED | OUTPATIENT
Start: 2021-10-15 | End: 2021-10-16

## 2021-10-15 RX ORDER — LATANOPROST 50 UG/ML
1 SOLUTION/ DROPS OPHTHALMIC NIGHTLY
Status: DISCONTINUED | OUTPATIENT
Start: 2021-10-15 | End: 2021-10-16 | Stop reason: HOSPADM

## 2021-10-15 RX ORDER — SODIUM CHLORIDE 9 MG/ML
25 INJECTION, SOLUTION INTRAVENOUS PRN
Status: DISCONTINUED | OUTPATIENT
Start: 2021-10-15 | End: 2021-10-16 | Stop reason: HOSPADM

## 2021-10-15 RX ADMIN — ONDANSETRON 4 MG: 2 INJECTION INTRAMUSCULAR; INTRAVENOUS at 04:08

## 2021-10-15 RX ADMIN — LISINOPRIL 10 MG: 10 TABLET ORAL at 09:41

## 2021-10-15 RX ADMIN — PIPERACILLIN AND TAZOBACTAM 3375 MG: 3; .375 INJECTION, POWDER, LYOPHILIZED, FOR SOLUTION INTRAVENOUS at 12:13

## 2021-10-15 RX ADMIN — PIPERACILLIN AND TAZOBACTAM 3375 MG: 3; .375 INJECTION, POWDER, LYOPHILIZED, FOR SOLUTION INTRAVENOUS at 20:54

## 2021-10-15 RX ADMIN — PANTOPRAZOLE SODIUM 40 MG: 40 TABLET, DELAYED RELEASE ORAL at 05:32

## 2021-10-15 RX ADMIN — AMLODIPINE BESYLATE 5 MG: 5 TABLET ORAL at 08:44

## 2021-10-15 RX ADMIN — ENOXAPARIN SODIUM 40 MG: 40 INJECTION SUBCUTANEOUS at 08:43

## 2021-10-15 RX ADMIN — INSULIN LISPRO 6 UNITS: 100 INJECTION, SOLUTION INTRAVENOUS; SUBCUTANEOUS at 09:13

## 2021-10-15 RX ADMIN — ASPIRIN 81 MG: 81 TABLET, CHEWABLE ORAL at 08:44

## 2021-10-15 RX ADMIN — EZETIMIBE 10 MG: 10 TABLET ORAL at 08:43

## 2021-10-15 RX ADMIN — PANTOPRAZOLE SODIUM 40 MG: 40 TABLET, DELAYED RELEASE ORAL at 16:23

## 2021-10-15 RX ADMIN — INSULIN GLARGINE 10 UNITS: 100 INJECTION, SOLUTION SUBCUTANEOUS at 20:55

## 2021-10-15 RX ADMIN — LATANOPROST 1 DROP: 50 SOLUTION OPHTHALMIC at 20:59

## 2021-10-15 RX ADMIN — TIMOLOL MALEATE 1 DROP: 5 SOLUTION OPHTHALMIC at 08:43

## 2021-10-15 RX ADMIN — INSULIN LISPRO 1 UNITS: 100 INJECTION, SOLUTION INTRAVENOUS; SUBCUTANEOUS at 20:56

## 2021-10-15 RX ADMIN — INSULIN LISPRO 6 UNITS: 100 INJECTION, SOLUTION INTRAVENOUS; SUBCUTANEOUS at 12:13

## 2021-10-15 RX ADMIN — SODIUM CHLORIDE: 9 INJECTION, SOLUTION INTRAVENOUS at 03:20

## 2021-10-15 RX ADMIN — PIPERACILLIN AND TAZOBACTAM 3375 MG: 3; .375 INJECTION, POWDER, LYOPHILIZED, FOR SOLUTION INTRAVENOUS at 03:23

## 2021-10-15 ASSESSMENT — PAIN SCALES - GENERAL
PAINLEVEL_OUTOF10: 0
PAINLEVEL_OUTOF10: 0

## 2021-10-15 ASSESSMENT — PAIN SCALES - WONG BAKER
WONGBAKER_NUMERICALRESPONSE: 2
WONGBAKER_NUMERICALRESPONSE: 2

## 2021-10-15 NOTE — PLAN OF CARE
Problem: Falls - Risk of:  Goal: Will remain free from falls  Description: Will remain free from falls  10/15/2021 1155 by Estelita Gant RN  Outcome: Ongoing     Problem: Falls - Risk of:  Goal: Absence of physical injury  Description: Absence of physical injury  10/15/2021 1155 by Estelita Gant RN  Outcome: Ongoing

## 2021-10-15 NOTE — H&P
Hospital Medicine History & Physical      PCP: CEASAR Barrera    Date of Admission: 10/14/2021    Date of Service: Pt seen/examined on 10/14/2021 and Admitted to Inpatient with expected LOS greater than two midnights due to medical therapy. Chief Complaint:  Nausea/vomiting      History Of Present Illness:      62 y.o. female with PMHx of DM, HLD, HLD and gastroparesis presented to [facility] with 2 day history of nausea, vomiting and diarrhea. Patient states diarrhea and abdominal cramping began after a medication that was prescribed by her GI doctor. Her bowels have improved but she continues with abdominal cramping and vomiting. She has not been able to tolerate anything orally. She denies fever, chills or body aches. She does report urinary frequency with dysuria. She states her urinary symptoms are intermittent but consistent over the last 2 days.     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    Gastroparesis     Hirsutism     History of blood transfusion     Hyperlipidemia LDL goal < 70     Hypertension     Major depressive disorder with single episode 2009    Pancreatitis     Seasonal allergic rhinitis due to pollen 11/10/2016    Sleep apnea     Thyroid disease     Vitamin D deficiency        Past Surgical History:          Procedure Laterality Date     SECTION      ENDOMETRIAL ABLATION  5/3/12    ENDOSCOPY, COLON, DIAGNOSTIC      HYSTEROSCOPY  5/3/12    MANDIBLE SURGERY      SKIN BIOPSY      TUBAL LIGATION      UPPER GASTROINTESTINAL ENDOSCOPY N/A 8/20/2021    EGD BIOPSY performed by Alvin Sullivan MD at [facility]       Medications Prior to Admission:      Prior to Admission medications    Medication Sig Start Date End Date Taking? Authorizing Provider   cetirizine (ZYRTEC) 10 MG tablet Take 1 tablet by mouth daily 10/1/21 10/31/21 Yes Malachi Mercado,    insulin glargine (BASAGLAR KWIKPEN) 100 UNIT/ML injection pen Inject 10 Units into the skin nightly 9/22/21  Yes Henry Newberry MD   polyethylene glycol (GLYCOLAX) 17 g packet Take 17 g by mouth daily as needed for Constipation 9/22/21 10/22/21 Yes Kwaku Pisano MD   pantoprazole (PROTONIX) 40 MG tablet Take 1 tablet by mouth 2 times daily (before meals) 9/22/21  Yes Henry Newberry MD   Papaya Enzyme CHEW Take 3-4 tablets by mouth daily as needed (Indigestion)   Yes Historical Provider, MD   lisinopril (PRINIVIL;ZESTRIL) 10 MG tablet Take 10 mg by mouth daily   Yes Historical Provider, MD   ezetimibe (ZETIA) 10 MG tablet Take 10 mg by mouth daily   Yes Historical Provider, MD   amLODIPine (NORVASC) 5 MG tablet Take 5 mg by mouth daily   Yes Historical Provider, MD   insulin lispro (HUMALOG) 100 UNIT/ML injection vial Inject into the skin 3 times daily (before meals) Per sliding scale.  2 units >150 then additional 2 units for every 50   Yes Historical Provider, MD   aspirin 81 MG tablet Take 81 mg by mouth daily   Yes Historical Provider, MD   Insulin Syringe-Needle U-100 (INSULIN SYRINGE .5CC/31GX5/16\") 31G X 5/16\" 0.5 ML MISC 1 each by Does not apply route daily 4/14/17  Yes Anahi Covarrubias MD   acetaminophen (APAP EXTRA STRENGTH) 500 MG tablet Take 1 tablet by mouth every 6 hours as needed for Pain 11/18/16  Yes Aileen Baumgarten, [address]   timolol (TIMOPTIC) 0.5 % ophthalmic solution Place 1 drop into both eyes daily  8/7/16  Yes Historical Provider, MD   ergocalciferol (DRISDOL) 10984 UNITS capsule Take 1 capsule by mouth once a week 5/24/16  Yes Kiet Millard MD   albuterol sulfate (PROAIR RESPICLICK) 406 (90 BASE) MCG/ACT aerosol powder inhalation Inhale 2 puffs into the lungs every 4 hours as needed for Wheezing or Shortness of Breath 5/20/16  Yes Karel Wallace MD   Insulin Pen Needle 31G X 5 MM MISC Pt uses three times a day 9/17/15  Yes Linda Del Rio MD   glucose blood VI test strips (EXACTECH TEST) strip 4 times a day As needed. 3/23/15  Yes Linda Del Rio MD   Lancets MISC 3-4 times a day 2/26/15  Yes Linda Del Rio MD   latanoprost (XALATAN) 0.005 % ophthalmic solution Place 1 drop into both eyes nightly. Yes Historical Provider, MD       Allergies: Avelox [moxifloxacin], Bactrim, Cefuroxime, Codeine, Medrol [methylprednisolone], Morphine, Niacin, Oat, Percocet [oxycodone-acetaminophen], Reglan [metoclopramide], Spironolactone, Statins, Sulfamethoxazole-trimethoprim, and Vicodin [hydrocodone-acetaminophen]    Social History:        TOBACCO:   reports that she quit smoking about 31 years ago. Her smoking use included cigarettes. She has never used smokeless tobacco.  ETOH:   reports current alcohol use.       Family History:      Reviewed in detail positive as follows:        Problem Relation Age of Onset    Hypertension Mother     Breast Cancer Mother     Colon Cancer Mother     Cancer Father         Pancreatic    Prostate Cancer Father     No Known Problems Brother     No Known Problems Sister     Hypertension Maternal Aunt     Cancer Maternal Uncle     Hypertension Maternal Grandmother     Stroke Maternal Grandmother     Cancer Maternal Grandfather     No Known Problems Paternal Aunt     No Known Problems Paternal Uncle     No Known Problems Paternal Grandmother     No Known Problems Paternal Grandfather     No Known Problems Other     Rheum Arthritis Neg Hx     Osteoarthritis Neg Hx     Asthma Neg Hx     Diabetes Neg Hx     Heart Failure Neg Hx     High Cholesterol Neg Hx     Migraines Neg Hx     Ovarian Cancer Neg Hx     Rashes/Skin Problems Neg Hx     Seizures Neg Hx     Thyroid Disease Neg Hx        REVIEW OF SYSTEMS:   Pertinent positives as noted in the HPI. All other systems reviewed and negative. PHYSICAL EXAM PERFORMED:    BP (!) 179/84   Pulse 100   Temp 97.8 °F (36.6 °C) (Oral)   Resp 16   Ht 5' 7\" (1.702 m)   Wt 215 lb 6.2 oz (97.7 kg)   SpO2 98%   BMI 33.73 kg/m²     General appearance:  Well developed, well nourished, -American female lying on ED cart uncomfortable in appearance but in no apparent distress, appears stated age and cooperative. HEENT:  Normal cephalic, atraumatic without obvious deformity. Pupils equal, round, and reactive to light. Conjunctivae/corneas clear. Neck: Supple, with full range of motion. No jugular venous distention. Trachea midline. Respiratory:  Normal respiratory effort. Clear to auscultation, bilaterally without accessory muscle use. Cardiovascular:  Regular rate and rhythm without murmurs, no lower extremity edema. Abdomen: Soft, obese abdomen, diffusely tender without rebound or guarding. Normal bowel sounds. Musculoskeletal:  Moves all extremities equally. Full range of motion without deformity. Skin: Skin warm, dry and intact. No rashes or lesions. Neurologic:  Neurovascularly intact without any focal sensory/motor deficits. Cranial nerves: II-XII intact, grossly non-focal.  Psychiatric:  Alert and oriented, thought content appropriate, normal insight  Capillary Refill: Brisk,< 3 seconds   Peripheral Pulses: +2 palpable, equal bilaterally       Labs:     Recent Labs     10/14/21  1845   WBC 15.9*   HGB 15.7   HCT 48.4*        Recent Labs     10/14/21  1845   *   K 3.7   CL 88*   CO2 25   BUN 24*   CREATININE 1.3*   CALCIUM 10.1     Recent Labs     10/14/21  1845   AST 19   ALT 19   BILITOT 0.7   ALKPHOS 128     No results for input(s): INR in the last 72 hours. No results for input(s): Jt Shackle in the last 72 hours.     Urinalysis:      Lab Results   Component Value Date    NITRU Color Interfer 10/14/2021    WBCUA 28 10/14/2021    BACTERIA 3+ 10/14/2021    RBCUA 5-10 10/14/2021    BLOODU Color Interfer 10/14/2021    SPECGRAV >=1.030 10/14/2021    GLUCOSEU Color Interfer 10/14/2021    GLUCOSEU NEGATIVE 04/09/2012       Radiology:       CT ABDOMEN PELVIS WO CONTRAST Additional Contrast? None   Final Result   1. No acute findings in the abdomen and pelvis. 2. Distal esophageal thickening is nonspecific though likely represents   esophagitis. ASSESSMENT:    Active Hospital Problems    Diagnosis Date Noted    SHAY (acute kidney injury) ([facility] [address] [N17.9] 10/15/2021         PLAN:    Acute kidney injury    - Likely a result of of nausea/vomiting and dehydration  - crt baseline 0.8,  today 1.2  - IVF NS 75 ml/hr  - Avoid nephrotoxins  - check: UACS, Angelic/UCrt, uric acid, TSH, U osmol    Nausea/Vomiting/Diarrhea with hx of gastroparesis  - IV fluids: NS 75 ml/hr  - antiemetics prn  - IM bentyl    Urinary tract infection  -No previous cultures available for review   - Urine and blood cultures pending  - Zosyn started in ED secondary to multiple allergies we will continue for now  - IVF NS 75 ml/hr     Diabetes mellitus II   - Lantus, SSI and CCD    Essential (primary) hypertension   - monitor blood pressure  - continue home meds     Hyperlipidemia   - continue statin    COPD (chronic obstructive pulmonary disease)   - without acute exacerbation.   - Nebulizer treatments as needed and continue home medication  - Patient will be monitored closely, and deep breathing and coughing will be encouraged while awake. DVT Prophylaxis: Lovenox  Diet: ADULT DIET; Clear Liquid  Code Status: Full Code    Dispo - Inpatient       [address] CHRIS, APRN - CNP    Thank you CEASAR Damian for the opportunity to be involved in this patient's care. If you have any questions or concerns please feel free to contact me at [phone].

## 2021-10-15 NOTE — ED NOTES
ED SBAR report provider to Pollo Andrea RN. Patient to be transported to Room 4120 via stretcher by ED tech. Patient transported with bedside cardiac monitor. IV site clean, dry, and intact. MEWS score and pain assessed and documented. Updated patient and family on plan of care.      [id number] Rory Jacobsen RN  10/15/21 1784

## 2021-10-15 NOTE — CARE COORDINATION
INITIAL CASE MANAGEMENT ASSESSMENT    Reviewed chart, met with patient to assess possible discharge needs. Explained Case Management role/services. Living Situation: Patient lives with her Older Sister in a 2 family on the first floor with 6 steps to enter. ADLs: Independent     DME: None    PT/OT Recs: None     Active Services: None     Transportation: Non-/public transport     Medications: [facility] on [address]/No barriers    PCP: CEASAR Laird      HD/PD: N/A    PLAN/COMMENTS: Patient plans to return to home. May need assistance with transportation to home. SW/CM provided contact information for patient or family to call with any questions. SW/CM will follow and assist as needed.   Electronically signed by Gloria Dill RN on 10/15/2021 at 3:14 PM

## 2021-10-15 NOTE — ED NOTES
One set of cultures were gotten and a lactic acid,  Both were sent to the lab. Second culture was cancelled due to pt dehydrated and unable to get blood.      Shai Lee  10/14/21 9519

## 2021-10-15 NOTE — PLAN OF CARE
Problem: Nutrition  Goal: Optimal nutrition therapy  Outcome: Ongoing     Nutrition Problem #1: Inadequate oral intake  Intervention: Food and/or Nutrient Delivery: Continue Current Diet  Nutritional Goals: Consume greater than 50% of meals

## 2021-10-15 NOTE — ED NOTES
Pt son updated.      Adarsh Wright, [facility]  10/15/21 [id number]

## 2021-10-15 NOTE — PLAN OF CARE
Problem: Falls - Risk of: Bed alarm in place and call light within reach.    Goal: Will remain free from falls  Description: Will remain free from falls  Outcome: Ongoing  Goal: Absence of physical injury  Description: Absence of physical injury  Outcome: Ongoing

## 2021-10-15 NOTE — PROGRESS NOTES
Comprehensive Nutrition Assessment    Type and Reason for Visit:  Initial, Positive Nutrition Screen    Nutrition Recommendations/Plan:   Full Liquids   Will monitor nutritional adequacy, nutrition-related labs, weights, BMs, and clinical progress     Nutrition Assessment:  + Screen for MST 2. Pt admitted with SHAY. Also with n/v/d, UTI. Hx includes DM, HLD, gastroparesis. Pt reports poor intake for last several days d/t n/v. Also with frequent constipation per pt. Currently on Fulls, pt did report she is tolerating liquids with some nausea. Does not tolerate supplements well, will hold off on ordering. Appears wt usually between 217 and 225 lb per EMR. Malnutrition Assessment:  Malnutrition Status: At risk for malnutrition (Comment)    Context:  Acute Illness     Findings of the 6 clinical characteristics of malnutrition:  Energy Intake:  7 - 50% or less of estimated energy requirements for 5 or more days  Weight Loss:  No significant weight loss     Body Fat Loss:  No significant body fat loss     Muscle Mass Loss:  No significant muscle mass loss    Fluid Accumulation:  No significant fluid accumulation     Strength:  Not Performed    Estimated Daily Nutrient Needs:  Energy (kcal):  6203-4699 kcal (15-18 kcal/kg ABW); Weight Used for Energy Requirements:  Current     Protein (g):   gm (1.2-2 gm/kg IBW); Weight Used for Protein Requirements:  Ideal        Fluid (ml/day):   ; Method Used for Fluid Requirements:  1 ml/kcal      Nutrition Related Findings:  no edema      Wounds:  None       Current Nutrition Therapies:    ADULT DIET; Full Liquid    Anthropometric Measures:  · Height: 5' 7\" (170.2 cm)  · Current Body Weight: 218 lb (98.9 kg)   · Admission Body Weight: 215 lb (97.5 kg)    · Usual Body Weight:  (217-225 lb)     · Ideal Body Weight: 135 lbs; % Ideal Body Weight 161.5 %   · BMI: 34.1   · BMI Categories: Obese Class 1 (BMI 30.0-34. 9)       Nutrition Diagnosis:   · Inadequate oral intake related to altered GI function as evidenced by poor intake prior to admission      Nutrition Interventions:   Food and/or Nutrient Delivery:  Continue Current Diet  Nutrition Education/Counseling:  No recommendation at this time   Coordination of Nutrition Care:  Continue to monitor while inpatient    Goals:  Consume greater than 50% of meals       Nutrition Monitoring and Evaluation:   Behavioral-Environmental Outcomes:  None Identified   Food/Nutrient Intake Outcomes:  Food and Nutrient Intake, Diet Advancement/Tolerance  Physical Signs/Symptoms Outcomes:  Biochemical Data, Nausea or Vomiting, GI Status, Nutrition Focused Physical Findings, Skin, Weight     Discharge Planning:     Too soon to determine     Electronically signed by Jaun Montano RD, LD on 10/15/21 at 2:12 PM EDT    Contact: [phone]

## 2021-10-15 NOTE — ED PROVIDER NOTES
I was available for consultation during patient's ED stay. Patient was cared for by ALIZE. I did not evaluate or participate in patient care.     EKG Interpretation    Interpreted by emergency department physician    Rhythm: Sinus tachycardia  Rate: 103  Axis: normal  Ectopy: none  Conduction: normal  ST Segments: no acute change  T Waves: no acute change  Q Waves: none    Clinical Impression: sinus tachycardia    MD Aga Montero MD  10/14/21 8066

## 2021-10-15 NOTE — PROGRESS NOTES
Hospitalist Progress Note    CC: <principal problem not specified>      Admit date: 10/14/2021  Days in hospital:  0    Subjective/interval history: Pt S/E. Pt reports her nausea is slightly better, no vomiting today. She still has abdominal pain. Can not tolerate reglan. Lytes better today. O2 status: room air    ROS:   Gastrointestinal: positive for abdominal pain and nausea     Objective:    BP (!) 148/70   Pulse 86   Temp 97.4 °F (36.3 °C) (Axillary)   Resp 16   Ht 5' 7\" (1.702 m)   Wt 218 lb 14.7 oz (99.3 kg)   SpO2 96%   BMI 34.29 kg/m²     Gen: alert, NAD  HEENT: NC/AT, moist mucous membranes, no oropharyngeal erythema or exudate  Neck: supple, trachea midline, no anterior cervical or SC LAD  Heart: Normal s1/s2, RRR, no murmurs, gallops, or rubs. Lungs: clear bilaterally, no wheezing, no rales, no rhonchi, no use of accessory muscles  Abd: bowel sounds present, soft, nontender, nondistended, no masses  Extrem: No clubbing, cyanosis, no edema  Skin: no rashes or lesions  Psych: A & O x3, affect appropriate  Neuro: grossly intact, moves all four extremities spontaneously.   Cap refill: +2 sec    Medications:  Scheduled Meds:   amLODIPine  5 mg Oral Daily    aspirin  81 mg Oral Daily    cetirizine  10 mg Oral Daily    ezetimibe  10 mg Oral Daily    insulin glargine  10 Units SubCUTAneous Nightly    latanoprost  1 drop Both Eyes Nightly    pantoprazole  40 mg Oral BID AC    timolol  1 drop Both Eyes Daily    insulin lispro  0-12 Units SubCUTAneous TID WC    insulin lispro  0-6 Units SubCUTAneous Nightly    sodium chloride flush  5-40 mL IntraVENous 2 times per day    enoxaparin  40 mg SubCUTAneous Daily    piperacillin-tazobactam  3,375 mg IntraVENous Q8H       PRN Meds:  polyethylene glycol, glucose, dextrose, glucagon (rDNA), dextrose, sodium chloride flush, sodium chloride, ondansetron **OR** ondansetron, acetaminophen **OR** acetaminophen, dicyclomine, promethazine    IV:   dextrose      sodium chloride      sodium chloride 75 mL/hr at 10/15/21 0320       No intake or output data in the 24 hours ending 10/15/21 0840    Results:  CBC:   Recent Labs     10/14/21  1845 10/15/21  0625   WBC 15.9* 14.1*   HGB 15.7 15.1   HCT 48.4* 46.5   MCV 79.0* 78.1*    274     BMP:   Recent Labs     10/14/21  1845 10/15/21  0625   * 132*   K 3.7 4.2   CL 88* 92*   CO2 25 24   BUN 24* 14   CREATININE 1.3* 0.8     Mag: No results for input(s): MAG in the last 72 hours. Phos:   Lab Results   Component Value Date    PHOS 4.1 02/21/2017     No components found for: GLU    LIVER PROFILE:   Recent Labs     10/14/21  1845 10/15/21  0625   AST 19 17   ALT 19 14   LIPASE 47.0  --    BILITOT 0.7 0.8   ALKPHOS 128 117     PT/INR: No results for input(s): PROTIME, INR in the last 72 hours. APTT: No results for input(s): APTT in the last 72 hours. UA:  Recent Labs     10/14/21  1845   COLORU ORANGE*   PHUR Color Interfer*   WBCUA 28*   RBCUA 5-10*   BACTERIA 3+*   CLARITYU CLOUDY*   SPECGRAV >=1.030   LEUKOCYTESUR Color Interfer*   UROBILINOGEN Color Interfer*   BILIRUBINUR Color Interfer*   BLOODU Color Interfer*   GLUCOSEU Color Interfer*       Invalid input(s): ABG  Lab Results   Component Value Date    CALCIUM 9.1 10/15/2021    PHOS 4.1 02/21/2017       Assessment:    Active Problems:    SHAY (acute kidney injury) ([address]  Resolved Problems:    * No resolved hospital problems. [facility] course: 62 y.o. female with PMHx of DM, HLD, HLD and gastroparesis presented to [facility] with 2 day history of nausea, vomiting and diarrhea. Patient states diarrhea and abdominal cramping began after a medication that was prescribed by her GI doctor (sje states was creon). Her bowels have improved but she continues with abdominal cramping and vomiting. She has not been able to tolerate anything orally. She denies fever, chills or body aches.   She does report urinary frequency with dysuria. She states her urinary symptoms are intermittent but consistent over the last 2 days. She was supposed to have and egd on 9/13/21 but missed her appt. Plan:     Nausea/Vomiting/Diarrhea with hx of gastroparesis - starting to improve  - on clear liquids, advance as toelrated  - IV fluids: NS 75 ml/hr  - antiemetics prn, can not tolerate reglan   - IM bentyl, change to po       Urinary tract infection  - No previous cultures available for review   - Urine and blood cultures pending  - Zosyn started in ED secondary to multiple allergies we will continue for now  - IVF NS 75 ml/hr     Acute kidney injury - resolved with ivf   - Likely a result of of nausea/vomiting and dehydration  - crt baseline 0.8; 1.2 on admission -> .8  - Avoid nephrotoxins    Chronic conditions - continue home meds unless otherwise stated  Dm - improving.  hba1c went from 12 -> 8.4   htn  hld  Asthma    Code status:  full  DVT prophylaxis: [x] Lovenox  [] SQ Heparin  [] SCDs because of  [] warfarin/oral direct thrombin inhibitor [] Encourage ambulation      Disposition:  [] Home [] Rehab [] Psych [] SNF  [] LTAC  [] Transfer to ICU  [] Transfer to PCU [] Other: in pt    Electronically signed by Janet Acevedo DO on 10/15/2021 at 8:40 AM

## 2021-10-16 VITALS
DIASTOLIC BLOOD PRESSURE: 59 MMHG | BODY MASS INDEX: 34.53 KG/M2 | TEMPERATURE: 97.6 F | OXYGEN SATURATION: 96 % | HEIGHT: 67 IN | WEIGHT: 220.02 LBS | RESPIRATION RATE: 17 BRPM | HEART RATE: 69 BPM | SYSTOLIC BLOOD PRESSURE: 111 MMHG

## 2021-10-16 LAB
ANION GAP SERPL CALCULATED.3IONS-SCNC: 13 MMOL/L (ref 3–16)
BASOPHILS ABSOLUTE: 0.1 K/UL (ref 0–0.2)
BASOPHILS RELATIVE PERCENT: 0.7 %
BUN BLDV-MCNC: 12 MG/DL (ref 7–20)
CALCIUM SERPL-MCNC: 8.8 MG/DL (ref 8.3–10.6)
CHLORIDE BLD-SCNC: 99 MMOL/L (ref 99–110)
CO2: 25 MMOL/L (ref 21–32)
CREAT SERPL-MCNC: 1.1 MG/DL (ref 0.6–1.2)
EOSINOPHILS ABSOLUTE: 0.1 K/UL (ref 0–0.6)
EOSINOPHILS RELATIVE PERCENT: 0.7 %
GFR AFRICAN AMERICAN: >60
GFR NON-AFRICAN AMERICAN: 51
GLUCOSE BLD-MCNC: 142 MG/DL (ref 70–99)
GLUCOSE BLD-MCNC: 148 MG/DL (ref 70–99)
GLUCOSE BLD-MCNC: 184 MG/DL (ref 70–99)
GLUCOSE BLD-MCNC: 225 MG/DL (ref 70–99)
HCT VFR BLD CALC: 42.2 % (ref 36–48)
HEMOGLOBIN: 14 G/DL (ref 12–16)
LYMPHOCYTES ABSOLUTE: 4 K/UL (ref 1–5.1)
LYMPHOCYTES RELATIVE PERCENT: 37 %
MCH RBC QN AUTO: 26 PG (ref 26–34)
MCHC RBC AUTO-ENTMCNC: 33.2 G/DL (ref 31–36)
MCV RBC AUTO: 78.2 FL (ref 80–100)
MONOCYTES ABSOLUTE: 0.6 K/UL (ref 0–1.3)
MONOCYTES RELATIVE PERCENT: 5.6 %
NEUTROPHILS ABSOLUTE: 6.1 K/UL (ref 1.7–7.7)
NEUTROPHILS RELATIVE PERCENT: 56 %
PDW BLD-RTO: 14.3 % (ref 12.4–15.4)
PERFORMED ON: ABNORMAL
PLATELET # BLD: 252 K/UL (ref 135–450)
PMV BLD AUTO: 8.2 FL (ref 5–10.5)
POTASSIUM REFLEX MAGNESIUM: 3.6 MMOL/L (ref 3.5–5.1)
RBC # BLD: 5.39 M/UL (ref 4–5.2)
SODIUM BLD-SCNC: 137 MMOL/L (ref 136–145)
WBC # BLD: 10.9 K/UL (ref 4–11)

## 2021-10-16 PROCEDURE — 85025 COMPLETE CBC W/AUTO DIFF WBC: CPT

## 2021-10-16 PROCEDURE — 36415 COLL VENOUS BLD VENIPUNCTURE: CPT

## 2021-10-16 PROCEDURE — 2580000003 HC RX 258: Performed by: NURSE PRACTITIONER

## 2021-10-16 PROCEDURE — 6360000002 HC RX W HCPCS: Performed by: NURSE PRACTITIONER

## 2021-10-16 PROCEDURE — 6370000000 HC RX 637 (ALT 250 FOR IP): Performed by: FAMILY MEDICINE

## 2021-10-16 PROCEDURE — 80048 BASIC METABOLIC PNL TOTAL CA: CPT

## 2021-10-16 PROCEDURE — 6370000000 HC RX 637 (ALT 250 FOR IP): Performed by: NURSE PRACTITIONER

## 2021-10-16 RX ORDER — AMOXICILLIN AND CLAVULANATE POTASSIUM 875; 125 MG/1; MG/1
1 TABLET, FILM COATED ORAL 2 TIMES DAILY
Qty: 8 TABLET | Refills: 0 | Status: SHIPPED | OUTPATIENT
Start: 2021-10-16 | End: 2021-10-20

## 2021-10-16 RX ORDER — FLUCONAZOLE 100 MG/1
100 TABLET ORAL DAILY
Qty: 3 TABLET | Refills: 0 | Status: SHIPPED | OUTPATIENT
Start: 2021-10-16 | End: 2021-10-19

## 2021-10-16 RX ADMIN — INSULIN LISPRO 4 UNITS: 100 INJECTION, SOLUTION INTRAVENOUS; SUBCUTANEOUS at 14:32

## 2021-10-16 RX ADMIN — DICYCLOMINE HYDROCHLORIDE 20 MG: 20 INJECTION, SOLUTION INTRAMUSCULAR at 09:39

## 2021-10-16 RX ADMIN — AMLODIPINE BESYLATE 5 MG: 5 TABLET ORAL at 09:35

## 2021-10-16 RX ADMIN — PIPERACILLIN AND TAZOBACTAM 3375 MG: 3; .375 INJECTION, POWDER, LYOPHILIZED, FOR SOLUTION INTRAVENOUS at 04:40

## 2021-10-16 RX ADMIN — SODIUM CHLORIDE: 9 INJECTION, SOLUTION INTRAVENOUS at 09:03

## 2021-10-16 RX ADMIN — PANTOPRAZOLE SODIUM 40 MG: 40 TABLET, DELAYED RELEASE ORAL at 06:14

## 2021-10-16 RX ADMIN — TIMOLOL MALEATE 1 DROP: 5 SOLUTION OPHTHALMIC at 09:05

## 2021-10-16 RX ADMIN — INSULIN LISPRO 2 UNITS: 100 INJECTION, SOLUTION INTRAVENOUS; SUBCUTANEOUS at 09:36

## 2021-10-16 RX ADMIN — ASPIRIN 81 MG: 81 TABLET, CHEWABLE ORAL at 09:35

## 2021-10-16 RX ADMIN — ENOXAPARIN SODIUM 40 MG: 40 INJECTION SUBCUTANEOUS at 09:36

## 2021-10-16 RX ADMIN — LISINOPRIL 10 MG: 10 TABLET ORAL at 09:35

## 2021-10-16 RX ADMIN — EZETIMIBE 10 MG: 10 TABLET ORAL at 09:36

## 2021-10-16 ASSESSMENT — PAIN SCALES - GENERAL: PAINLEVEL_OUTOF10: 0

## 2021-10-16 NOTE — DISCHARGE SUMMARY
BP (!) 111/59   Pulse 69   Temp 97.6 °F (36.4 °C) (Oral)   Resp 17   Ht 5' 7\" (1.702 m)   Wt 220 lb 0.3 oz (99.8 kg)   SpO2 96%   BMI 34.46 kg/m²     General appearance:  No apparent distress, appears stated age and cooperative. HEENT:  Normal cephalic, atraumatic without obvious deformity. Pupils equal, round, and reactive to light. Extra ocular muscles intact. Conjunctivae/corneas clear. Neck: Supple, with full range of motion. No jugular venous distention. Trachea midline. Respiratory:  Normal respiratory effort. Clear to auscultation, bilaterally without Rales/Wheezes/Rhonchi. Cardiovascular:  Regular rate and rhythm with normal S1/S2 without murmurs, rubs or gallops. Abdomen: Soft, non-tender, non-distended with normal bowel sounds. Musculoskeletal:  No clubbing, cyanosis or edema bilaterally. Full range of motion without deformity. Skin: Skin color, texture, turgor normal.  No rashes or lesions. Neurologic:  Neurovascularly intact without any focal sensory/motor deficits. Cranial nerves: II-XII intact, grossly non-focal.  Psychiatric:  Alert and oriented, thought content appropriate, normal insight    Consults:     None    Labs:  For convenience and continuity at follow-up the following most recent labs are provided:    Lab Results   Component Value Date    WBC 10.9 10/16/2021    HGB 14.0 10/16/2021    HCT 42.2 10/16/2021    MCV 78.2 10/16/2021     10/16/2021     10/16/2021    K 3.6 10/16/2021    CL 99 10/16/2021    CO2 25 10/16/2021    BUN 12 10/16/2021    CREATININE 1.1 10/16/2021    CALCIUM 8.8 10/16/2021    PHOS 4.1 02/21/2017    BNP 83.3 05/03/2012    ALKPHOS 117 10/15/2021    ALT 14 10/15/2021    AST 17 10/15/2021    BILITOT 0.8 10/15/2021    BILIDIR <0.2 08/20/2021    LABALBU 4.1 10/15/2021    LDLCALC 169 03/29/2017    TRIG 158 03/29/2017     Lab Results   Component Value Date    INR 1.01 12/21/2014    INR 1.16 (H) 05/03/2012    INR 1.04 04/08/2012       Radiology:  CT ABDOMEN PELVIS WO CONTRAST Additional Contrast? None   Final Result   1. No acute findings in the abdomen and pelvis. 2. Distal esophageal thickening is nonspecific though likely represents   esophagitis. Discharge Medications:   Current Discharge Medication List      START taking these medications    Details   amoxicillin-clavulanate (AUGMENTIN) 875-125 MG per tablet Take 1 tablet by mouth 2 times daily for 4 days  Qty: 8 tablet, Refills: 0      fluconazole (DIFLUCAN) 100 MG tablet Take 1 tablet by mouth daily for 3 days  Qty: 3 tablet, Refills: 0           Current Discharge Medication List        Current Discharge Medication List      CONTINUE these medications which have NOT CHANGED    Details   cetirizine (ZYRTEC) 10 MG tablet Take 1 tablet by mouth daily  Qty: 30 tablet, Refills: 0      insulin glargine (BASAGLAR KWIKPEN) 100 UNIT/ML injection pen Inject 10 Units into the skin nightly  Qty: 5 pen, Refills: 0      polyethylene glycol (GLYCOLAX) 17 g packet Take 17 g by mouth daily as needed for Constipation  Qty: 527 g, Refills: 0      pantoprazole (PROTONIX) 40 MG tablet Take 1 tablet by mouth 2 times daily (before meals)  Qty: 60 tablet, Refills: 0      Papaya Enzyme CHEW Take 3-4 tablets by mouth daily as needed (Indigestion)      lisinopril (PRINIVIL;ZESTRIL) 10 MG tablet Take 10 mg by mouth daily      ezetimibe (ZETIA) 10 MG tablet Take 10 mg by mouth daily      amLODIPine (NORVASC) 5 MG tablet Take 5 mg by mouth daily      insulin lispro (HUMALOG) 100 UNIT/ML injection vial Inject into the skin 3 times daily (before meals) Per sliding scale.  2 units >150 then additional 2 units for every 50      aspirin 81 MG tablet Take 81 mg by mouth daily      Insulin Syringe-Needle U-100 (INSULIN SYRINGE .5CC/31GX5/16\") 31G X 5/16\" 0.5 ML MISC 1 each by Does not apply route daily  Qty: 100 Syringe, Refills: 3      acetaminophen (APAP EXTRA STRENGTH) 500 MG tablet Take 1 tablet by mouth every 6 hours as needed for Pain  Qty: 60 tablet, Refills: 0      timolol (TIMOPTIC) 0.5 % ophthalmic solution Place 1 drop into both eyes daily       ergocalciferol (DRISDOL) 37726 UNITS capsule Take 1 capsule by mouth once a week  Qty: 13 capsule, Refills: 1      albuterol sulfate (PROAIR RESPICLICK) 435 (90 BASE) MCG/ACT aerosol powder inhalation Inhale 2 puffs into the lungs every 4 hours as needed for Wheezing or Shortness of Breath  Qty: 1 Inhaler, Refills: 5    Associated Diagnoses: Pulmonary emphysema, unspecified emphysema type (HCC)      Insulin Pen Needle 31G X 5 MM MISC Pt uses three times a day  Qty: 450 each, Refills: 3    Associated Diagnoses: Type II or unspecified type diabetes mellitus without mention of complication, uncontrolled; Hypertension due to endocrine disorder; Hirsutism; Elevated testosterone level in female      glucose blood VI test strips (EXACTECH TEST) strip 4 times a day As needed. Qty: 400 each, Refills: 3      Lancets MISC 3-4 times a day  Qty: 100 each, Refills: 3      latanoprost (XALATAN) 0.005 % ophthalmic solution Place 1 drop into both eyes nightly. Current Discharge Medication List          Follow-up appointments:  one week    Provider Follow-up:    pcp    Condition at Discharge:  Stable    The patient was seen and examined on day of discharge and this discharge summary is in conjunction with any daily progress note from day of discharge. Time Spent on discharge is 45 minutes  in the examination, evaluation, counseling and review of medications and discharge plan. Signed:    Arthur Quinn DO   10/16/2021      Thank you CEASAR Jo for the opportunity to be involved in this patient's care. If you have any questions or concerns please feel free to contact me at [phone].

## 2021-10-16 NOTE — PROGRESS NOTES
Pt discharged to home with son, iv removed, discharge paperwork reviewed and signed with pt, pt refused wheelchair and safe to ambulate, pt ambulated to hospital entrance with documented belongings, pt satisfied with plan of care, this RN printed letter for work for pt as well and gave to pt

## 2021-10-16 NOTE — CARE COORDINATION
D/C order noted. Spoke with patient over the phone. She states her son is coming to get her and confirmed her pharmacy is [facility] on [address] She denies any further D/C needs.     CASE MANAGEMENT DISCHARGE SUMMARY:    DISCHARGE DATE: 10/16/2021    DISCHARGED TO HOME     TRANSPORTATION: son             TIME: as soon as paperwork is ready     PREFERRED PHARMACY: [facility] on [address], RN, BSN, Case Management  [phone]  Electronically signed by Genoveva De La Cruz RN on 10/16/2021 at 2:16 PM

## 2021-10-16 NOTE — PLAN OF CARE
Problem: Falls - Risk of:  Goal: Will remain free from falls  Description: Will remain free from falls  Outcome: Ongoing     Problem: Falls - Risk of:  Goal: Absence of physical injury  Description: Absence of physical injury  Outcome: Ongoing   Patient uses call light appropriately to express needs. Bed to lowest position with door open and call light in reach. All fall precautions implemented at this time. Siderails up x2. Non skid footwear in place. Patient has had no falls this shift. Will continue to monitor. Problem: Nutrition  Goal: Optimal nutrition therapy  10/16/2021 0321 by Altagracia Palacios RN  Outcome: Ongoing  10/15/2021 1413 by Eva Leyva RD, LD  Outcome: Ongoing     Problem: Discharge Planning:  Goal: Discharged to appropriate level of care  Description: Discharged to appropriate level of care  Outcome: Ongoing     Problem: Nausea/Vomiting:  Goal: Absence of nausea/vomiting  Description: Absence of nausea/vomiting  Outcome: Ongoing   IVF's as ordered. PRN nausea medications.

## 2021-10-16 NOTE — PLAN OF CARE
Problem: Falls - Risk of:  Goal: Will remain free from falls  Description: Will remain free from falls  10/16/2021 1457 by Daniela Weinstein RN  Outcome: Met This Shift

## 2021-10-16 NOTE — PROGRESS NOTES
D: pt tolerating diet, however, having abdominal cramps and concerned that zosyn is causing cramping, pt denies nausea A: this RN gave bentyl and reported concerns about antibiotic to Dr. Farshad Stahl R: Dr. Farshad Stahl discontinued zosyn

## 2021-10-18 LAB — BLOOD CULTURE, ROUTINE: NORMAL

## 2021-11-04 NOTE — ADT AUTH CERT
Patient Class History    Date / Time Event Patient Class   10/15/2021 12:33 AM Patient Update Inpatient   10/14/2021 06:15 PM Admission Emergency     PATIENT CAME IN [facility] THE EMERGENCY ROOM ON 10/14/21 AND WAS ADMITTED ON 10/15/21, BECOMING INPATIENT. IF YOU HAVE ANY FURTHER QUESTIONS, I CAN ALSO BE REACHED [phone], THANK YOU.

## 2021-12-03 ENCOUNTER — APPOINTMENT (OUTPATIENT)
Dept: CT IMAGING | Age: 61
End: 2021-12-03
Payer: COMMERCIAL

## 2021-12-03 ENCOUNTER — HOSPITAL ENCOUNTER (EMERGENCY)
Age: 61
Discharge: HOME | End: 2021-12-03
Payer: COMMERCIAL

## 2021-12-03 VITALS
WEIGHT: 235.2 LBS | RESPIRATION RATE: 16 BRPM | SYSTOLIC BLOOD PRESSURE: 157 MMHG | BODY MASS INDEX: 36.91 KG/M2 | HEART RATE: 69 BPM | DIASTOLIC BLOOD PRESSURE: 61 MMHG | OXYGEN SATURATION: 99 % | HEIGHT: 67 IN | TEMPERATURE: 98.4 F

## 2021-12-03 DIAGNOSIS — K86.9 PANCREATIC LESION: ICD-10-CM

## 2021-12-03 DIAGNOSIS — M54.6 ACUTE RIGHT-SIDED THORACIC BACK PAIN: Primary | ICD-10-CM

## 2021-12-03 LAB
A/G RATIO: 1.2 (ref 1.1–2.2)
ALBUMIN SERPL-MCNC: 4.1 G/DL (ref 3.4–5)
ALP BLD-CCNC: 123 U/L (ref 40–129)
ALT SERPL-CCNC: 15 U/L (ref 10–40)
ANION GAP SERPL CALCULATED.3IONS-SCNC: 11 MMOL/L (ref 3–16)
AST SERPL-CCNC: 14 U/L (ref 15–37)
BASOPHILS ABSOLUTE: 0.2 K/UL (ref 0–0.2)
BASOPHILS RELATIVE PERCENT: 1.3 %
BILIRUB SERPL-MCNC: <0.2 MG/DL (ref 0–1)
BUN BLDV-MCNC: 9 MG/DL (ref 7–20)
CALCIUM SERPL-MCNC: 9.3 MG/DL (ref 8.3–10.6)
CHLORIDE BLD-SCNC: 98 MMOL/L (ref 99–110)
CO2: 28 MMOL/L (ref 21–32)
CREAT SERPL-MCNC: 0.9 MG/DL (ref 0.6–1.2)
EOSINOPHILS ABSOLUTE: 0.3 K/UL (ref 0–0.6)
EOSINOPHILS RELATIVE PERCENT: 2.1 %
GFR AFRICAN AMERICAN: >60
GFR NON-AFRICAN AMERICAN: >60
GLUCOSE BLD-MCNC: 170 MG/DL (ref 70–99)
HCG(URINE) PREGNANCY TEST: NEGATIVE
HCT VFR BLD CALC: 43.9 % (ref 36–48)
HEMOGLOBIN: 14 G/DL (ref 12–16)
LIPASE: 73 U/L (ref 13–60)
LYMPHOCYTES ABSOLUTE: 3.6 K/UL (ref 1–5.1)
LYMPHOCYTES RELATIVE PERCENT: 30.7 %
MCH RBC QN AUTO: 25.2 PG (ref 26–34)
MCHC RBC AUTO-ENTMCNC: 31.8 G/DL (ref 31–36)
MCV RBC AUTO: 79.4 FL (ref 80–100)
MONOCYTES ABSOLUTE: 0.6 K/UL (ref 0–1.3)
MONOCYTES RELATIVE PERCENT: 5.5 %
NEUTROPHILS ABSOLUTE: 7 K/UL (ref 1.7–7.7)
NEUTROPHILS RELATIVE PERCENT: 60.4 %
PDW BLD-RTO: 15 % (ref 12.4–15.4)
PLATELET # BLD: 290 K/UL (ref 135–450)
PMV BLD AUTO: 7.5 FL (ref 5–10.5)
POTASSIUM REFLEX MAGNESIUM: 4.1 MMOL/L (ref 3.5–5.1)
RBC # BLD: 5.53 M/UL (ref 4–5.2)
SODIUM BLD-SCNC: 137 MMOL/L (ref 136–145)
TOTAL PROTEIN: 7.6 G/DL (ref 6.4–8.2)
TROPONIN: <0.01 NG/ML
WBC # BLD: 11.7 K/UL (ref 4–11)

## 2021-12-03 PROCEDURE — 96374 THER/PROPH/DIAG INJ IV PUSH: CPT

## 2021-12-03 PROCEDURE — 6360000002 HC RX W HCPCS: Performed by: PHYSICIAN ASSISTANT

## 2021-12-03 PROCEDURE — 2580000003 HC RX 258: Performed by: PHYSICIAN ASSISTANT

## 2021-12-03 PROCEDURE — 96372 THER/PROPH/DIAG INJ SC/IM: CPT

## 2021-12-03 PROCEDURE — 93005 ELECTROCARDIOGRAM TRACING: CPT | Performed by: PHYSICIAN ASSISTANT

## 2021-12-03 PROCEDURE — 84484 ASSAY OF TROPONIN QUANT: CPT

## 2021-12-03 PROCEDURE — 84703 CHORIONIC GONADOTROPIN ASSAY: CPT

## 2021-12-03 PROCEDURE — 71260 CT THORAX DX C+: CPT

## 2021-12-03 PROCEDURE — 6370000000 HC RX 637 (ALT 250 FOR IP): Performed by: PHYSICIAN ASSISTANT

## 2021-12-03 PROCEDURE — 6360000004 HC RX CONTRAST MEDICATION: Performed by: PHYSICIAN ASSISTANT

## 2021-12-03 PROCEDURE — 83690 ASSAY OF LIPASE: CPT

## 2021-12-03 PROCEDURE — 74177 CT ABD & PELVIS W/CONTRAST: CPT

## 2021-12-03 PROCEDURE — 36415 COLL VENOUS BLD VENIPUNCTURE: CPT

## 2021-12-03 PROCEDURE — 85025 COMPLETE CBC W/AUTO DIFF WBC: CPT

## 2021-12-03 PROCEDURE — 80053 COMPREHEN METABOLIC PANEL: CPT

## 2021-12-03 PROCEDURE — 99284 EMERGENCY DEPT VISIT MOD MDM: CPT

## 2021-12-03 RX ORDER — LIDOCAINE 4 G/G
1 PATCH TOPICAL DAILY
Status: DISCONTINUED | OUTPATIENT
Start: 2021-12-03 | End: 2021-12-04 | Stop reason: HOSPADM

## 2021-12-03 RX ORDER — NAPROXEN 500 MG/1
500 TABLET ORAL 2 TIMES DAILY WITH MEALS
Qty: 30 TABLET | Refills: 0 | Status: SHIPPED | OUTPATIENT
Start: 2021-12-03 | End: 2022-02-24

## 2021-12-03 RX ORDER — DIAZEPAM 5 MG/1
5 TABLET ORAL ONCE
Status: DISCONTINUED | OUTPATIENT
Start: 2021-12-03 | End: 2021-12-04 | Stop reason: HOSPADM

## 2021-12-03 RX ORDER — METHOCARBAMOL 750 MG/1
750-1500 TABLET, FILM COATED ORAL 3 TIMES DAILY
Qty: 30 TABLET | Refills: 0 | Status: SHIPPED | OUTPATIENT
Start: 2021-12-03 | End: 2021-12-08

## 2021-12-03 RX ORDER — ORPHENADRINE CITRATE 30 MG/ML
60 INJECTION INTRAMUSCULAR; INTRAVENOUS ONCE
Status: COMPLETED | OUTPATIENT
Start: 2021-12-03 | End: 2021-12-03

## 2021-12-03 RX ORDER — LIDOCAINE 4 G/G
1 PATCH TOPICAL DAILY
Qty: 30 PATCH | Refills: 0 | Status: SHIPPED | OUTPATIENT
Start: 2021-12-03 | End: 2022-01-02

## 2021-12-03 RX ORDER — KETOROLAC TROMETHAMINE 30 MG/ML
30 INJECTION, SOLUTION INTRAMUSCULAR; INTRAVENOUS ONCE
Status: COMPLETED | OUTPATIENT
Start: 2021-12-03 | End: 2021-12-03

## 2021-12-03 RX ORDER — 0.9 % SODIUM CHLORIDE 0.9 %
500 INTRAVENOUS SOLUTION INTRAVENOUS ONCE
Status: COMPLETED | OUTPATIENT
Start: 2021-12-03 | End: 2021-12-03

## 2021-12-03 RX ADMIN — SODIUM CHLORIDE 500 ML: 9 INJECTION, SOLUTION INTRAVENOUS at 17:57

## 2021-12-03 RX ADMIN — ORPHENADRINE CITRATE 60 MG: 30 INJECTION INTRAMUSCULAR; INTRAVENOUS at 17:58

## 2021-12-03 RX ADMIN — KETOROLAC TROMETHAMINE 30 MG: 30 INJECTION, SOLUTION INTRAMUSCULAR at 19:47

## 2021-12-03 RX ADMIN — IOPAMIDOL 75 ML: 755 INJECTION, SOLUTION INTRAVENOUS at 18:47

## 2021-12-03 RX ADMIN — HYDROMORPHONE HYDROCHLORIDE 1 MG: 1 INJECTION, SOLUTION INTRAMUSCULAR; INTRAVENOUS; SUBCUTANEOUS at 21:08

## 2021-12-03 ASSESSMENT — ENCOUNTER SYMPTOMS
COUGH: 0
VOMITING: 0
SHORTNESS OF BREATH: 0
ABDOMINAL PAIN: 0
EYES NEGATIVE: 1
NAUSEA: 0
BACK PAIN: 1
CHEST TIGHTNESS: 0

## 2021-12-03 ASSESSMENT — PAIN - FUNCTIONAL ASSESSMENT: PAIN_FUNCTIONAL_ASSESSMENT: PREVENTS OR INTERFERES SOME ACTIVE ACTIVITIES AND ADLS

## 2021-12-03 ASSESSMENT — PAIN SCALES - GENERAL
PAINLEVEL_OUTOF10: 8
PAINLEVEL_OUTOF10: 8
PAINLEVEL_OUTOF10: 6

## 2021-12-03 ASSESSMENT — PAIN DESCRIPTION - LOCATION: LOCATION: BACK

## 2021-12-03 ASSESSMENT — PAIN DESCRIPTION - PAIN TYPE: TYPE: ACUTE PAIN

## 2021-12-03 ASSESSMENT — PAIN DESCRIPTION - DESCRIPTORS: DESCRIPTORS: SHARP

## 2021-12-03 ASSESSMENT — PAIN DESCRIPTION - FREQUENCY: FREQUENCY: CONTINUOUS

## 2021-12-03 NOTE — ED NOTES
Bed: -  Expected date:   Expected time:   Means of arrival: [facility]  Comments:  Back/side pain     Mariella Mckeon RN  12/03/21 9977

## 2021-12-03 NOTE — LETTER
[facility] Emergency Department  [address] [facility] [address]  Phone: [phone]               December 3, 2021    Patient: Viki Camejo   YOB: 1960   Date of Visit: 12/3/2021       To Whom It May Concern:    Viki Camejo was seen and treated in our emergency department on 12/3/2021.        Sincerely,       Scott Steven RN         Signature:__________________________________

## 2021-12-03 NOTE — ED TRIAGE NOTES
Pt states she has upper R sided back pain that has been going on for a couple days. The pt stated that this morning it was worse and feels like it is catching. It sometimes takes her breath away but she is not feeling short of breath. The pt also denies any trouble urinating or with having bowel  movements.

## 2021-12-04 LAB
EKG ATRIAL RATE: 72 BPM
EKG DIAGNOSIS: NORMAL
EKG P AXIS: 36 DEGREES
EKG P-R INTERVAL: 170 MS
EKG Q-T INTERVAL: 410 MS
EKG QRS DURATION: 78 MS
EKG QTC CALCULATION (BAZETT): 448 MS
EKG R AXIS: 2 DEGREES
EKG T AXIS: 41 DEGREES
EKG VENTRICULAR RATE: 72 BPM

## 2021-12-04 PROCEDURE — 93010 ELECTROCARDIOGRAM REPORT: CPT | Performed by: INTERNAL MEDICINE

## 2022-02-12 ENCOUNTER — HOSPITAL ENCOUNTER (EMERGENCY)
Age: 62
Discharge: HOME | End: 2022-02-12
Attending: EMERGENCY MEDICINE
Payer: COMMERCIAL

## 2022-02-12 ENCOUNTER — APPOINTMENT (OUTPATIENT)
Dept: CT IMAGING | Age: 62
End: 2022-02-12
Payer: COMMERCIAL

## 2022-02-12 VITALS
SYSTOLIC BLOOD PRESSURE: 133 MMHG | HEIGHT: 67 IN | OXYGEN SATURATION: 99 % | WEIGHT: 230 LBS | DIASTOLIC BLOOD PRESSURE: 85 MMHG | BODY MASS INDEX: 36.1 KG/M2 | HEART RATE: 83 BPM | TEMPERATURE: 98.3 F | RESPIRATION RATE: 14 BRPM

## 2022-02-12 DIAGNOSIS — R11.2 NON-INTRACTABLE VOMITING WITH NAUSEA, UNSPECIFIED VOMITING TYPE: Primary | ICD-10-CM

## 2022-02-12 DIAGNOSIS — R11.2 CANNABINOID HYPEREMESIS SYNDROME: ICD-10-CM

## 2022-02-12 DIAGNOSIS — R10.84 GENERALIZED ABDOMINAL PAIN: ICD-10-CM

## 2022-02-12 DIAGNOSIS — F12.90 CANNABINOID HYPEREMESIS SYNDROME: ICD-10-CM

## 2022-02-12 LAB
A/G RATIO: 1.2 (ref 1.1–2.2)
ALBUMIN SERPL-MCNC: 3.9 G/DL (ref 3.4–5)
ALBUMIN SERPL-MCNC: 4.5 G/DL (ref 3.4–5)
ALP BLD-CCNC: 110 U/L (ref 40–129)
ALP BLD-CCNC: 129 U/L (ref 40–129)
ALT SERPL-CCNC: 16 U/L (ref 10–40)
ALT SERPL-CCNC: 18 U/L (ref 10–40)
ANION GAP SERPL CALCULATED.3IONS-SCNC: 13 MMOL/L (ref 3–16)
ANION GAP SERPL CALCULATED.3IONS-SCNC: 21 MMOL/L (ref 3–16)
AST SERPL-CCNC: 17 U/L (ref 15–37)
AST SERPL-CCNC: 17 U/L (ref 15–37)
BASE EXCESS VENOUS: 5.9 MMOL/L
BASOPHILS ABSOLUTE: 0.1 K/UL (ref 0–0.2)
BASOPHILS RELATIVE PERCENT: 0.6 %
BETA-HYDROXYBUTYRATE: 0.17 MMOL/L (ref 0–0.27)
BILIRUB SERPL-MCNC: 0.5 MG/DL (ref 0–1)
BILIRUB SERPL-MCNC: 0.6 MG/DL (ref 0–1)
BILIRUBIN DIRECT: <0.2 MG/DL (ref 0–0.3)
BILIRUBIN URINE: NEGATIVE
BILIRUBIN, INDIRECT: ABNORMAL MG/DL (ref 0–1)
BLOOD, URINE: ABNORMAL
BUN BLDV-MCNC: 23 MG/DL (ref 7–20)
BUN BLDV-MCNC: 29 MG/DL (ref 7–20)
CALCIUM SERPL-MCNC: 10 MG/DL (ref 8.3–10.6)
CALCIUM SERPL-MCNC: 9.1 MG/DL (ref 8.3–10.6)
CARBOXYHEMOGLOBIN: 1.3 %
CHLORIDE BLD-SCNC: 85 MMOL/L (ref 99–110)
CHLORIDE BLD-SCNC: 91 MMOL/L (ref 99–110)
CLARITY: CLEAR
CO2: 23 MMOL/L (ref 21–32)
CO2: 27 MMOL/L (ref 21–32)
COLOR: YELLOW
CREAT SERPL-MCNC: 0.9 MG/DL (ref 0.6–1.2)
CREAT SERPL-MCNC: 1.1 MG/DL (ref 0.6–1.2)
EOSINOPHILS ABSOLUTE: 0 K/UL (ref 0–0.6)
EOSINOPHILS RELATIVE PERCENT: 0 %
EPITHELIAL CELLS, UA: 2 /HPF (ref 0–5)
GFR AFRICAN AMERICAN: >60
GFR AFRICAN AMERICAN: >60
GFR NON-AFRICAN AMERICAN: 51
GFR NON-AFRICAN AMERICAN: >60
GLUCOSE BLD-MCNC: 320 MG/DL (ref 70–99)
GLUCOSE BLD-MCNC: 446 MG/DL (ref 70–99)
GLUCOSE URINE: >=1000 MG/DL
HCO3 VENOUS: 32 MMOL/L (ref 23–29)
HCT VFR BLD CALC: 47.1 % (ref 36–48)
HEMOGLOBIN: 15.4 G/DL (ref 12–16)
HYALINE CASTS: ABNORMAL /LPF (ref 0–2)
KETONES, URINE: ABNORMAL MG/DL
LACTIC ACID: 3 MMOL/L (ref 0.4–2)
LEUKOCYTE ESTERASE, URINE: NEGATIVE
LIPASE: 53 U/L (ref 13–60)
LYMPHOCYTES ABSOLUTE: 2.8 K/UL (ref 1–5.1)
LYMPHOCYTES RELATIVE PERCENT: 18.1 %
MCH RBC QN AUTO: 25.5 PG (ref 26–34)
MCHC RBC AUTO-ENTMCNC: 32.8 G/DL (ref 31–36)
MCV RBC AUTO: 77.7 FL (ref 80–100)
METHEMOGLOBIN VENOUS: 0.5 %
MICROSCOPIC EXAMINATION: YES
MONOCYTES ABSOLUTE: 1 K/UL (ref 0–1.3)
MONOCYTES RELATIVE PERCENT: 6.6 %
NEUTROPHILS ABSOLUTE: 11.7 K/UL (ref 1.7–7.7)
NEUTROPHILS RELATIVE PERCENT: 74.7 %
NITRITE, URINE: NEGATIVE
O2 SAT, VEN: 65 %
O2 THERAPY: ABNORMAL
PCO2, VEN: 48.3 MMHG (ref 40–50)
PDW BLD-RTO: 15 % (ref 12.4–15.4)
PH UA: 5.5 (ref 5–8)
PH VENOUS: 7.43 (ref 7.35–7.45)
PLATELET # BLD: 281 K/UL (ref 135–450)
PMV BLD AUTO: 8.1 FL (ref 5–10.5)
PO2, VEN: 35 MMHG
POTASSIUM REFLEX MAGNESIUM: 4 MMOL/L (ref 3.5–5.1)
POTASSIUM SERPL-SCNC: 3.5 MMOL/L (ref 3.5–5.1)
PROTEIN UA: 100 MG/DL
RBC # BLD: 6.06 M/UL (ref 4–5.2)
RBC UA: 3 /HPF (ref 0–4)
SODIUM BLD-SCNC: 129 MMOL/L (ref 136–145)
SODIUM BLD-SCNC: 131 MMOL/L (ref 136–145)
SPECIFIC GRAVITY UA: >1.03 (ref 1–1.03)
TCO2 CALC VENOUS: 33 MMOL/L
TOTAL PROTEIN: 7.2 G/DL (ref 6.4–8.2)
TOTAL PROTEIN: 8.4 G/DL (ref 6.4–8.2)
TROPONIN: <0.01 NG/ML
URINE REFLEX TO CULTURE: ABNORMAL
URINE TYPE: ABNORMAL
UROBILINOGEN, URINE: 1 E.U./DL
WBC # BLD: 15.7 K/UL (ref 4–11)
WBC UA: 2 /HPF (ref 0–5)

## 2022-02-12 PROCEDURE — 93005 ELECTROCARDIOGRAM TRACING: CPT | Performed by: NURSE PRACTITIONER

## 2022-02-12 PROCEDURE — 6360000004 HC RX CONTRAST MEDICATION: Performed by: NURSE PRACTITIONER

## 2022-02-12 PROCEDURE — 36415 COLL VENOUS BLD VENIPUNCTURE: CPT

## 2022-02-12 PROCEDURE — 96375 TX/PRO/DX INJ NEW DRUG ADDON: CPT

## 2022-02-12 PROCEDURE — 84484 ASSAY OF TROPONIN QUANT: CPT

## 2022-02-12 PROCEDURE — 6360000002 HC RX W HCPCS: Performed by: NURSE PRACTITIONER

## 2022-02-12 PROCEDURE — 96374 THER/PROPH/DIAG INJ IV PUSH: CPT

## 2022-02-12 PROCEDURE — 83690 ASSAY OF LIPASE: CPT

## 2022-02-12 PROCEDURE — 80053 COMPREHEN METABOLIC PANEL: CPT

## 2022-02-12 PROCEDURE — 82803 BLOOD GASES ANY COMBINATION: CPT

## 2022-02-12 PROCEDURE — 96376 TX/PRO/DX INJ SAME DRUG ADON: CPT

## 2022-02-12 PROCEDURE — 6370000000 HC RX 637 (ALT 250 FOR IP): Performed by: NURSE PRACTITIONER

## 2022-02-12 PROCEDURE — 99283 EMERGENCY DEPT VISIT LOW MDM: CPT

## 2022-02-12 PROCEDURE — 81001 URINALYSIS AUTO W/SCOPE: CPT

## 2022-02-12 PROCEDURE — 85025 COMPLETE CBC W/AUTO DIFF WBC: CPT

## 2022-02-12 PROCEDURE — 82010 KETONE BODYS QUAN: CPT

## 2022-02-12 PROCEDURE — 74177 CT ABD & PELVIS W/CONTRAST: CPT

## 2022-02-12 PROCEDURE — 83605 ASSAY OF LACTIC ACID: CPT

## 2022-02-12 RX ORDER — ONDANSETRON 2 MG/ML
4 INJECTION INTRAMUSCULAR; INTRAVENOUS ONCE
Status: COMPLETED | OUTPATIENT
Start: 2022-02-12 | End: 2022-02-12

## 2022-02-12 RX ORDER — 0.9 % SODIUM CHLORIDE 0.9 %
1000 INTRAVENOUS SOLUTION INTRAVENOUS ONCE
Status: DISCONTINUED | OUTPATIENT
Start: 2022-02-12 | End: 2022-02-12 | Stop reason: HOSPADM

## 2022-02-12 RX ORDER — DIPHENHYDRAMINE HYDROCHLORIDE 50 MG/ML
25 INJECTION INTRAMUSCULAR; INTRAVENOUS ONCE
Status: DISCONTINUED | OUTPATIENT
Start: 2022-02-12 | End: 2022-02-12

## 2022-02-12 RX ORDER — DIPHENHYDRAMINE HYDROCHLORIDE 50 MG/ML
50 INJECTION INTRAMUSCULAR; INTRAVENOUS ONCE
Status: COMPLETED | OUTPATIENT
Start: 2022-02-12 | End: 2022-02-12

## 2022-02-12 RX ADMIN — HYDROMORPHONE HYDROCHLORIDE 1 MG: 1 INJECTION, SOLUTION INTRAMUSCULAR; INTRAVENOUS; SUBCUTANEOUS at 11:40

## 2022-02-12 RX ADMIN — DIPHENHYDRAMINE HYDROCHLORIDE 50 MG: 50 INJECTION, SOLUTION INTRAMUSCULAR; INTRAVENOUS at 11:48

## 2022-02-12 RX ADMIN — IOPAMIDOL 75 ML: 755 INJECTION, SOLUTION INTRAVENOUS at 10:44

## 2022-02-12 RX ADMIN — Medication 1000 ML: at 09:27

## 2022-02-12 RX ADMIN — Medication 1000 ML: at 10:32

## 2022-02-12 RX ADMIN — LIDOCAINE HYDROCHLORIDE: 20 SOLUTION ORAL; TOPICAL at 11:00

## 2022-02-12 RX ADMIN — ONDANSETRON 4 MG: 2 INJECTION INTRAMUSCULAR; INTRAVENOUS at 09:27

## 2022-02-12 ASSESSMENT — PAIN SCALES - GENERAL: PAINLEVEL_OUTOF10: 10

## 2022-02-12 NOTE — ED PROVIDER NOTES
[address] [facility]        Pt Name: Basilia Roy  MRN: [id number]  [place] 1960  Date of evaluation: 2/12/2022  Provider: SEVEN Lees - CNP  PCP: CEASAR Richardson  Note Started: 9:14 AM EST       I have seen and evaluated this patient with my supervising physician, Dr. Mary Leal. Triage CHIEF COMPLAINT       Chief Complaint   Patient presents with    Emesis     x5 days; unable to tolerate any PO. unable to keep down BP meds. blood glucose per squad: 298. pt has f/u with PCP on Monday. pt has tried talking home PO Phenergan and Zofran with no relief. pt states she had COVID in December. she notes recent symptom of dysuria.  Hypertension     SBP per squad in 200's. HISTORY OF PRESENT ILLNESS   (Location/Symptom, Timing/Onset, Context/Setting, Quality, Duration, Modifying Factors, Severity)  Note limiting factors. Chief Complaint: Patient presents to ED with inability tolerate p.o. secondary to nausea and vomiting for the past 5 days. She is insulin-dependent diabetic and has hypertension, gastroparesis, among other diagnoses    Basilia Roy is a 62 y.o. nontoxic, well-appearing but distressed female who presents to the ED via EMS status post she has been unable to tolerate p.o. for the past 5 days. She has experienced nausea with vomiting x6 today, she endorses \"burning\" 8/10 diffuse abdominal pain, lightheadedness, shortness of breath, diaphoresis, and dysuria. Denies chest pain, dizziness, presyncope, fever, chills, cough, change in ability to smell or taste, hemoptysis, leg/calf pain or swelling, body aches, diarrhea, urinary frequency, urinary urgency, urinary retention, or other concerns. She is diabetic and hypertensive with a history of gastroparesis. The patient is hypertensive at 213/97 mmHg and has a fingerstick blood sugar of 298 per squad.     Nursing Notes were all reviewed and agreed with TABLET    Take 10 mg by mouth daily    GLUCOSE BLOOD VI TEST STRIPS (EXACTECH TEST) STRIP    4 times a day As needed. INSULIN GLARGINE (BASAGLAR KWIKPEN) 100 UNIT/ML INJECTION PEN    Inject 10 Units into the skin nightly    INSULIN LISPRO (HUMALOG) 100 UNIT/ML INJECTION VIAL    Inject into the skin 3 times daily (before meals) Per sliding scale. 2 units >150 then additional 2 units for every 50    INSULIN PEN NEEDLE 31G X 5 MM MISC    Pt uses three times a day    INSULIN SYRINGE-NEEDLE U-100 (INSULIN SYRINGE .5CC/31GX5/16\") 31G X 5/16\" 0.5 ML MISC    1 each by Does not apply route daily    LANCETS MISC    3-4 times a day    LATANOPROST (XALATAN) 0.005 % OPHTHALMIC SOLUTION    Place 1 drop into both eyes nightly.     LISINOPRIL (PRINIVIL;ZESTRIL) 10 MG TABLET    Take 10 mg by mouth daily    NAPROXEN (NAPROSYN) 500 MG TABLET    Take 1 tablet by mouth 2 times daily (with meals)    PANTOPRAZOLE (PROTONIX) 40 MG TABLET    Take 1 tablet by mouth 2 times daily (before meals)    PAPAYA ENZYME CHEW    Take 3-4 tablets by mouth daily as needed (Indigestion)    TIMOLOL (TIMOPTIC) 0.5 % OPHTHALMIC SOLUTION    Place 1 drop into both eyes daily        ALLERGIES     Avelox [moxifloxacin], Bactrim, Cefuroxime, Codeine, Medrol [methylprednisolone], Morphine, Niacin, Oat, Percocet [oxycodone-acetaminophen], Reglan [metoclopramide], Spironolactone, Statins, Sulfamethoxazole-trimethoprim, and Vicodin [hydrocodone-acetaminophen]    FAMILYHISTORY       Family History   Problem Relation Age of Onset    Hypertension Mother     Breast Cancer Mother     Colon Cancer Mother     Cancer Father         Pancreatic    Prostate Cancer Father     No Known Problems Brother     No Known Problems Sister     Hypertension Maternal Aunt     Cancer Maternal Uncle     Hypertension Maternal Grandmother     Stroke Maternal Grandmother     Cancer Maternal Grandfather     No Known Problems Paternal Aunt     No Known Problems Paternal Uncle     No Known Problems Paternal Grandmother     No Known Problems Paternal Grandfather     No Known Problems Other     Rheum Arthritis Neg Hx     Osteoarthritis Neg Hx     Asthma Neg Hx     Diabetes Neg Hx     Heart Failure Neg Hx     High Cholesterol Neg Hx     Migraines Neg Hx     Ovarian Cancer Neg Hx     Rashes/Skin Problems Neg Hx     Seizures Neg Hx     Thyroid Disease Neg Hx         SOCIAL HISTORY       Social History     Socioeconomic History    Marital status:      Spouse name: Not on file    Number of children: 3    Years of education: Not on file    Highest education level: Not on file   Occupational History    Occupation:    Tobacco Use    Smoking status: Former Smoker     Types: Cigarettes     Quit date: 1990     Years since quittin.1    Smokeless tobacco: Never Used   Vaping Use    Vaping Use: Never used   Substance and Sexual Activity    Alcohol use: Yes     Alcohol/week: 0.0 standard drinks     Comment: less than once a month    Drug use: Yes     Frequency: 2.0 times per week     Types: Marijuana Dolan Meckel)     Comment: used yesterday    Sexual activity: Not Currently     Partners: Male   Other Topics Concern    Not on file   Social History Narrative    Not on file     Social Determinants of Health     Financial Resource Strain:     Difficulty of Paying Living Expenses: Not on file   Food Insecurity:     Worried About Running Out of Food in the Last Year: Not on file    Amelia of Food in the Last Year: Not on file   Transportation Needs:     Lack of Transportation (Medical): Not on file    Lack of Transportation (Non-Medical):  Not on file   Physical Activity:     Days of Exercise per Week: Not on file    Minutes of Exercise per Session: Not on file   Stress:     Feeling of Stress : Not on file   Social Connections:     Frequency of Communication with Friends and Family: Not on file    Frequency of Social Gatherings with Friends and Family: Not of motion. Cervical back: Normal range of motion and neck supple. Skin:     General: Skin is warm and dry. Capillary Refill: Capillary refill takes less than 2 seconds. Neurological:      Mental Status: She is alert and oriented to person, place, and time. Psychiatric:         Mood and Affect: Mood normal.         Behavior: Behavior normal.         DIAGNOSTIC RESULTS   LABS:    Labs Reviewed   CBC WITH AUTO DIFFERENTIAL   COMPREHENSIVE METABOLIC PANEL W/ REFLEX TO MG FOR LOW K   LACTIC ACID, PLASMA   TROPONIN   URINE RT REFLEX TO CULTURE   BLOOD GAS, VENOUS   BETA-HYDROXYBUTYRATE       When ordered, only abnormal lab results are displayed. All other labs were within normal range or not returned as of this dictation. EKG: When ordered, EKG's are interpreted by the Emergency Department Physician in the absence of a cardiologist.  Please see their note for interpretation of EKG. RADIOLOGY:   Non-plain film images such as CT, Ultrasound and MRI are read by the radiologist. Plain radiographic images are visualized andpreliminarily interpreted by the  ED Provider with the below findings:        Interpretation perthe Radiologist below, if available at the time of this note:    CT ABDOMEN PELVIS W IV CONTRAST Additional Contrast? None    Result Date: 2/12/2022  No acute findings within the abdomen or pelvis. Scattered colonic diverticulosis with no acute features. No secondary signs of appendicitis.        I have reviewed and interpreted all of the currently available lab results from this visit:  Results for orders placed or performed during the hospital encounter of 02/12/22   CBC Auto Differential   Result Value Ref Range    WBC 15.7 (H) 4.0 - 11.0 K/uL    RBC 6.06 (H) 4.00 - 5.20 M/uL    Hemoglobin 15.4 12.0 - 16.0 g/dL    Hematocrit 47.1 36.0 - 48.0 %    MCV 77.7 (L) 80.0 - 100.0 fL    MCH 25.5 (L) 26.0 - 34.0 pg    MCHC 32.8 31.0 - 36.0 g/dL    RDW 15.0 12.4 - 15.4 %    Platelets 115 498 - 677 K/uL    MPV 8.1 5.0 - 10.5 fL    Neutrophils % 74.7 %    Lymphocytes % 18.1 %    Monocytes % 6.6 %    Eosinophils % 0.0 %    Basophils % 0.6 %    Neutrophils Absolute 11.7 (H) 1.7 - 7.7 K/uL    Lymphocytes Absolute 2.8 1.0 - 5.1 K/uL    Monocytes Absolute 1.0 0.0 - 1.3 K/uL    Eosinophils Absolute 0.0 0.0 - 0.6 K/uL    Basophils Absolute 0.1 0.0 - 0.2 K/uL   Lactic Acid, Plasma   Result Value Ref Range    Lactic Acid 3.0 (H) 0.4 - 2.0 mmol/L   Troponin   Result Value Ref Range    Troponin <0.01 <0.01 ng/mL   Urinalysis Reflex to Culture    Specimen: Urine, clean catch   Result Value Ref Range    Color, UA YELLOW Straw/Yellow    Clarity, UA Clear Clear    Glucose, Ur >=1000 (A) Negative mg/dL    Bilirubin Urine Negative Negative    Ketones, Urine TRACE (A) Negative mg/dL    Specific Gravity, UA >1.030 1.005 - 1.030    Blood, Urine TRACE (A) Negative    pH, UA 5.5 5.0 - 8.0    Protein,  (A) Negative mg/dL    Urobilinogen, Urine 1.0 <2.0 E.U./dL    Nitrite, Urine Negative Negative    Leukocyte Esterase, Urine Negative Negative    Microscopic Examination YES     Urine Type NotGiven     Urine Reflex to Culture Not Indicated    Blood Gas, Venous   Result Value Ref Range    pH, Bernard 7.426 7.350 - 7.450    pCO2, Bernard 48.3 40.0 - 50.0 mmHg    pO2, Bernard 35 Not Established mmHg    HCO3, Venous 32 (H) 23 - 29 mmol/L    Base Excess, Bernard 5.9 Not Established mmol/L    O2 Sat, Bernard 65 Not Established %    Carboxyhemoglobin 1.3 %    MetHgb, Bernard 0.5 <1.5 %    TC02 (Calc), Bernard 33 Not Established mmol/L    O2 Therapy Unknown    Beta-Hydroxybutyrate   Result Value Ref Range    Beta-Hydroxybutyrate 0.17 0.00 - 0.27 mmol/L   Basic Metabolic Panel   Result Value Ref Range    Sodium 129 (L) 136 - 145 mmol/L    Potassium 3.5 3.5 - 5.1 mmol/L    Chloride 85 (L) 99 - 110 mmol/L    CO2 23 21 - 32 mmol/L    Anion Gap 21 (H) 3 - 16    Glucose 446 (H) 70 - 99 mg/dL    BUN 29 (H) 7 - 20 mg/dL    CREATININE 1.1 0.6 - 1.2 mg/dL    GFR Non- American 51 (A) >60    GFR African American >60 >60    Calcium 10.0 8.3 - 10.6 mg/dL   Hepatic Function Panel   Result Value Ref Range    Total Protein 8.4 (H) 6.4 - 8.2 g/dL    Albumin 4.5 3.4 - 5.0 g/dL    Alkaline Phosphatase 129 40 - 129 U/L    ALT 18 10 - 40 U/L    AST 17 15 - 37 U/L    Total Bilirubin 0.6 0.0 - 1.0 mg/dL    Bilirubin, Direct <0.2 0.0 - 0.3 mg/dL    Bilirubin, Indirect see below 0.0 - 1.0 mg/dL   Lipase   Result Value Ref Range    Lipase 53.0 13.0 - 60.0 U/L   Microscopic Urinalysis   Result Value Ref Range    Hyaline Casts, UA 3-5 (A) 0 - 2 /LPF    WBC, UA 2 0 - 5 /HPF    RBC, UA 3 0 - 4 /HPF    Epithelial Cells, UA 2 0 - 5 /HPF     Repeat chemistry panel: Shows improvement with sodium 131, chloride 91, glucose 320, BUN 23. PROCEDURES   Unless otherwise noted below, none     Procedures    CRITICAL CARE TIME   N/A    CONSULTS:  None      EMERGENCY DEPARTMENT COURSE and DIFFERENTIAL DIAGNOSIS/MDM:   Patient presents to the emergency department with a 5-day history of nausea and vomiting and diffuse abdominal pain. Diagnosis considered but likely*history physical.  Considered kidney stone, pyelonephritis, UTI, appendicitis, bowel obstruction, diverticulitis, hernia, gastritis/gastroenteritis, pancreatitis, cholecystitis, hepatitis, constipation, IBS, IBD, others. We will obtain an abdominal and cardiac work-up on the patient. We will need to rule out if she is in DKA and ensure that there is no bowel obstruction intra-abdominally. Therefore will obtain CT A/P. Patient is not in DKA. Will need to reevaluate patient's metabolic panel following fluids. We will reassess patient's ability to tolerate p.o. and if she is unable to do so she will be admitted to the hospital for further evaluation treatment. Of note, there is some degree of contribution from cannabinoid hyperemesis as the patient states that she smokes marijuana on a daily basis.     Patient now tolerating p.o.      Therefore my attending physician I feel the patient is both safe and appropriate discharged home with outpatient follow-up with her PCP and her gastroenterologist.  She is encouraged to discontinue the use of cannabinoids. Strict return precautions. Patient verbalized understanding and is agreeable with the plan for discharge and follow-up. Vitals:    Vitals:    02/12/22 0839   BP: (!) 213/97   Pulse: 110   Resp: 15   Temp: 98.3 °F (36.8 °C)   TempSrc: Oral   SpO2: 100%   Weight: 230 lb (104.3 kg)   Height: 5' 7\" (1.702 m)       Patient was given thefollowing medications:  Medications   0.9 % sodium chloride bolus (1,000 mLs IntraVENous Bolus from Bag 2/12/22 1032)   0.9 % sodium chloride bolus (1,000 mLs IntraVENous Bolus from Bag 2/12/22 0927)   promethazine (PHENERGAN) 12.5mg in sodium chloride 0.9% 50 mL IVPB 12.5 mg (12.5 mg IntraVENous Bolus from Bag 2/12/22 0953)   promethazine (PHENERGAN) 12.5mg in sodium chloride 0.9% 50 mL IVPB 12.5 mg (12.5 mg IntraVENous Bolus from Bag 2/12/22 1157)   ondansetron (ZOFRAN) injection 4 mg (4 mg IntraVENous Given 2/12/22 0927)   aluminum & magnesium hydroxide-simethicone (MAALOX) 30 mL, lidocaine viscous hcl (XYLOCAINE) 5 mL (GI COCKTAIL) ( Oral Given 2/12/22 1100)   iopamidol (ISOVUE-370) 76 % injection 75 mL (75 mLs IntraVENous Given 2/12/22 1044)   diphenhydrAMINE (BENADRYL) injection 50 mg (50 mg IntraVENous Given 2/12/22 1148)   HYDROmorphone (DILAUDID) injection 1 mg (1 mg IntraVENous Given 2/12/22 1140)            FINAL IMPRESSION    Non-intractable vomiting with nausea  Generalized abdominal pain  Cannabinoid hyperemesis syndrome    DISPOSITION/PLAN   DISPOSITION Discharged with outpatient follow-up    PATIENT REFERREDTO:  No follow-up provider specified.     DISCHARGE MEDICATIONS:  New Prescriptions    No medications on file       DISCONTINUED MEDICATIONS:  Discontinued Medications    No medications on file              (Please note that portions ofthis note were completed with a voice recognition program.  Efforts were made to edit the dictations but occasionally words are mis-transcribed.)    SEVEN Akhtar CNP (electronically signed)             SEVEN Akhtar CNP  02/16/22 1038

## 2022-02-12 NOTE — ED NOTES
Pt feels slightly better after IVF, second dose of phenergan and other meds . HR improved and down to 80s on monitor. Ice chips, water, and saltine crackers provided for PO challenge. Repeat CMP sent to lab. Will reeval pt after PO challenge.       Kaylan Boyce, JOHAN  02/12/22 3895 Marline Avalos,[facility], RN  02/12/22 3267

## 2022-02-12 NOTE — ED PROVIDER NOTES
Attending Supervisory Note/Shared Visit   I have personally performed a face to face diagnostic evaluation on this patient. I have reviewed the mid-levels findings and agree. History and Exam by me shows an alert black female in no acute distress. She has a history of diabetic gastroparesis. She has had nausea and vomiting for 5 days. She has been able to keep her medications down including her blood pressure medications. She states her throat and abdomen is sore from all the vomiting. She states she is vomited 6 times today. HEENT: Conjunctiva are clear. No pallor. Pupils equal round reactive. ENT: Fany Pick is clear. Oropharynx is moist without erythema. Heart: Tachycardic, regular, no murmurs or gallops noted. Lungs: Breath sounds equal bilaterally and clear. Abdomen: Obese, soft, mild diffuse tenderness without guarding or rebound. Bowel sounds are normal.    EKG: Sinus tachycardia, rate of 105, no acute ST-T wave changes. Rhythm strip shows sinus tachycardia with a rate of 105, WY interval 168 ms, QRS of 80 ms with no other ectopy as interpreted by me. Compared to 12/3/2021, the tachycardia is new, no other significant change noted    Labs reviewed. H&H are 15.4 and 47.1. White blood cell count 15,700 with 75 neutrophils and 18 lymphs. Troponin less than 0.01. Beta hydroxybutyrate of 0.17. Sodium 129 with a potassium of 3.5. BUN of 29 with a creatinine of 1.1. Glucose of 446. Liver enzymes are normal.  Lipase is normal.  Urinalysis is unremarkable. Lactic acid of 3.0. Venous blood gas shows a pH of 7.42 with a PCO2 of 48. Repeat CMP shows a sodium 131 with potassium 4.0.  Glucose is 320. BUN of 23 with a creatinine of 0.9. CT abdomen pelvis: No acute findings within the abdomen pelvis. Patient was hydrated. She was medicated for nausea and pain. She gradually improved. She has a history of diabetic gastroparesis.   We also discovered on further questioning that she does use marijuana daily. There could be a problem with cannabinoid hyperemesis as well. She reached a point where she was able to take p.o. She felt much better. Her blood pressure normalized. Her abdomen is nonsurgical.  I think she can be discharged home with symptomatic treatment since she is able to take her oral medications. She can follow-up with her primary care provider. Test results, diagnosis, and treatment plan were discussed with the patient.   She understands her treatment plan and follow-up as discussed    (Please note that portions of this note were completed with a voice recognition program.  Efforts were made to edit the dictations but occasionally words are mis-transcribed.)    Chente Blank MD  Attending Emergency Physician        Hermelindo Boo MD  02/12/22 5445 Demetrio Gutierrez MD  02/15/22 6324

## 2022-02-12 NOTE — ED NOTES
Bed: B-09  Expected date: 2/12/22  Expected time: 8:26 AM  Means of arrival:   Comments:  61F abdominal pain, generalized weakness     Santos Rodriguez RN  02/12/22 5316

## 2022-02-13 LAB
EKG ATRIAL RATE: 105 BPM
EKG DIAGNOSIS: NORMAL
EKG P AXIS: 54 DEGREES
EKG P-R INTERVAL: 168 MS
EKG Q-T INTERVAL: 358 MS
EKG QRS DURATION: 80 MS
EKG QTC CALCULATION (BAZETT): 473 MS
EKG R AXIS: -1 DEGREES
EKG T AXIS: 43 DEGREES
EKG VENTRICULAR RATE: 105 BPM

## 2022-02-13 PROCEDURE — 93010 ELECTROCARDIOGRAM REPORT: CPT | Performed by: INTERNAL MEDICINE

## 2022-02-24 ENCOUNTER — HOSPITAL ENCOUNTER (INPATIENT)
Age: 62
LOS: 1 days | Discharge: HOME | DRG: 074 | End: 2022-02-25
Attending: EMERGENCY MEDICINE | Admitting: INTERNAL MEDICINE
Payer: COMMERCIAL

## 2022-02-24 DIAGNOSIS — R73.9 HYPERGLYCEMIA: ICD-10-CM

## 2022-02-24 DIAGNOSIS — R11.2 INTRACTABLE VOMITING WITH NAUSEA, UNSPECIFIED VOMITING TYPE: Primary | ICD-10-CM

## 2022-02-24 DIAGNOSIS — I10 PRIMARY HYPERTENSION: ICD-10-CM

## 2022-02-24 LAB
A/G RATIO: 1.2 (ref 1.1–2.2)
ALBUMIN SERPL-MCNC: 4.3 G/DL (ref 3.4–5)
ALP BLD-CCNC: 133 U/L (ref 40–129)
ALT SERPL-CCNC: 15 U/L (ref 10–40)
ANION GAP SERPL CALCULATED.3IONS-SCNC: 18 MMOL/L (ref 3–16)
AST SERPL-CCNC: 19 U/L (ref 15–37)
BASE EXCESS VENOUS: 1.6 MMOL/L
BASOPHILS ABSOLUTE: 0.1 K/UL (ref 0–0.2)
BASOPHILS RELATIVE PERCENT: 0.4 %
BETA-HYDROXYBUTYRATE: 0.39 MMOL/L (ref 0–0.27)
BILIRUB SERPL-MCNC: 0.3 MG/DL (ref 0–1)
BILIRUBIN URINE: NEGATIVE
BLOOD, URINE: NEGATIVE
BUN BLDV-MCNC: 8 MG/DL (ref 7–20)
CALCIUM SERPL-MCNC: 9.6 MG/DL (ref 8.3–10.6)
CARBOXYHEMOGLOBIN: 1.5 %
CHLORIDE BLD-SCNC: 98 MMOL/L (ref 99–110)
CLARITY: CLEAR
CO2: 22 MMOL/L (ref 21–32)
COLOR: YELLOW
CREAT SERPL-MCNC: 0.7 MG/DL (ref 0.6–1.2)
EKG ATRIAL RATE: 102 BPM
EKG DIAGNOSIS: NORMAL
EKG P AXIS: 49 DEGREES
EKG P-R INTERVAL: 148 MS
EKG Q-T INTERVAL: 364 MS
EKG QRS DURATION: 86 MS
EKG QTC CALCULATION (BAZETT): 474 MS
EKG R AXIS: 5 DEGREES
EKG T AXIS: 62 DEGREES
EKG VENTRICULAR RATE: 102 BPM
EOSINOPHILS ABSOLUTE: 0.1 K/UL (ref 0–0.6)
EOSINOPHILS RELATIVE PERCENT: 0.3 %
EPITHELIAL CELLS, UA: 1 /HPF (ref 0–5)
GFR AFRICAN AMERICAN: >60
GFR NON-AFRICAN AMERICAN: >60
GLUCOSE BLD-MCNC: 284 MG/DL (ref 70–99)
GLUCOSE BLD-MCNC: 329 MG/DL (ref 70–99)
GLUCOSE URINE: >=1000 MG/DL
HCO3 VENOUS: 28 MMOL/L (ref 23–29)
HCT VFR BLD CALC: 45.6 % (ref 36–48)
HEMOGLOBIN: 14.7 G/DL (ref 12–16)
HYALINE CASTS: 0 /LPF (ref 0–8)
KETONES, URINE: ABNORMAL MG/DL
LEUKOCYTE ESTERASE, URINE: NEGATIVE
LIPASE: 54 U/L (ref 13–60)
LYMPHOCYTES ABSOLUTE: 2.7 K/UL (ref 1–5.1)
LYMPHOCYTES RELATIVE PERCENT: 17.2 %
MCH RBC QN AUTO: 25.8 PG (ref 26–34)
MCHC RBC AUTO-ENTMCNC: 32.3 G/DL (ref 31–36)
MCV RBC AUTO: 79.9 FL (ref 80–100)
METHEMOGLOBIN VENOUS: 0.5 %
MICROSCOPIC EXAMINATION: YES
MONOCYTES ABSOLUTE: 0.5 K/UL (ref 0–1.3)
MONOCYTES RELATIVE PERCENT: 3.3 %
NEUTROPHILS ABSOLUTE: 12.4 K/UL (ref 1.7–7.7)
NEUTROPHILS RELATIVE PERCENT: 78.8 %
NITRITE, URINE: NEGATIVE
O2 SAT, VEN: 77 %
O2 THERAPY: ABNORMAL
PCO2, VEN: 51 MMHG (ref 40–50)
PDW BLD-RTO: 15.9 % (ref 12.4–15.4)
PERFORMED ON: ABNORMAL
PH UA: 7 (ref 5–8)
PH VENOUS: 7.35 (ref 7.35–7.45)
PLATELET # BLD: 379 K/UL (ref 135–450)
PMV BLD AUTO: 8 FL (ref 5–10.5)
PO2, VEN: 45 MMHG
POTASSIUM REFLEX MAGNESIUM: 4.8 MMOL/L (ref 3.5–5.1)
PROTEIN UA: 30 MG/DL
RBC # BLD: 5.7 M/UL (ref 4–5.2)
RBC UA: 3 /HPF (ref 0–4)
SODIUM BLD-SCNC: 138 MMOL/L (ref 136–145)
SPECIFIC GRAVITY UA: 1.01 (ref 1–1.03)
TCO2 CALC VENOUS: 30 MMOL/L
TOTAL PROTEIN: 8 G/DL (ref 6.4–8.2)
TROPONIN: <0.01 NG/ML
URINE REFLEX TO CULTURE: ABNORMAL
URINE TYPE: ABNORMAL
UROBILINOGEN, URINE: 0.2 E.U./DL
WBC # BLD: 15.8 K/UL (ref 4–11)
WBC UA: 0 /HPF (ref 0–5)

## 2022-02-24 PROCEDURE — 96366 THER/PROPH/DIAG IV INF ADDON: CPT

## 2022-02-24 PROCEDURE — 6360000002 HC RX W HCPCS: Performed by: INTERNAL MEDICINE

## 2022-02-24 PROCEDURE — 93005 ELECTROCARDIOGRAM TRACING: CPT | Performed by: EMERGENCY MEDICINE

## 2022-02-24 PROCEDURE — 6370000000 HC RX 637 (ALT 250 FOR IP): Performed by: INTERNAL MEDICINE

## 2022-02-24 PROCEDURE — 84484 ASSAY OF TROPONIN QUANT: CPT

## 2022-02-24 PROCEDURE — G0378 HOSPITAL OBSERVATION PER HR: HCPCS

## 2022-02-24 PROCEDURE — 82010 KETONE BODYS QUAN: CPT

## 2022-02-24 PROCEDURE — 2580000003 HC RX 258: Performed by: EMERGENCY MEDICINE

## 2022-02-24 PROCEDURE — 96375 TX/PRO/DX INJ NEW DRUG ADDON: CPT

## 2022-02-24 PROCEDURE — 96361 HYDRATE IV INFUSION ADD-ON: CPT

## 2022-02-24 PROCEDURE — 6360000002 HC RX W HCPCS: Performed by: EMERGENCY MEDICINE

## 2022-02-24 PROCEDURE — 80053 COMPREHEN METABOLIC PANEL: CPT

## 2022-02-24 PROCEDURE — 81001 URINALYSIS AUTO W/SCOPE: CPT

## 2022-02-24 PROCEDURE — 83690 ASSAY OF LIPASE: CPT

## 2022-02-24 PROCEDURE — 6360000002 HC RX W HCPCS

## 2022-02-24 PROCEDURE — 1200000000 HC SEMI PRIVATE

## 2022-02-24 PROCEDURE — 96365 THER/PROPH/DIAG IV INF INIT: CPT

## 2022-02-24 PROCEDURE — 96376 TX/PRO/DX INJ SAME DRUG ADON: CPT

## 2022-02-24 PROCEDURE — 82803 BLOOD GASES ANY COMBINATION: CPT

## 2022-02-24 PROCEDURE — 2580000003 HC RX 258: Performed by: INTERNAL MEDICINE

## 2022-02-24 PROCEDURE — 85025 COMPLETE CBC W/AUTO DIFF WBC: CPT

## 2022-02-24 PROCEDURE — 36415 COLL VENOUS BLD VENIPUNCTURE: CPT

## 2022-02-24 PROCEDURE — 99284 EMERGENCY DEPT VISIT MOD MDM: CPT

## 2022-02-24 PROCEDURE — 96372 THER/PROPH/DIAG INJ SC/IM: CPT

## 2022-02-24 PROCEDURE — 93010 ELECTROCARDIOGRAM REPORT: CPT | Performed by: INTERNAL MEDICINE

## 2022-02-24 PROCEDURE — 2500000003 HC RX 250 WO HCPCS: Performed by: INTERNAL MEDICINE

## 2022-02-24 RX ORDER — 0.9 % SODIUM CHLORIDE 0.9 %
1000 INTRAVENOUS SOLUTION INTRAVENOUS ONCE
Status: COMPLETED | OUTPATIENT
Start: 2022-02-24 | End: 2022-02-24

## 2022-02-24 RX ORDER — ONDANSETRON 2 MG/ML
4 INJECTION INTRAMUSCULAR; INTRAVENOUS EVERY 6 HOURS PRN
Status: DISCONTINUED | OUTPATIENT
Start: 2022-02-24 | End: 2022-02-25 | Stop reason: HOSPADM

## 2022-02-24 RX ORDER — PANTOPRAZOLE SODIUM 40 MG/1
40 TABLET, DELAYED RELEASE ORAL
Status: DISCONTINUED | OUTPATIENT
Start: 2022-02-25 | End: 2022-02-25 | Stop reason: HOSPADM

## 2022-02-24 RX ORDER — POTASSIUM CHLORIDE 7.45 MG/ML
10 INJECTION INTRAVENOUS PRN
Status: DISCONTINUED | OUTPATIENT
Start: 2022-02-24 | End: 2022-02-24

## 2022-02-24 RX ORDER — SODIUM CHLORIDE 9 MG/ML
25 INJECTION, SOLUTION INTRAVENOUS PRN
Status: DISCONTINUED | OUTPATIENT
Start: 2022-02-24 | End: 2022-02-25 | Stop reason: HOSPADM

## 2022-02-24 RX ORDER — OMEPRAZOLE 40 MG/1
40 CAPSULE, DELAYED RELEASE ORAL DAILY
COMMUNITY

## 2022-02-24 RX ORDER — LISINOPRIL 10 MG/1
10 TABLET ORAL DAILY
Status: DISCONTINUED | OUTPATIENT
Start: 2022-02-24 | End: 2022-02-25 | Stop reason: HOSPADM

## 2022-02-24 RX ORDER — NICOTINE POLACRILEX 4 MG
15 LOZENGE BUCCAL PRN
Status: DISCONTINUED | OUTPATIENT
Start: 2022-02-24 | End: 2022-02-25 | Stop reason: HOSPADM

## 2022-02-24 RX ORDER — MAGNESIUM SULFATE IN WATER 40 MG/ML
2000 INJECTION, SOLUTION INTRAVENOUS PRN
Status: DISCONTINUED | OUTPATIENT
Start: 2022-02-24 | End: 2022-02-25 | Stop reason: HOSPADM

## 2022-02-24 RX ORDER — PROCHLORPERAZINE EDISYLATE 5 MG/ML
10 INJECTION INTRAMUSCULAR; INTRAVENOUS EVERY 6 HOURS PRN
Status: DISCONTINUED | OUTPATIENT
Start: 2022-02-24 | End: 2022-02-24

## 2022-02-24 RX ORDER — POTASSIUM CHLORIDE 7.45 MG/ML
10 INJECTION INTRAVENOUS PRN
Status: DISCONTINUED | OUTPATIENT
Start: 2022-02-24 | End: 2022-02-25 | Stop reason: HOSPADM

## 2022-02-24 RX ORDER — EZETIMIBE 10 MG/1
10 TABLET ORAL NIGHTLY
Status: DISCONTINUED | OUTPATIENT
Start: 2022-02-24 | End: 2022-02-25 | Stop reason: HOSPADM

## 2022-02-24 RX ORDER — DIPHENHYDRAMINE HYDROCHLORIDE 50 MG/ML
INJECTION INTRAMUSCULAR; INTRAVENOUS
Status: COMPLETED
Start: 2022-02-24 | End: 2022-02-24

## 2022-02-24 RX ORDER — AMLODIPINE BESYLATE 5 MG/1
5 TABLET ORAL DAILY
Status: DISCONTINUED | OUTPATIENT
Start: 2022-02-24 | End: 2022-02-25 | Stop reason: HOSPADM

## 2022-02-24 RX ORDER — DEXTROSE MONOHYDRATE 50 MG/ML
100 INJECTION, SOLUTION INTRAVENOUS PRN
Status: DISCONTINUED | OUTPATIENT
Start: 2022-02-24 | End: 2022-02-25 | Stop reason: HOSPADM

## 2022-02-24 RX ORDER — ONDANSETRON 2 MG/ML
4 INJECTION INTRAMUSCULAR; INTRAVENOUS ONCE
Status: COMPLETED | OUTPATIENT
Start: 2022-02-24 | End: 2022-02-24

## 2022-02-24 RX ORDER — SODIUM CHLORIDE 0.9 % (FLUSH) 0.9 %
10 SYRINGE (ML) INJECTION PRN
Status: DISCONTINUED | OUTPATIENT
Start: 2022-02-24 | End: 2022-02-25 | Stop reason: HOSPADM

## 2022-02-24 RX ORDER — SODIUM CHLORIDE 0.9 % (FLUSH) 0.9 %
10 SYRINGE (ML) INJECTION EVERY 12 HOURS SCHEDULED
Status: DISCONTINUED | OUTPATIENT
Start: 2022-02-24 | End: 2022-02-25 | Stop reason: HOSPADM

## 2022-02-24 RX ORDER — SODIUM CHLORIDE 9 MG/ML
INJECTION, SOLUTION INTRAVENOUS CONTINUOUS
Status: DISCONTINUED | OUTPATIENT
Start: 2022-02-24 | End: 2022-02-25 | Stop reason: HOSPADM

## 2022-02-24 RX ORDER — ACETAMINOPHEN 325 MG/1
650 TABLET ORAL EVERY 6 HOURS PRN
Status: DISCONTINUED | OUTPATIENT
Start: 2022-02-24 | End: 2022-02-25 | Stop reason: HOSPADM

## 2022-02-24 RX ORDER — ASPIRIN 81 MG/1
81 TABLET, CHEWABLE ORAL DAILY
Status: DISCONTINUED | OUTPATIENT
Start: 2022-02-24 | End: 2022-02-25 | Stop reason: HOSPADM

## 2022-02-24 RX ORDER — PROMETHAZINE HYDROCHLORIDE 25 MG/1
12.5 TABLET ORAL EVERY 6 HOURS PRN
Status: DISCONTINUED | OUTPATIENT
Start: 2022-02-24 | End: 2022-02-25 | Stop reason: HOSPADM

## 2022-02-24 RX ORDER — POTASSIUM CHLORIDE 20 MEQ/1
40 TABLET, EXTENDED RELEASE ORAL PRN
Status: DISCONTINUED | OUTPATIENT
Start: 2022-02-24 | End: 2022-02-25 | Stop reason: HOSPADM

## 2022-02-24 RX ORDER — ERGOCALCIFEROL 1.25 MG/1
50000 CAPSULE ORAL WEEKLY
Status: DISCONTINUED | OUTPATIENT
Start: 2022-03-01 | End: 2022-02-25 | Stop reason: HOSPADM

## 2022-02-24 RX ORDER — DEXTROSE MONOHYDRATE 25 G/50ML
12.5 INJECTION, SOLUTION INTRAVENOUS PRN
Status: DISCONTINUED | OUTPATIENT
Start: 2022-02-24 | End: 2022-02-25 | Stop reason: HOSPADM

## 2022-02-24 RX ORDER — LABETALOL HYDROCHLORIDE 5 MG/ML
20 INJECTION, SOLUTION INTRAVENOUS EVERY 4 HOURS PRN
Status: DISCONTINUED | OUTPATIENT
Start: 2022-02-24 | End: 2022-02-25 | Stop reason: HOSPADM

## 2022-02-24 RX ORDER — INSULIN GLARGINE 100 [IU]/ML
10 INJECTION, SOLUTION SUBCUTANEOUS NIGHTLY
Status: DISCONTINUED | OUTPATIENT
Start: 2022-02-24 | End: 2022-02-25 | Stop reason: HOSPADM

## 2022-02-24 RX ORDER — ALBUTEROL SULFATE 90 UG/1
2 AEROSOL, METERED RESPIRATORY (INHALATION) EVERY 4 HOURS PRN
Status: DISCONTINUED | OUTPATIENT
Start: 2022-02-24 | End: 2022-02-25 | Stop reason: HOSPADM

## 2022-02-24 RX ORDER — ACETAMINOPHEN 650 MG/1
650 SUPPOSITORY RECTAL EVERY 6 HOURS PRN
Status: DISCONTINUED | OUTPATIENT
Start: 2022-02-24 | End: 2022-02-25 | Stop reason: HOSPADM

## 2022-02-24 RX ORDER — DIPHENHYDRAMINE HYDROCHLORIDE 50 MG/ML
25 INJECTION INTRAMUSCULAR; INTRAVENOUS ONCE
Status: COMPLETED | OUTPATIENT
Start: 2022-02-24 | End: 2022-02-24

## 2022-02-24 RX ADMIN — ENOXAPARIN SODIUM 40 MG: 100 INJECTION SUBCUTANEOUS at 21:14

## 2022-02-24 RX ADMIN — LISINOPRIL 10 MG: 10 TABLET ORAL at 21:14

## 2022-02-24 RX ADMIN — INSULIN GLARGINE 10 UNITS: 100 INJECTION, SOLUTION SUBCUTANEOUS at 21:15

## 2022-02-24 RX ADMIN — ONDANSETRON 4 MG: 2 INJECTION INTRAMUSCULAR; INTRAVENOUS at 12:51

## 2022-02-24 RX ADMIN — DIPHENHYDRAMINE HYDROCHLORIDE 25 MG: 50 INJECTION INTRAMUSCULAR; INTRAVENOUS at 14:20

## 2022-02-24 RX ADMIN — EZETIMIBE 10 MG: 10 TABLET ORAL at 21:14

## 2022-02-24 RX ADMIN — LABETALOL HYDROCHLORIDE 20 MG: 5 INJECTION INTRAVENOUS at 19:13

## 2022-02-24 RX ADMIN — DIPHENHYDRAMINE HYDROCHLORIDE 25 MG: 50 INJECTION, SOLUTION INTRAMUSCULAR; INTRAVENOUS at 14:20

## 2022-02-24 RX ADMIN — ONDANSETRON 4 MG: 2 INJECTION INTRAMUSCULAR; INTRAVENOUS at 20:29

## 2022-02-24 RX ADMIN — AMLODIPINE BESYLATE 5 MG: 5 TABLET ORAL at 21:14

## 2022-02-24 RX ADMIN — Medication 12.5 MG: at 21:13

## 2022-02-24 RX ADMIN — SODIUM CHLORIDE 1000 ML: 9 INJECTION, SOLUTION INTRAVENOUS at 14:18

## 2022-02-24 RX ADMIN — Medication 12.5 MG: at 17:03

## 2022-02-24 RX ADMIN — INSULIN LISPRO 3 UNITS: 100 INJECTION, SOLUTION INTRAVENOUS; SUBCUTANEOUS at 21:15

## 2022-02-24 RX ADMIN — Medication 10 ML: at 20:30

## 2022-02-24 RX ADMIN — SODIUM CHLORIDE 1000 ML: 9 INJECTION, SOLUTION INTRAVENOUS at 12:48

## 2022-02-24 RX ADMIN — SODIUM CHLORIDE: 9 INJECTION, SOLUTION INTRAVENOUS at 20:29

## 2022-02-24 RX ADMIN — ASPIRIN 81 MG 81 MG: 81 TABLET ORAL at 21:14

## 2022-02-24 ASSESSMENT — ENCOUNTER SYMPTOMS
EYE REDNESS: 0
VOMITING: 1
NAUSEA: 1
ABDOMINAL PAIN: 1
COUGH: 0
RHINORRHEA: 0
SHORTNESS OF BREATH: 0
SORE THROAT: 0

## 2022-02-24 ASSESSMENT — PAIN SCALES - GENERAL: PAINLEVEL_OUTOF10: 0

## 2022-02-24 NOTE — ED NOTES
Pt arrived to the ED via EMS, pt states that she has been vomiting since last evening, pt states that she has been feeling nauseated for one month or so, pt is alert and orient, vss afebrile      Brayden Melgoza RN  02/24/22 1500

## 2022-02-24 NOTE — ED NOTES
Pt resting in bed at this time, laying in a supine position with head of bed elevated . Call light remains in reach instructed pt how to use, and encouraged pt to call if needed assistance, no distress noted. RR even and unlabored, skin warm and dry. No needs at this time. Will continue to monitor closely.          Misael Doan RN  02/24/22 7812

## 2022-02-24 NOTE — ED PROVIDER NOTES
eMERGENCY dEPARTMENT eNCOUnter      Pt Name: Thor Michel  MRN: [id number]  [place] 1960  Date of evaluation: 2/24/2022  Provider: Quincy Avelar MD    CHIEF COMPLAINT       Chief Complaint   Patient presents with    Hypertension         HISTORY OF PRESENT ILLNESS   (Location/Symptom, Timing/Onset,Context/Setting, Quality, Duration, Modifying Factors, Severity)  Note limiting factors. Thor Michel is a 62 y.o. female who presents to the emergency department presents complaining of nausea and vomiting. This patient has a past medical history of chronic low back pain with sciatica, depression, acid reflux, anxiety, gastroparesis, hyperlipidemia, hypertension, pancreatitis. Patient states that she has been having nausea on and off all week and then last night started developing vomiting and has been unable to stop her vomiting despite her home medications. In route EMS gave the patient an IM dose of nausea medicine. The patient's glucose was measured and was in the mid 200s. HPI    NursingNotes were reviewed. REVIEW OF SYSTEMS    (2-9 systems for level 4, 10 or more for level 5)     Review of Systems   Constitutional: Negative for chills and fever. HENT: Negative for rhinorrhea and sore throat. Eyes: Negative for redness. Respiratory: Negative for cough and shortness of breath. Cardiovascular: Negative for chest pain. Gastrointestinal: Positive for abdominal pain, nausea and vomiting. Genitourinary: Negative for flank pain. Musculoskeletal: Negative for joint swelling. Skin: Negative for rash. Neurological: Negative for headaches. Hematological: Negative for adenopathy. Psychiatric/Behavioral: Negative for confusion. Except as noted above the remainder of the review of systems was reviewed and negative.        PAST MEDICAL HISTORY     Past Medical History:   Diagnosis Date    Acid reflux     SHAY (acute kidney injury) (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    Gastroparesis     Hirsutism     History of blood transfusion     Hyperlipidemia LDL goal < 70     Hypertension     Major depressive disorder with single episode 2009    Pancreatitis     Seasonal allergic rhinitis due to pollen 11/10/2016    Sleep apnea     Thyroid disease     Vitamin D deficiency          SURGICALHISTORY       Past Surgical History:   Procedure Laterality Date     SECTION      ENDOMETRIAL ABLATION  5/3/12    ENDOSCOPY, COLON, DIAGNOSTIC      HYSTEROSCOPY  5/3/12    MANDIBLE SURGERY      SKIN BIOPSY      TUBAL LIGATION      UPPER GASTROINTESTINAL ENDOSCOPY N/A 2021    EGD BIOPSY performed by Lenin Romano MD at [facility] Medication List as of 2022  6:34 PM      CONTINUE these medications which have NOT CHANGED    Details   omeprazole (PRILOSEC) 40 MG delayed release capsule Take 40 mg by mouth dailyHistorical Med      insulin glargine (BASAGLAR KWIKPEN) 100 UNIT/ML injection pen Inject 10 Units into the skin nightly, Disp-5 pen, R-0Normal      Papaya Enzyme CHEW Take 3-4 tablets by mouth daily as needed (Indigestion)Historical Med      lisinopril (PRINIVIL;ZESTRIL) 10 MG tablet Take 10 mg by mouth dailyHistorical Med      ezetimibe (ZETIA) 10 MG tablet Take 10 mg by mouth dailyHistorical Med      amLODIPine (NORVASC) 5 MG tablet Take 5 mg by mouth dailyHistorical Med      insulin lispro (HUMALOG) 100 UNIT/ML injection vial Inject into the skin 3 times daily (before meals) Per sliding scale.  2 units >150 then additional 2 units for every 50Historical Med      aspirin 81 MG tablet Take 81 mg by mouth dailyHistorical Med      acetaminophen (APAP EXTRA STRENGTH) 500 MG tablet Take 1 tablet by mouth every 6 hours as needed for Pain, Disp-60 tablet, R-0      timolol (TIMOPTIC) 0.5 % ophthalmic solution Place 1 drop into both eyes daily       ergocalciferol (DRISDOL) 30567 UNITS capsule Take 1 capsule by mouth once a week, Disp-13 capsule, R-1      albuterol sulfate (PROAIR RESPICLICK) 158 (90 BASE) MCG/ACT aerosol powder inhalation Inhale 2 puffs into the lungs every 4 hours as needed for Wheezing or Shortness of Breath, Disp-1 Inhaler, R-5      latanoprost (XALATAN) 0.005 % ophthalmic solution Place 1 drop into both eyes nightly. Insulin Syringe-Needle U-100 (INSULIN SYRINGE .5CC/31GX5/16\") 31G X 5/16\" 0.5 ML MISC DAILY Starting 4/14/2017, Until Discontinued, Disp-100 Syringe, R-3, Normal      Insulin Pen Needle 31G X 5 MM MISC Disp-450 each, R-3, NormalPt uses three times a day      glucose blood VI test strips (EXACTECH TEST) strip Disp-400 each, R-3, Normal4 times a day As needed.       Lancets MISC Disp-100 each, R-3, Normal3-4 times a day             ALLERGIES     Avelox [moxifloxacin], Bactrim, Cefuroxime, Codeine, Medrol [methylprednisolone], Morphine, Niacin, Oat, Percocet [oxycodone-acetaminophen], Reglan [metoclopramide], Spironolactone, Statins, Sulfamethoxazole-trimethoprim, and Vicodin [hydrocodone-acetaminophen]    FAMILY HISTORY       Family History   Problem Relation Age of Onset    Hypertension Mother     Breast Cancer Mother     Colon Cancer Mother     Cancer Father         Pancreatic    Prostate Cancer Father     No Known Problems Brother     No Known Problems Sister     Hypertension Maternal Aunt     Cancer Maternal Uncle     Hypertension Maternal Grandmother     Stroke Maternal Grandmother     Cancer Maternal Grandfather     No Known Problems Paternal Aunt     No Known Problems Paternal Uncle     No Known Problems Paternal Grandmother     No Known Problems Paternal Grandfather     No Known Problems Other     Rheum Arthritis Neg Hx     Osteoarthritis Neg Hx     Asthma Neg Hx     Diabetes Neg Hx     Heart Failure Neg Hx     High Cholesterol Neg Hx     Migraines Neg Hx     Ovarian Cancer Neg Hx     Rashes/Skin Problems Neg Hx     Seizures Neg Hx     Thyroid Disease Neg Hx           SOCIAL HISTORY       Social History     Socioeconomic History    Marital status:      Spouse name: None    Number of children: 3    Years of education: None    Highest education level: None   Occupational History    Occupation:    Tobacco Use    Smoking status: Former Smoker     Types: Cigarettes     Quit date: 1990     Years since quittin.1    Smokeless tobacco: Never Used   Vaping Use    Vaping Use: Never used   Substance and Sexual Activity    Alcohol use: Yes     Alcohol/week: 0.0 standard drinks     Comment: less than once a month    Drug use: Yes     Frequency: 2.0 times per week     Types: Marijuana Dolph Madelaine)     Comment: used yesterday    Sexual activity: Not Currently     Partners: Male   Other Topics Concern    None   Social History Narrative    None     Social Determinants of Health     Financial Resource Strain:     Difficulty of Paying Living Expenses: Not on file   Food Insecurity:     Worried About Running Out of Food in the Last Year: Not on file    Amelia of Food in the Last Year: Not on file   Transportation Needs:     Lack of Transportation (Medical): Not on file    Lack of Transportation (Non-Medical):  Not on file   Physical Activity:     Days of Exercise per Week: Not on file    Minutes of Exercise per Session: Not on file   Stress:     Feeling of Stress : Not on file   Social Connections:     Frequency of Communication with Friends and Family: Not on file    Frequency of Social Gatherings with Friends and Family: Not on file    Attends Worship Services: Not on file    Active Member of Clubs or Organizations: Not on file   [address] or Organization Meetings: Not on file    Marital Status: Not on file   Intimate Partner Violence:     Fear of Current or Ex-Partner: Not on file    Emotionally Abused: Not on file    Physically Abused: Not on file    Sexually Abused: Not on file   Housing Stability:     Unable to Pay for Housing in the Last Year: Not on file    Number of Elmer in the Last Year: Not on file    Unstable Housing in the Last Year: Not on file       SCREENINGS    [place] Coma Scale  Eye Opening: Spontaneous  Best Verbal Response: Oriented  Best Motor Response: Obeys commands  Caleb Coma Scale Score: 15        PHYSICAL EXAM    (up to 7 for level 4, 8 or more for level 5)     ED Triage Vitals   BP Temp Temp src Pulse Resp SpO2 Height Weight   -- -- -- -- -- -- -- --       Physical Exam  Vitals and nursing note reviewed. Constitutional:       Appearance: She is well-developed. She is diaphoretic. HENT:      Head: Normocephalic and atraumatic. Mouth/Throat:      Mouth: Mucous membranes are dry. Eyes:      General:         Right eye: No discharge. Left eye: No discharge. Conjunctiva/sclera: Conjunctivae normal.   Cardiovascular:      Rate and Rhythm: Tachycardia present. Heart sounds: No murmur heard. Pulmonary:      Effort: Pulmonary effort is normal. No respiratory distress. Breath sounds: Normal breath sounds. No wheezing, rhonchi or rales. Abdominal:      Comments: Epigastric tenderness palpation. No rebound or guarding. Musculoskeletal:         General: Normal range of motion. Skin:     General: Skin is warm. Capillary Refill: Capillary refill takes less than 2 seconds. Neurological:      Mental Status: She is alert and oriented to person, place, and time.    Psychiatric:         Behavior: Behavior normal.         DIAGNOSTIC RESULTS     EKG: All EKG's are interpreted by the Emergency Department Physician who either signs or Co-signsthis chart in the absence of a cardiologist.    The Ekg interpreted by me shows  sinus tachycardia, gwss=058   Axis is   Left axis deviation  QTc is  474 msec  Intervals and Durations are unremarkable.       ST Segments: normal  No significant change from prior EKG dated February 12, 2022          RADIOLOGY:   Non-plain filmimages such as CT, Ultrasound and MRI are read by the radiologist. Plain radiographic images are visualized and preliminarily interpreted by the emergency physician with the below findings:        Interpretation per the Radiologist below, if available at the time ofthis note:    No orders to display         ED BEDSIDE ULTRASOUND:   Performed by ED Physician - none    LABS:  Labs Reviewed   CBC WITH AUTO DIFFERENTIAL - Abnormal; Notable for the following components:       Result Value    WBC 15.8 (*)     RBC 5.70 (*)     MCV 79.9 (*)     MCH 25.8 (*)     RDW 15.9 (*)     Neutrophils Absolute 12.4 (*)     All other components within normal limits    Narrative:     Performed at:  [address] [employer] [address]   Phone [phone]   COMPREHENSIVE METABOLIC PANEL W/ REFLEX TO MG FOR LOW K - Abnormal; Notable for the following components:    Chloride 98 (*)     Anion Gap 18 (*)     Glucose 329 (*)     Alkaline Phosphatase 133 (*)     All other components within normal limits    Narrative:     Performed at:  [address] [employer] [address]   Phone [phone]   URINALYSIS WITH REFLEX TO CULTURE - Abnormal; Notable for the following components:    Glucose, Ur >=1000 (*)     Ketones, Urine TRACE (*)     Protein, UA 30 (*)     All other components within normal limits    Narrative:     Performed at:  [address] [employer] [address]   Phone [phone]   BLOOD GAS, VENOUS - Abnormal; Notable for the following components:    pCO2, Bernard 51.0 (*) All other components within normal limits    Narrative:     Performed at:  [facility]  [address] [place] [facility] [employer] [address]   Phone [phone]   BETA-HYDROXYBUTYRATE - Abnormal; Notable for the following components:    Beta-Hydroxybutyrate 0.39 (*)     All other components within normal limits    Narrative:     Performed at:  [address] [facility] [employer] [address]   Phone [phone]   BASIC METABOLIC PANEL W/ REFLEX TO MG FOR LOW K - Abnormal; Notable for the following components:    Sodium 131 (*)     Chloride 93 (*)     Glucose 254 (*)     All other components within normal limits    Narrative:     Performed at:  [address] [employer] [address]   Phone [phone]   POCT GLUCOSE - Abnormal; Notable for the following components:    POC Glucose 284 (*)     All other components within normal limits    Narrative:     Performed at:  [address] [facility] [employer] [address]   Phone [phone]   POCT GLUCOSE - Abnormal; Notable for the following components:    POC Glucose 213 (*)     All other components within normal limits    Narrative:     Performed at:  [facility]  [address] [facility] [employer] [address]   Phone [phone]   POCT GLUCOSE - Abnormal; Notable for the following components:    POC Glucose 163 (*)     All other components within normal limits    Narrative:     Performed at:  [address] [place] [address]   Phone [phone]   POCT GLUCOSE - Abnormal; Notable for the following components:    POC Glucose 168 (*)     All other components within normal limits    Narrative:     Performed at:  [address] [place] [address]   Phone [phone] LIPASE    Narrative:     Performed at:  [facility]  [address] [facility] [address] [place] [place] [address]   Phone [phone]   TROPONIN    Narrative:     Performed at:  [facility]  [address] [facility] [place] [address]   Phone [phone]   MICROSCOPIC URINALYSIS    Narrative:     Performed at:  [facility]  [address] [place] [address]   Phone [phone]   HEMOGLOBIN A1C    Narrative:     Performed at:  [facility]  [address] [facility] [place] [address]   Phone [phone]   BASIC METABOLIC PANEL W/ REFLEX TO MG FOR LOW K   POCT GLUCOSE   POCT GLUCOSE   POCT GLUCOSE   POCT GLUCOSE   POCT GLUCOSE   POCT GLUCOSE       All other labs were within normal range or not returned as of this dictation. EMERGENCY DEPARTMENT COURSE and DIFFERENTIAL DIAGNOSIS/MDM:   Vitals:    Vitals:    02/25/22 0045 02/25/22 0635 02/25/22 0745 02/25/22 1505   BP: 112/71 107/71 128/80 100/62   Pulse: 89 84 77 72   Resp: 18 20 18 16   Temp: 98.4 °F (36.9 °C) 97.7 °F (36.5 °C) 98.8 °F (37.1 °C) 98.1 °F (36.7 °C)   TempSrc: Oral Oral Oral Oral   SpO2: 97% 98% 99% 97%   Weight:  223 lb 6.4 oz (101.3 kg)             MDM   DDX: DKA, gastroparesis, dehydration, atypical presentation of myocardial ischemia. The patient received 2 L of normal saline. While she had a mild elevation in her beta hydroxybutyrate, she has a CO2 of 22 without acidosis on ABG. I do not feel that this patient is in DKA. Her troponin is unremarkable. The patient was feeling improved but not back to baseline. At 3:30 PM I turned this patient over to Dr. Jan Medrano. CRITICAL CARE TIME   Total Critical Care time was 0 minutes, excluding separately reportable procedures.   There was a high probability of clinically significant/life threatening deterioration in the patient's condition which required my urgent intervention. CONSULTS:  IP CONSULT TO HOSPITALIST    PROCEDURES:  Unless otherwise noted below, none     Procedures    FINAL IMPRESSION      1. Intractable vomiting with nausea, unspecified vomiting type    2. Hyperglycemia    3.  Primary hypertension          DISPOSITION/PLAN   DISPOSITION Admitted 02/24/2022 06:49:16 PM      PATIENT REFERRED TO:  CEASAR Kaye  [address]  [phone]    In 1 week        DISCHARGE MEDICATIONS:  Discharge Medication List as of 2/25/2022  6:34 PM      START taking these medications    Details   promethazine (PHENERGAN) 12.5 MG tablet Take 1 tablet by mouth 3 times daily as needed for Nausea, Disp-21 tablet, R-0Normal      polyethylene glycol (GLYCOLAX) 17 GM/SCOOP powder Take 17 g by mouth daily, Disp-1530 g, R-1Normal                (Please note that portions of this note were completed with a voice recognition program.Efforts were made to edit the dictations but occasionally words are mis-transcribed.)    Rober Jain MD (electronically signed)  Attending Emergency Physician          Rober Jain MD  02/25/22 2029

## 2022-02-24 NOTE — LETTER
[address]  Phone: [phone]             February 25, 2022    Patient: Cheryle Genera   YOB: 1960   Date of Visit: 2/24/2022       To Whom It May Concern:    Cheryle Genera was seen and treated in our facility  beginning 2/24/2022 until 2/25/2022. She may return to work on 2/28/2022.       Sincerely,       Camilo Alaniz RN         Signature:__________________________________

## 2022-02-24 NOTE — ED NOTES
Pt resting in bed at this time, laying in a supine position with head of bed elevated . Call light remains in reach instructed pt how to use, and encouraged pt to call if needed assistance, no distress noted. RR even and unlabored, skin warm and dry. No needs at this time. Will continue to monitor closely.          Janes Montes De [place] RN  02/24/22 1500

## 2022-02-24 NOTE — ED NOTES
Report called to  Hector Albert RN   To Room  7771  Cardiac monitor on during transfer  Pt's pain level   denies  VSS, Afebrile   IV site is clean, dry and intact, Normal saline locked              Adelina Ceja RN  02/24/22 4558

## 2022-02-24 NOTE — ED NOTES
Pt resting in bed at this time, laying in a supine position with head of bed elevated . Call light remains in reach instructed pt how to use, and encouraged pt to call if needed assistance, no distress noted. RR even and unlabored, skin warm and dry. No needs at this time. Will continue to monitor closely.          Misael Doan RN  02/24/22 5179

## 2022-02-24 NOTE — ED PROVIDER NOTES
Emergency Department Encounter  Location: [address] [place] [address]    Patient: Jaya Phillips  MRN: [id number]  : 1960  Date of evaluation: 2022  ED Provider: Karel Delgado MD    15:00p.m. Jaya Phillips was checked out to me by Dr. Emerson Story. Please see his/her initial documentation for details of the patient's initial ED presentation, physical exam and completed studies. In brief, Jaya Phillips is a 62 y.o. female that presented to the emergency department with recurrent nausea vomiting. Similar to previous episodes of patient has had. Patient has history of chronic low back pain, sciatica, depression, GERD, anxiety, gastroparesis, hyperlipidemia, hypertension, pancreatitis. Patient is having nausea and vomiting on and off all week. At this time patient has had multiple dose of IV antiemetics as well as was found to be hyperglycemic. Bicarb relatively normal.  Patient been given IV fluids. We will plan on reevaluate prior to possible discharge as patient is able to have some improvement in her symptoms and tolerate p.o.     I have reviewed and interpreted all of the currently available lab results and diagnostics from this visit:  Results for orders placed or performed during the hospital encounter of 22   CBC with Auto Differential   Result Value Ref Range    WBC 15.8 (H) 4.0 - 11.0 K/uL    RBC 5.70 (H) 4.00 - 5.20 M/uL    Hemoglobin 14.7 12.0 - 16.0 g/dL    Hematocrit 45.6 36.0 - 48.0 %    MCV 79.9 (L) 80.0 - 100.0 fL    MCH 25.8 (L) 26.0 - 34.0 pg    MCHC 32.3 31.0 - 36.0 g/dL    RDW 15.9 (H) 12.4 - 15.4 %    Platelets 303 443 - 547 K/uL    MPV 8.0 5.0 - 10.5 fL    Neutrophils % 78.8 %    Lymphocytes % 17.2 %    Monocytes % 3.3 %    Eosinophils % 0.3 %    Basophils % 0.4 %    Neutrophils Absolute 12.4 (H) 1.7 - 7.7 K/uL    Lymphocytes Absolute 2.7 1.0 - 5.1 K/uL    Monocytes Absolute 0.5 0.0 - 1.3 K/uL    Eosinophils Absolute 0.1 0.0 - 0.6 K/uL    Basophils Absolute 0.1 0.0 - 0.2 K/uL   Comprehensive Metabolic Panel w/ Reflex to MG   Result Value Ref Range    Sodium 138 136 - 145 mmol/L    Potassium reflex Magnesium 4.8 3.5 - 5.1 mmol/L    Chloride 98 (L) 99 - 110 mmol/L    CO2 22 21 - 32 mmol/L    Anion Gap 18 (H) 3 - 16    Glucose 329 (H) 70 - 99 mg/dL    BUN 8 7 - 20 mg/dL    CREATININE 0.7 0.6 - 1.2 mg/dL    GFR Non-African American >60 >60    GFR African American >60 >60    Calcium 9.6 8.3 - 10.6 mg/dL    Total Protein 8.0 6.4 - 8.2 g/dL    Albumin 4.3 3.4 - 5.0 g/dL    Albumin/Globulin Ratio 1.2 1.1 - 2.2    Total Bilirubin 0.3 0.0 - 1.0 mg/dL    Alkaline Phosphatase 133 (H) 40 - 129 U/L    ALT 15 10 - 40 U/L    AST 19 15 - 37 U/L   Lipase   Result Value Ref Range    Lipase 54.0 13.0 - 60.0 U/L   Troponin   Result Value Ref Range    Troponin <0.01 <0.01 ng/mL   Urinalysis with Reflex to Culture    Specimen: Urine   Result Value Ref Range    Color, UA YELLOW Straw/Yellow    Clarity, UA Clear Clear    Glucose, Ur >=1000 (A) Negative mg/dL    Bilirubin Urine Negative Negative    Ketones, Urine TRACE (A) Negative mg/dL    Specific Gravity, UA 1.015 1.005 - 1.030    Blood, Urine Negative Negative    pH, UA 7.0 5.0 - 8.0    Protein, UA 30 (A) Negative mg/dL    Urobilinogen, Urine 0.2 <2.0 E.U./dL    Nitrite, Urine Negative Negative    Leukocyte Esterase, Urine Negative Negative    Microscopic Examination YES     Urine Type Other     Urine Reflex to Culture Not Indicated    Blood Gas, Venous   Result Value Ref Range    pH, Bernard 7.352 7.350 - 7.450    pCO2, Bernard 51.0 (H) 40.0 - 50.0 mmHg    pO2, Bernard 45 Not Established mmHg    HCO3, Venous 28 23 - 29 mmol/L    Base Excess, Bernard 1.6 Not Established mmol/L    O2 Sat, Bernard 77 Not Established %    Carboxyhemoglobin 1.5 %    MetHgb, Bernard 0.5 <1.5 %    TC02 (Calc), Bernard 30 Not Established mmol/L    O2 Therapy Unknown    Beta-Hydroxybutyrate   Result Value Ref Range    Beta-Hydroxybutyrate 0.39 (H) 0.00 - 0.27 mmol/L Microscopic Urinalysis   Result Value Ref Range    Hyaline Casts, UA 0 0 - 8 /LPF    WBC, UA 0 0 - 5 /HPF    RBC, UA 3 0 - 4 /HPF    Epithelial Cells, UA 1 0 - 5 /HPF   EKG 12 Lead   Result Value Ref Range    Ventricular Rate 102 BPM    Atrial Rate 102 BPM    P-R Interval 148 ms    QRS Duration 86 ms    Q-T Interval 364 ms    QTc Calculation (Bazett) 474 ms    P Axis 49 degrees    R Axis 5 degrees    T Axis 62 degrees    Diagnosis       Sinus tachycardiaOtherwise normal ECGWhen compared with ECG of 12-FEB-2022 09:58,No significant change was found     No results found. Final ED Course and MDM: In brief, Phillip Cunha is a 62 y.o. female whose care was signed out to me by the outgoing provider. Update 4:13 PM  Patient still vomiting. Will give 1 other dose of antiemetic will attempt Phenergan and if that does not work plan on admission. Patient is in agreement with plan. Update 5:38 PM  Patient with persistent vomiting not tolerating p.o. Not improved after Phenergan. Will admit for further work-up.     ED Medication Orders (From admission, onward)    Start Ordered     Status Ordering Provider    02/24/22 1630 02/24/22 1628  promethazine (PHENERGAN) 12.5mg in sodium chloride 0.9% 50 mL IVPB 12.5 mg  ONCE         Last MAR action: New Bag - by Yonathan MURRAY on 02/24/22 at [address]    02/24/22 1430 02/24/22 1403  diphenhydrAMINE (BENADRYL) injection 25 mg  ONCE         Last MAR action: Given - by Yonathan MURRAY on 02/24/22 at [address]    02/24/22 1403 02/24/22 1403  prochlorperazine (COMPAZINE) injection 10 mg  EVERY 6 HOURS PRN         Acknowledged Regina NEELY    02/24/22 1400 02/24/22 1356  0.9 % sodium chloride bolus  ONCE         Last MAR action: Stopped - by Yonathan MURRAY on 02/24/22 at [address]    02/24/22 1230 02/24/22 1227  0.9 % sodium chloride bolus  ONCE         Last MAR action: Stopped - by Giovani Sorensen A on 02/24/22 at [address]    02/24/22 1230 02/24/22 1227  ondansetron (ZOFRAN) injection 4 mg  ONCE         Last MAR action: Given - by Dalila MURRAY on 02/24/22 at [address] [facility] [place]          Final Impression      1. Intractable vomiting with nausea, unspecified vomiting type    2. Hyperglycemia    3.  Primary hypertension        DISPOSITION       (Please note that portions of this note may have been completed with a voice recognition program. Efforts were made to edit the dictations but occasionally words are mis-transcribed.)    Denson Bernheim, MD  [facility], MD  02/24/22 2355

## 2022-02-24 NOTE — ED NOTES
Pt resting in bed at this time, laying in a supine position with head of bed elevated . Call light remains in reach instructed pt how to use, and encouraged pt to call if needed assistance, no distress noted. RR even and unlabored, skin warm and dry. No needs at this time. Will continue to monitor closely.          Jony Mg RN  02/24/22 [id number]

## 2022-02-25 VITALS
RESPIRATION RATE: 16 BRPM | DIASTOLIC BLOOD PRESSURE: 62 MMHG | SYSTOLIC BLOOD PRESSURE: 100 MMHG | HEART RATE: 72 BPM | WEIGHT: 223.4 LBS | BODY MASS INDEX: 34.99 KG/M2 | OXYGEN SATURATION: 97 % | TEMPERATURE: 98.1 F

## 2022-02-25 LAB
ANION GAP SERPL CALCULATED.3IONS-SCNC: 16 MMOL/L (ref 3–16)
BUN BLDV-MCNC: 9 MG/DL (ref 7–20)
CALCIUM SERPL-MCNC: 9.1 MG/DL (ref 8.3–10.6)
CHLORIDE BLD-SCNC: 93 MMOL/L (ref 99–110)
CO2: 22 MMOL/L (ref 21–32)
CREAT SERPL-MCNC: 0.9 MG/DL (ref 0.6–1.2)
ESTIMATED AVERAGE GLUCOSE: 231.7 MG/DL
GFR AFRICAN AMERICAN: >60
GFR NON-AFRICAN AMERICAN: >60
GLUCOSE BLD-MCNC: 163 MG/DL (ref 70–99)
GLUCOSE BLD-MCNC: 168 MG/DL (ref 70–99)
GLUCOSE BLD-MCNC: 213 MG/DL (ref 70–99)
GLUCOSE BLD-MCNC: 254 MG/DL (ref 70–99)
HBA1C MFR BLD: 9.7 %
PERFORMED ON: ABNORMAL
POTASSIUM REFLEX MAGNESIUM: 4 MMOL/L (ref 3.5–5.1)
SODIUM BLD-SCNC: 131 MMOL/L (ref 136–145)

## 2022-02-25 PROCEDURE — 94761 N-INVAS EAR/PLS OXIMETRY MLT: CPT

## 2022-02-25 PROCEDURE — 36415 COLL VENOUS BLD VENIPUNCTURE: CPT

## 2022-02-25 PROCEDURE — 83036 HEMOGLOBIN GLYCOSYLATED A1C: CPT

## 2022-02-25 PROCEDURE — G0378 HOSPITAL OBSERVATION PER HR: HCPCS

## 2022-02-25 PROCEDURE — 6370000000 HC RX 637 (ALT 250 FOR IP): Performed by: INTERNAL MEDICINE

## 2022-02-25 PROCEDURE — 2580000003 HC RX 258: Performed by: INTERNAL MEDICINE

## 2022-02-25 PROCEDURE — 80048 BASIC METABOLIC PNL TOTAL CA: CPT

## 2022-02-25 RX ORDER — PROMETHAZINE HYDROCHLORIDE 12.5 MG/1
12.5 TABLET ORAL 3 TIMES DAILY PRN
Qty: 21 TABLET | Refills: 0 | Status: SHIPPED | OUTPATIENT
Start: 2022-02-25 | End: 2022-03-04

## 2022-02-25 RX ORDER — POLYETHYLENE GLYCOL 3350 17 G/17G
17 POWDER, FOR SOLUTION ORAL DAILY
Qty: 1530 G | Refills: 1 | Status: SHIPPED | OUTPATIENT
Start: 2022-02-25 | End: 2022-03-27

## 2022-02-25 RX ADMIN — INSULIN LISPRO 2 UNITS: 100 INJECTION, SOLUTION INTRAVENOUS; SUBCUTANEOUS at 12:13

## 2022-02-25 RX ADMIN — PANTOPRAZOLE SODIUM 40 MG: 40 TABLET, DELAYED RELEASE ORAL at 15:58

## 2022-02-25 RX ADMIN — LISINOPRIL 10 MG: 10 TABLET ORAL at 08:51

## 2022-02-25 RX ADMIN — PANTOPRAZOLE SODIUM 40 MG: 40 TABLET, DELAYED RELEASE ORAL at 08:51

## 2022-02-25 RX ADMIN — INSULIN LISPRO 5 UNITS: 100 INJECTION, SOLUTION INTRAVENOUS; SUBCUTANEOUS at 17:32

## 2022-02-25 RX ADMIN — INSULIN LISPRO 4 UNITS: 100 INJECTION, SOLUTION INTRAVENOUS; SUBCUTANEOUS at 08:52

## 2022-02-25 RX ADMIN — SODIUM CHLORIDE: 9 INJECTION, SOLUTION INTRAVENOUS at 10:17

## 2022-02-25 RX ADMIN — ASPIRIN 81 MG 81 MG: 81 TABLET ORAL at 08:51

## 2022-02-25 RX ADMIN — Medication 10 ML: at 10:13

## 2022-02-25 RX ADMIN — AMLODIPINE BESYLATE 5 MG: 5 TABLET ORAL at 08:51

## 2022-02-25 ASSESSMENT — PAIN SCALES - GENERAL: PAINLEVEL_OUTOF10: 0

## 2022-02-25 NOTE — H&P
Hospital Medicine History & Physical      PCP: CEASAR Capellan    Date of Admission: 2022    Chief Complaint:  Nausea, vomiting    History Of Present Illness:  Patient is a 67-year-old female with past medical history of diabetes mellitus, hypertension who presents to the hospital for recurrent nausea vomiting. Patient has previous history of gastroparesis. .  Patient mentions she has been having abdominal pain, nausea vomiting and has not been able to hold down any food, medications. Patient otherwise denied chest pain diarrhea constipation dysuria fevers or chills. 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    Gastroparesis     Hirsutism     History of blood transfusion     Hyperlipidemia LDL goal < 70     Hypertension     Major depressive disorder with single episode 2009    Pancreatitis     Seasonal allergic rhinitis due to pollen 11/10/2016    Sleep apnea     Thyroid disease     Vitamin D deficiency        Past Surgical History:          Procedure Laterality Date     SECTION      ENDOMETRIAL ABLATION  5/3/12    ENDOSCOPY, COLON, DIAGNOSTIC      HYSTEROSCOPY  5/3/12    MANDIBLE SURGERY      SKIN BIOPSY      TUBAL LIGATION      UPPER GASTROINTESTINAL ENDOSCOPY N/A 2021    EGD BIOPSY performed by Arlette Winkler MD at [facility]       Medications Prior to Admission:      Prior to Admission medications    Medication Sig Start Date End Date Taking?  Authorizing Provider   omeprazole (PRILOSEC) 40 MG delayed release capsule Take 40 mg by mouth daily   Yes Historical Provider, MD   insulin glargine (BASAGLAR KWIKPEN) 100 UNIT/ML injection pen Inject 10 Units into the skin nightly 9/22/21  Yes Henry Ramos MD   Papaya Enzyme CHEW Take 3-4 tablets by mouth daily as needed (Indigestion)   Yes Historical Provider, MD   lisinopril (PRINIVIL;ZESTRIL) 10 MG tablet Take 10 mg by mouth daily   Yes Historical Provider, MD   ezetimibe (ZETIA) 10 MG tablet Take 10 mg by mouth daily   Yes Historical Provider, MD   amLODIPine (NORVASC) 5 MG tablet Take 5 mg by mouth daily   Yes Historical Provider, MD   insulin lispro (HUMALOG) 100 UNIT/ML injection vial Inject into the skin 3 times daily (before meals) Per sliding scale. 2 units >150 then additional 2 units for every 50   Yes Historical Provider, MD   aspirin 81 MG tablet Take 81 mg by mouth daily   Yes Historical Provider, MD   acetaminophen (APAP EXTRA STRENGTH) 500 MG tablet Take 1 tablet by mouth every 6 hours as needed for Pain 11/18/16  Yes CEASAR Vasquez   timolol (TIMOPTIC) 0.5 % ophthalmic solution Place 1 drop into both eyes daily  8/7/16  Yes Historical Provider, MD   ergocalciferol (DRISDOL) 76096 UNITS capsule Take 1 capsule by mouth once a week 5/24/16  Yes Oscar Treadwell MD   albuterol sulfate (PROAIR RESPICLICK) 839 (90 BASE) MCG/ACT aerosol powder inhalation Inhale 2 puffs into the lungs every 4 hours as needed for Wheezing or Shortness of Breath 5/20/16  Yes Oscar Treadwell MD   latanoprost (XALATAN) 0.005 % ophthalmic solution Place 1 drop into both eyes nightly. Yes Historical Provider, MD   Insulin Syringe-Needle U-100 (INSULIN SYRINGE .5CC/31GX5/16\") 31G X 5/16\" 0.5 ML MISC 1 each by Does not apply route daily 4/14/17   Bita Ball MD   Insulin Pen Needle 31G X 5 MM MISC Pt uses three times a day 9/17/15   Bita Ball MD   glucose blood VI test strips (EXACTECH TEST) strip 4 times a day As needed.  3/23/15   Bita Ball MD   Lancets MISC 3-4 times a day 2/26/15   Artemio Baig Tayler Bosch MD       Allergies: Avelox [moxifloxacin], Bactrim, Cefuroxime, Codeine, Medrol [methylprednisolone], Morphine, Niacin, Oat, Percocet [oxycodone-acetaminophen], Reglan [metoclopramide], Spironolactone, Statins, Sulfamethoxazole-trimethoprim, and Vicodin [hydrocodone-acetaminophen]    Social History:      TOBACCO:   reports that she quit smoking about 32 years ago. Her smoking use included cigarettes. She has never used smokeless tobacco.  ETOH:   reports current alcohol use. Family History:       Reviewed in detail and non contributory          Problem Relation Age of Onset    Hypertension Mother     Breast Cancer Mother     Colon Cancer Mother     Cancer Father         Pancreatic    Prostate Cancer Father     No Known Problems Brother     No Known Problems Sister     Hypertension Maternal Aunt     Cancer Maternal Uncle     Hypertension Maternal Grandmother     Stroke Maternal Grandmother     Cancer Maternal Grandfather     No Known Problems Paternal Aunt     No Known Problems Paternal Uncle     No Known Problems Paternal Grandmother     No Known Problems Paternal Grandfather     No Known Problems Other     Rheum Arthritis Neg Hx     Osteoarthritis Neg Hx     Asthma Neg Hx     Diabetes Neg Hx     Heart Failure Neg Hx     High Cholesterol Neg Hx     Migraines Neg Hx     Ovarian Cancer Neg Hx     Rashes/Skin Problems Neg Hx     Seizures Neg Hx     Thyroid Disease Neg Hx        REVIEW OF SYSTEMS:   Pertinent positives as noted in the HPI. All other systems reviewed and negative. PHYSICAL EXAM PERFORMED:    BP (!) 197/109 Comment: BP High  Pulse 104   Temp 98.6 °F (37 °C) (Oral)   Resp 16   Wt 230 lb 6.1 oz (104.5 kg)   SpO2 100%   BMI 36.08 kg/m²     General appearance:  No apparent distress, cooperative. HEENT:  Normal cephalic, atraumatic without obvious deformity. Conjunctivae/corneas clear. Neck: Supple, with full range of motion.  No cervical lymphadenopathy  Respiratory:  Normal respiratory effort. Clear to auscultation, bilaterally without Rales/Wheezes/Rhonchi. Cardiovascular:  Regular rate and rhythm with normal S1/S2 without murmurs, rubs or gallops. Abdomen: Soft, non-tender, non-distended, normal bowel sounds. Musculoskeletal:  No edema noted bilaterally. No tenderness on palpation   Skin: no rash visible  Neurologic:  Neurologically intact without any focal sensory/motor deficits. grossly non-focal.  Psychiatric:  Alert and oriented, normal mood  Peripheral Pulses: +2 palpable, equal bilaterally       Labs:     Recent Labs     02/24/22  1223   WBC 15.8*   HGB 14.7   HCT 45.6        Recent Labs     02/24/22  1223      K 4.8   CL 98*   CO2 22   BUN 8   CREATININE 0.7   CALCIUM 9.6     Recent Labs     02/24/22  1223   AST 19   ALT 15   BILITOT 0.3   ALKPHOS 133*     No results for input(s): INR in the last 72 hours. Recent Labs     02/24/22  1223   TROPONINI <0.01       Urinalysis:      Lab Results   Component Value Date    NITRU Negative 02/24/2022    WBCUA 0 02/24/2022    BACTERIA 3+ 10/14/2021    RBCUA 3 02/24/2022    BLOODU Negative 02/24/2022    SPECGRAV 1.015 02/24/2022    GLUCOSEU >=1000 02/24/2022    GLUCOSEU NEGATIVE 04/09/2012       Radiology:       No orders to display           Active Hospital Problems    Diagnosis Date Noted    Intractable nausea and vomiting [R11.2] 08/18/2021       Patient is a 60-year-old female with past medical history of diabetes mellitus, hypertension who presents to the hospital for recurrent nausea vomiting. Patient has previous history of gastroparesis. .  Patient mentions she has been having abdominal pain, nausea vomiting and has not been able to hold down any food, medications. Patient otherwise denied chest pain diarrhea constipation dysuria fevers or chills.     Assessment  Intractable nausea vomiting likely secondary to gastroparesis  Hyperglycemia due to diabetes mellitus  Leukocytosis likely reactive  Hypertension      Plan  As needed Phenergan, Zofran, IV fluid therapy with normal saline  Daily liquid diet for now, advance diet as tolerated  Insulin sliding scale, resume home long-acting insulin  DVT prophylaxis-Lovenox  Diet: ADULT DIET; Clear Liquid  Code Status: Full Code    PT/OT Eval Status: ordered    Dispo - pending clinical improvement       Mahamed Morelos MD    The note was completed using EMR and Dragon dictation system. Every effort was made to ensure accuracy; however, inadvertent computerized transcription errors may be present. Thank you CEASAR Camacho for the opportunity to be involved in this patient's care. If you have any questions or concerns please feel free to contact me at [phone].     Mahamed Morelos MD

## 2022-02-25 NOTE — PROGRESS NOTES
Physical Therapy  No eval/discharge  Meghan Blair  [id number]    PT eval orders received for this pt. Per nursing notes the pt is up ad rylie in the room. Spoke with the pt at bedside this morning and the pt reports she has been getting up on her own in the room. She does report she has issues with vertigo but knows to take her time. She reports she lives with her older sister that is more mobile than she is and that she works from home, lives on the main floor and has been indep with mobility and self-care PTA. The pt denies having skilled PT needs at this time and has no concerns re: going home at d/c. Will d/c from acute care PT services due to no needs.     Electronically signed by Arlene Chaparro, PT [id number] on 2/25/2022 at 8:35 AM

## 2022-02-25 NOTE — DISCHARGE SUMMARY
Hospital Medicine Discharge Summary    Patient ID: Mark Lazo      Patient's PCP: CEASAR Moon    Admit Date: 2/24/2022     Discharge Date:     Admitting Physician: Frandy James MD     Discharge Physician: Frandy Jamse MD     Discharge Diagnoses: Active Hospital Problems    Diagnosis Date Noted    Intractable nausea and vomiting [R11.2] 08/18/2021       The patient was seen and examined on day of discharge and this discharge summary is in conjunction with any daily progress note from day of discharge. Condition at discharge - stable    Hospital Course: patient seen and evaluated on the day of discharge. Patient informed about following up with appointments. Patient verbalized understanding for follow-up appointments. The patient and / or the family were informed of the results of tests, a time was given to answer questions, a plan was proposed and they agreed with plan. Medical reconciliation performed. Patient discharged stable condition. Patient is a 28-year-old female with past medical history of diabetes mellitus, hypertension who presents to the hospital for recurrent nausea vomiting. Patient has previous history of gastroparesis. .  Patient mentions she has been having abdominal pain, nausea vomiting and has not been able to hold down any food, medications. Patient otherwise denied chest pain diarrhea constipation dysuria fevers or chills.     Assessment  Intractable nausea vomiting likely secondary to gastroparesis  Hyperglycemia due to diabetes mellitus  Leukocytosis likely reactive  Hypertension      stable for discharge, tolerating diet, counseled on marijuana cessation    Exam:     /62   Pulse 72   Temp 98.1 °F (36.7 °C) (Oral)   Resp 16   Wt 223 lb 6.4 oz (101.3 kg)   SpO2 97%   BMI 34.99 kg/m²     General appearance: No apparent distress  HEENT:  Conjunctivae/corneas clear. Neck: Supple, No jugular venous distention.   Respiratory:  Normal respiratory effort. Clear to auscultation, bilaterally without Rales/Wheezes/Rhonchi. Cardiovascular: Regular rate and rhythm with normal S1/S2 without murmurs, rubs or gallops. Abdomen: Soft, non-tender, non-distended, normal bowel sounds. Musculoskelatal: No clubbing, cyanosis or edema bilaterally. Skin: Skin color, texture, turgor normal.   Neurologic: no focal neurologic deficits. grossly non-focal.  Psychiatric: Alert and oriented, normal mood    Consults:     IP CONSULT TO HOSPITALIST      Code Status:  Full Code    Activity: activity as tolerated    Labs:  For convenience and continuity at follow-up the following most recent labs are provided:      CBC:    Lab Results   Component Value Date    WBC 15.8 02/24/2022    HGB 14.7 02/24/2022    HCT 45.6 02/24/2022     02/24/2022       Renal:    Lab Results   Component Value Date     02/25/2022    K 4.0 02/25/2022    CL 93 02/25/2022    CO2 22 02/25/2022    BUN 9 02/25/2022    CREATININE 0.9 02/25/2022    CALCIUM 9.1 02/25/2022    PHOS 4.1 02/21/2017       Discharge Medications:     Current Discharge Medication List           Details   promethazine (PHENERGAN) 12.5 MG tablet Take 1 tablet by mouth 3 times daily as needed for Nausea  Qty: 21 tablet, Refills: 0      polyethylene glycol (GLYCOLAX) 17 GM/SCOOP powder Take 17 g by mouth daily  Qty: 1530 g, Refills: 1              Details   omeprazole (PRILOSEC) 40 MG delayed release capsule Take 40 mg by mouth daily      insulin glargine (BASAGLAR KWIKPEN) 100 UNIT/ML injection pen Inject 10 Units into the skin nightly  Qty: 5 pen, Refills: 0      Papaya Enzyme CHEW Take 3-4 tablets by mouth daily as needed (Indigestion)      lisinopril (PRINIVIL;ZESTRIL) 10 MG tablet Take 10 mg by mouth daily      ezetimibe (ZETIA) 10 MG tablet Take 10 mg by mouth daily      amLODIPine (NORVASC) 5 MG tablet Take 5 mg by mouth daily      insulin lispro (HUMALOG) 100 UNIT/ML injection vial Inject into the skin 3 times daily (before meals) Per sliding scale. 2 units >150 then additional 2 units for every 50      aspirin 81 MG tablet Take 81 mg by mouth daily      acetaminophen (APAP EXTRA STRENGTH) 500 MG tablet Take 1 tablet by mouth every 6 hours as needed for Pain  Qty: 60 tablet, Refills: 0      timolol (TIMOPTIC) 0.5 % ophthalmic solution Place 1 drop into both eyes daily       ergocalciferol (DRISDOL) 03762 UNITS capsule Take 1 capsule by mouth once a week  Qty: 13 capsule, Refills: 1      albuterol sulfate (PROAIR RESPICLICK) 806 (90 BASE) MCG/ACT aerosol powder inhalation Inhale 2 puffs into the lungs every 4 hours as needed for Wheezing or Shortness of Breath  Qty: 1 Inhaler, Refills: 5    Associated Diagnoses: Pulmonary emphysema, unspecified emphysema type (HCC)      latanoprost (XALATAN) 0.005 % ophthalmic solution Place 1 drop into both eyes nightly. Insulin Syringe-Needle U-100 (INSULIN SYRINGE .5CC/31GX5/16\") 31G X 5/16\" 0.5 ML MISC 1 each by Does not apply route daily  Qty: 100 Syringe, Refills: 3      Insulin Pen Needle 31G X 5 MM MISC Pt uses three times a day  Qty: 450 each, Refills: 3    Associated Diagnoses: Type II or unspecified type diabetes mellitus without mention of complication, uncontrolled; Hypertension due to endocrine disorder; Hirsutism; Elevated testosterone level in female      glucose blood VI test strips (EXACTECH TEST) strip 4 times a day As needed. Qty: 400 each, Refills: 3      Lancets MISC 3-4 times a day  Qty: 100 each, Refills: 3             Time Spent on discharge is more than 30 mints in the examination, evaluation, counseling and review of medications and discharge plan. Signed:    Anjelica Gilliam MD   2/25/2022      Thank you CEASAR Henry for the opportunity to be involved in this patient's care. If you have any questions or concerns please feel free to contact me at [phone].

## 2022-02-25 NOTE — PROGRESS NOTES
Occupational Therapy  Evaluation Attempt/Discharge  Christina Davison    OT orders received and reviewed. Met pt b/s w/ PT for sha. Pt states she lives with her sister where she is independent w/ ADLs and her sister assists w/ laundry. Pt reports she has been up in the room independently. Pt states she has no therapy concerns at this time and if needs assist her sister can help if needed at home. Pt declines need for acute OT. D/c OT.     Electronically signed by Noelle Henderson OT/S on 2/25/2022 at 8:34 AM  Therapist was present, directed the patient's care, made skilled judgement, and was responsible for assessment and treatment of the patient    Electronically signed by Mini Colbert OT on 2/25/22 at 10:16 AM EST

## 2022-02-25 NOTE — CARE COORDINATION
INITIAL CASE MANAGEMENT ASSESSMENT     Reviewed chart, met with patient to assess possible discharge needs. Explained Case Management role/services. SW interviewed patient via telephone today.      Living Situation: Patient resides in a two family home with her sister and no pets. She stated that they have the first floor unit and they don't know the neighbors on the 2nd floor. There are no pets. There are six stairs leading up to the front door. Prior to medical admission she stated that she had no trouble getting in and out of the property.        ADLs: Prior to medical admission patient reports that she was independent. She stated that she doesn't anticipate any needs at discharge.      DME: Prior to medical admission patient reports that she was independent. She stated that she doesn't anticipate any needs at discharge.      PT/OT Recs: Home independently.      Active Services:Prior to medical admission patient reports that she had an [employer] . She stated that she doesn't anticipate any needs at discharge.      Transportation: Prior to medical admission patient reports that she used family and [employer] to get back and forth to medical appointments and errands. She stated that she can order her own [employer] at discharge.     Medications: Patient receives medications from [facility] on [address]. At this time there are no issues with access or affordability.     PCP: Bernabe Alarcon [phone] (phone) and [phone] (fax number). Patient reports that her last appointment with this provider was over a week ago.   Nita Aguilera  HD/PD: N/A     PLAN/COMMENTS:  1) GI clearance. 2) Work note needed.      SW provided contact information for patient or family to call with any questions. SW will follow and assist as needed. Respectfully submitted,    UCHE Navarro  [facility]   [phone]    Electronically signed by UCHE Dong on 2/25/2022 at 2:40 PM

## 2022-02-25 NOTE — ACP (ADVANCE CARE PLANNING)
Advance Care Planning     Advance Care Planning Activator (Inpatient)  Conversation Note      Date of ACP Conversation: 2/25/2022     Conversation Conducted with: Patient with Decision Making Capacity    ACP Activator: UCHE Williamson    Health Care Decision Maker:     Current Designated Health Care Decision Maker:     Primary Decision Maker: [facility] - Brother/Sister - [phone]    Primary Decision Maker: Sherita Teague - Child - [phone]    Secondary Decision Maker: Darrick Douglas Child - [phone]    Care Preferences    Ventilation: \"If you were in your present state of health and suddenly became very ill and were unable to breathe on your own, what would your preference be about the use of a ventilator (breathing machine) if it were available to you? \"      Would the patient desire the use of ventilator (breathing machine)?: yes    \"If your health worsens and it becomes clear that your chance of recovery is unlikely, what would your preference be about the use of a ventilator (breathing machine) if it were available to you? \"     Would the patient desire the use of ventilator (breathing machine)?: No      Resuscitation  \"CPR works best to restart the heart when there is a sudden event, like a heart attack, in someone who is otherwise healthy. Unfortunately, CPR does not typically restart the heart for people who have serious health conditions or who are very sick. \"    \"In the event your heart stopped as a result of an underlying serious health condition, would you want attempts to be made to restart your heart (answer \"yes\" for attempt to resuscitate) or would you prefer a natural death (answer \"no\" for do not attempt to resuscitate)? \" yes       [] Yes   [] No   Educated Patient / Deanne Unger regarding differences between Advance Directives and portable DNR orders.     Length of ACP Conversation in minutes:  2    Conversation Outcomes:  [x] ACP discussion completed  [] Existing advance directive reviewed with patient; no changes to patient's previously recorded wishes  [] New Advance Directive completed  [] Portable Do Not Rescitate prepared for Provider review and signature  [] POLST/POST/MOLST/MOST prepared for Provider review and signature      Follow-up plan:    [] Schedule follow-up conversation to continue planning  [] Referred individual to Provider for additional questions/concerns   [] Advised patient/agent/surrogate to review completed ACP document and update if needed with changes in condition, patient preferences or care setting    [x] This note routed to one or more involved healthcare providers Blayne Vergara, [address] primary care physician. Respectfully submitted,    UCHE Morel  [facility]   [phone]    Electronically signed by UCHE Oliver on 2/25/2022 at 2:49 PM

## 2022-03-16 ENCOUNTER — HOSPITAL ENCOUNTER (EMERGENCY)
Age: 62
Discharge: HOME | End: 2022-03-16
Payer: COMMERCIAL

## 2022-03-16 ENCOUNTER — APPOINTMENT (OUTPATIENT)
Dept: GENERAL RADIOLOGY | Age: 62
End: 2022-03-16
Payer: COMMERCIAL

## 2022-03-16 ENCOUNTER — APPOINTMENT (OUTPATIENT)
Dept: CT IMAGING | Age: 62
End: 2022-03-16
Payer: COMMERCIAL

## 2022-03-16 VITALS
RESPIRATION RATE: 16 BRPM | OXYGEN SATURATION: 100 % | HEART RATE: 77 BPM | SYSTOLIC BLOOD PRESSURE: 153 MMHG | HEIGHT: 67 IN | TEMPERATURE: 97.9 F | WEIGHT: 224.43 LBS | DIASTOLIC BLOOD PRESSURE: 92 MMHG | BODY MASS INDEX: 35.22 KG/M2

## 2022-03-16 DIAGNOSIS — R68.83 CHILLS: ICD-10-CM

## 2022-03-16 DIAGNOSIS — R19.7 NAUSEA VOMITING AND DIARRHEA: Primary | ICD-10-CM

## 2022-03-16 DIAGNOSIS — R05.9 COUGH: ICD-10-CM

## 2022-03-16 DIAGNOSIS — R10.84 GENERALIZED ABDOMINAL PAIN: ICD-10-CM

## 2022-03-16 DIAGNOSIS — R52 GENERALIZED BODY ACHES: ICD-10-CM

## 2022-03-16 DIAGNOSIS — R11.2 NAUSEA VOMITING AND DIARRHEA: Primary | ICD-10-CM

## 2022-03-16 LAB
ALBUMIN SERPL-MCNC: 4.4 G/DL (ref 3.4–5)
ALP BLD-CCNC: 125 U/L (ref 40–129)
ALT SERPL-CCNC: 48 U/L (ref 10–40)
ANION GAP SERPL CALCULATED.3IONS-SCNC: 17 MMOL/L (ref 3–16)
AST SERPL-CCNC: 56 U/L (ref 15–37)
BACTERIA: ABNORMAL /HPF
BASE EXCESS VENOUS: 6.7 MMOL/L
BASOPHILS ABSOLUTE: 0 K/UL (ref 0–0.2)
BASOPHILS RELATIVE PERCENT: 0.4 %
BETA-HYDROXYBUTYRATE: 0.54 MMOL/L (ref 0–0.27)
BILIRUB SERPL-MCNC: 0.5 MG/DL (ref 0–1)
BILIRUBIN DIRECT: <0.2 MG/DL (ref 0–0.3)
BILIRUBIN URINE: ABNORMAL
BILIRUBIN, INDIRECT: ABNORMAL MG/DL (ref 0–1)
BLOOD, URINE: NEGATIVE
BUN BLDV-MCNC: 25 MG/DL (ref 7–20)
CALCIUM SERPL-MCNC: 9.1 MG/DL (ref 8.3–10.6)
CARBOXYHEMOGLOBIN: 0.7 %
CHLORIDE BLD-SCNC: 90 MMOL/L (ref 99–110)
CLARITY: ABNORMAL
CO2: 25 MMOL/L (ref 21–32)
COLOR: ABNORMAL
COMMENT UA: ABNORMAL
CREAT SERPL-MCNC: 0.9 MG/DL (ref 0.6–1.2)
EOSINOPHILS ABSOLUTE: 0 K/UL (ref 0–0.6)
EOSINOPHILS RELATIVE PERCENT: 0.1 %
EPITHELIAL CELLS, UA: 9 /HPF (ref 0–5)
GFR AFRICAN AMERICAN: >60
GFR NON-AFRICAN AMERICAN: >60
GLUCOSE BLD-MCNC: 252 MG/DL (ref 70–99)
GLUCOSE BLD-MCNC: 266 MG/DL (ref 70–99)
GLUCOSE URINE: NEGATIVE MG/DL
HCO3 VENOUS: 34 MMOL/L (ref 23–29)
HCT VFR BLD CALC: 48.6 % (ref 36–48)
HEMOGLOBIN: 16 G/DL (ref 12–16)
HYALINE CASTS: ABNORMAL /LPF (ref 0–2)
KETONES, URINE: NEGATIVE MG/DL
LACTIC ACID: 1.9 MMOL/L (ref 0.4–2)
LEUKOCYTE ESTERASE, URINE: NEGATIVE
LIPASE: 102 U/L (ref 13–60)
LYMPHOCYTES ABSOLUTE: 2.5 K/UL (ref 1–5.1)
LYMPHOCYTES RELATIVE PERCENT: 44.7 %
MCH RBC QN AUTO: 25.7 PG (ref 26–34)
MCHC RBC AUTO-ENTMCNC: 32.9 G/DL (ref 31–36)
MCV RBC AUTO: 78 FL (ref 80–100)
METHEMOGLOBIN VENOUS: 0.4 %
MICROSCOPIC EXAMINATION: YES
MONOCYTES ABSOLUTE: 0.6 K/UL (ref 0–1.3)
MONOCYTES RELATIVE PERCENT: 10.3 %
NEUTROPHILS ABSOLUTE: 2.5 K/UL (ref 1.7–7.7)
NEUTROPHILS RELATIVE PERCENT: 44.5 %
NITRITE, URINE: NEGATIVE
O2 SAT, VEN: 15 %
O2 THERAPY: ABNORMAL
PCO2, VEN: 58.3 MMHG (ref 40–50)
PDW BLD-RTO: 14.7 % (ref 12.4–15.4)
PERFORMED ON: ABNORMAL
PH UA: 5.5 (ref 5–8)
PH VENOUS: 7.38 (ref 7.35–7.45)
PLATELET # BLD: 176 K/UL (ref 135–450)
PMV BLD AUTO: 8 FL (ref 5–10.5)
PO2, VEN: <30 MMHG
POTASSIUM REFLEX MAGNESIUM: 4.3 MMOL/L (ref 3.5–5.1)
PRO-BNP: 27 PG/ML (ref 0–124)
PROTEIN UA: 100 MG/DL
RAPID INFLUENZA  B AGN: NEGATIVE
RAPID INFLUENZA A AGN: NEGATIVE
RBC # BLD: 6.22 M/UL (ref 4–5.2)
RBC UA: ABNORMAL /HPF (ref 0–4)
SARS-COV-2, NAAT: NOT DETECTED
SODIUM BLD-SCNC: 132 MMOL/L (ref 136–145)
SPECIFIC GRAVITY UA: 1.02 (ref 1–1.03)
TCO2 CALC VENOUS: 36 MMOL/L
TOTAL PROTEIN: 7.4 G/DL (ref 6.4–8.2)
TROPONIN: <0.01 NG/ML
URINE REFLEX TO CULTURE: ABNORMAL
URINE TYPE: ABNORMAL
UROBILINOGEN, URINE: 1 E.U./DL
WBC # BLD: 5.6 K/UL (ref 4–11)
WBC UA: 8 /HPF (ref 0–5)
YEAST: PRESENT /HPF

## 2022-03-16 PROCEDURE — 81001 URINALYSIS AUTO W/SCOPE: CPT

## 2022-03-16 PROCEDURE — 6360000004 HC RX CONTRAST MEDICATION: Performed by: NURSE PRACTITIONER

## 2022-03-16 PROCEDURE — 71045 X-RAY EXAM CHEST 1 VIEW: CPT

## 2022-03-16 PROCEDURE — 80076 HEPATIC FUNCTION PANEL: CPT

## 2022-03-16 PROCEDURE — 74177 CT ABD & PELVIS W/CONTRAST: CPT

## 2022-03-16 PROCEDURE — 94761 N-INVAS EAR/PLS OXIMETRY MLT: CPT

## 2022-03-16 PROCEDURE — 83605 ASSAY OF LACTIC ACID: CPT

## 2022-03-16 PROCEDURE — 71260 CT THORAX DX C+: CPT

## 2022-03-16 PROCEDURE — 80048 BASIC METABOLIC PNL TOTAL CA: CPT

## 2022-03-16 PROCEDURE — 94640 AIRWAY INHALATION TREATMENT: CPT

## 2022-03-16 PROCEDURE — 87635 SARS-COV-2 COVID-19 AMP PRB: CPT

## 2022-03-16 PROCEDURE — 82803 BLOOD GASES ANY COMBINATION: CPT

## 2022-03-16 PROCEDURE — 36415 COLL VENOUS BLD VENIPUNCTURE: CPT

## 2022-03-16 PROCEDURE — 83690 ASSAY OF LIPASE: CPT

## 2022-03-16 PROCEDURE — 84484 ASSAY OF TROPONIN QUANT: CPT

## 2022-03-16 PROCEDURE — 85025 COMPLETE CBC W/AUTO DIFF WBC: CPT

## 2022-03-16 PROCEDURE — 87804 INFLUENZA ASSAY W/OPTIC: CPT

## 2022-03-16 PROCEDURE — 93005 ELECTROCARDIOGRAM TRACING: CPT

## 2022-03-16 PROCEDURE — 6370000000 HC RX 637 (ALT 250 FOR IP): Performed by: NURSE PRACTITIONER

## 2022-03-16 PROCEDURE — 99283 EMERGENCY DEPT VISIT LOW MDM: CPT

## 2022-03-16 PROCEDURE — 83880 ASSAY OF NATRIURETIC PEPTIDE: CPT

## 2022-03-16 PROCEDURE — 82010 KETONE BODYS QUAN: CPT

## 2022-03-16 RX ORDER — IBUPROFEN 800 MG/1
800 TABLET ORAL EVERY 8 HOURS PRN
Qty: 20 TABLET | Refills: 0 | Status: SHIPPED | OUTPATIENT
Start: 2022-03-16 | End: 2022-08-23

## 2022-03-16 RX ORDER — DICYCLOMINE HYDROCHLORIDE 10 MG/1
10 CAPSULE ORAL EVERY 6 HOURS PRN
Qty: 20 CAPSULE | Refills: 0 | Status: SHIPPED | OUTPATIENT
Start: 2022-03-16 | End: 2022-10-24

## 2022-03-16 RX ORDER — ONDANSETRON 4 MG/1
4-8 TABLET, ORALLY DISINTEGRATING ORAL EVERY 12 HOURS PRN
Qty: 30 TABLET | Refills: 0 | Status: SHIPPED | OUTPATIENT
Start: 2022-03-16

## 2022-03-16 RX ORDER — GUAIFENESIN/DEXTROMETHORPHAN 100-10MG/5
10 SYRUP ORAL ONCE
Status: COMPLETED | OUTPATIENT
Start: 2022-03-16 | End: 2022-03-16

## 2022-03-16 RX ORDER — FLUCONAZOLE 100 MG/1
200 TABLET ORAL ONCE
Status: COMPLETED | OUTPATIENT
Start: 2022-03-16 | End: 2022-03-16

## 2022-03-16 RX ORDER — 0.9 % SODIUM CHLORIDE 0.9 %
500 INTRAVENOUS SOLUTION INTRAVENOUS ONCE
Status: DISCONTINUED | OUTPATIENT
Start: 2022-03-16 | End: 2022-03-16 | Stop reason: HOSPADM

## 2022-03-16 RX ORDER — DICYCLOMINE HYDROCHLORIDE 10 MG/1
20 CAPSULE ORAL ONCE
Status: COMPLETED | OUTPATIENT
Start: 2022-03-16 | End: 2022-03-16

## 2022-03-16 RX ORDER — PROMETHAZINE HYDROCHLORIDE 25 MG/1
25 TABLET ORAL EVERY 6 HOURS PRN
Qty: 30 TABLET | Refills: 0 | Status: SHIPPED | OUTPATIENT
Start: 2022-03-16 | End: 2022-03-26

## 2022-03-16 RX ORDER — GUAIFENESIN/DEXTROMETHORPHAN 100-10MG/5
5 SYRUP ORAL 3 TIMES DAILY PRN
Qty: 120 ML | Refills: 0 | Status: SHIPPED | OUTPATIENT
Start: 2022-03-16 | End: 2022-03-26

## 2022-03-16 RX ORDER — ACETAMINOPHEN 500 MG
1000 TABLET ORAL 4 TIMES DAILY PRN
Qty: 60 TABLET | Refills: 0 | Status: SHIPPED | OUTPATIENT
Start: 2022-03-16 | End: 2022-08-06

## 2022-03-16 RX ORDER — ALBUTEROL SULFATE 90 UG/1
2 AEROSOL, METERED RESPIRATORY (INHALATION) 4 TIMES DAILY PRN
Qty: 18 G | Refills: 0 | Status: SHIPPED | OUTPATIENT
Start: 2022-03-16

## 2022-03-16 RX ORDER — IPRATROPIUM BROMIDE AND ALBUTEROL SULFATE 2.5; .5 MG/3ML; MG/3ML
1 SOLUTION RESPIRATORY (INHALATION) ONCE
Status: COMPLETED | OUTPATIENT
Start: 2022-03-16 | End: 2022-03-16

## 2022-03-16 RX ADMIN — DICYCLOMINE HYDROCHLORIDE 20 MG: 10 CAPSULE ORAL at 15:00

## 2022-03-16 RX ADMIN — IOPAMIDOL 75 ML: 755 INJECTION, SOLUTION INTRAVENOUS at 12:59

## 2022-03-16 RX ADMIN — IPRATROPIUM BROMIDE AND ALBUTEROL SULFATE 1 AMPULE: .5; 3 SOLUTION RESPIRATORY (INHALATION) at 11:58

## 2022-03-16 RX ADMIN — GUAIFENESIN SYRUP AND DEXTROMETHORPHAN 10 ML: 100; 10 SYRUP ORAL at 11:37

## 2022-03-16 RX ADMIN — FLUCONAZOLE 200 MG: 100 TABLET ORAL at 13:56

## 2022-03-16 RX ADMIN — Medication 1000 ML: at 11:37

## 2022-03-16 ASSESSMENT — ENCOUNTER SYMPTOMS
PHOTOPHOBIA: 0
DIARRHEA: 1
CONSTIPATION: 0
NAUSEA: 1
RHINORRHEA: 1
VOICE CHANGE: 0
ABDOMINAL PAIN: 1
COUGH: 1
CHEST TIGHTNESS: 1
BLOOD IN STOOL: 0
EYE PAIN: 0
SORE THROAT: 0
WHEEZING: 0
TROUBLE SWALLOWING: 0
BACK PAIN: 0
VOMITING: 1
SHORTNESS OF BREATH: 0

## 2022-03-16 NOTE — ED NOTES
Pt tolerating ice chips. Unable to drink water. Walking to the bathroom w/o issues.  Gait steady and equal.      Marquise Hardwick RN  03/16/22 1717

## 2022-03-16 NOTE — ED PROVIDER NOTES
[address] [facility]        Pt Name: Jim Farmer  MRN: [id number]  [place] 1960  Date of evaluation: 3/16/2022  Provider: SEVEN Rankin - CNP  PCP: CEASAR Powell  Note Started: 3:28 PM EDT       ALIZE. I have evaluated this patient. My supervising physician was available for consultation. CHIEF COMPLAINT       Chief Complaint   Patient presents with    Emesis    Chest Pain     with deep breathing. pt has been taking Mucinex for chest congestion since yesterday with no relief. pt states she has been nauseated and vomiting. no relief with zofran or phenergan; unable to tolerate PO. has stopped smoking marijuana x10 days. HISTORY OF PRESENT ILLNESS   (Location, Timing/Onset, Context/Setting, Quality, Duration, Modifying Factors, Severity, Associated Signs and Symptoms)  Note limiting factors. Chief Complaint: Multiple complaints    Jim Farmer is a 65 y.o. female who presents to the emergency department with multiple complaints. She states that her symptoms started Friday with flu/Covid-like symptoms. She states that she started experiencing chills, generalized fatigue and body aches. She states that over the course of the past several days has developed a cough with thick mucus production. Some chest pain that does worsen with coughing and deep breathing. She is also developed nausea vomiting diarrhea. She states that she has been taking Phenergan and Zofran at home, has been able to tolerate small amounts of water and her pills only. She states that she has not been able to keep any food down. She is does have a history of cyclic nausea and vomiting secondary to marijuana use. She states however she has not smoked marijuana in about 10 days. She has not had a flu vaccine recently. She states that multiple family members in the home are sick as well with similar symptoms.   She denies any measured fevers but has been chilled. Is not diabetic or immunocompromise. Came to the emergency department for further evaluation and treatment    Nursing Notes were all reviewed and agreed with or any disagreements were addressed in the HPI. REVIEW OF SYSTEMS    (2-9 systems for level 4, 10 or more for level 5)     Review of Systems   Constitutional: Positive for appetite change, chills, fatigue and fever. HENT: Positive for congestion and rhinorrhea. Negative for drooling, sore throat, trouble swallowing and voice change. Eyes: Negative for photophobia, pain and visual disturbance. Respiratory: Positive for cough and chest tightness. Negative for shortness of breath and wheezing. Cardiovascular: Positive for chest pain. Negative for palpitations and leg swelling. Gastrointestinal: Positive for abdominal pain, diarrhea, nausea and vomiting. Negative for blood in stool and constipation. Genitourinary: Negative for dysuria, flank pain and hematuria. Musculoskeletal: Positive for myalgias. Negative for back pain, neck pain and neck stiffness. Skin: Negative. Negative for rash and wound. Allergic/Immunologic: Negative for immunocompromised state. Neurological: Negative. Negative for dizziness and light-headedness. Psychiatric/Behavioral: Negative. All other systems reviewed and are negative. Positives and Pertinent negatives as per HPI. Except as noted above in the ROS, all other systems were reviewed and negative.        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    Gastroparesis     Hirsutism     History of blood transfusion     Hyperlipidemia LDL goal < 70     Hypertension     Major depressive disorder with single episode 2009    Pancreatitis     Seasonal allergic rhinitis due to pollen 11/10/2016    Sleep apnea     Thyroid disease     Vitamin D deficiency          SURGICAL HISTORY     Past Surgical History:   Procedure Laterality Date     SECTION      ENDOMETRIAL ABLATION  5/3/12    ENDOSCOPY, COLON, DIAGNOSTIC      HYSTEROSCOPY  5/3/12    MANDIBLE SURGERY      SKIN BIOPSY      TUBAL LIGATION      UPPER GASTROINTESTINAL ENDOSCOPY N/A 2021    EGD BIOPSY performed by Arlette Winkler MD at [facility] Medication List as of 3/16/2022  3:00 PM      CONTINUE these medications which have NOT CHANGED    Details   polyethylene glycol (GLYCOLAX) 17 GM/SCOOP powder Take 17 g by mouth daily, Disp-1530 g, R-1Normal      omeprazole (PRILOSEC) 40 MG delayed release capsule Take 40 mg by mouth dailyHistorical Med      insulin glargine (BASAGLAR KWIKPEN) 100 UNIT/ML injection pen Inject 10 Units into the skin nightly, Disp-5 pen, R-0Normal      Papaya Enzyme CHEW Take 3-4 tablets by mouth daily as needed (Indigestion)Historical Med      lisinopril (PRINIVIL;ZESTRIL) 10 MG tablet Take 10 mg by mouth dailyHistorical Med      ezetimibe (ZETIA) 10 MG tablet Take 10 mg by mouth dailyHistorical Med      amLODIPine (NORVASC) 5 MG tablet Take 5 mg by mouth dailyHistorical Med      insulin lispro (HUMALOG) 100 UNIT/ML injection vial Inject into the skin 3 times daily (before meals) Per sliding scale.  2 units >150 then additional 2 units for every 50Historical Med      aspirin 81 MG tablet Take 81 mg by mouth dailyHistorical Med      Insulin Syringe-Needle U-100 (INSULIN SYRINGE .5CC/31G/\") 31G X 516\" 0.5 ML MISC DAILY Starting 2017, Until Discontinued, Disp-100 Syringe, R-3, Normal      timolol (TIMOPTIC) 0.5 % ophthalmic solution Place 1 drop into both eyes daily       ergocalciferol (DRISDOL) 26438 UNITS capsule Take 1 capsule by mouth once a week, Disp-13 capsule, R-1      albuterol sulfate (PROAIR RESPICLICK) 469 (90 BASE) MCG/ACT aerosol powder inhalation Inhale 2 puffs into the lungs every 4 hours as needed for Wheezing or Shortness of Breath, Disp-1 Inhaler, R-5      Insulin Pen Needle 31G X 5 MM MISC Disp-450 each, R-3, NormalPt uses three times a day      glucose blood VI test strips (EXACTECH TEST) strip Disp-400 each, R-3, Normal4 times a day As needed. Lancets MISC Disp-100 each, R-3, Normal3-4 times a day      latanoprost (XALATAN) 0.005 % ophthalmic solution Place 1 drop into both eyes nightly.                ALLERGIES     Avelox [moxifloxacin], Bactrim, Cefuroxime, Codeine, Medrol [methylprednisolone], Morphine, Niacin, Oat, Percocet [oxycodone-acetaminophen], Reglan [metoclopramide], Spironolactone, Statins, Sulfamethoxazole-trimethoprim, and Vicodin [hydrocodone-acetaminophen]    FAMILYHISTORY       Family History   Problem Relation Age of Onset    Hypertension Mother     Breast Cancer Mother     Colon Cancer Mother     Cancer Father         Pancreatic    Prostate Cancer Father     No Known Problems Brother     No Known Problems Sister     Hypertension Maternal Aunt     Cancer Maternal Uncle     Hypertension Maternal Grandmother     Stroke Maternal Grandmother     Cancer Maternal Grandfather     No Known Problems Paternal Aunt     No Known Problems Paternal Uncle     No Known Problems Paternal Grandmother     No Known Problems Paternal Grandfather     No Known Problems Other     Rheum Arthritis Neg Hx     Osteoarthritis Neg Hx     Asthma Neg Hx     Diabetes Neg Hx     Heart Failure Neg Hx     High Cholesterol Neg Hx     Migraines Neg Hx     Ovarian Cancer Neg Hx     Rashes/Skin Problems Neg Hx     Seizures Neg Hx     Thyroid Disease Neg Hx           SOCIAL HISTORY Social History     Tobacco Use    Smoking status: Former Smoker     Types: Cigarettes     Quit date: 1990     Years since quittin.2    Smokeless tobacco: Never Used   Vaping Use    Vaping Use: Never used   Substance Use Topics    Alcohol use: Yes     Alcohol/week: 0.0 standard drinks     Comment: less than once a month    Drug use: Yes     Frequency: 2.0 times per week     Types: Marijuana Adelia Peals)     Comment: used yesterday       SCREENINGS             PHYSICAL EXAM    (up to 7 for level 4, 8 or more for level 5)     ED Triage Vitals [22 1106]   BP Temp Temp Source Pulse Resp SpO2 Height Weight   (!) 145/74 97.9 °F (36.6 °C) Oral 81 12 99 % 5' 7\" (1.702 m) 224 lb 6.9 oz (101.8 kg)       Physical Exam  Vitals and nursing note reviewed. Constitutional:       General: She is not in acute distress. Appearance: Normal appearance. She is obese. She is not ill-appearing, toxic-appearing or diaphoretic. HENT:      Head: Normocephalic and atraumatic. No raccoon eyes, Yo's sign, right periorbital erythema or left periorbital erythema. Right Ear: Hearing and external ear normal.      Left Ear: Hearing and external ear normal.      Nose: Nose normal. No laceration, nasal tenderness, mucosal edema, congestion or rhinorrhea. Right Nostril: No epistaxis. Left Nostril: No epistaxis. Mouth/Throat:      Lips: Pink. No lesions. Mouth: Mucous membranes are moist.      Tongue: No lesions. Tongue does not deviate from midline. Pharynx: Oropharynx is clear. Uvula midline. No pharyngeal swelling, oropharyngeal exudate, posterior oropharyngeal erythema or uvula swelling. Tonsils: No tonsillar exudate or tonsillar abscesses. Eyes:      General: Lids are normal.         Right eye: No discharge. Left eye: No discharge. Extraocular Movements: Extraocular movements intact. Pupils: Pupils are equal, round, and reactive to light.    Neck:      Trachea: Phonation normal. No abnormal tracheal secretions or tracheal deviation. Comments: No meningismus   Cardiovascular:      Rate and Rhythm: Normal rate and regular rhythm. Pulses: Normal pulses. Heart sounds: Normal heart sounds. No murmur heard. No friction rub. No gallop. Pulmonary:      Effort: Pulmonary effort is normal. No respiratory distress. Breath sounds: Normal breath sounds. No stridor. No wheezing, rhonchi or rales. Abdominal:      General: Bowel sounds are normal. There is no distension. Palpations: Abdomen is soft. There is no mass. Tenderness: There is no abdominal tenderness. There is no right CVA tenderness, left CVA tenderness, guarding or rebound. Hernia: No hernia is present. Musculoskeletal:         General: Normal range of motion. Cervical back: Full passive range of motion without pain, normal range of motion and neck supple. No rigidity. No spinous process tenderness or muscular tenderness. Lymphadenopathy:      Cervical: No cervical adenopathy. Skin:     General: Skin is warm and dry. Capillary Refill: Capillary refill takes less than 2 seconds. Neurological:      General: No focal deficit present. Mental Status: She is alert and oriented to person, place, and time. GCS: GCS eye subscore is 4. GCS verbal subscore is 5. GCS motor subscore is 6. Sensory: Sensation is intact. Motor: Motor function is intact. Gait: Gait is intact.    Psychiatric:         Mood and Affect: Mood normal.         Behavior: Behavior normal.         DIAGNOSTIC RESULTS   LABS:    Labs Reviewed   CBC WITH AUTO DIFFERENTIAL - Abnormal; Notable for the following components:       Result Value    RBC 6.22 (*)     Hematocrit 48.6 (*)     MCV 78.0 (*)     MCH 25.7 (*)     All other components within normal limits   BASIC METABOLIC PANEL W/ REFLEX TO MG FOR LOW K - Abnormal; Notable for the following components:    Sodium 132 (*)     Chloride 90 (*)     Anion Gap 17 (*)     Glucose 252 (*)     BUN 25 (*)     All other components within normal limits   HEPATIC FUNCTION PANEL - Abnormal; Notable for the following components:    ALT 48 (*)     AST 56 (*)     All other components within normal limits   LIPASE - Abnormal; Notable for the following components:    Lipase 102.0 (*)     All other components within normal limits   URINALYSIS WITH REFLEX TO CULTURE - Abnormal; Notable for the following components:    Clarity, UA CLOUDY (*)     Bilirubin Urine SMALL (*)     Protein,  (*)     All other components within normal limits   BLOOD GAS, VENOUS - Abnormal; Notable for the following components:    pCO2, Bernard 58.3 (*)     HCO3, Venous 34 (*)     All other components within normal limits   BETA-HYDROXYBUTYRATE - Abnormal; Notable for the following components:    Beta-Hydroxybutyrate 0.54 (*)     All other components within normal limits   MICROSCOPIC URINALYSIS - Abnormal; Notable for the following components:    Bacteria, UA 1+ (*)     Yeast, UA Present (*)     WBC, UA 8 (*)     Epithelial Cells, UA 9 (*)     All other components within normal limits   POCT GLUCOSE - Abnormal; Notable for the following components:    POC Glucose 266 (*)     All other components within normal limits   RAPID INFLUENZA A/B ANTIGENS   COVID-19, RAPID   CULTURE, BLOOD 1   TROPONIN   BRAIN NATRIURETIC PEPTIDE   LACTIC ACID   POCT GLUCOSE       When ordered only abnormal lab results are displayed. All other labs were within normal range or not returned as of this dictation. EKG: When ordered, EKG's are interpreted by the Emergency Department Physician in the absence of a cardiologist.  Please see their note for interpretation of EKG.     RADIOLOGY:   Non-plain film images such as CT, Ultrasound and MRI are read by the radiologist. Plain radiographic images are visualized and preliminarily interpreted by the ED Provider with the below findings:        Interpretation per the Radiologist below, if available at the time of this note:    CT CHEST PULMONARY EMBOLISM W CONTRAST   Final Result   Chest:      No central pulmonary embolus identified. Peripheral pulmonary arterial   branches are not well evaluated      Abdomen and pelvis:      No acute intra-abdominal abnormality      RECOMMENDATIONS:   Unavailable         CT ABDOMEN PELVIS W IV CONTRAST Additional Contrast? None   Final Result   Chest:      No central pulmonary embolus identified. Peripheral pulmonary arterial   branches are not well evaluated      Abdomen and pelvis:      No acute intra-abdominal abnormality      RECOMMENDATIONS:   Unavailable         XR CHEST PORTABLE   Final Result   No acute cardiopulmonary disease. CT ABDOMEN PELVIS W IV CONTRAST Additional Contrast? None    Result Date: 3/16/2022  EXAMINATION: CTA OF THE CHEST; CT OF THE ABDOMEN AND PELVIS WITH CONTRAST 3/16/2022 12:47 pm TECHNIQUE: CTA of the chest was performed after the administration of intravenous contrast.  Multiplanar reformatted images are provided for review. MIP images are provided for review. Dose modulation, iterative reconstruction, and/or weight based adjustment of the mA/kV was utilized to reduce the radiation dose to as low as reasonably achievable.; CT of the abdomen and pelvis was performed with the administration of intravenous contrast. Multiplanar reformatted images are provided for review. Dose modulation, iterative reconstruction, and/or weight based adjustment of the mA/kV was utilized to reduce the radiation dose to as low as reasonably achievable.  COMPARISON: December 2021 February 2022 HISTORY: ORDERING SYSTEM PROVIDED HISTORY: sob/cp TECHNOLOGIST PROVIDED HISTORY: Reason for exam:->sob/cp Decision Support Exception - unselect if not a suspected or confirmed emergency medical condition->Emergency Medical Condition (MA) Reason for Exam: sob, cp, n/v/d; ORDERING SYSTEM PROVIDED HISTORY: abd pain with n/v/d TECHNOLOGIST PROVIDED HISTORY: Additional Contrast?->None Reason for exam:->abd pain with n/v/d Decision Support Exception - unselect if not a suspected or confirmed emergency medical condition->Emergency Medical Condition (MA) Reason for Exam: sob, cp, n/v/d FINDINGS: Chest: Mediastinum: Small hypodense nodule seen in right lobe of thyroid, measuring 6 mm in size or less. No specific imaging follow-up recommended based on size. No intimal flap is seen in the aorta No embolus is seen in the central, right, or left main pulmonary artery. Segmental branches and subsegmental branches are not well evaluated secondary to suboptimal contrast bolus and respiratory motion artifact. Small mediastinal and hilar nodes are noted Small hiatal hernia seen. There is nonspecific thickening at the GE junction Lungs/pleura: Respiratory motion artifact limits evaluation of fine pulmonary parenchymal change. No obstructing endobronchial lesions are seen. Scattered areas of bronchial wall thickening are seen. No pneumonia. No edema. No pleural effusion. No focal lung consolidation noted. No pneumothorax. No significant pleural fluid. Soft Tissues/Bones: Spurring is seen in the spine. Spurring is seen in the shoulder joints Abdomen/Pelvis: Organs: No splenomegaly. No perisplenic fluid Adrenal glands appear normal No hydronephrosis on the right. No hydronephrosis on the left. No peripancreatic fluid. No peripancreatic inflammatory change. No intrahepatic ductal dilatation is seen. No perihepatic fluid. No inflammatory stranding surrounding the gallbladder. Hypodense nodule seen in the right hepatic lobe measuring 1.1 cm, likely cyst or hemangioma., Similar to prior GI/Bowel: No significant small bowel distention noted. Mild stool load seen in the colon. Scattered colonic diverticula are seen. No bowel obstruction noted. Appendix is identified and normal in caliber. Pelvis: No free fluid in pelvis. No pelvic adenopathy.   Follicular change seen in the adnexal regions, incidentally noted. Small calcifications are seen the pelvis, likely phleboliths. Peritoneum/Retroperitoneum: No retroperitoneal adenopathy. No aortic aneurysm. Mild atherosclerotic change seen at the SMA. Mild atherosclerotic change centered at the origin of the renal arteries. Bones/Soft Tissues: Spurring is seen in the spine. Spurring is seen in the hips     Chest: No central pulmonary embolus identified. Peripheral pulmonary arterial branches are not well evaluated Abdomen and pelvis: No acute intra-abdominal abnormality RECOMMENDATIONS: Unavailable     XR CHEST PORTABLE    Result Date: 3/16/2022  EXAMINATION: ONE XRAY VIEW OF THE CHEST 3/16/2022 11:46 am COMPARISON: 09/30/2021. HISTORY: ORDERING SYSTEM PROVIDED HISTORY: cough TECHNOLOGIST PROVIDED HISTORY: Reason for exam:->cough Reason for Exam: cp FINDINGS: The cardiomediastinal silhouette is unremarkable. The lungs are clear. No infiltrate, pleural fluid or evidence of overt failure. No acute cardiopulmonary disease. CT CHEST PULMONARY EMBOLISM W CONTRAST    Result Date: 3/16/2022  EXAMINATION: CTA OF THE CHEST; CT OF THE ABDOMEN AND PELVIS WITH CONTRAST 3/16/2022 12:47 pm TECHNIQUE: CTA of the chest was performed after the administration of intravenous contrast.  Multiplanar reformatted images are provided for review. MIP images are provided for review. Dose modulation, iterative reconstruction, and/or weight based adjustment of the mA/kV was utilized to reduce the radiation dose to as low as reasonably achievable.; CT of the abdomen and pelvis was performed with the administration of intravenous contrast. Multiplanar reformatted images are provided for review. Dose modulation, iterative reconstruction, and/or weight based adjustment of the mA/kV was utilized to reduce the radiation dose to as low as reasonably achievable.  COMPARISON: December 2021 February 2022 HISTORY: ORDERING SYSTEM PROVIDED HISTORY: sob/cp TECHNOLOGIST PROVIDED HISTORY: Reason for exam:->sob/cp Decision Support Exception - unselect if not a suspected or confirmed emergency medical condition->Emergency Medical Condition (MA) Reason for Exam: sob, cp, n/v/d; ORDERING SYSTEM PROVIDED HISTORY: abd pain with n/v/d TECHNOLOGIST PROVIDED HISTORY: Additional Contrast?->None Reason for exam:->abd pain with n/v/d Decision Support Exception - unselect if not a suspected or confirmed emergency medical condition->Emergency Medical Condition (MA) Reason for Exam: sob, cp, n/v/d FINDINGS: Chest: Mediastinum: Small hypodense nodule seen in right lobe of thyroid, measuring 6 mm in size or less. No specific imaging follow-up recommended based on size. No intimal flap is seen in the aorta No embolus is seen in the central, right, or left main pulmonary artery. Segmental branches and subsegmental branches are not well evaluated secondary to suboptimal contrast bolus and respiratory motion artifact. Small mediastinal and hilar nodes are noted Small hiatal hernia seen. There is nonspecific thickening at the GE junction Lungs/pleura: Respiratory motion artifact limits evaluation of fine pulmonary parenchymal change. No obstructing endobronchial lesions are seen. Scattered areas of bronchial wall thickening are seen. No pneumonia. No edema. No pleural effusion. No focal lung consolidation noted. No pneumothorax. No significant pleural fluid. Soft Tissues/Bones: Spurring is seen in the spine. Spurring is seen in the shoulder joints Abdomen/Pelvis: Organs: No splenomegaly. No perisplenic fluid Adrenal glands appear normal No hydronephrosis on the right. No hydronephrosis on the left. No peripancreatic fluid. No peripancreatic inflammatory change. No intrahepatic ductal dilatation is seen. No perihepatic fluid. No inflammatory stranding surrounding the gallbladder.  Hypodense nodule seen in the right hepatic lobe measuring 1.1 cm, likely cyst or hemangioma., Similar to prior GI/Bowel: No significant small bowel distention noted. Mild stool load seen in the colon. Scattered colonic diverticula are seen. No bowel obstruction noted. Appendix is identified and normal in caliber. Pelvis: No free fluid in pelvis. No pelvic adenopathy. Follicular change seen in the adnexal regions, incidentally noted. Small calcifications are seen the pelvis, likely phleboliths. Peritoneum/Retroperitoneum: No retroperitoneal adenopathy. No aortic aneurysm. Mild atherosclerotic change seen at the SMA. Mild atherosclerotic change centered at the origin of the renal arteries. Bones/Soft Tissues: Spurring is seen in the spine. Spurring is seen in the hips     Chest: No central pulmonary embolus identified. Peripheral pulmonary arterial branches are not well evaluated Abdomen and pelvis: No acute intra-abdominal abnormality RECOMMENDATIONS: Unavailable           PROCEDURES   Unless otherwise noted below, none     Procedures    CRITICAL CARE TIME   CRITICAL CARE NOTE:  There was a high probability of clinically significant life-threatening deterioration of the patient's condition requiring my urgent intervention. Total critical care time was at least 10 minutes. This includes vital sign monitoring, pulse oximetry monitoring, telemetry monitoring, clinical response to the IV medications, reviewing the nursing notes, consultation time, dictation/documentation time, and interpretation of the labwork. This excludes any separately billable procedures performed.       CONSULTS:  None      EMERGENCY DEPARTMENT COURSE and DIFFERENTIAL DIAGNOSIS/MDM:   Vitals:    Vitals:    03/16/22 1242 03/16/22 1332 03/16/22 1358 03/16/22 1402   BP: (!) 153/55 (!) 129/95  (!) 153/92   Pulse: 81 83 91 77   Resp: 10 16 14 16   Temp:       TempSrc:       SpO2: 96% 99% 94% 100%   Weight:       Height:           Patient was given the following medications:  Medications   0.9 % sodium chloride bolus (1,000 mLs IntraVENous Bolus from Bag 3/16/22 1137)   promethazine (PHENERGAN) 12.5mg in sodium chloride 0.9% 50 mL IVPB 12.5 mg (0 mg IntraVENous Stopped 3/16/22 1415)   promethazine (PHENERGAN) in sodium chloride 0.9% 50 mL IVPB 25 mg (0 mg IntraVENous Stopped 3/16/22 1320)   guaiFENesin-dextromethorphan (ROBITUSSIN DM) 100-10 MG/5ML syrup 10 mL (10 mLs Oral Given 3/16/22 1137)   ipratropium-albuterol (DUONEB) nebulizer solution 1 ampule (1 ampule Inhalation Given 3/16/22 1158)   iopamidol (ISOVUE-370) 76 % injection 75 mL (75 mLs IntraVENous Given 3/16/22 1259)   fluconazole (DIFLUCAN) tablet 200 mg (200 mg Oral Given 3/16/22 1356)   dicyclomine (BENTYL) capsule 20 mg (20 mg Oral Given 3/16/22 1500)           MDM: See HPI and above for full presentation physical exam.  Differential diagnoses include other viral illness, influenza, COVID-19, electrolyte derangement, SHAY, dehydration, UTI, intra-abdominal emergency, DKA, hyperglycemia, HHS, pneumonia, infection, sepsis, other    Point-of-care glucose 266. She is not acidotic however. Bicarb 34. PCO2 of 58.3. Beta hydroxybutyrate 0.54, nothing to indicate that patient is in DKA. Urinalysis shows no ketones. No nitrites or leukocytes. There are some budding yeast present on the microscopic urinalysis. Will give a dose of fluconazole. Patient has no leukocytosis. No lactic acidosis. No anemia. No significant electrolyte derangements or SHAY. Slight elevation in her BUN to indicate a very mild dehydration, will get IV fluids    LFTs show slight elevation in ALT and AST as well as lipase, there is hemolysis in the specimen but it could be reactive her to her vomiting and diarrhea. EKG officially interpreted per my attending. Troponin negative. BNP unremarkable. Rapid flu and Covid are negative. CTs and plain films as read by the radiologist as above.     Given her symptoms, and the fact that multiple sick contacts have been in the household her symptoms are likely viral in nature. I do believe that she is stable for discharge home, she remained afebrile and hemodynamically stable throughout her entire ED course. Was able to tolerate p.o. intake here in the emergency department as well. She is in agreement with this plan as well as plan of discharge. We will get medications for symptoms and have her follow-up with her primary care provider on outpatient basis. She verbalized understanding of all the above withou any further questions or concerns. Verbalized understanding to return immediately for any new or worsening symptoms. Will be discharged home in stable condition    FINAL IMPRESSION      1. Nausea vomiting and diarrhea    2. Generalized abdominal pain    3. Generalized body aches    4. Cough    5.  Chills          DISPOSITION/PLAN   DISPOSITION Decision To Discharge 03/16/2022 02:39:34 PM      PATIENT REFERRED TO:  CEASAR Kaye  [address] [place] [address]  [phone]    Schedule an appointment as soon as possible for a visit in 3 days      [facility] Emergency Department  [address] [address]  [phone]  Go to   As needed, If symptoms worsen      DISCHARGE MEDICATIONS:  Discharge Medication List as of 3/16/2022  3:00 PM      START taking these medications    Details   ibuprofen (IBU) 800 MG tablet Take 1 tablet by mouth every 8 hours as needed for Pain or Fever, Disp-20 tablet, R-0Print      guaiFENesin-dextromethorphan (ROBITUSSIN DM) 100-10 MG/5ML syrup Take 5 mLs by mouth 3 times daily as needed for Cough, Disp-120 mL, R-0Print      dicyclomine (BENTYL) 10 MG capsule Take 1 capsule by mouth every 6 hours as needed (cramps), Disp-20 capsule, R-0Print      albuterol sulfate HFA (VENTOLIN HFA) 108 (90 Base) MCG/ACT inhaler Inhale 2 puffs into the lungs 4 times daily as needed for Wheezing or Shortness of Breath, Disp-18 g, R-0Print      ondansetron (ZOFRAN ODT) 4 MG disintegrating tablet Take 1-2 tablets by mouth every 12 hours as needed for Nausea or Vomiting May Sub regular tablet (non-ODT) if insurance does not cover ODT., Disp-30 tablet, R-0Print      promethazine (PHENERGAN) 25 MG tablet Take 1 tablet by mouth every 6 hours as needed for Nausea, Disp-30 tablet, R-0Print             DISCONTINUED MEDICATIONS:  Discharge Medication List as of 3/16/2022  3:00 PM                 (Please note that portions of this note were completed with a voice recognition program.  Efforts were made to edit the dictations but occasionally words are mis-transcribed.)    SEVEN Lira CNP (electronically signed)            SEVEN Lira CNP  03/16/22 2835

## 2022-03-17 LAB
EKG ATRIAL RATE: 85 BPM
EKG DIAGNOSIS: NORMAL
EKG P AXIS: 52 DEGREES
EKG P-R INTERVAL: 142 MS
EKG Q-T INTERVAL: 410 MS
EKG QRS DURATION: 82 MS
EKG QTC CALCULATION (BAZETT): 487 MS
EKG R AXIS: -1 DEGREES
EKG T AXIS: 44 DEGREES
EKG VENTRICULAR RATE: 85 BPM

## 2022-03-17 PROCEDURE — 93010 ELECTROCARDIOGRAM REPORT: CPT | Performed by: INTERNAL MEDICINE

## 2022-03-18 NOTE — ED NOTES
Bed: A-16  Expected date:   Expected time:   Means of arrival:   Comments:  Amadou Iverson, JOHAN  02/24/22 2445 09:45

## 2022-03-28 PROCEDURE — 99284 EMERGENCY DEPT VISIT MOD MDM: CPT

## 2022-03-29 ENCOUNTER — APPOINTMENT (OUTPATIENT)
Dept: GENERAL RADIOLOGY | Age: 62
End: 2022-03-29
Payer: COMMERCIAL

## 2022-03-29 ENCOUNTER — HOSPITAL ENCOUNTER (EMERGENCY)
Age: 62
Discharge: HOME | End: 2022-03-29
Attending: EMERGENCY MEDICINE
Payer: COMMERCIAL

## 2022-03-29 VITALS
DIASTOLIC BLOOD PRESSURE: 74 MMHG | TEMPERATURE: 98 F | WEIGHT: 235.23 LBS | RESPIRATION RATE: 18 BRPM | OXYGEN SATURATION: 98 % | HEART RATE: 82 BPM | SYSTOLIC BLOOD PRESSURE: 117 MMHG | HEIGHT: 67 IN | BODY MASS INDEX: 36.92 KG/M2

## 2022-03-29 DIAGNOSIS — R05.9 COUGH: Primary | ICD-10-CM

## 2022-03-29 LAB
ALBUMIN SERPL-MCNC: 3.3 G/DL (ref 3.4–5)
ALP BLD-CCNC: 106 U/L (ref 40–129)
ALT SERPL-CCNC: 23 U/L (ref 10–40)
ANION GAP SERPL CALCULATED.3IONS-SCNC: 16 MMOL/L (ref 3–16)
AST SERPL-CCNC: 27 U/L (ref 15–37)
BASOPHILS ABSOLUTE: 0 K/UL (ref 0–0.2)
BASOPHILS RELATIVE PERCENT: 0.4 %
BILIRUB SERPL-MCNC: <0.2 MG/DL (ref 0–1)
BILIRUBIN DIRECT: <0.2 MG/DL (ref 0–0.3)
BILIRUBIN, INDIRECT: ABNORMAL MG/DL (ref 0–1)
BUN BLDV-MCNC: 10 MG/DL (ref 7–20)
CALCIUM SERPL-MCNC: 8.7 MG/DL (ref 8.3–10.6)
CHLORIDE BLD-SCNC: 102 MMOL/L (ref 99–110)
CO2: 16 MMOL/L (ref 21–32)
CREAT SERPL-MCNC: 0.8 MG/DL (ref 0.6–1.2)
EKG ATRIAL RATE: 70 BPM
EKG DIAGNOSIS: NORMAL
EKG P AXIS: 40 DEGREES
EKG P-R INTERVAL: 166 MS
EKG Q-T INTERVAL: 406 MS
EKG QRS DURATION: 82 MS
EKG QTC CALCULATION (BAZETT): 438 MS
EKG R AXIS: 0 DEGREES
EKG T AXIS: 24 DEGREES
EKG VENTRICULAR RATE: 70 BPM
EOSINOPHILS ABSOLUTE: 0.2 K/UL (ref 0–0.6)
EOSINOPHILS RELATIVE PERCENT: 1.7 %
GFR AFRICAN AMERICAN: >60
GFR NON-AFRICAN AMERICAN: >60
GLUCOSE BLD-MCNC: 245 MG/DL (ref 70–99)
HCT VFR BLD CALC: 38.7 % (ref 36–48)
HEMOGLOBIN: 12.5 G/DL (ref 12–16)
LIPASE: 128 U/L (ref 13–60)
LYMPHOCYTES ABSOLUTE: 3.2 K/UL (ref 1–5.1)
LYMPHOCYTES RELATIVE PERCENT: 35.5 %
MCH RBC QN AUTO: 25.3 PG (ref 26–34)
MCHC RBC AUTO-ENTMCNC: 32.3 G/DL (ref 31–36)
MCV RBC AUTO: 78.4 FL (ref 80–100)
MONOCYTES ABSOLUTE: 0.8 K/UL (ref 0–1.3)
MONOCYTES RELATIVE PERCENT: 9.1 %
NEUTROPHILS ABSOLUTE: 4.9 K/UL (ref 1.7–7.7)
NEUTROPHILS RELATIVE PERCENT: 53.3 %
PDW BLD-RTO: 15.2 % (ref 12.4–15.4)
PLATELET # BLD: 284 K/UL (ref 135–450)
PMV BLD AUTO: 7.1 FL (ref 5–10.5)
POTASSIUM REFLEX MAGNESIUM: 4.5 MMOL/L (ref 3.5–5.1)
PRO-BNP: 84 PG/ML (ref 0–124)
RBC # BLD: 4.94 M/UL (ref 4–5.2)
SODIUM BLD-SCNC: 134 MMOL/L (ref 136–145)
TOTAL PROTEIN: 6.5 G/DL (ref 6.4–8.2)
TROPONIN: <0.01 NG/ML
WBC # BLD: 9.1 K/UL (ref 4–11)

## 2022-03-29 PROCEDURE — 93005 ELECTROCARDIOGRAM TRACING: CPT | Performed by: EMERGENCY MEDICINE

## 2022-03-29 PROCEDURE — 85025 COMPLETE CBC W/AUTO DIFF WBC: CPT

## 2022-03-29 PROCEDURE — 84484 ASSAY OF TROPONIN QUANT: CPT

## 2022-03-29 PROCEDURE — 80048 BASIC METABOLIC PNL TOTAL CA: CPT

## 2022-03-29 PROCEDURE — 83690 ASSAY OF LIPASE: CPT

## 2022-03-29 PROCEDURE — 80076 HEPATIC FUNCTION PANEL: CPT

## 2022-03-29 PROCEDURE — 93010 ELECTROCARDIOGRAM REPORT: CPT | Performed by: INTERNAL MEDICINE

## 2022-03-29 PROCEDURE — 71046 X-RAY EXAM CHEST 2 VIEWS: CPT

## 2022-03-29 PROCEDURE — 83880 ASSAY OF NATRIURETIC PEPTIDE: CPT

## 2022-03-29 RX ORDER — FLUCONAZOLE 150 MG/1
150 TABLET ORAL ONCE
Qty: 1 TABLET | Refills: 0 | Status: SHIPPED | OUTPATIENT
Start: 2022-03-29 | End: 2022-03-29

## 2022-03-29 RX ORDER — DOXYCYCLINE HYCLATE 100 MG
100 TABLET ORAL 2 TIMES DAILY
Qty: 20 TABLET | Refills: 0 | Status: SHIPPED | OUTPATIENT
Start: 2022-03-29 | End: 2022-04-08

## 2022-03-29 RX ORDER — BENZONATATE 100 MG/1
100 CAPSULE ORAL 3 TIMES DAILY PRN
Qty: 10 CAPSULE | Refills: 0 | Status: SHIPPED | OUTPATIENT
Start: 2022-03-29 | End: 2022-04-05

## 2022-03-29 ASSESSMENT — PAIN DESCRIPTION - DESCRIPTORS: DESCRIPTORS: SORE;OTHER (COMMENT)

## 2022-03-29 ASSESSMENT — PAIN SCALES - GENERAL: PAINLEVEL_OUTOF10: 8

## 2022-03-29 ASSESSMENT — PAIN DESCRIPTION - LOCATION: LOCATION: CHEST;BACK;SHOULDER

## 2022-03-29 ASSESSMENT — PAIN - FUNCTIONAL ASSESSMENT: PAIN_FUNCTIONAL_ASSESSMENT: 0-10

## 2022-03-29 ASSESSMENT — PAIN DESCRIPTION - FREQUENCY: FREQUENCY: CONTINUOUS

## 2022-03-29 ASSESSMENT — PAIN DESCRIPTION - PAIN TYPE: TYPE: ACUTE PAIN

## 2022-03-29 NOTE — ED PROVIDER NOTES
[address] [facility]      Pt Name: Robert Willoughby  MRN: [id number]  [place] 1960  Date of evaluation: 3/28/2022  Provider: Cary Esteves MD    CHIEF COMPLAINT       Chief Complaint   Patient presents with    URI     HISTORY OF PRESENT ILLNESS    Robert Willoughby is a 65 y.o. female who presents to the emergency department with cough. Patient presents with cough and upper respiratory-like symptoms that been going on the last few weeks. No abdominal pain. No chest pain. No back pain. Endorses productive cough. History of asthma. Denies shortness of breath. Positive for tactile fevers. Positive for chills. No leg swelling. No other associated symptoms    Nursing Notes were reviewed. Including nursing noted for FM, Surgical History, Past Medical History, Social History, vitals, and allergies; agree with all. REVIEW OF SYSTEMS       Review of Systems    Except as noted above the remainder of the review of systems was reviewed and negative.      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    Gastroparesis     Hirsutism     History of blood transfusion     Hyperlipidemia LDL goal < 70     Hypertension     Major depressive disorder with single episode 2009    Pancreatitis     Seasonal allergic rhinitis due to pollen 11/10/2016    Sleep apnea     Thyroid disease     Vitamin D deficiency        SURGICAL HISTORY       Past Surgical History:   Procedure Laterality Date     SECTION      ENDOMETRIAL ABLATION  5/3/12    ENDOSCOPY, COLON, DIAGNOSTIC      HYSTEROSCOPY  5/3/12    MANDIBLE SURGERY      SKIN BIOPSY      TUBAL LIGATION      UPPER GASTROINTESTINAL ENDOSCOPY N/A 8/20/2021    EGD BIOPSY performed by Burke Kramer MD at [address] [facility]       Discharge Medication List as of 3/29/2022  2:52 AM      CONTINUE these medications which have NOT CHANGED    Details   acetaminophen (TYLENOL) 500 MG tablet Take 2 tablets by mouth 4 times daily as needed for Pain or Fever, Disp-60 tablet, R-0Print      ibuprofen (IBU) 800 MG tablet Take 1 tablet by mouth every 8 hours as needed for Pain or Fever, Disp-20 tablet, R-0Print      dicyclomine (BENTYL) 10 MG capsule Take 1 capsule by mouth every 6 hours as needed (cramps), Disp-20 capsule, R-0Print      albuterol sulfate HFA (VENTOLIN HFA) 108 (90 Base) MCG/ACT inhaler Inhale 2 puffs into the lungs 4 times daily as needed for Wheezing or Shortness of Breath, Disp-18 g, R-0Print      ondansetron (ZOFRAN ODT) 4 MG disintegrating tablet Take 1-2 tablets by mouth every 12 hours as needed for Nausea or Vomiting May Sub regular tablet (non-ODT) if insurance does not cover ODT., Disp-30 tablet, R-0Print      omeprazole (PRILOSEC) 40 MG delayed release capsule Take 40 mg by mouth dailyHistorical Med      insulin glargine (BASAGLAR KWIKPEN) 100 UNIT/ML injection pen Inject 10 Units into the skin nightly, Disp-5 pen, R-0Normal      Papaya Enzyme CHEW Take 3-4 tablets by mouth daily as needed (Indigestion)Historical Med      lisinopril (PRINIVIL;ZESTRIL) 10 MG tablet Take 10 mg by mouth dailyHistorical Med      ezetimibe (ZETIA) 10 MG tablet Take 10 mg by mouth dailyHistorical Med      amLODIPine (NORVASC) 5 MG tablet Take 5 mg by mouth dailyHistorical Med      insulin lispro (HUMALOG) 100 UNIT/ML injection vial Inject into the skin 3 times daily (before meals) Per sliding scale.  2 units >150 then additional 2 units for every 50Historical Med aspirin 81 MG tablet Take 81 mg by mouth dailyHistorical Med      Insulin Syringe-Needle U-100 (INSULIN SYRINGE .5CC/31GX5/16\") 31G X 5/16\" 0.5 ML MISC DAILY Starting 4/14/2017, Until Discontinued, Disp-100 Syringe, R-3, Normal      timolol (TIMOPTIC) 0.5 % ophthalmic solution Place 1 drop into both eyes daily       ergocalciferol (DRISDOL) 88117 UNITS capsule Take 1 capsule by mouth once a week, Disp-13 capsule, R-1      albuterol sulfate (PROAIR RESPICLICK) 605 (90 BASE) MCG/ACT aerosol powder inhalation Inhale 2 puffs into the lungs every 4 hours as needed for Wheezing or Shortness of Breath, Disp-1 Inhaler, R-5      Insulin Pen Needle 31G X 5 MM MISC Disp-450 each, R-3, NormalPt uses three times a day      glucose blood VI test strips (EXACTECH TEST) strip Disp-400 each, R-3, Normal4 times a day As needed. Lancets MISC Disp-100 each, R-3, Normal3-4 times a day      latanoprost (XALATAN) 0.005 % ophthalmic solution Place 1 drop into both eyes nightly.              ALLERGIES     Avelox [moxifloxacin], Bactrim, Cefuroxime, Codeine, Medrol [methylprednisolone], Morphine, Niacin, Oat, Percocet [oxycodone-acetaminophen], Reglan [metoclopramide], Spironolactone, Statins, Sulfamethoxazole-trimethoprim, and Vicodin [hydrocodone-acetaminophen]    FAMILY HISTORY        Family History   Problem Relation Age of Onset    Hypertension Mother     Breast Cancer Mother     Colon Cancer Mother     Cancer Father         Pancreatic    Prostate Cancer Father     No Known Problems Brother     No Known Problems Sister     Hypertension Maternal Aunt     Cancer Maternal Uncle     Hypertension Maternal Grandmother     Stroke Maternal Grandmother     Cancer Maternal Grandfather     No Known Problems Paternal Aunt     No Known Problems Paternal Uncle     No Known Problems Paternal Grandmother     No Known Problems Paternal Grandfather     No Known Problems Other     Rheum Arthritis Neg Hx     Osteoarthritis Neg Hx  Asthma Neg Hx     Diabetes Neg Hx     Heart Failure Neg Hx     High Cholesterol Neg Hx     Migraines Neg Hx     Ovarian Cancer Neg Hx     Rashes/Skin Problems Neg Hx     Seizures Neg Hx     Thyroid Disease Neg Hx        SOCIAL HISTORY       Social History     Socioeconomic History    Marital status:      Spouse name: None    Number of children: 3    Years of education: None    Highest education level: None   Occupational History    Occupation:    Tobacco Use    Smoking status: Former Smoker     Types: Cigarettes     Quit date: 1990     Years since quittin.2    Smokeless tobacco: Never Used   Vaping Use    Vaping Use: Never used   Substance and Sexual Activity    Alcohol use: Yes     Alcohol/week: 0.0 standard drinks     Comment: less than once a month    Drug use: Yes     Frequency: 2.0 times per week     Types: Marijuana Jarrod Cook     Comment: used yesterday    Sexual activity: Not Currently     Partners: Male   Other Topics Concern    None   Social History Narrative    None     Social Determinants of Health     Financial Resource Strain:     Difficulty of Paying Living Expenses: Not on file   Food Insecurity:     Worried About Running Out of Food in the Last Year: Not on file    Amelia of Food in the Last Year: Not on file   Transportation Needs:     Lack of Transportation (Medical): Not on file    Lack of Transportation (Non-Medical):  Not on file   Physical Activity:     Days of Exercise per Week: Not on file    Minutes of Exercise per Session: Not on file   Stress:     Feeling of Stress : Not on file   Social Connections:     Frequency of Communication with Friends and Family: Not on file    Frequency of Social Gatherings with Friends and Family: Not on file    Attends Holiness Services: Not on file    Active Member of Clubs or Organizations: Not on file    Attends Club or Organization Meetings: Not on file    Marital Status: Not on file alert and oriented to person, place, and time. She is not disoriented. Cranial Nerves: No cranial nerve deficit. Motor: No atrophy or abnormal muscle tone. Coordination: Coordination normal.   Psychiatric:         Behavior: Behavior normal.         Thought Content: Thought content normal.         DIAGNOSTIC RESULTS     RADIOLOGY:   Non-plain film images such as CT, Ultrasoundand MRI are read by the radiologist. Plain radiographic images are visualized and preliminarily interpreted by the emergency physician with the below findings:    Chest x-ray shows no infiltrate    ED BEDSIDE ULTRASOUND:   Performed by ED Physician - none    LABS:  Labs Reviewed   CBC WITH AUTO DIFFERENTIAL - Abnormal; Notable for the following components:       Result Value    MCV 78.4 (*)     MCH 25.3 (*)     All other components within normal limits   BASIC METABOLIC PANEL W/ REFLEX TO MG FOR LOW K - Abnormal; Notable for the following components:    Sodium 134 (*)     CO2 16 (*)     Glucose 245 (*)     All other components within normal limits   LIPASE - Abnormal; Notable for the following components:    Lipase 128.0 (*)     All other components within normal limits   HEPATIC FUNCTION PANEL - Abnormal; Notable for the following components:    Albumin 3.3 (*)     All other components within normal limits   TROPONIN   BRAIN NATRIURETIC PEPTIDE       All other labs were withinnormal range or not returned as of this dictation. EMERGENCY DEPARTMENT COURSE and DIFFERENTIAL DIAGNOSIS/MDM:     PMH, Surgical Hx, FH, Social Hx reviewed by myself (ETOH usage, Tobacco usage, Drug usage reviewed by myself, no pertinent Hx)- No Pertinent Hx     Old records were reviewed by me    67-year-old with upper respiratory infection versus bronchitis versus pneumonia. Antibiotics initiated. Outpatient follow-up.     I estimate there is LOW risk for Sepsis, MI, Stroke, Tamponade, PTX, Toxicity or other life threatening etiology thus I consider the discharge disposition reasonable. The patient is at low risk for mortality based on demographic, history and clinical factors. Given the best available information and clinical assessment, I estimate the risk of hospitalization to be greater than risk of treatment at home. I have explained to the patient that the risk could rapidly change, given precautions for return and instructions. Explained to patient that the risk for mortality is low based on demographic, history and clinical factors. I discussed with patient the results of evaluation in the ED, diagnosis, care, and prognosis. The plan is to discharge to home. Patient is in agreement with plan and questions have been answered. I also discussed with patient the reasons which may require a return visit and the importance of follow-up care. The patient is well-appearing, nontoxic, and improved at the time of discharge. Patient agrees to call to arrange follow-up care as directed. Patient understands to return immediately for worsening/change in symptoms. CRITICAL CARE TIME   Total Critical Caretime was 12 minutes, excluding separately reportable procedures. There was a high probability of clinically significant/life threatening deterioration in the patient's condition which required my urgent intervention. PROCEDURES:  Unlessotherwise noted below, none    FINAL IMPRESSION      1.  Cough          DISPOSITION/PLAN   DISPOSITION Decision To Discharge 03/29/2022 02:32:03 AM    PATIENT REFERRED TO:  CEASAR Hawk  [address] [place] [address]  [phone]            DISCHARGE MEDICATIONS:  Discharge Medication List as of 3/29/2022  2:52 AM      START taking these medications    Details   doxycycline hyclate (VIBRA-TABS) 100 MG tablet Take 1 tablet by mouth 2 times daily for 10 days, Disp-20 tablet, R-0Print      benzonatate (TESSALON PERLES) 100 MG capsule Take 1 capsule by mouth 3 times daily as needed for Cough, Disp-10 capsule, R-0Print      fluconazole (DIFLUCAN) 150 MG tablet Take 1 tablet by mouth once for 1 dose, Disp-1 tablet, R-0Print                (Please note that portions ofthis note were completed with a voice recognition program.  Efforts were made to edit the dictations but occasionally words are mis-transcribed.)    Madyson Parks MD(electronically signed)  Attending Emergency Physician          Madyson Parks MD  03/29/22 2067

## 2022-03-29 NOTE — ED NOTES
Discharge and education instructions reviewed. Patient verbalized understanding, teach-back successful. Patient denied questions at this time. No acute distress noted. Patient instructed to follow-up as noted - return to emergency department if symptoms worsen. Patient verbalized understanding. Discharged per EDMD with discharge instructions.           Aj Pyle RN  03/29/22 6048
all other ROS negative except as per HPI

## 2022-08-04 ENCOUNTER — HOSPITAL ENCOUNTER (EMERGENCY)
Age: 62
Discharge: HOME | End: 2022-08-05
Attending: EMERGENCY MEDICINE
Payer: COMMERCIAL

## 2022-08-04 DIAGNOSIS — R11.2 NON-INTRACTABLE VOMITING WITH NAUSEA, UNSPECIFIED VOMITING TYPE: Primary | ICD-10-CM

## 2022-08-04 DIAGNOSIS — E11.65 TYPE 2 DIABETES MELLITUS WITH HYPERGLYCEMIA, WITH LONG-TERM CURRENT USE OF INSULIN (HCC): ICD-10-CM

## 2022-08-04 DIAGNOSIS — Z79.4 TYPE 2 DIABETES MELLITUS WITH HYPERGLYCEMIA, WITH LONG-TERM CURRENT USE OF INSULIN (HCC): ICD-10-CM

## 2022-08-04 LAB
A/G RATIO: 1.3 (ref 1.1–2.2)
ALBUMIN SERPL-MCNC: 5.1 G/DL (ref 3.4–5)
ALP BLD-CCNC: 154 U/L (ref 40–129)
ALT SERPL-CCNC: 17 U/L (ref 10–40)
ANION GAP SERPL CALCULATED.3IONS-SCNC: 17 MMOL/L (ref 3–16)
AST SERPL-CCNC: 16 U/L (ref 15–37)
BASE EXCESS VENOUS: 11.8 MMOL/L
BASOPHILS ABSOLUTE: 0.1 K/UL (ref 0–0.2)
BASOPHILS RELATIVE PERCENT: 0.4 %
BETA-HYDROXYBUTYRATE: 0.61 MMOL/L (ref 0–0.27)
BILIRUB SERPL-MCNC: 0.6 MG/DL (ref 0–1)
BUN BLDV-MCNC: 14 MG/DL (ref 7–20)
CALCIUM SERPL-MCNC: 10.4 MG/DL (ref 8.3–10.6)
CARBOXYHEMOGLOBIN: 2.3 %
CHLORIDE BLD-SCNC: 88 MMOL/L (ref 99–110)
CO2: 32 MMOL/L (ref 21–32)
CREAT SERPL-MCNC: 1 MG/DL (ref 0.6–1.2)
EOSINOPHILS ABSOLUTE: 0 K/UL (ref 0–0.6)
EOSINOPHILS RELATIVE PERCENT: 0 %
GFR AFRICAN AMERICAN: >60
GFR NON-AFRICAN AMERICAN: 56
GLUCOSE BLD-MCNC: 331 MG/DL (ref 70–99)
GLUCOSE BLD-MCNC: 344 MG/DL (ref 70–99)
HCO3 VENOUS: 37 MMOL/L (ref 23–29)
HCT VFR BLD CALC: 48.8 % (ref 36–48)
HEMOGLOBIN: 15.9 G/DL (ref 12–16)
LIPASE: 39 U/L (ref 13–60)
LYMPHOCYTES ABSOLUTE: 2.1 K/UL (ref 1–5.1)
LYMPHOCYTES RELATIVE PERCENT: 11.2 %
MAGNESIUM: 1.8 MG/DL (ref 1.8–2.4)
MCH RBC QN AUTO: 25.4 PG (ref 26–34)
MCHC RBC AUTO-ENTMCNC: 32.7 G/DL (ref 31–36)
MCV RBC AUTO: 77.7 FL (ref 80–100)
METHEMOGLOBIN VENOUS: 0.3 %
MONOCYTES ABSOLUTE: 1.2 K/UL (ref 0–1.3)
MONOCYTES RELATIVE PERCENT: 6.4 %
NEUTROPHILS ABSOLUTE: 15.5 K/UL (ref 1.7–7.7)
NEUTROPHILS RELATIVE PERCENT: 82 %
O2 SAT, VEN: 77 %
O2 THERAPY: ABNORMAL
PCO2, VEN: 46 MMHG (ref 40–50)
PDW BLD-RTO: 14.7 % (ref 12.4–15.4)
PERFORMED ON: ABNORMAL
PH VENOUS: 7.51 (ref 7.35–7.45)
PLATELET # BLD: 333 K/UL (ref 135–450)
PMV BLD AUTO: 7.5 FL (ref 5–10.5)
PO2, VEN: 38 MMHG
POTASSIUM SERPL-SCNC: 3.5 MMOL/L (ref 3.5–5.1)
RBC # BLD: 6.28 M/UL (ref 4–5.2)
SODIUM BLD-SCNC: 137 MMOL/L (ref 136–145)
TCO2 CALC VENOUS: 38 MMOL/L
TOTAL PROTEIN: 8.9 G/DL (ref 6.4–8.2)
WBC # BLD: 18.8 K/UL (ref 4–11)

## 2022-08-04 PROCEDURE — 6360000002 HC RX W HCPCS: Performed by: EMERGENCY MEDICINE

## 2022-08-04 PROCEDURE — 6370000000 HC RX 637 (ALT 250 FOR IP): Performed by: EMERGENCY MEDICINE

## 2022-08-04 PROCEDURE — 2500000003 HC RX 250 WO HCPCS: Performed by: EMERGENCY MEDICINE

## 2022-08-04 PROCEDURE — 2580000003 HC RX 258: Performed by: EMERGENCY MEDICINE

## 2022-08-04 PROCEDURE — 99284 EMERGENCY DEPT VISIT MOD MDM: CPT

## 2022-08-04 PROCEDURE — 96375 TX/PRO/DX INJ NEW DRUG ADDON: CPT

## 2022-08-04 PROCEDURE — 83690 ASSAY OF LIPASE: CPT

## 2022-08-04 PROCEDURE — 96374 THER/PROPH/DIAG INJ IV PUSH: CPT

## 2022-08-04 PROCEDURE — 82010 KETONE BODYS QUAN: CPT

## 2022-08-04 PROCEDURE — 80053 COMPREHEN METABOLIC PANEL: CPT

## 2022-08-04 PROCEDURE — 85025 COMPLETE CBC W/AUTO DIFF WBC: CPT

## 2022-08-04 PROCEDURE — 82803 BLOOD GASES ANY COMBINATION: CPT

## 2022-08-04 PROCEDURE — A4216 STERILE WATER/SALINE, 10 ML: HCPCS | Performed by: EMERGENCY MEDICINE

## 2022-08-04 PROCEDURE — 83735 ASSAY OF MAGNESIUM: CPT

## 2022-08-04 RX ORDER — 0.9 % SODIUM CHLORIDE 0.9 %
1000 INTRAVENOUS SOLUTION INTRAVENOUS ONCE
Status: COMPLETED | OUTPATIENT
Start: 2022-08-04 | End: 2022-08-04

## 2022-08-04 RX ORDER — LABETALOL HYDROCHLORIDE 5 MG/ML
20 INJECTION, SOLUTION INTRAVENOUS ONCE
Status: DISCONTINUED | OUTPATIENT
Start: 2022-08-04 | End: 2022-08-04

## 2022-08-04 RX ORDER — ONDANSETRON 2 MG/ML
4 INJECTION INTRAMUSCULAR; INTRAVENOUS ONCE
Status: COMPLETED | OUTPATIENT
Start: 2022-08-04 | End: 2022-08-04

## 2022-08-04 RX ORDER — SCOLOPAMINE TRANSDERMAL SYSTEM 1 MG/1
1 PATCH, EXTENDED RELEASE TRANSDERMAL
Qty: 10 PATCH | Refills: 0 | Status: SHIPPED | OUTPATIENT
Start: 2022-08-04 | End: 2022-10-24

## 2022-08-04 RX ADMIN — SODIUM CHLORIDE 1000 ML: 9 INJECTION, SOLUTION INTRAVENOUS at 22:12

## 2022-08-04 RX ADMIN — ALUMINUM HYDROXIDE, MAGNESIUM HYDROXIDE, AND SIMETHICONE: 200; 200; 20 SUSPENSION ORAL at 22:12

## 2022-08-04 RX ADMIN — FAMOTIDINE 20 MG: 10 INJECTION, SOLUTION INTRAVENOUS at 22:13

## 2022-08-04 RX ADMIN — ONDANSETRON 4 MG: 2 INJECTION INTRAMUSCULAR; INTRAVENOUS at 21:21

## 2022-08-04 ASSESSMENT — PAIN SCALES - GENERAL: PAINLEVEL_OUTOF10: 8

## 2022-08-04 ASSESSMENT — PAIN DESCRIPTION - LOCATION: LOCATION: ABDOMEN

## 2022-08-04 ASSESSMENT — PAIN - FUNCTIONAL ASSESSMENT: PAIN_FUNCTIONAL_ASSESSMENT: 0-10

## 2022-08-04 ASSESSMENT — PAIN DESCRIPTION - FREQUENCY: FREQUENCY: CONTINUOUS

## 2022-08-04 NOTE — ED TRIAGE NOTES
Vomiting that started on Monday. Patient placed her nausea patch behind her ear. Non stop vomiting since Wednesday. Shortness of breath with movement. Patient states she has been trying to take her insulin.

## 2022-08-04 NOTE — LETTER
[facility] Emergency Department  [address] [facility] [address]  Phone: [phone]               August 5, 2022    Patient: Shiva Cabral   YOB: 1960   Date of Visit: 8/4/2022       To Whom It May Concern:    Shiva Cabral was seen and treated in our emergency department on 8/4/2022 for intractable nausea, vomiting and abdominal pain. She was treated and released on 8/5/22. She may return to work on 8/8/22.       Sincerely,       Emory Rose RN        Signature:__________________________________

## 2022-08-04 NOTE — ED NOTES
I took pt blood sugar and it was 344, nurse Amish Golden at bedside pt dose not need at repeat at this time.      Anthony Link  08/04/22 1956

## 2022-08-05 VITALS
DIASTOLIC BLOOD PRESSURE: 85 MMHG | HEIGHT: 67 IN | SYSTOLIC BLOOD PRESSURE: 163 MMHG | HEART RATE: 98 BPM | RESPIRATION RATE: 18 BRPM | WEIGHT: 218.7 LBS | TEMPERATURE: 98.7 F | OXYGEN SATURATION: 97 % | BODY MASS INDEX: 34.33 KG/M2

## 2022-08-05 NOTE — DISCHARGE INSTRUCTIONS
Call today or tomorrow to follow up with [facility]  in 2-3 days. Take your medication as prescribed. Avoid drinking alcohol or drinks that have caffeine it. Drink plenty of water or fluids like Gatorade. Return to the Emergency Department for worsening of nausea or vomiting, fever > 101.5, abdominal pain, blood in stool, vomiting blood, unable to tolerate oral fluids, continue to have vomiting for more than 24 hours, any other care or concern.

## 2022-08-05 NOTE — ED NOTES
Pt discharged back home, reviewed discharged instructions with pt follow up care , pain control and return precautions discussed , pt verbalized understanding.  Pt ambulated out of the department with steady gait          Lorie Bruner RN  08/05/22 5709

## 2022-08-05 NOTE — ED PROVIDER NOTES
CHIEF COMPLAINT  Nausea and Emesis      HISTORY OF PRESENT ILLNESS  Marie Benedict is a 65 y.o. female who  has a past medical history of Acid reflux, HSAY (acute kidney injury) ([address] Anxiety, ASCUS (atypical squamous cells of undetermined significance) on Pap smear, Asthma, Chronic midline low back pain with bilateral sciatica, Chronic midline low back pain with bilateral sciatica, Diabetes mellitus, type 2 ([address] Disease of blood and blood forming organ, Gastroparesis, Hirsutism, History of blood transfusion, Hyperlipidemia LDL goal < 70, Hypertension, Major depressive disorder with single episode, Pancreatitis, Seasonal allergic rhinitis due to pollen, Sleep apnea, Thyroid disease, and Vitamin D deficiency. presents to the ED complaining of nausea and vomiting that is been present over the past 2 to 3 days. Patient states she has frequent episodes of nausea and vomiting similar to this where she has to come to the emergency room. Patient denies any hematemesis. Denies any fevers or chills. States she is having some epigastric abdominal pain with vomiting episodes. Denies any lower abdominal pain. States he is having normal urinary and bowel habits. Denies any known sick contacts. Patient states has been unable to take her oral medications at home secondary to her nausea and vomiting. Does have type 2 diabetes and states she is also not been using her insulin because she is not been eating. No other complaints, modifying factors or associated symptoms. Nursing notes reviewed.    Past Medical History:   Diagnosis Date    Acid reflux     SHAY (acute kidney injury) ([address] 10/15/2021    Anxiety     ASCUS (atypical squamous cells of undetermined significance) on Pap smear 6/2013    Asthma     Chronic midline low back pain with bilateral sciatica 11/10/2016    Chronic midline low back pain with bilateral sciatica     Diabetes mellitus, type 2 ([address]     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    Gastroparesis     Hirsutism     History of blood transfusion     Hyperlipidemia LDL goal < 70     Hypertension     Major depressive disorder with single episode 2009    Pancreatitis     Seasonal allergic rhinitis due to pollen 11/10/2016    Sleep apnea     Thyroid disease     Vitamin D deficiency      Past Surgical History:   Procedure Laterality Date     SECTION      ENDOMETRIAL ABLATION  5/3/12    ENDOSCOPY, COLON, DIAGNOSTIC      HYSTEROSCOPY  5/3/12    MANDIBLE SURGERY      SKIN BIOPSY      TUBAL LIGATION      UPPER GASTROINTESTINAL ENDOSCOPY N/A 2021    EGD BIOPSY performed by Nirav Barros MD at [address] History   Problem Relation Age of Onset    Hypertension Mother     Breast Cancer Mother     Colon Cancer Mother     Cancer Father         Pancreatic    Prostate Cancer Father     No Known Problems Brother     No Known Problems Sister     Hypertension Maternal Aunt     Cancer Maternal Uncle     Hypertension Maternal Grandmother     Stroke Maternal Grandmother     Cancer Maternal Grandfather     No Known Problems Paternal Aunt     No Known Problems Paternal Uncle     No Known Problems Paternal Grandmother     No Known Problems Paternal Grandfather     No Known Problems Other     Rheum Arthritis Neg Hx     Osteoarthritis Neg Hx     Asthma Neg Hx     Diabetes Neg Hx     Heart Failure Neg Hx     High Cholesterol Neg Hx     Migraines Neg Hx     Ovarian Cancer Neg Hx     Rashes/Skin Problems Neg Hx     Seizures Neg Hx     Thyroid Disease Neg Hx      Social History     Socioeconomic History    Marital status:       Spouse name: Not on file    Number of children: 3    Years of education: Not on file    Highest education level: Not on file   Occupational History    Occupation:    Tobacco Use    Smoking status: Former     Types: Cigarettes     Quit date: 1990     Years since quittin.5    Smokeless tobacco: Never   Vaping Use    Vaping Use: Never used   Substance and Sexual Activity    Alcohol use: Yes     Alcohol/week: 0.0 standard drinks     Comment: less than once a month    Drug use: Yes     Frequency: 2.0 times per week     Types: Marijuana Yanet Malcolm)     Comment: used yesterday    Sexual activity: Not Currently     Partners: Male   Other Topics Concern    Not on file   Social History Narrative    Not on file     Social Determinants of Health     Financial Resource Strain: Not on file   Food Insecurity: Not on file   Transportation Needs: Not on file   Physical Activity: Not on file   Stress: Not on file   Social Connections: Not on file   Intimate Partner Violence: Not on file   Housing Stability: Not on file     Current Facility-Administered Medications   Medication Dose Route Frequency Provider Last Rate Last Admin    0.9 % sodium chloride bolus  1,000 mL IntraVENous Once Hattie Huertas MD 1,000 mL/hr at 08/04/22 2212 1,000 mL at 08/04/22 2212     Current Outpatient Medications   Medication Sig Dispense Refill    scopolamine (TRANSDERM-SCOP, 1.5 MG,) transdermal patch Place 1 patch onto the skin every 72 hours as needed for Nausea or Vomiting 10 patch 0    acetaminophen (TYLENOL) 500 MG tablet Take 2 tablets by mouth 4 times daily as needed for Pain or Fever 60 tablet 0    ibuprofen (IBU) 800 MG tablet Take 1 tablet by mouth every 8 hours as needed for Pain or Fever 20 tablet 0    dicyclomine (BENTYL) 10 MG capsule Take 1 capsule by mouth every 6 hours as needed (cramps) 20 capsule 0    albuterol sulfate HFA (VENTOLIN HFA) 108 (90 Base) MCG/ACT inhaler Inhale 2 puffs into the lungs 4 times daily as needed for Wheezing or Shortness of Breath 18 g 0    ondansetron (ZOFRAN ODT) 4 MG disintegrating tablet Take 1-2 tablets by mouth every 12 hours as needed for Nausea or Vomiting May Sub regular tablet (non-ODT) if insurance does not cover ODT.  30 tablet 0    omeprazole (PRILOSEC) 40 MG delayed release capsule Take 40 mg by mouth daily      insulin glargine (BASAGLAR KWIKPEN) 100 UNIT/ML injection pen Inject 10 Units into the skin nightly 5 pen 0    Papaya Enzyme CHEW Take 3-4 tablets by mouth daily as needed (Indigestion)      lisinopril (PRINIVIL;ZESTRIL) 10 MG tablet Take 10 mg by mouth daily      ezetimibe (ZETIA) 10 MG tablet Take 10 mg by mouth daily      amLODIPine (NORVASC) 5 MG tablet Take 5 mg by mouth daily      insulin lispro (HUMALOG) 100 UNIT/ML injection vial Inject into the skin 3 times daily (before meals) Per sliding scale. 2 units >150 then additional 2 units for every 50      aspirin 81 MG tablet Take 81 mg by mouth daily      Insulin Syringe-Needle U-100 (INSULIN SYRINGE .5CC/31GX5/16\") 31G X 5/16\" 0.5 ML MISC 1 each by Does not apply route daily 100 Syringe 3    timolol (TIMOPTIC) 0.5 % ophthalmic solution Place 1 drop into both eyes daily       ergocalciferol (DRISDOL) 25524 UNITS capsule Take 1 capsule by mouth once a week 13 capsule 1    Insulin Pen Needle 31G X 5 MM MISC Pt uses three times a day 450 each 3    glucose blood VI test strips (EXACTECH TEST) strip 4 times a day As needed. 400 each 3    Lancets MISC 3-4 times a day 100 each 3    latanoprost (XALATAN) 0.005 % ophthalmic solution Place 1 drop into both eyes nightly.        Allergies   Allergen Reactions    Avelox [Moxifloxacin] Other (See Comments)     Weakness, tingling, tingling mouth    Bactrim      Remote h/o diarrhea, swollen eyes, and weakness, tachycardia and tongue swelling    Cefuroxime      Pt does not recall what happened with this    Codeine     Medrol [Methylprednisolone] Swelling     Ok to take nasal steroids    Morphine     Niacin Other (See Comments)    Oat      Throat swells    Percocet [Oxycodone-Acetaminophen]     Reglan [Metoclopramide] Other (See Comments)     States has something to do with a mass unknown reaction     Spironolactone      Increased potassium    Statins      Muscle cramps    Sulfamethoxazole-Trimethoprim Swelling    Vicodin [Hydrocodone-Acetaminophen]          REVIEW OF SYSTEMS  (up to 7 for level 4, 8 or more for level 5) pertinent positives per HPI otherwise noted to be negative    PHYSICAL EXAM  /62   Pulse 98   Temp 98.7 °F (37.1 °C) (Oral)   Resp 18   Ht 5' 7\" (1.702 m)   Wt 218 lb 11.1 oz (99.2 kg)   SpO2 95%   BMI 34.25 kg/m²      CONSTITUTIONAL: AOx4, cooperative with exam, afebrile   HEAD: normocephalic, atraumatic   EYES: PERRL, EOMI, anicteric sclera   ENT: Moist mucous membranes, uvula midline   NECK: Supple, symmetric, trachea midline   LUNGS: Bilateral breath sounds, CTAB, no rales/ronchi/wheezes   CARDIOVASCULAR: RRR, normal S1/S2, no m/r/g, 2+ pulses throughout   ABDOMEN: Soft, obese abdomen, mild epigastric tenderness, non-distended, +BS   NEUROLOGIC:  MAEx4, GCS 15   MUSCULOSKELETAL: No clubbing, cyanosis or edema   SKIN: No rash, pallor or wounds on exposed surfaces         RADIOLOGY  X-RAYS:  I have reviewed radiologic plain film image(s). ALL OTHER NON-PLAIN FILM IMAGES SUCH AS CT, ULTRASOUND AND MRI HAVE BEEN READ BY THE RADIOLOGIST. No orders to display          EKG INTERPRETATION  None    PROCEDURES    ED COURSE/MDM  Small bowel obstruction, DKA, gastroenteritis, appendicitis, gallbladder, pancreatitis, PUD, perforation, vertigo, hyperemesis, abnormal lytes, EtOH intoxication/ tox, post-tussive, ACS/ MI    Patient seen and evaluated. History and physical as above. Nontoxic, afebrile. Patient presents with nausea and vomiting over the past 2 to 3 days. Has had episodes of cyclical vomiting similar to this in the past.  No peritoneal signs on abdominal exam.  Patient is hypertensive on arrival but has not had her medications as normal.  Will provide Zofran, IV fluid bolus and obtain routine labs. Will obtain VBG, beta hydroxybutyrate to evaluate for possible DKA. ED Course as of 08/04/22 2251   Thu Aug 04, 2022   2148 Patient's initial blood pressure on arrival 231/127.   Patient just the following medications. I counseled patient how to take these medications. New Prescriptions    SCOPOLAMINE (TRANSDERM-SCOP, 1.5 MG,) TRANSDERMAL PATCH    Place 1 patch onto the skin every 72 hours as needed for Nausea or Vomiting           CLINICAL IMPRESSION  1. Non-intractable vomiting with nausea, unspecified vomiting type    2. Type 2 diabetes mellitus with hyperglycemia, with long-term current use of insulin ([facility])        Blood pressure 123/62, pulse 98, temperature 98.7 °F (37.1 °C), temperature source Oral, resp. rate 18, height 5' 7\" (1.702 m), weight 218 lb 11.1 oz (99.2 kg), SpO2 95 %, not currently breastfeeding. DISPOSITION  Patient was discharged to home in good condition. CEASAR Almaraz  [address] [place] [address]  [phone]    Call in 1 day  For a follow up appointment. Disclaimer: All medical record entries made by [employer] dictation.       (Please note that this note was completed with a voice recognition program. Every attempt was made to edit the dictations, but inevitably there remain words that are mis-transcribed.)            Marcelino Cuadra MD  08/04/22 [id number]

## 2022-08-06 ENCOUNTER — HOSPITAL ENCOUNTER (EMERGENCY)
Age: 62
Discharge: HOME | End: 2022-08-06
Attending: EMERGENCY MEDICINE
Payer: COMMERCIAL

## 2022-08-06 ENCOUNTER — APPOINTMENT (OUTPATIENT)
Dept: CT IMAGING | Age: 62
End: 2022-08-06
Payer: COMMERCIAL

## 2022-08-06 ENCOUNTER — APPOINTMENT (OUTPATIENT)
Dept: GENERAL RADIOLOGY | Age: 62
End: 2022-08-06
Payer: COMMERCIAL

## 2022-08-06 VITALS
HEART RATE: 86 BPM | RESPIRATION RATE: 18 BRPM | HEIGHT: 67 IN | BODY MASS INDEX: 35.61 KG/M2 | TEMPERATURE: 98 F | OXYGEN SATURATION: 95 % | DIASTOLIC BLOOD PRESSURE: 47 MMHG | SYSTOLIC BLOOD PRESSURE: 115 MMHG | WEIGHT: 226.85 LBS

## 2022-08-06 DIAGNOSIS — R11.2 CANNABINOID HYPEREMESIS SYNDROME: ICD-10-CM

## 2022-08-06 DIAGNOSIS — R52 BODY ACHES: ICD-10-CM

## 2022-08-06 DIAGNOSIS — F12.90 CANNABINOID HYPEREMESIS SYNDROME: ICD-10-CM

## 2022-08-06 DIAGNOSIS — R11.2 NON-INTRACTABLE VOMITING WITH NAUSEA, UNSPECIFIED VOMITING TYPE: Primary | ICD-10-CM

## 2022-08-06 LAB
ALBUMIN SERPL-MCNC: 4.1 G/DL (ref 3.4–5)
ALP BLD-CCNC: 120 U/L (ref 40–129)
ALT SERPL-CCNC: 17 U/L (ref 10–40)
AMPHETAMINE SCREEN, URINE: ABNORMAL
ANION GAP SERPL CALCULATED.3IONS-SCNC: 14 MMOL/L (ref 3–16)
AST SERPL-CCNC: 17 U/L (ref 15–37)
BACTERIA: NORMAL /HPF
BARBITURATE SCREEN URINE: ABNORMAL
BASE EXCESS VENOUS: 10.7 MMOL/L
BASOPHILS ABSOLUTE: 0.1 K/UL (ref 0–0.2)
BASOPHILS RELATIVE PERCENT: 0.7 %
BENZODIAZEPINE SCREEN, URINE: ABNORMAL
BETA-HYDROXYBUTYRATE: 0.21 MMOL/L (ref 0–0.27)
BILIRUB SERPL-MCNC: 0.5 MG/DL (ref 0–1)
BILIRUBIN DIRECT: <0.2 MG/DL (ref 0–0.3)
BILIRUBIN URINE: NEGATIVE
BILIRUBIN, INDIRECT: NORMAL MG/DL (ref 0–1)
BLOOD, URINE: NEGATIVE
BUN BLDV-MCNC: 26 MG/DL (ref 7–20)
CALCIUM SERPL-MCNC: 9.6 MG/DL (ref 8.3–10.6)
CANNABINOID SCREEN URINE: POSITIVE
CARBOXYHEMOGLOBIN: 1.7 %
CHLORIDE BLD-SCNC: 87 MMOL/L (ref 99–110)
CHP ED QC CHECK: NORMAL
CLARITY: CLEAR
CO2: 31 MMOL/L (ref 21–32)
COCAINE METABOLITE SCREEN URINE: ABNORMAL
COLOR: YELLOW
CREAT SERPL-MCNC: 1.2 MG/DL (ref 0.6–1.2)
EOSINOPHILS ABSOLUTE: 0 K/UL (ref 0–0.6)
EOSINOPHILS RELATIVE PERCENT: 0.1 %
EPITHELIAL CELLS, UA: 2 /HPF (ref 0–5)
GFR AFRICAN AMERICAN: 55
GFR NON-AFRICAN AMERICAN: 46
GLUCOSE BLD-MCNC: 214 MG/DL
GLUCOSE BLD-MCNC: 214 MG/DL (ref 70–99)
GLUCOSE URINE: 100 MG/DL
HCO3 VENOUS: 37 MMOL/L (ref 23–29)
HCT VFR BLD CALC: 44.6 % (ref 36–48)
HEMOGLOBIN: 14.7 G/DL (ref 12–16)
HYALINE CASTS: 4 /LPF (ref 0–8)
KETONES, URINE: ABNORMAL MG/DL
LEUKOCYTE ESTERASE, URINE: NEGATIVE
LIPASE: 42 U/L (ref 13–60)
LYMPHOCYTES ABSOLUTE: 3.1 K/UL (ref 1–5.1)
LYMPHOCYTES RELATIVE PERCENT: 20.2 %
Lab: ABNORMAL
MCH RBC QN AUTO: 25.6 PG (ref 26–34)
MCHC RBC AUTO-ENTMCNC: 33 G/DL (ref 31–36)
MCV RBC AUTO: 77.4 FL (ref 80–100)
METHADONE SCREEN, URINE: ABNORMAL
METHEMOGLOBIN VENOUS: 0.7 %
MICROSCOPIC EXAMINATION: YES
MONOCYTES ABSOLUTE: 1.1 K/UL (ref 0–1.3)
MONOCYTES RELATIVE PERCENT: 7.3 %
NEUTROPHILS ABSOLUTE: 10.9 K/UL (ref 1.7–7.7)
NEUTROPHILS RELATIVE PERCENT: 71.7 %
NITRITE, URINE: NEGATIVE
O2 SAT, VEN: 52 %
O2 THERAPY: ABNORMAL
OPIATE SCREEN URINE: ABNORMAL
OXYCODONE URINE: ABNORMAL
PCO2, VEN: 51 MMHG (ref 40–50)
PDW BLD-RTO: 14.2 % (ref 12.4–15.4)
PH UA: 5
PH UA: 5.5 (ref 5–8)
PH VENOUS: 7.46 (ref 7.35–7.45)
PHENCYCLIDINE SCREEN URINE: ABNORMAL
PLATELET # BLD: 268 K/UL (ref 135–450)
PMV BLD AUTO: 7.6 FL (ref 5–10.5)
PO2, VEN: <30 MMHG
POTASSIUM SERPL-SCNC: 3.5 MMOL/L (ref 3.5–5.1)
PROPOXYPHENE SCREEN: ABNORMAL
PROTEIN UA: 30 MG/DL
RBC # BLD: 5.76 M/UL (ref 4–5.2)
RBC UA: 3 /HPF (ref 0–4)
SARS-COV-2, NAAT: NOT DETECTED
SODIUM BLD-SCNC: 132 MMOL/L (ref 136–145)
SPECIFIC GRAVITY UA: 1.01 (ref 1–1.03)
TCO2 CALC VENOUS: 38 MMOL/L
TOTAL PROTEIN: 7.6 G/DL (ref 6.4–8.2)
URINE REFLEX TO CULTURE: ABNORMAL
URINE TYPE: ABNORMAL
UROBILINOGEN, URINE: 0.2 E.U./DL
WBC # BLD: 15.1 K/UL (ref 4–11)
WBC UA: 2 /HPF (ref 0–5)

## 2022-08-06 PROCEDURE — 36415 COLL VENOUS BLD VENIPUNCTURE: CPT

## 2022-08-06 PROCEDURE — 82010 KETONE BODYS QUAN: CPT

## 2022-08-06 PROCEDURE — 82803 BLOOD GASES ANY COMBINATION: CPT

## 2022-08-06 PROCEDURE — 96375 TX/PRO/DX INJ NEW DRUG ADDON: CPT

## 2022-08-06 PROCEDURE — 80076 HEPATIC FUNCTION PANEL: CPT

## 2022-08-06 PROCEDURE — 2580000003 HC RX 258: Performed by: NURSE PRACTITIONER

## 2022-08-06 PROCEDURE — 99285 EMERGENCY DEPT VISIT HI MDM: CPT

## 2022-08-06 PROCEDURE — 93005 ELECTROCARDIOGRAM TRACING: CPT | Performed by: EMERGENCY MEDICINE

## 2022-08-06 PROCEDURE — 87635 SARS-COV-2 COVID-19 AMP PRB: CPT

## 2022-08-06 PROCEDURE — 83690 ASSAY OF LIPASE: CPT

## 2022-08-06 PROCEDURE — 81001 URINALYSIS AUTO W/SCOPE: CPT

## 2022-08-06 PROCEDURE — 2580000003 HC RX 258: Performed by: EMERGENCY MEDICINE

## 2022-08-06 PROCEDURE — 80048 BASIC METABOLIC PNL TOTAL CA: CPT

## 2022-08-06 PROCEDURE — 96374 THER/PROPH/DIAG INJ IV PUSH: CPT

## 2022-08-06 PROCEDURE — 74176 CT ABD & PELVIS W/O CONTRAST: CPT

## 2022-08-06 PROCEDURE — 85025 COMPLETE CBC W/AUTO DIFF WBC: CPT

## 2022-08-06 PROCEDURE — 80307 DRUG TEST PRSMV CHEM ANLYZR: CPT

## 2022-08-06 PROCEDURE — 71045 X-RAY EXAM CHEST 1 VIEW: CPT

## 2022-08-06 PROCEDURE — 6360000002 HC RX W HCPCS: Performed by: NURSE PRACTITIONER

## 2022-08-06 RX ORDER — PROMETHAZINE HYDROCHLORIDE 25 MG/1
25 TABLET ORAL EVERY 6 HOURS PRN
Qty: 20 TABLET | Refills: 0 | Status: SHIPPED | OUTPATIENT
Start: 2022-08-06 | End: 2022-08-13

## 2022-08-06 RX ORDER — SODIUM CHLORIDE, SODIUM LACTATE, POTASSIUM CHLORIDE, AND CALCIUM CHLORIDE .6; .31; .03; .02 G/100ML; G/100ML; G/100ML; G/100ML
1000 INJECTION, SOLUTION INTRAVENOUS ONCE
Status: COMPLETED | OUTPATIENT
Start: 2022-08-06 | End: 2022-08-06

## 2022-08-06 RX ORDER — ACETAMINOPHEN 500 MG
1000 TABLET ORAL 3 TIMES DAILY PRN
Qty: 180 TABLET | Refills: 0 | Status: SHIPPED | OUTPATIENT
Start: 2022-08-06

## 2022-08-06 RX ORDER — DROPERIDOL 2.5 MG/ML
0.62 INJECTION, SOLUTION INTRAMUSCULAR; INTRAVENOUS ONCE
Status: COMPLETED | OUTPATIENT
Start: 2022-08-06 | End: 2022-08-06

## 2022-08-06 RX ORDER — ONDANSETRON 2 MG/ML
4 INJECTION INTRAMUSCULAR; INTRAVENOUS ONCE
Status: COMPLETED | OUTPATIENT
Start: 2022-08-06 | End: 2022-08-06

## 2022-08-06 RX ADMIN — ONDANSETRON 4 MG: 2 INJECTION INTRAMUSCULAR; INTRAVENOUS at 20:12

## 2022-08-06 RX ADMIN — SODIUM CHLORIDE, POTASSIUM CHLORIDE, SODIUM LACTATE AND CALCIUM CHLORIDE 1000 ML: 600; 310; 30; 20 INJECTION, SOLUTION INTRAVENOUS at 21:45

## 2022-08-06 RX ADMIN — SODIUM CHLORIDE, POTASSIUM CHLORIDE, SODIUM LACTATE AND CALCIUM CHLORIDE 1000 ML: 600; 310; 30; 20 INJECTION, SOLUTION INTRAVENOUS at 20:11

## 2022-08-06 RX ADMIN — DROPERIDOL 0.62 MG: 2.5 INJECTION, SOLUTION INTRAMUSCULAR; INTRAVENOUS at 20:12

## 2022-08-06 ASSESSMENT — PAIN DESCRIPTION - DESCRIPTORS: DESCRIPTORS: ACHING

## 2022-08-06 ASSESSMENT — ENCOUNTER SYMPTOMS
CONSTIPATION: 0
DIARRHEA: 0
BACK PAIN: 0
EYE PAIN: 0
SORE THROAT: 0
CHEST TIGHTNESS: 0
SHORTNESS OF BREATH: 0
VOMITING: 1
BLOOD IN STOOL: 0
NAUSEA: 1
COUGH: 0
WHEEZING: 0
ABDOMINAL PAIN: 1

## 2022-08-06 ASSESSMENT — PAIN SCALES - GENERAL: PAINLEVEL_OUTOF10: 6

## 2022-08-06 ASSESSMENT — PAIN - FUNCTIONAL ASSESSMENT: PAIN_FUNCTIONAL_ASSESSMENT: 0-10

## 2022-08-06 NOTE — ED PROVIDER NOTES
[address] [facility]        Pt Name: Lito Kidd  MRN: [id number]  [place] 1960  Date of evaluation: 8/6/2022  Provider: SEVEN Altamirano CNP  PCP: CEASAR Espinoza  Note Started: 6:45 PM EDT        I have seen and evaluated this patient with my supervising physician No att. providers found. CHIEF COMPLAINT       Chief Complaint   Patient presents with    Shortness of Breath     Pt states that she has had SOB, sweating, headache for over a week. N/V/D       HISTORY OF PRESENT ILLNESS   (Location, Timing/Onset, Context/Setting, Quality, Duration, Modifying Factors, Severity, Associated Signs and Symptoms)  Note limiting factors. Chief Complaint: \"I am weak and I cannot keep anything down and I think my blood sugar is high again. \"    Lito Kidd is a 65 y.o. female who presents to the emergency department with complaints of nausea and vomiting, generalized body aches and feeling generally unwell. States that she was just seen and discharged in the emergency department for similar symptoms a few days ago. States that she went home, has continued to have nausea and vomiting. She states that the diarrhea that she used to have is resolved. She is not having any sharp stabbing abdominal pain but is having some generalized body aches including some \"cramping\" in her abdomen. She has not been checking her blood sugar since she was discharged. She has been having chills but no measured fevers. She states that \"I cannot even get to my glucometer. \"  Does not appear to be compliant. Came to the emergency department for further evaluation and treatment. No urinary complaints. Nursing Notes were all reviewed and agreed with or any disagreements were addressed in the HPI. REVIEW OF SYSTEMS    (2-9 systems for level 4, 10 or more for level 5)     Review of Systems   Constitutional:  Positive for chills and fatigue. Negative for fever. HENT:  Negative for congestion and sore throat. Eyes:  Negative for pain and visual disturbance. Respiratory:  Negative for cough, chest tightness, shortness of breath and wheezing. Cardiovascular:  Negative for chest pain, palpitations and leg swelling. Gastrointestinal:  Positive for abdominal pain, nausea and vomiting. Negative for blood in stool, constipation and diarrhea. Endocrine: Positive for polydipsia. Genitourinary:  Negative for dysuria, flank pain and hematuria. Musculoskeletal:  Positive for myalgias. Negative for back pain, neck pain and neck stiffness. Skin:  Negative for rash and wound. Neurological:  Negative for dizziness and light-headedness. All other systems reviewed and are negative. Positives and Pertinent negatives as per HPI. Except as noted above in the ROS, all other systems were reviewed and negative.        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    Gastroparesis     Hirsutism     History of blood transfusion     Hyperlipidemia LDL goal < 70     Hypertension     Major depressive disorder with single episode 2009    Pancreatitis     Seasonal allergic rhinitis due to pollen 11/10/2016    Sleep apnea     Thyroid disease     Vitamin D deficiency          SURGICAL HISTORY     Past Surgical History:   Procedure Laterality Date     SECTION      ENDOMETRIAL ABLATION  5/3/12    ENDOSCOPY, COLON, DIAGNOSTIC      HYSTEROSCOPY  5/3/12    MANDIBLE SURGERY      SKIN BIOPSY      TUBAL LIGATION      UPPER GASTROINTESTINAL ENDOSCOPY N/A 8/20/2021    EGD BIOPSY performed by Pastor Katy MD at [facility]       Discharge Medication List as of 8/6/2022 10:14 PM        CONTINUE these medications which have NOT CHANGED    Details   scopolamine (TRANSDERM-SCOP, 1.5 MG,) transdermal patch Place 1 patch onto the skin every 72 hours as needed for Nausea or Vomiting, Disp-10 patch, R-0Normal      ibuprofen (IBU) 800 MG tablet Take 1 tablet by mouth every 8 hours as needed for Pain or Fever, Disp-20 tablet, R-0Print      dicyclomine (BENTYL) 10 MG capsule Take 1 capsule by mouth every 6 hours as needed (cramps), Disp-20 capsule, R-0Print      albuterol sulfate HFA (VENTOLIN HFA) 108 (90 Base) MCG/ACT inhaler Inhale 2 puffs into the lungs 4 times daily as needed for Wheezing or Shortness of Breath, Disp-18 g, R-0Print      ondansetron (ZOFRAN ODT) 4 MG disintegrating tablet Take 1-2 tablets by mouth every 12 hours as needed for Nausea or Vomiting May Sub regular tablet (non-ODT) if insurance does not cover ODT., Disp-30 tablet, R-0Print      omeprazole (PRILOSEC) 40 MG delayed release capsule Take 40 mg by mouth dailyHistorical Med      insulin glargine (BASAGLAR KWIKPEN) 100 UNIT/ML injection pen Inject 10 Units into the skin nightly, Disp-5 pen, R-0Normal      Papaya Enzyme CHEW Take 3-4 tablets by mouth daily as needed (Indigestion)Historical Med      lisinopril (PRINIVIL;ZESTRIL) 10 MG tablet Take 10 mg by mouth dailyHistorical Med      ezetimibe (ZETIA) 10 MG tablet Take 10 mg by mouth dailyHistorical Med      amLODIPine (NORVASC) 5 MG tablet Take 5 mg by mouth dailyHistorical Med      insulin lispro (HUMALOG) 100 UNIT/ML injection vial Inject into the skin 3 times daily (before meals) Per sliding scale.  2 units >150 then additional 2 units for every 50Historical Med      aspirin 81 MG tablet Take 81 mg by mouth dailyHistorical Med      Insulin Syringe-Needle U-100 (INSULIN SYRINGE .5CC/31GX5/16\") 31G X 5/16\" 0.5 ML MISC DAILY Starting 4/14/2017, Until Discontinued, Disp-100 Syringe, R-3, Normal      timolol (TIMOPTIC) 0.5 % ophthalmic solution Place 1 drop into both eyes daily       ergocalciferol (DRISDOL) 77066 UNITS capsule Take 1 capsule by mouth once a week, Disp-13 capsule, R-1      Insulin Pen Needle 31G X 5 MM MISC Disp-450 each, R-3, NormalPt uses three times a day      glucose blood VI test strips (EXACTECH TEST) strip Disp-400 each, R-3, Normal4 times a day As needed. Lancets MISC Disp-100 each, R-3, Normal3-4 times a day      latanoprost (XALATAN) 0.005 % ophthalmic solution Place 1 drop into both eyes nightly.                ALLERGIES     Avelox [moxifloxacin], Bactrim, Cefuroxime, Codeine, Medrol [methylprednisolone], Morphine, Niacin, Oat, Percocet [oxycodone-acetaminophen], Reglan [metoclopramide], Spironolactone, Statins, Sulfamethoxazole-trimethoprim, and Vicodin [hydrocodone-acetaminophen]    FAMILYHISTORY       Family History   Problem Relation Age of Onset    Hypertension Mother     Breast Cancer Mother     Colon Cancer Mother     Cancer Father         Pancreatic    Prostate Cancer Father     No Known Problems Brother     No Known Problems Sister     Hypertension Maternal Aunt     Cancer Maternal Uncle     Hypertension Maternal Grandmother     Stroke Maternal Grandmother     Cancer Maternal Grandfather     No Known Problems Paternal Aunt     No Known Problems Paternal Uncle     No Known Problems Paternal Grandmother     No Known Problems Paternal Grandfather     No Known Problems Other     Rheum Arthritis Neg Hx     Osteoarthritis Neg Hx     Asthma Neg Hx     Diabetes Neg Hx     Heart Failure Neg Hx     High Cholesterol Neg Hx     Migraines Neg Hx     Ovarian Cancer Neg Hx     Rashes/Skin Problems Neg Hx     Seizures Neg Hx     Thyroid Disease Neg Hx           SOCIAL HISTORY       Social History     Tobacco Use    Smoking status: Former     Types: Cigarettes     Quit date: 1/9/1990     Years since quitting: 32.5    Smokeless tobacco: Never   Vaping Use    Vaping Use: Never used   Substance Use Topics    Alcohol use: Yes     Alcohol/week: 0.0 standard drinks     Comment: less than once a month    Drug use: Yes     Frequency: 2.0 times per week     Types: Marijuana (Weed)     Comment: used yesterday       SCREENINGS    Caleb Coma Scale  Eye Opening: Spontaneous  Best Verbal Response: Oriented  Best Motor Response: Obeys commands  [place] Coma Scale Score: 15        PHYSICAL EXAM    (up to 7 for level 4, 8 or more for level 5)     ED Triage Vitals   BP Temp Temp src Pulse Resp SpO2 Height Weight   -- -- -- -- -- -- -- --       Physical Exam  Vitals and nursing note reviewed. Constitutional:       General: She is not in acute distress. Appearance: Normal appearance. She is obese. She is not ill-appearing, toxic-appearing or diaphoretic. HENT:      Head: Normocephalic and atraumatic. No raccoon eyes, Yo's sign, right periorbital erythema or left periorbital erythema. Right Ear: Hearing and external ear normal.      Left Ear: Hearing and external ear normal.      Nose: Nose normal. No laceration, nasal tenderness, mucosal edema, congestion or rhinorrhea. Right Nostril: No epistaxis. Left Nostril: No epistaxis. Mouth/Throat:      Lips: Pink. No lesions. Mouth: Mucous membranes are moist.      Tongue: No lesions. Tongue does not deviate from midline. Pharynx: Oropharynx is clear. Uvula midline. No pharyngeal swelling, oropharyngeal exudate, posterior oropharyngeal erythema or uvula swelling. Tonsils: No tonsillar exudate or tonsillar abscesses. Eyes:      General: Lids are normal.         Right eye: No discharge. Left eye: No discharge. Extraocular Movements: Extraocular movements intact. Pupils: Pupils are equal, round, and reactive to light. Neck:      Trachea: Phonation normal. No abnormal tracheal secretions or tracheal deviation.       Comments: No meningismus   Cardiovascular:      Rate and Rhythm: Normal rate and regular rhythm. Pulses: Normal pulses. Heart sounds: Normal heart sounds. No murmur heard. No friction rub. No gallop. Pulmonary:      Effort: Pulmonary effort is normal. No respiratory distress. Breath sounds: Normal breath sounds. No stridor. No wheezing, rhonchi or rales. Abdominal:      General: Bowel sounds are normal. There is no distension. Palpations: Abdomen is soft. Tenderness: There is no abdominal tenderness. There is no guarding or rebound. Musculoskeletal:         General: Normal range of motion. Cervical back: Full passive range of motion without pain, normal range of motion and neck supple. No rigidity. No spinous process tenderness or muscular tenderness. Right lower leg: No tenderness. No edema. Left lower leg: No tenderness. No edema. Lymphadenopathy:      Cervical: No cervical adenopathy. Skin:     General: Skin is warm and dry. Capillary Refill: Capillary refill takes less than 2 seconds. Neurological:      General: No focal deficit present. Mental Status: She is alert and oriented to person, place, and time. GCS: GCS eye subscore is 4. GCS verbal subscore is 5. GCS motor subscore is 6. Sensory: Sensation is intact. Motor: Motor function is intact. Gait: Gait is intact.    Psychiatric:         Mood and Affect: Mood normal.         Behavior: Behavior normal.       DIAGNOSTIC RESULTS   LABS:    Labs Reviewed   CBC WITH AUTO DIFFERENTIAL - Abnormal; Notable for the following components:       Result Value    WBC 15.1 (*)     RBC 5.76 (*)     MCV 77.4 (*)     MCH 25.6 (*)     Neutrophils Absolute 10.9 (*)     All other components within normal limits   BASIC METABOLIC PANEL - Abnormal; Notable for the following components:    Sodium 132 (*)     Chloride 87 (*)     Glucose 214 (*)     BUN 26 (*)     GFR Non- 46 (*)     GFR  American 55 (*)     All other components within normal limits   URINALYSIS WITH REFLEX TO CULTURE - Abnormal; Notable for the following components:    Glucose, Ur 100 (*)     Ketones, Urine TRACE (*)     Protein, UA 30 (*)     All other components within normal limits   BLOOD GAS, VENOUS - Abnormal; Notable for the following components:    pH, Bernard 7.464 (*)     pCO2, Bernard 51.0 (*)     HCO3, Venous 37 (*)     All other components within normal limits   URINE DRUG SCREEN - Abnormal; Notable for the following components:    Cannabinoid Scrn, Ur POSITIVE (*)     All other components within normal limits   POCT GLUCOSE - Normal   COVID-19, RAPID   HEPATIC FUNCTION PANEL   LIPASE   BETA-HYDROXYBUTYRATE   MICROSCOPIC URINALYSIS       When ordered only abnormal lab results are displayed. All other labs were within normal range or not returned as of this dictation. EKG: When ordered, EKG's are interpreted by the Emergency Department Physician in the absence of a cardiologist.  Please see their note for interpretation of EKG. RADIOLOGY:   Non-plain film images such as CT, Ultrasound and MRI are read by the radiologist. Plain radiographic images are visualized and preliminarily interpreted by the ED Provider with the below findings:        Interpretation per the Radiologist below, if available at the time of this note:    XR CHEST PORTABLE   Final Result   No acute cardiopulmonary process identified. CT ABDOMEN PELVIS WO CONTRAST Additional Contrast? None   Final Result   No acute intra-abnormality seen. Probable small hepatic cyst which is unchanged. Mild adrenal thickening which may be due to hyperplasia and is unchanged. No hydronephrosis or renal stones and no CT evidence of urinary obstruction. Atrophic uterus with no pelvic mass or active inflammation and no bowel   obstruction           No results found.         PROCEDURES   Unless otherwise noted below, none     Procedures    CRITICAL CARE TIME   CRITICAL CARE NOTE:    Deon Arita CNP am the primary clinician of record. There was a high probability of clinically significant life-threatening deterioration of the patient's condition requiring my urgent intervention. Total critical care time was at least 30 minutes. This includes vital sign monitoring, pulse oximetry monitoring, telemetry monitoring, clinical response to the IV medications, reviewing the nursing notes, consultation time, dictation/documentation time, and interpretation of the labwork. This excludes any separately billable procedures performed. CONSULTS:  None      EMERGENCY DEPARTMENT COURSE and DIFFERENTIAL DIAGNOSIS/MDM:   Vitals:    Vitals:    08/06/22 2149 08/06/22 2159 08/06/22 2246 08/06/22 2255   BP: 124/65 (!) 108/47 (!) 115/47    Pulse: 88 74 86    Resp:    18   Temp:    98 °F (36.7 °C)   TempSrc:    Oral   SpO2: 100% 99% 95%    Weight:       Height:           Patient was given the following medications:  Medications   lactated ringers bolus (0 mLs IntraVENous Stopped 8/6/22 2147)   ondansetron (ZOFRAN) injection 4 mg (4 mg IntraVENous Given 8/6/22 2012)   droperidol (INAPSINE) injection 0.625 mg (0.625 mg IntraVENous Given 8/6/22 2012)   lactated ringers bolus (0 mLs IntraVENous Stopped 8/6/22 2247)           MDM: See HPI and above for full presentation physical exam.  Differential diagnoses included but not limited to gastritis, gastroenteritis, cyclic nausea and vomiting, noncompliance with diabetes medication regimen, intra-abdominal emergency, other viral illness such as COVID-19, electrolyte derangement, SHAY, dehydration, DKA, other    Urine only shows trace ketones. 100 glucose. No nitrites or leukocytes or evidence of UTI. She did test positive for cannabis, likely the source of her cyclic nausea and vomiting. Blood glucose 214 after fluids. No evidence of DKA, she is not acidotic, and has no anion gap.   Beta hydroxybutyrate is not elevated. She does have a leukocytosis of 15.1 but this is improved from her previous testing 2 days ago, likely reactive to the cyclical nausea and vomiting. She has no significant electrolyte derangements. BUN is 26, was given 2 L of IV fluids. Creatinine shows no evidence of SHAY. LFTs and lipase are unremarkable    Rapid COVID-negative. CT and plain films as read by the radiologist as above. Upon reevaluation patient is able to tolerate p.o. intake. She is feeling better. I do believe that she is stable for discharge home. She needs to stop smoking marijuana as this is likely contributing to her cyclic nausea and vomiting. She can return at any point time for any new or worsening symptoms but needs to follow-up outpatient with the PCP. She verbalized understanding of this, has no further questions or concerns will be discharged home in stable condition with medications for symptoms as needed    I estimate there is LOW risk for ACUTE APPENDICITIS, BOWEL OBSTRUCTION, CHOLECYSTITIS, DIVERTICULITIS, INCARCERATED HERNIA, PANCREATITIS, or PERFORATED BOWEL or ULCER, thus I consider the discharge disposition reasonable. Also, there is no evidence or peritonitis, sepsis, or toxicity. Bere Solorzano and I have discussed the diagnosis and risks, and we agree with discharging home to follow-up with their primary doctor. We also discussed returning to the Emergency Department immediately if new or worsening symptoms occur. We have discussed the symptoms which are most concerning (e.g., bloody stool, fever, changing or worsening pain, vomiting) that necessitate immediate return. FINAL IMPRESSION      1. Non-intractable vomiting with nausea, unspecified vomiting type    2. Body aches    3.  Cannabinoid hyperemesis syndrome          DISPOSITION/PLAN   DISPOSITION Decision To Discharge 08/06/2022 10:55:40 PM      PATIENT REFERRED TO:  CEASAR Rodriguez  [address] [place] [address]  [phone]    Schedule an appointment as soon as possible for a visit in 2 days      [facility] Emergency Department  [address] [facility] [id number]  [phone]  Go to   As needed, If symptoms worsen    DISCHARGE MEDICATIONS:  Discharge Medication List as of 8/6/2022 10:14 PM        START taking these medications    Details   promethazine (PHENERGAN) 25 MG tablet Take 1 tablet by mouth every 6 hours as needed for Nausea WARNING:  May cause drowsiness. May impair ability to operate vehicles or machinery.   Do not use in combination with alcohol., Disp-20 tablet, R-0Normal             DISCONTINUED MEDICATIONS:  Discharge Medication List as of 8/6/2022 10:14 PM                 (Please note that portions of this note were completed with a voice recognition program.  Efforts were made to edit the dictations but occasionally words are mis-transcribed.)    SEVEN Holbrook CNP (electronically signed)            SEVEN Holbrook CNP  08/06/22 [id number]

## 2022-08-06 NOTE — ED PROVIDER NOTES
I independently performed a history and physical on Antonieta Andre. All diagnostic, treatment, and disposition decisions were made by myself in conjunction with the advanced practice provider. [facility], please see JONATHAN Novak's documentation. Patient complains of nausea and vomiting for several days. She is also had a few episodes of diarrhea. Neither the stool nor emesis is bloody or black. No fevers. No chest pain. On exam abdomen is benign and lungs clear auscultation bilaterally and heart regular rate and rhythm. EKG  The Ekg interpreted by me shows  normal sinus rhythm with a rate of 76  Axis is   Normal  QTc is  normal  Intervals and Durations are unremarkable.       ST Segments: no acute change  No significant change from prior EKG dated 3/29/22      Labs Reviewed   CBC WITH AUTO DIFFERENTIAL - Abnormal; Notable for the following components:       Result Value    WBC 15.1 (*)     RBC 5.76 (*)     MCV 77.4 (*)     MCH 25.6 (*)     Neutrophils Absolute 10.9 (*)     All other components within normal limits   BASIC METABOLIC PANEL - Abnormal; Notable for the following components:    Sodium 132 (*)     Chloride 87 (*)     Glucose 214 (*)     BUN 26 (*)     GFR Non- 46 (*)     GFR  55 (*)     All other components within normal limits   URINALYSIS WITH REFLEX TO CULTURE - Abnormal; Notable for the following components:    Glucose, Ur 100 (*)     Ketones, Urine TRACE (*)     Protein, UA 30 (*)     All other components within normal limits   BLOOD GAS, VENOUS - Abnormal; Notable for the following components:    pH, Bernard 7.464 (*)     pCO2, Bernard 51.0 (*)     HCO3, Venous 37 (*)     All other components within normal limits   URINE DRUG SCREEN - Abnormal; Notable for the following components:    Cannabinoid Scrn, Ur POSITIVE (*)     All other components within normal limits   POCT GLUCOSE - Normal   COVID-19, RAPID   HEPATIC FUNCTION PANEL   LIPASE   BETA-HYDROXYBUTYRATE   MICROSCOPIC URINALYSIS     XR CHEST PORTABLE   Final Result   No acute cardiopulmonary process identified. CT ABDOMEN PELVIS WO CONTRAST Additional Contrast? None   Final Result   No acute intra-abnormality seen. Probable small hepatic cyst which is unchanged. Mild adrenal thickening which may be due to hyperplasia and is unchanged. No hydronephrosis or renal stones and no CT evidence of urinary obstruction. Atrophic uterus with no pelvic mass or active inflammation and no bowel   obstruction           I personally saw this patient and performed a substantive portion of the visit including all aspects of the medical decision making. MDM  Receiving IV fluids and medicines patient is feeling better. Advised return for new or worsening symptoms. I personally saw this patient and independently provided 15 minutes of non-concurrent critical care out of the total shared critical care time provided.        Ryan Yates MD  08/06/22 5535

## 2022-08-06 NOTE — ED NOTES
Pt stuck x2 moves hands , has veins , starts hollering you hitting a hot spot take it out , take it out , charge notified she is going to stick pt        Nixon Everett, JOHAN  08/06/22 1943

## 2022-08-07 LAB
EKG ATRIAL RATE: 76 BPM
EKG DIAGNOSIS: NORMAL
EKG P AXIS: 55 DEGREES
EKG P-R INTERVAL: 158 MS
EKG Q-T INTERVAL: 408 MS
EKG QRS DURATION: 92 MS
EKG QTC CALCULATION (BAZETT): 459 MS
EKG R AXIS: -3 DEGREES
EKG T AXIS: 29 DEGREES
EKG VENTRICULAR RATE: 76 BPM

## 2022-08-07 PROCEDURE — 93010 ELECTROCARDIOGRAM REPORT: CPT | Performed by: INTERNAL MEDICINE

## 2022-08-07 NOTE — ED NOTES
Pt in restroom at this time , attempting provide urine spec     Anna Marie Rossi, JOHAN  08/06/22 1332

## 2022-08-22 ENCOUNTER — HOSPITAL ENCOUNTER (EMERGENCY)
Age: 62
Discharge: HOME | End: 2022-08-23
Payer: COMMERCIAL

## 2022-08-22 VITALS
HEART RATE: 89 BPM | DIASTOLIC BLOOD PRESSURE: 72 MMHG | TEMPERATURE: 98 F | RESPIRATION RATE: 18 BRPM | SYSTOLIC BLOOD PRESSURE: 116 MMHG | OXYGEN SATURATION: 97 %

## 2022-08-22 DIAGNOSIS — R11.2 INTRACTABLE VOMITING WITH NAUSEA, UNSPECIFIED VOMITING TYPE: Primary | ICD-10-CM

## 2022-08-22 DIAGNOSIS — K08.89 PAIN, DENTAL: ICD-10-CM

## 2022-08-22 ASSESSMENT — PAIN DESCRIPTION - FREQUENCY: FREQUENCY: CONTINUOUS

## 2022-08-22 ASSESSMENT — PAIN DESCRIPTION - DESCRIPTORS: DESCRIPTORS: ACHING

## 2022-08-22 ASSESSMENT — PAIN DESCRIPTION - ORIENTATION: ORIENTATION: RIGHT

## 2022-08-22 ASSESSMENT — PAIN - FUNCTIONAL ASSESSMENT: PAIN_FUNCTIONAL_ASSESSMENT: 0-10

## 2022-08-22 ASSESSMENT — PAIN DESCRIPTION - LOCATION: LOCATION: FACE

## 2022-08-22 ASSESSMENT — PAIN SCALES - GENERAL: PAINLEVEL_OUTOF10: 9

## 2022-08-22 ASSESSMENT — PAIN DESCRIPTION - PAIN TYPE: TYPE: ACUTE PAIN

## 2022-08-23 LAB
A/G RATIO: 1.2 (ref 1.1–2.2)
ALBUMIN SERPL-MCNC: 4.1 G/DL (ref 3.4–5)
ALP BLD-CCNC: 120 U/L (ref 40–129)
ALT SERPL-CCNC: 8 U/L (ref 10–40)
ANION GAP SERPL CALCULATED.3IONS-SCNC: 14 MMOL/L (ref 3–16)
AST SERPL-CCNC: 9 U/L (ref 15–37)
BASOPHILS ABSOLUTE: 0.1 K/UL (ref 0–0.2)
BASOPHILS RELATIVE PERCENT: 1 %
BILIRUB SERPL-MCNC: 0.5 MG/DL (ref 0–1)
BUN BLDV-MCNC: 11 MG/DL (ref 7–20)
CALCIUM SERPL-MCNC: 9.3 MG/DL (ref 8.3–10.6)
CHLORIDE BLD-SCNC: 102 MMOL/L (ref 99–110)
CO2: 25 MMOL/L (ref 21–32)
CREAT SERPL-MCNC: 1 MG/DL (ref 0.6–1.2)
EOSINOPHILS ABSOLUTE: 0.1 K/UL (ref 0–0.6)
EOSINOPHILS RELATIVE PERCENT: 0.6 %
GFR AFRICAN AMERICAN: >60
GFR NON-AFRICAN AMERICAN: 56
GLUCOSE BLD-MCNC: 108 MG/DL (ref 70–99)
HCT VFR BLD CALC: 40 % (ref 36–48)
HEMOGLOBIN: 13.4 G/DL (ref 12–16)
LYMPHOCYTES ABSOLUTE: 3 K/UL (ref 1–5.1)
LYMPHOCYTES RELATIVE PERCENT: 25.9 %
MCH RBC QN AUTO: 26.1 PG (ref 26–34)
MCHC RBC AUTO-ENTMCNC: 33.6 G/DL (ref 31–36)
MCV RBC AUTO: 77.6 FL (ref 80–100)
MONOCYTES ABSOLUTE: 0.8 K/UL (ref 0–1.3)
MONOCYTES RELATIVE PERCENT: 6.9 %
NEUTROPHILS ABSOLUTE: 7.6 K/UL (ref 1.7–7.7)
NEUTROPHILS RELATIVE PERCENT: 65.6 %
PDW BLD-RTO: 15.1 % (ref 12.4–15.4)
PLATELET # BLD: 298 K/UL (ref 135–450)
PMV BLD AUTO: 7.3 FL (ref 5–10.5)
POTASSIUM REFLEX MAGNESIUM: 3.8 MMOL/L (ref 3.5–5.1)
RBC # BLD: 5.15 M/UL (ref 4–5.2)
SODIUM BLD-SCNC: 141 MMOL/L (ref 136–145)
TOTAL PROTEIN: 7.5 G/DL (ref 6.4–8.2)
WBC # BLD: 11.6 K/UL (ref 4–11)

## 2022-08-23 PROCEDURE — 96365 THER/PROPH/DIAG IV INF INIT: CPT

## 2022-08-23 PROCEDURE — 85025 COMPLETE CBC W/AUTO DIFF WBC: CPT

## 2022-08-23 PROCEDURE — 99284 EMERGENCY DEPT VISIT MOD MDM: CPT

## 2022-08-23 PROCEDURE — 2580000003 HC RX 258: Performed by: PHYSICIAN ASSISTANT

## 2022-08-23 PROCEDURE — 6360000002 HC RX W HCPCS: Performed by: PHYSICIAN ASSISTANT

## 2022-08-23 PROCEDURE — 80053 COMPREHEN METABOLIC PANEL: CPT

## 2022-08-23 PROCEDURE — 96375 TX/PRO/DX INJ NEW DRUG ADDON: CPT

## 2022-08-23 RX ORDER — KETOROLAC TROMETHAMINE 10 MG/1
10 TABLET, FILM COATED ORAL EVERY 6 HOURS PRN
Qty: 20 TABLET | Refills: 0 | Status: SHIPPED | OUTPATIENT
Start: 2022-08-23 | End: 2022-08-23

## 2022-08-23 RX ORDER — KETOROLAC TROMETHAMINE 30 MG/ML
30 INJECTION, SOLUTION INTRAMUSCULAR; INTRAVENOUS ONCE
Status: COMPLETED | OUTPATIENT
Start: 2022-08-23 | End: 2022-08-23

## 2022-08-23 RX ORDER — KETOROLAC TROMETHAMINE 10 MG/1
10 TABLET, FILM COATED ORAL EVERY 6 HOURS PRN
Qty: 20 TABLET | Refills: 0 | Status: SHIPPED | OUTPATIENT
Start: 2022-08-23

## 2022-08-23 RX ORDER — 0.9 % SODIUM CHLORIDE 0.9 %
1000 INTRAVENOUS SOLUTION INTRAVENOUS ONCE
Status: COMPLETED | OUTPATIENT
Start: 2022-08-23 | End: 2022-08-23

## 2022-08-23 RX ORDER — ONDANSETRON 2 MG/ML
4 INJECTION INTRAMUSCULAR; INTRAVENOUS ONCE
Status: COMPLETED | OUTPATIENT
Start: 2022-08-23 | End: 2022-08-23

## 2022-08-23 RX ADMIN — KETOROLAC TROMETHAMINE 30 MG: 30 INJECTION, SOLUTION INTRAMUSCULAR at 02:09

## 2022-08-23 RX ADMIN — SODIUM CHLORIDE 1000 ML: 9 INJECTION, SOLUTION INTRAVENOUS at 01:27

## 2022-08-23 RX ADMIN — Medication 12.5 MG: at 01:27

## 2022-08-23 RX ADMIN — ONDANSETRON 4 MG: 2 INJECTION INTRAMUSCULAR; INTRAVENOUS at 01:26

## 2022-08-23 ASSESSMENT — PAIN SCALES - GENERAL: PAINLEVEL_OUTOF10: 9

## 2022-08-23 NOTE — DISCHARGE INSTRUCTIONS
Continue taking your augmentin as prescribed by your family doctor as you just started your course. Take this with food so you are able to keep the medication down. Drink with plenty of water as well.

## 2022-08-23 NOTE — ED NOTES
Pt discharged back home, reviewed discharged instructions with pt follow up care , pain control and return precautions discussed , pt verbalized understanding.  Pt ambulated out of the department with steady gait        Diandra Mortensen RN  08/23/22 8867

## 2022-09-07 ENCOUNTER — APPOINTMENT (OUTPATIENT)
Dept: CT IMAGING | Age: 62
DRG: 074 | End: 2022-09-07
Payer: COMMERCIAL

## 2022-09-07 ENCOUNTER — HOSPITAL ENCOUNTER (INPATIENT)
Age: 62
LOS: 3 days | Discharge: HOME | DRG: 074 | End: 2022-09-11
Attending: EMERGENCY MEDICINE | Admitting: STUDENT IN AN ORGANIZED HEALTH CARE EDUCATION/TRAINING PROGRAM
Payer: COMMERCIAL

## 2022-09-07 ENCOUNTER — APPOINTMENT (OUTPATIENT)
Dept: GENERAL RADIOLOGY | Age: 62
DRG: 074 | End: 2022-09-07
Payer: COMMERCIAL

## 2022-09-07 DIAGNOSIS — A41.9 SEPTICEMIA (HCC): ICD-10-CM

## 2022-09-07 DIAGNOSIS — R11.2 CANNABINOID HYPEREMESIS SYNDROME: Primary | ICD-10-CM

## 2022-09-07 DIAGNOSIS — F12.90 CANNABINOID HYPEREMESIS SYNDROME: Primary | ICD-10-CM

## 2022-09-07 DIAGNOSIS — R73.9 HYPERGLYCEMIA: ICD-10-CM

## 2022-09-07 PROBLEM — N12 PYELONEPHRITIS: Status: ACTIVE | Noted: 2022-09-07

## 2022-09-07 PROBLEM — N28.0 RENAL INFARCT (HCC): Status: ACTIVE | Noted: 2022-09-07

## 2022-09-07 LAB
A/G RATIO: 1.1 (ref 1.1–2.2)
ALBUMIN SERPL-MCNC: 4.7 G/DL (ref 3.4–5)
ALP BLD-CCNC: 180 U/L (ref 40–129)
ALT SERPL-CCNC: 15 U/L (ref 10–40)
ANION GAP SERPL CALCULATED.3IONS-SCNC: 16 MMOL/L (ref 3–16)
ANION GAP SERPL CALCULATED.3IONS-SCNC: 25 MMOL/L (ref 3–16)
APTT: 26.4 SEC (ref 23–34.3)
AST SERPL-CCNC: 18 U/L (ref 15–37)
BACTERIA: NORMAL /HPF
BASE EXCESS VENOUS: -3.7 MMOL/L
BASOPHILS ABSOLUTE: 0.1 K/UL (ref 0–0.2)
BASOPHILS RELATIVE PERCENT: 0.6 %
BETA-HYDROXYBUTYRATE: 0.8 MMOL/L (ref 0–0.27)
BILIRUB SERPL-MCNC: 0.5 MG/DL (ref 0–1)
BILIRUBIN URINE: NEGATIVE
BLOOD, URINE: ABNORMAL
BUN BLDV-MCNC: 6 MG/DL (ref 7–20)
BUN BLDV-MCNC: 7 MG/DL (ref 7–20)
CALCIUM SERPL-MCNC: 10 MG/DL (ref 8.3–10.6)
CALCIUM SERPL-MCNC: 8.2 MG/DL (ref 8.3–10.6)
CARBOXYHEMOGLOBIN: 1.4 %
CHLORIDE BLD-SCNC: 101 MMOL/L (ref 99–110)
CHLORIDE BLD-SCNC: 93 MMOL/L (ref 99–110)
CLARITY: CLEAR
CO2: 17 MMOL/L (ref 21–32)
CO2: 20 MMOL/L (ref 21–32)
COLOR: YELLOW
CREAT SERPL-MCNC: 0.6 MG/DL (ref 0.6–1.2)
CREAT SERPL-MCNC: 0.7 MG/DL (ref 0.6–1.2)
EOSINOPHILS ABSOLUTE: 0 K/UL (ref 0–0.6)
EOSINOPHILS RELATIVE PERCENT: 0 %
EPITHELIAL CELLS, UA: 0 /HPF (ref 0–5)
GFR AFRICAN AMERICAN: >60
GFR AFRICAN AMERICAN: >60
GFR NON-AFRICAN AMERICAN: >60
GFR NON-AFRICAN AMERICAN: >60
GLUCOSE BLD-MCNC: 267 MG/DL (ref 70–99)
GLUCOSE BLD-MCNC: 372 MG/DL (ref 70–99)
GLUCOSE URINE: 250 MG/DL
HCO3 VENOUS: 24 MMOL/L (ref 23–29)
HCT VFR BLD CALC: 47.3 % (ref 36–48)
HEMOGLOBIN: 15.5 G/DL (ref 12–16)
HYALINE CASTS: 1 /LPF (ref 0–8)
INR BLD: 1.08 (ref 0.87–1.14)
KETONES, URINE: 15 MG/DL
LACTIC ACID, SEPSIS: 5.1 MMOL/L (ref 0.4–1.9)
LEUKOCYTE ESTERASE, URINE: NEGATIVE
LIPASE: 34 U/L (ref 13–60)
LYMPHOCYTES ABSOLUTE: 0.8 K/UL (ref 1–5.1)
LYMPHOCYTES RELATIVE PERCENT: 4.1 %
MAGNESIUM: 1.3 MG/DL (ref 1.8–2.4)
MCH RBC QN AUTO: 26 PG (ref 26–34)
MCHC RBC AUTO-ENTMCNC: 32.7 G/DL (ref 31–36)
MCV RBC AUTO: 79.4 FL (ref 80–100)
METHEMOGLOBIN VENOUS: 0.4 %
MICROSCOPIC EXAMINATION: YES
MONOCYTES ABSOLUTE: 0.4 K/UL (ref 0–1.3)
MONOCYTES RELATIVE PERCENT: 2.1 %
NEUTROPHILS ABSOLUTE: 19.2 K/UL (ref 1.7–7.7)
NEUTROPHILS RELATIVE PERCENT: 93.2 %
NITRITE, URINE: NEGATIVE
O2 SAT, VEN: 45 %
O2 THERAPY: ABNORMAL
PCO2, VEN: 52.2 MMHG (ref 40–50)
PDW BLD-RTO: 15.2 % (ref 12.4–15.4)
PH UA: 5 (ref 5–8)
PH VENOUS: 7.27 (ref 7.35–7.45)
PLATELET # BLD: 313 K/UL (ref 135–450)
PMV BLD AUTO: 7.6 FL (ref 5–10.5)
PO2, VEN: <30 MMHG
POTASSIUM REFLEX MAGNESIUM: 3.5 MMOL/L (ref 3.5–5.1)
POTASSIUM REFLEX MAGNESIUM: 4.1 MMOL/L (ref 3.5–5.1)
PROTEIN UA: 300 MG/DL
PROTHROMBIN TIME: 14 SEC (ref 11.7–14.5)
RBC # BLD: 5.96 M/UL (ref 4–5.2)
RBC UA: 1 /HPF (ref 0–4)
SODIUM BLD-SCNC: 135 MMOL/L (ref 136–145)
SODIUM BLD-SCNC: 137 MMOL/L (ref 136–145)
SPECIFIC GRAVITY UA: 1.02 (ref 1–1.03)
TCO2 CALC VENOUS: 26 MMOL/L
TOTAL CK: 292 U/L (ref 26–192)
TOTAL PROTEIN: 8.8 G/DL (ref 6.4–8.2)
TROPONIN: <0.01 NG/ML
URINE REFLEX TO CULTURE: ABNORMAL
URINE TYPE: ABNORMAL
UROBILINOGEN, URINE: 0.2 E.U./DL
WBC # BLD: 20.5 K/UL (ref 4–11)
WBC UA: 1 /HPF (ref 0–5)

## 2022-09-07 PROCEDURE — 83735 ASSAY OF MAGNESIUM: CPT

## 2022-09-07 PROCEDURE — 82803 BLOOD GASES ANY COMBINATION: CPT

## 2022-09-07 PROCEDURE — 99285 EMERGENCY DEPT VISIT HI MDM: CPT

## 2022-09-07 PROCEDURE — 96375 TX/PRO/DX INJ NEW DRUG ADDON: CPT

## 2022-09-07 PROCEDURE — 36415 COLL VENOUS BLD VENIPUNCTURE: CPT

## 2022-09-07 PROCEDURE — 71046 X-RAY EXAM CHEST 2 VIEWS: CPT

## 2022-09-07 PROCEDURE — 74177 CT ABD & PELVIS W/CONTRAST: CPT

## 2022-09-07 PROCEDURE — A4216 STERILE WATER/SALINE, 10 ML: HCPCS | Performed by: GENERAL ACUTE CARE HOSPITAL

## 2022-09-07 PROCEDURE — 81001 URINALYSIS AUTO W/SCOPE: CPT

## 2022-09-07 PROCEDURE — 82010 KETONE BODYS QUAN: CPT

## 2022-09-07 PROCEDURE — 85730 THROMBOPLASTIN TIME PARTIAL: CPT

## 2022-09-07 PROCEDURE — 80053 COMPREHEN METABOLIC PANEL: CPT

## 2022-09-07 PROCEDURE — 2500000003 HC RX 250 WO HCPCS: Performed by: GENERAL ACUTE CARE HOSPITAL

## 2022-09-07 PROCEDURE — 6360000004 HC RX CONTRAST MEDICATION: Performed by: GENERAL ACUTE CARE HOSPITAL

## 2022-09-07 PROCEDURE — 87040 BLOOD CULTURE FOR BACTERIA: CPT

## 2022-09-07 PROCEDURE — 93005 ELECTROCARDIOGRAM TRACING: CPT | Performed by: GENERAL ACUTE CARE HOSPITAL

## 2022-09-07 PROCEDURE — 85610 PROTHROMBIN TIME: CPT

## 2022-09-07 PROCEDURE — 84484 ASSAY OF TROPONIN QUANT: CPT

## 2022-09-07 PROCEDURE — 96372 THER/PROPH/DIAG INJ SC/IM: CPT

## 2022-09-07 PROCEDURE — 2580000003 HC RX 258: Performed by: GENERAL ACUTE CARE HOSPITAL

## 2022-09-07 PROCEDURE — 82550 ASSAY OF CK (CPK): CPT

## 2022-09-07 PROCEDURE — 6360000002 HC RX W HCPCS: Performed by: GENERAL ACUTE CARE HOSPITAL

## 2022-09-07 PROCEDURE — 85025 COMPLETE CBC W/AUTO DIFF WBC: CPT

## 2022-09-07 PROCEDURE — 83690 ASSAY OF LIPASE: CPT

## 2022-09-07 PROCEDURE — 87635 SARS-COV-2 COVID-19 AMP PRB: CPT

## 2022-09-07 PROCEDURE — 83605 ASSAY OF LACTIC ACID: CPT

## 2022-09-07 PROCEDURE — 96361 HYDRATE IV INFUSION ADD-ON: CPT

## 2022-09-07 PROCEDURE — 96365 THER/PROPH/DIAG IV INF INIT: CPT

## 2022-09-07 RX ORDER — 0.9 % SODIUM CHLORIDE 0.9 %
1000 INTRAVENOUS SOLUTION INTRAVENOUS ONCE
Status: COMPLETED | OUTPATIENT
Start: 2022-09-07 | End: 2022-09-08

## 2022-09-07 RX ORDER — ONDANSETRON 2 MG/ML
4 INJECTION INTRAMUSCULAR; INTRAVENOUS ONCE
Status: COMPLETED | OUTPATIENT
Start: 2022-09-07 | End: 2022-09-07

## 2022-09-07 RX ORDER — INSULIN GLARGINE 100 [IU]/ML
22 INJECTION, SOLUTION SUBCUTANEOUS DAILY
COMMUNITY

## 2022-09-07 RX ORDER — DEXTROSE MONOHYDRATE 100 MG/ML
INJECTION, SOLUTION INTRAVENOUS CONTINUOUS PRN
Status: DISCONTINUED | OUTPATIENT
Start: 2022-09-07 | End: 2022-09-07

## 2022-09-07 RX ORDER — HALOPERIDOL 5 MG/ML
5 INJECTION INTRAMUSCULAR ONCE
Status: COMPLETED | OUTPATIENT
Start: 2022-09-07 | End: 2022-09-07

## 2022-09-07 RX ORDER — HYDRALAZINE HYDROCHLORIDE 20 MG/ML
5 INJECTION INTRAMUSCULAR; INTRAVENOUS ONCE
Status: COMPLETED | OUTPATIENT
Start: 2022-09-07 | End: 2022-09-07

## 2022-09-07 RX ORDER — PROCHLORPERAZINE EDISYLATE 5 MG/ML
10 INJECTION INTRAMUSCULAR; INTRAVENOUS EVERY 6 HOURS PRN
Status: DISCONTINUED | OUTPATIENT
Start: 2022-09-07 | End: 2022-09-09

## 2022-09-07 RX ADMIN — ONDANSETRON 4 MG: 2 INJECTION INTRAMUSCULAR; INTRAVENOUS at 19:28

## 2022-09-07 RX ADMIN — SODIUM CHLORIDE 1000 ML: 9 INJECTION, SOLUTION INTRAVENOUS at 19:48

## 2022-09-07 RX ADMIN — IOPAMIDOL 75 ML: 755 INJECTION, SOLUTION INTRAVENOUS at 20:41

## 2022-09-07 RX ADMIN — HALOPERIDOL LACTATE 5 MG: 5 INJECTION, SOLUTION INTRAMUSCULAR at 18:47

## 2022-09-07 RX ADMIN — Medication 12.5 MG: at 23:02

## 2022-09-07 RX ADMIN — HYDRALAZINE HYDROCHLORIDE 5 MG: 20 INJECTION, SOLUTION INTRAMUSCULAR; INTRAVENOUS at 20:49

## 2022-09-07 RX ADMIN — FAMOTIDINE 20 MG: 10 INJECTION, SOLUTION INTRAVENOUS at 19:27

## 2022-09-07 RX ADMIN — SODIUM CHLORIDE 1000 ML: 9 INJECTION, SOLUTION INTRAVENOUS at 20:48

## 2022-09-07 ASSESSMENT — ENCOUNTER SYMPTOMS
CHEST TIGHTNESS: 0
WHEEZING: 0
VOMITING: 0
COUGH: 0
VOICE CHANGE: 0
ABDOMINAL PAIN: 0
NAUSEA: 0
BACK PAIN: 0
SORE THROAT: 0
SHORTNESS OF BREATH: 0

## 2022-09-07 ASSESSMENT — PAIN - FUNCTIONAL ASSESSMENT: PAIN_FUNCTIONAL_ASSESSMENT: 0-10

## 2022-09-07 NOTE — LETTER
[facility]  [address] [address]  Dept: [phone]  Loc: [address]                                                                    [address]          RETURN TO WORK STATUS  9/11/2022    Diagnosis (optional ):    These are your [id number] Instructions. It may contain personal and confidential  information about your health. It is up to you to share  these instructions as necessary with your employer(s) as required by their policies for you to return to work. WORK STATUS:   May return to work without modifications. Return to work date:  Wednesday September 14, 2022    (PLEASE NOTE: If modified work is not available, this patient is then unable to work for this period of time.)           Ronna Brewer RN  [employer] SHOWS THIS  Meeting [facility]

## 2022-09-07 NOTE — ED PROVIDER NOTES
Eyes:  Negative for visual disturbance. Respiratory:  Negative for cough, chest tightness, shortness of breath and wheezing. Cardiovascular:  Negative for chest pain and palpitations. Gastrointestinal:  Negative for abdominal pain, nausea and vomiting. Endocrine: Negative for polydipsia and polyuria. Genitourinary:  Negative for difficulty urinating, dysuria and flank pain. Musculoskeletal:  Positive for myalgias. Negative for back pain. Skin:  Negative for rash and wound. Allergic/Immunologic: Negative for immunocompromised state. Neurological:  Negative for dizziness, weakness and light-headedness. Hematological:  Does not bruise/bleed easily. Psychiatric/Behavioral:  Negative for sleep disturbance and suicidal ideas. Positives and Pertinent negatives as per HPI. Except as noted above in the ROS, all other systems were reviewed and negative.        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    Gastroparesis     Hirsutism     History of blood transfusion     Hyperlipidemia LDL goal < 70     Hypertension     Major depressive disorder with single episode 2009    Pancreatitis     Seasonal allergic rhinitis due to pollen 11/10/2016    Sleep apnea     Thyroid disease     Vitamin D deficiency          SURGICAL HISTORY     Past Surgical History:   Procedure Laterality Date     SECTION      ENDOMETRIAL ABLATION  5/3/12    ENDOSCOPY, COLON, DIAGNOSTIC      HYSTEROSCOPY  5/3/12    MANDIBLE SURGERY      SKIN BIOPSY      TUBAL LIGATION      UPPER GASTROINTESTINAL ENDOSCOPY Place 1 patch onto the skin every 72 hours as needed for Nausea or Vomiting    TIMOLOL (TIMOPTIC) 0.5 % OPHTHALMIC SOLUTION    Place 1 drop into both eyes daily          ALLERGIES     Avelox [moxifloxacin], Bactrim, Cefuroxime, Codeine, Medrol [methylprednisolone], Morphine, Niacin, Oat, Percocet [oxycodone-acetaminophen], Reglan [metoclopramide], Spironolactone, Statins, Sulfamethoxazole-trimethoprim, and Vicodin [hydrocodone-acetaminophen]    FAMILYHISTORY       Family History   Problem Relation Age of Onset    Hypertension Mother     Breast Cancer Mother     Colon Cancer Mother     Cancer Father         Pancreatic    Prostate Cancer Father     No Known Problems Brother     No Known Problems Sister     Hypertension Maternal Aunt     Cancer Maternal Uncle     Hypertension Maternal Grandmother     Stroke Maternal Grandmother     Cancer Maternal Grandfather     No Known Problems Paternal Aunt     No Known Problems Paternal Uncle     No Known Problems Paternal Grandmother     No Known Problems Paternal Grandfather     No Known Problems Other     Rheum Arthritis Neg Hx     Osteoarthritis Neg Hx     Asthma Neg Hx     Diabetes Neg Hx     Heart Failure Neg Hx     High Cholesterol Neg Hx     Migraines Neg Hx     Ovarian Cancer Neg Hx     Rashes/Skin Problems Neg Hx     Seizures Neg Hx     Thyroid Disease Neg Hx           SOCIAL HISTORY       Social History     Tobacco Use    Smoking status: Former     Types: Cigarettes     Quit date: 1990     Years since quittin.6    Smokeless tobacco: Never   Vaping Use    Vaping Use: Never used   Substance Use Topics    Alcohol use:  Yes     Alcohol/week: 0.0 standard drinks     Comment: less than once a month    Drug use: Yes     Frequency: 2.0 times per week     Types: Marijuana [employer]di Members)     Comment: used yesterday       SCREENINGS    [place] Coma Scale  Eye Opening: Spontaneous  Best Verbal Response: Oriented  Best Motor Response: Obeys commands  [place] Coma Scale Score: 15 PHYSICAL EXAM    (up to 7 for level 4, 8 or more for level 5)     ED Triage Vitals [09/07/22 1751]   BP Temp Temp Source Heart Rate Resp SpO2 Height Weight   (!) 194/105 (!) 96.6 °F (35.9 °C) Oral (!) 103 16 100 % -- 219 lb 12.8 oz (99.7 kg)       Physical Exam  Vitals and nursing note reviewed. Constitutional:       General: She is in acute distress. Appearance: Normal appearance. She is ill-appearing. HENT:      Head: Normocephalic and atraumatic. Right Ear: External ear normal.      Left Ear: External ear normal.      Nose: Nose normal.      Mouth/Throat:      Mouth: Mucous membranes are dry. Pharynx: Oropharynx is clear. Eyes:      General:         Right eye: No discharge. Left eye: No discharge. Extraocular Movements: Extraocular movements intact. Conjunctiva/sclera: Conjunctivae normal.   Cardiovascular:      Rate and Rhythm: Normal rate and regular rhythm. Pulses: Normal pulses. Heart sounds: Normal heart sounds. Pulmonary:      Effort: Pulmonary effort is normal. No respiratory distress. Breath sounds: Wheezing present. No rhonchi. Abdominal:      General: Bowel sounds are normal.      Palpations: Abdomen is soft. Tenderness: There is no abdominal tenderness. Musculoskeletal:         General: Normal range of motion. Cervical back: Normal range of motion and neck supple. Skin:     General: Skin is warm and dry. Capillary Refill: Capillary refill takes less than 2 seconds. Neurological:      General: No focal deficit present. Mental Status: She is alert and oriented to person, place, and time.    Psychiatric:         Mood and Affect: Mood normal.         Behavior: Behavior normal.     DIAGNOSTIC RESULTS   LABS:    Labs Reviewed   CBC WITH AUTO DIFFERENTIAL - Abnormal; Notable for the following components:       Result Value    WBC 20.5 (*)     RBC 5.96 (*)     MCV 79.4 (*)     Neutrophils Absolute 19.2 (*)     Lymphocytes Absolute 0.8 (*)     All other components within normal limits   COMPREHENSIVE METABOLIC PANEL W/ REFLEX TO MG FOR LOW K - Abnormal; Notable for the following components:    Sodium 135 (*)     Chloride 93 (*)     CO2 17 (*)     Anion Gap 25 (*)     Glucose 372 (*)     Total Protein 8.8 (*)     Alkaline Phosphatase 180 (*)     All other components within normal limits   BETA-HYDROXYBUTYRATE - Abnormal; Notable for the following components:    Beta-Hydroxybutyrate 0.80 (*)     All other components within normal limits   BLOOD GAS, VENOUS - Abnormal; Notable for the following components:    pH, Bernard 7.270 (*)     pCO2, Bernard 52.2 (*)     All other components within normal limits   LACTATE, SEPSIS - Abnormal; Notable for the following components:    Lactic Acid, Sepsis 5.1 (*)     All other components within normal limits    Narrative:     Onofre Whyte tel. [phone],  Chemistry results called to and read back by Marisela Banuelos RN, 09/07/2022  23:47, by BLUSH   URINALYSIS WITH REFLEX TO CULTURE - Abnormal; Notable for the following components:    Glucose, Ur 250 (*)     Ketones, Urine 15 (*)     Blood, Urine MODERATE (*)     All other components within normal limits   CK - Abnormal; Notable for the following components:     Total  (*)     All other components within normal limits   BASIC METABOLIC PANEL W/ REFLEX TO MG FOR LOW K - Abnormal; Notable for the following components:    CO2 20 (*)     Glucose 267 (*)     BUN 6 (*)     Calcium 8.2 (*)     All other components within normal limits   MAGNESIUM - Abnormal; Notable for the following components:    Magnesium 1.30 (*)     All other components within normal limits   CULTURE, BLOOD 1   CULTURE, BLOOD 2   COVID-19, RAPID   LIPASE   TROPONIN   PROTIME-INR   APTT   MICROSCOPIC URINALYSIS   LACTATE, SEPSIS   POCT GLUCOSE   POCT GLUCOSE   POCT GLUCOSE   POCT GLUCOSE   POCT GLUCOSE   POCT GLUCOSE   POCT GLUCOSE   POCT GLUCOSE   POCT GLUCOSE   POCT GLUCOSE   POCT GLUCOSE   POCT GLUCOSE   POCT GLUCOSE   POCT GLUCOSE       When ordered only abnormal lab results are displayed. All other labs were within normal range or not returned as of this dictation. EKG: When ordered, EKG's are interpreted by the Emergency Department Physician in the absence of a cardiologist.  Please see their note for interpretation of EKG. RADIOLOGY:   Non-plain film images such as CT, Ultrasound and MRI are read by the radiologist. Plain radiographic images are visualized and preliminarily interpreted by the ED Provider with the below findings:        Interpretation per the Radiologist below, if available at the time of this note:    CT ABDOMEN PELVIS W IV CONTRAST Additional Contrast? None   Final Result   No evidence for mechanical obstructive process or small bowel dilatation. Small hiatal hernia      Asymmetric enhancement left kidney with somewhat heterogeneous enhancement or   hypoenhancement wedge-shaped area partially in the left inferior pole   concerning for renal infarct versus striated nephrogram with correlation of   urinary studies as there is questionable asymmetric left perinephric   stranding without renal collecting system dilatation somewhat indeterminate. Hepatic steatosis         XR CHEST (2 VW)   Final Result   No acute cardiopulmonary disease           No results found. PROCEDURES   Unless otherwise noted below, none     Procedures    CRITICAL CARE TIME   I personally saw the patient and independently provided 26 minutes of non-concurrent critical care time out of the total critical care time provided. This excludes time spent doing separately billable procedures. This includes time at the bedside, data interpretation, medication management, obtaining critical history from collateral sources if the patient is unable to provide it directly, and physician consultation.   Specifics of interventions taken and potentially life-threatening diagnostic considerations are listed above in the medical decision making. CONSULTS:  IP CONSULT TO VASCULAR SURGERY      EMERGENCY DEPARTMENT COURSE and DIFFERENTIAL DIAGNOSIS/MDM:   Vitals:    Vitals:    09/07/22 2037 09/07/22 2049 09/07/22 2130 09/07/22 2355   BP: (!) 201/76 (!) 201/76 (!) 172/62    Pulse:   90    Resp:   18    Temp:    98.4 °F (36.9 °C)   TempSrc:    Oral   SpO2:   100%    Weight:       Height:           Patient was given the following medications:  Medications   prochlorperazine (COMPAZINE) injection 10 mg (has no administration in time range)   piperacillin-tazobactam (ZOSYN) 3,375 mg in dextrose 5 % 50 mL IVPB (mini-bag) (has no administration in time range)   magnesium sulfate 4000 mg in 100 mL IVPB premix (has no administration in time range)   0.9 % sodium chloride infusion (has no administration in time range)   hydrALAZINE (APRESOLINE) injection 10 mg (has no administration in time range)   haloperidol lactate (HALDOL) injection 5 mg (5 mg IntraMUSCular Given 9/7/22 1847)   famotidine (PEPCID) 20 mg in sodium chloride (PF) 10 mL injection (20 mg IntraVENous Given 9/7/22 1927)   ondansetron (ZOFRAN) injection 4 mg (4 mg IntraVENous Given 9/7/22 1928)   0.9 % sodium chloride bolus (1,000 mLs IntraVENous New Bag 9/7/22 1948)   0.9 % sodium chloride bolus (1,000 mLs IntraVENous New Bag 9/7/22 2048)   iopamidol (ISOVUE-370) 76 % injection 75 mL (75 mLs IntraVENous Given 9/7/22 2041)   hydrALAZINE (APRESOLINE) injection 5 mg (5 mg IntraVENous Given 9/7/22 2049)   promethazine (PHENERGAN) 12.5mg in sodium chloride 0.9% 50 mL IVPB 12.5 mg (12.5 mg IntraVENous New Bag 9/7/22 2302)     ED Course as of 09/08/22 0008   Wed Sep 07, 2022   2228 Glucose, UA(!): 250 [CS]   9906 Consult placed to vascular surgery [TV]   2310 Spoke with on call vascular surgeon Dr. Be Reeves regarding questionable left renal infarct. Anticoagulation not advised at this time.   [TV]      ED Course User Index  [CS] Kana Tavares MD  [TV] Meseret Albert Jason Hendrix CNP      Is this patient to be included in the SEP-1 Core Measure due to severe sepsis or septic shock? Yes   SEP-1 CORE MEASURE DATA      Sepsis Criteria   Severe Sepsis Criteria   Septic Shock Criteria     Must be confirmed or suspected to move forward with diagnosis of sepsis. Must meet 2:    [] Temperature > 100.9 F (38.3 C)        or < 96.8 F (36 C)  [x] HR > 90  [] RR > 20  [x] WBC > 12 or < 4 or 10% bands      AND:      [x] Infection Confirmed or        Suspected. Must meet 1:    [x] Lactate > 2       or   [] Signs of Organ Dysfunction:    - SBP < 90 or MAP < 65  - Altered mental status  - Creatinine > 2 or increased from      baseline  - Urine Output < 0.5 ml/kg/hr  - Bilirubin > 2  - INR > 1.5 (not anticoagulated)  - Platelets < 604,187  - Acute Respiratory Failure as     evidenced by new need for NIPPV     or mechanical ventilation      [] No criteria met for Severe Sepsis. Must meet 1:    [] Lactate > 4        or   [] SBP < 90 or MAP < 65 for at        least two readings in the first        hour after fluid bolus        administration      [] Vasopressors initiated (if hypotension persists after fluid resuscitation)        [] No criteria met for Septic Shock. Patient Vitals for the past 6 hrs:   BP Temp Pulse Resp SpO2 Height   09/07/22 1845 (!) 194/108 97.6 °F (36.4 °C) 97 20 100 % 5' 7\" (1.702 m)   09/07/22 2037 (!) 201/76 -- -- -- -- --   09/07/22 2049 (!) 201/76 -- -- -- -- --   09/07/22 2130 (!) 172/62 -- 90 18 100 % --   09/07/22 2355 -- 98.4 °F (36.9 °C) -- -- -- --      Recent Labs     09/07/22  1931 09/07/22  2309   WBC 20.5*  --    CREATININE 0.7 0.6   BILITOT 0.5  --    INR  --  1.08     --          Time Severe Sepsis Identified: 2352    Fluid Resuscitation Rational: at least 30mL/kg based on ideal body weight due to obesity defined as BMI >30 (patient's BMI is Body mass index is 34.43 kg/m².  and IBW is Ideal body weight: 61.6 kg (135 lb 12.9 oz)Adjusted ideal body weight: 76.8 kg (169 lb 6.4 oz))      Repeat lactate level: ordered and pending at this time    Reassessment Exam:   Not applicable. Patient does not have septic shock. Previous records reviewed in order to gain further information regarding patient's PMH as well as her HPI. Nursing notes reviewed. This is a 71-year-old -American female with history of multiple comorbid's who presents to the emergency department today reporting approximate 2 to 3-day history of incessant vomiting. She denies recent travel or known sick contacts. Patient states that she has been unable to keep any of her prescribed medications down. Physical exam complete. Patient arrives nontoxic, afebrile, hypertensive. She has dry heaving at the time of my assessment. She is tachycardic and hypertensive. An EKG is interpreted by ED physician reviewed by myself and is negative for acute ST elevation. Patient medicated with IM Haldol. Portable chest x-ray interpreted by radiologist and reviewed by myself is negative for acute findings. Laboratory studies pending. Significant delay of laboratory studies due to limited IV access. Laboratory studies notable for a white count of 20. Initial anion gap is 25. Glucose is 372. VBG shows a pH of 7.2 with a PCO2 of 52.2. Beta hydroxybutyrate is 0.8. Patient is given IV normal saline 2 L, IV Pepcid, IV Zofran, and IV Phenergan. CT abdomen pelvis interpreted by radiologist shows findings concerning for a left renal infarct, see above report for full details. 2330-this case is discussed with ED attending Dr. Padma Cortez who also performed face-to-face evaluation and agrees that patient will benefit from admission for further evaluation and treatment. Again, vascular surgeon Dr. Jacki Reinoso has advised against starting anticoagulation at this time. Lactic and repeat BMP pending. Patient has continued to deny having any abdominal pain or flank pain.   Patient with history of multiple antibiotic allergies. Antibiotics will be initiated by hospitalist service. Patient admitted under hospitalist service. Patient is in agreement with plan of care. Patient is a full code at the time of this admission. FINAL IMPRESSION      1. Cannabinoid hyperemesis syndrome    2. Hyperglycemia    3. Septicemia [facility])          DISPOSITION/PLAN   DISPOSITION Decision To Admit 09/07/2022 10:04:35 PM      PATIENT REFERRED TO:  No follow-up provider specified.     DISCHARGE MEDICATIONS:  New Prescriptions    No medications on file       DISCONTINUED MEDICATIONS:  Discontinued Medications    INSULIN GLARGINE (BASAGLAR KWIKPEN) 100 UNIT/ML INJECTION PEN    Inject 10 Units into the skin nightly    PAPAYA ENZYME CHEW    Take 3-4 tablets by mouth daily as needed (Indigestion)              (Please note that portions of this note were completed with a voice recognition program.  Efforts were made to edit the dictations but occasionally words are mis-transcribed.)    SEVEN Fisher CNP (electronically signed)           SEVEN Fisher CNP  09/08/22 SEVEN Ramírez CNP  09/08/22 0008

## 2022-09-07 NOTE — ED PROVIDER NOTES
I was available for consultation during patient's ED stay. Patient was cared for by ALIZE. I did not evaluate or participate in patient care.     EKG Interpretation    Interpreted by emergency department physician    Rhythm: normal sinus   Rate: normal  Axis: normal  Ectopy: none  Conduction: normal  ST Segments: Nonspecific changes  T Waves: normal  Q Waves: none    Clinical Impression: Normal sinus rhythm nonspecific anterior changes relatively unchanged from previous EKG dated August 6, 2022        MD Job Sauceda MD  09/07/22 1911 General:	no fever, weight loss,  chills  Skin: no rash, ulcers  Ophthalmologic: no visual changes  ENMT:	no sore throat  Respiratory and Thorax: no cough, wheeze,  sob  Cardiovascular:	no chest pain, palpitations, dizziness  Gastrointestinal:	no nausea, vomiting, diarrhea, +abd pain  Genitourinary:	no dysuria, hematuria  Musculoskeletal:	no joint pains  Neurological:	 no speech disturbance, focal weakness, numbness  Psychiatric:	no depression, anxiety, psychosis  Hematology/Lymphatics:	no anemia  Endocrine:	no polyuria, polydipsia

## 2022-09-07 NOTE — ED TRIAGE NOTES
Pt arrives via hospital wheelchair for eval of nausea and fatigue. Pt is a/xo4, rsp nonlabored and pwd. Fe Brown NP at bedside assessing pt.

## 2022-09-08 LAB
ALBUMIN SERPL-MCNC: 3.8 G/DL (ref 3.4–5)
ALBUMIN SERPL-MCNC: 4 G/DL (ref 3.4–5)
ALBUMIN SERPL-MCNC: 4.1 G/DL (ref 3.4–5)
ANION GAP SERPL CALCULATED.3IONS-SCNC: 12 MMOL/L (ref 3–16)
ANION GAP SERPL CALCULATED.3IONS-SCNC: 14 MMOL/L (ref 3–16)
ANION GAP SERPL CALCULATED.3IONS-SCNC: 16 MMOL/L (ref 3–16)
BASOPHILS ABSOLUTE: 0.1 K/UL (ref 0–0.2)
BASOPHILS RELATIVE PERCENT: 0.8 %
BUN BLDV-MCNC: 12 MG/DL (ref 7–20)
BUN BLDV-MCNC: 15 MG/DL (ref 7–20)
BUN BLDV-MCNC: 9 MG/DL (ref 7–20)
CALCIUM SERPL-MCNC: 8.4 MG/DL (ref 8.3–10.6)
CALCIUM SERPL-MCNC: 8.8 MG/DL (ref 8.3–10.6)
CALCIUM SERPL-MCNC: 8.9 MG/DL (ref 8.3–10.6)
CHLORIDE BLD-SCNC: 96 MMOL/L (ref 99–110)
CHLORIDE BLD-SCNC: 98 MMOL/L (ref 99–110)
CHLORIDE BLD-SCNC: 99 MMOL/L (ref 99–110)
CO2: 23 MMOL/L (ref 21–32)
CO2: 23 MMOL/L (ref 21–32)
CO2: 25 MMOL/L (ref 21–32)
CREAT SERPL-MCNC: 0.8 MG/DL (ref 0.6–1.2)
CREAT SERPL-MCNC: 1 MG/DL (ref 0.6–1.2)
CREAT SERPL-MCNC: 1.2 MG/DL (ref 0.6–1.2)
EKG ATRIAL RATE: 98 BPM
EKG DIAGNOSIS: NORMAL
EKG P AXIS: 66 DEGREES
EKG P-R INTERVAL: 144 MS
EKG Q-T INTERVAL: 384 MS
EKG QRS DURATION: 88 MS
EKG QTC CALCULATION (BAZETT): 490 MS
EKG R AXIS: 2 DEGREES
EKG T AXIS: 73 DEGREES
EKG VENTRICULAR RATE: 98 BPM
EOSINOPHILS ABSOLUTE: 0 K/UL (ref 0–0.6)
EOSINOPHILS RELATIVE PERCENT: 0 %
GFR AFRICAN AMERICAN: 55
GFR AFRICAN AMERICAN: >60
GFR AFRICAN AMERICAN: >60
GFR NON-AFRICAN AMERICAN: 46
GFR NON-AFRICAN AMERICAN: 56
GFR NON-AFRICAN AMERICAN: >60
GLUCOSE BLD-MCNC: 184 MG/DL (ref 70–99)
GLUCOSE BLD-MCNC: 188 MG/DL (ref 70–99)
GLUCOSE BLD-MCNC: 201 MG/DL (ref 70–99)
GLUCOSE BLD-MCNC: 204 MG/DL (ref 70–99)
GLUCOSE BLD-MCNC: 217 MG/DL (ref 70–99)
GLUCOSE BLD-MCNC: 222 MG/DL (ref 70–99)
GLUCOSE BLD-MCNC: 249 MG/DL (ref 70–99)
GLUCOSE BLD-MCNC: 275 MG/DL (ref 70–99)
HCT VFR BLD CALC: 40.3 % (ref 36–48)
HEMOGLOBIN: 13.4 G/DL (ref 12–16)
LACTIC ACID, SEPSIS: 2.1 MMOL/L (ref 0.4–1.9)
LACTIC ACID: 1.6 MMOL/L (ref 0.4–2)
LACTIC ACID: 1.8 MMOL/L (ref 0.4–2)
LYMPHOCYTES ABSOLUTE: 2.3 K/UL (ref 1–5.1)
LYMPHOCYTES RELATIVE PERCENT: 14.3 %
MAGNESIUM: 2.5 MG/DL (ref 1.8–2.4)
MAGNESIUM: 2.5 MG/DL (ref 1.8–2.4)
MAGNESIUM: 2.9 MG/DL (ref 1.8–2.4)
MCH RBC QN AUTO: 25.6 PG (ref 26–34)
MCHC RBC AUTO-ENTMCNC: 33.2 G/DL (ref 31–36)
MCV RBC AUTO: 77.1 FL (ref 80–100)
MONOCYTES ABSOLUTE: 0.9 K/UL (ref 0–1.3)
MONOCYTES RELATIVE PERCENT: 5.9 %
NEUTROPHILS ABSOLUTE: 12.5 K/UL (ref 1.7–7.7)
NEUTROPHILS RELATIVE PERCENT: 79 %
PDW BLD-RTO: 14.8 % (ref 12.4–15.4)
PERFORMED ON: ABNORMAL
PHOSPHORUS: 3.4 MG/DL (ref 2.5–4.9)
PHOSPHORUS: 3.5 MG/DL (ref 2.5–4.9)
PHOSPHORUS: 3.6 MG/DL (ref 2.5–4.9)
PLATELET # BLD: 277 K/UL (ref 135–450)
PMV BLD AUTO: 7.5 FL (ref 5–10.5)
POTASSIUM SERPL-SCNC: 3.6 MMOL/L (ref 3.5–5.1)
POTASSIUM SERPL-SCNC: 3.9 MMOL/L (ref 3.5–5.1)
POTASSIUM SERPL-SCNC: 4 MMOL/L (ref 3.5–5.1)
RBC # BLD: 5.22 M/UL (ref 4–5.2)
SARS-COV-2, NAAT: NOT DETECTED
SODIUM BLD-SCNC: 135 MMOL/L (ref 136–145)
SODIUM BLD-SCNC: 135 MMOL/L (ref 136–145)
SODIUM BLD-SCNC: 136 MMOL/L (ref 136–145)
WBC # BLD: 15.8 K/UL (ref 4–11)

## 2022-09-08 PROCEDURE — 93010 ELECTROCARDIOGRAM REPORT: CPT | Performed by: INTERNAL MEDICINE

## 2022-09-08 PROCEDURE — 2060000000 HC ICU INTERMEDIATE R&B

## 2022-09-08 PROCEDURE — 83605 ASSAY OF LACTIC ACID: CPT

## 2022-09-08 PROCEDURE — 6370000000 HC RX 637 (ALT 250 FOR IP): Performed by: STUDENT IN AN ORGANIZED HEALTH CARE EDUCATION/TRAINING PROGRAM

## 2022-09-08 PROCEDURE — 80069 RENAL FUNCTION PANEL: CPT

## 2022-09-08 PROCEDURE — 83735 ASSAY OF MAGNESIUM: CPT

## 2022-09-08 PROCEDURE — 2580000003 HC RX 258: Performed by: STUDENT IN AN ORGANIZED HEALTH CARE EDUCATION/TRAINING PROGRAM

## 2022-09-08 PROCEDURE — 87040 BLOOD CULTURE FOR BACTERIA: CPT

## 2022-09-08 PROCEDURE — 6360000002 HC RX W HCPCS: Performed by: STUDENT IN AN ORGANIZED HEALTH CARE EDUCATION/TRAINING PROGRAM

## 2022-09-08 PROCEDURE — 36415 COLL VENOUS BLD VENIPUNCTURE: CPT

## 2022-09-08 PROCEDURE — 6360000002 HC RX W HCPCS: Performed by: EMERGENCY MEDICINE

## 2022-09-08 PROCEDURE — 85025 COMPLETE CBC W/AUTO DIFF WBC: CPT

## 2022-09-08 PROCEDURE — 2580000003 HC RX 258: Performed by: EMERGENCY MEDICINE

## 2022-09-08 PROCEDURE — 99221 1ST HOSP IP/OBS SF/LOW 40: CPT | Performed by: STUDENT IN AN ORGANIZED HEALTH CARE EDUCATION/TRAINING PROGRAM

## 2022-09-08 PROCEDURE — C9113 INJ PANTOPRAZOLE SODIUM, VIA: HCPCS | Performed by: STUDENT IN AN ORGANIZED HEALTH CARE EDUCATION/TRAINING PROGRAM

## 2022-09-08 PROCEDURE — APPNB30 APP NON BILLABLE TIME 0-30 MINS: Performed by: NURSE PRACTITIONER

## 2022-09-08 RX ORDER — LISINOPRIL 10 MG/1
10 TABLET ORAL DAILY
Status: DISCONTINUED | OUTPATIENT
Start: 2022-09-08 | End: 2022-09-11 | Stop reason: HOSPADM

## 2022-09-08 RX ORDER — ONDANSETRON 2 MG/ML
4 INJECTION INTRAMUSCULAR; INTRAVENOUS EVERY 6 HOURS PRN
Status: DISCONTINUED | OUTPATIENT
Start: 2022-09-08 | End: 2022-09-11 | Stop reason: HOSPADM

## 2022-09-08 RX ORDER — MAGNESIUM SULFATE IN WATER 40 MG/ML
4000 INJECTION, SOLUTION INTRAVENOUS ONCE
Status: COMPLETED | OUTPATIENT
Start: 2022-09-08 | End: 2022-09-08

## 2022-09-08 RX ORDER — ENOXAPARIN SODIUM 100 MG/ML
40 INJECTION SUBCUTANEOUS DAILY
Status: DISCONTINUED | OUTPATIENT
Start: 2022-09-08 | End: 2022-09-11 | Stop reason: HOSPADM

## 2022-09-08 RX ORDER — SODIUM CHLORIDE 0.9 % (FLUSH) 0.9 %
5-40 SYRINGE (ML) INJECTION PRN
Status: DISCONTINUED | OUTPATIENT
Start: 2022-09-08 | End: 2022-09-11 | Stop reason: HOSPADM

## 2022-09-08 RX ORDER — AMLODIPINE BESYLATE 5 MG/1
5 TABLET ORAL DAILY
Status: DISCONTINUED | OUTPATIENT
Start: 2022-09-08 | End: 2022-09-11 | Stop reason: HOSPADM

## 2022-09-08 RX ORDER — ACETAMINOPHEN 650 MG/1
650 SUPPOSITORY RECTAL EVERY 6 HOURS PRN
Status: DISCONTINUED | OUTPATIENT
Start: 2022-09-08 | End: 2022-09-11 | Stop reason: HOSPADM

## 2022-09-08 RX ORDER — SODIUM CHLORIDE 9 MG/ML
INJECTION, SOLUTION INTRAVENOUS PRN
Status: DISCONTINUED | OUTPATIENT
Start: 2022-09-08 | End: 2022-09-11 | Stop reason: HOSPADM

## 2022-09-08 RX ORDER — INSULIN LISPRO 100 [IU]/ML
0-4 INJECTION, SOLUTION INTRAVENOUS; SUBCUTANEOUS NIGHTLY
Status: DISCONTINUED | OUTPATIENT
Start: 2022-09-08 | End: 2022-09-11 | Stop reason: HOSPADM

## 2022-09-08 RX ORDER — ASPIRIN 81 MG/1
81 TABLET, CHEWABLE ORAL DAILY
Status: DISCONTINUED | OUTPATIENT
Start: 2022-09-08 | End: 2022-09-11 | Stop reason: HOSPADM

## 2022-09-08 RX ORDER — INSULIN LISPRO 100 [IU]/ML
0-4 INJECTION, SOLUTION INTRAVENOUS; SUBCUTANEOUS
Status: DISCONTINUED | OUTPATIENT
Start: 2022-09-08 | End: 2022-09-11 | Stop reason: HOSPADM

## 2022-09-08 RX ORDER — DEXTROSE MONOHYDRATE 100 MG/ML
INJECTION, SOLUTION INTRAVENOUS CONTINUOUS PRN
Status: DISCONTINUED | OUTPATIENT
Start: 2022-09-08 | End: 2022-09-11 | Stop reason: HOSPADM

## 2022-09-08 RX ORDER — SODIUM CHLORIDE 0.9 % (FLUSH) 0.9 %
5-40 SYRINGE (ML) INJECTION EVERY 12 HOURS SCHEDULED
Status: DISCONTINUED | OUTPATIENT
Start: 2022-09-08 | End: 2022-09-11 | Stop reason: HOSPADM

## 2022-09-08 RX ORDER — HYDRALAZINE HYDROCHLORIDE 20 MG/ML
10 INJECTION INTRAMUSCULAR; INTRAVENOUS EVERY 4 HOURS PRN
Status: DISCONTINUED | OUTPATIENT
Start: 2022-09-08 | End: 2022-09-11 | Stop reason: HOSPADM

## 2022-09-08 RX ORDER — INSULIN GLARGINE 100 [IU]/ML
10 INJECTION, SOLUTION SUBCUTANEOUS NIGHTLY
Status: DISCONTINUED | OUTPATIENT
Start: 2022-09-08 | End: 2022-09-09

## 2022-09-08 RX ORDER — ACETAMINOPHEN 325 MG/1
650 TABLET ORAL EVERY 6 HOURS PRN
Status: DISCONTINUED | OUTPATIENT
Start: 2022-09-08 | End: 2022-09-11 | Stop reason: HOSPADM

## 2022-09-08 RX ORDER — SODIUM CHLORIDE 9 MG/ML
INJECTION, SOLUTION INTRAVENOUS CONTINUOUS
Status: DISCONTINUED | OUTPATIENT
Start: 2022-09-08 | End: 2022-09-08

## 2022-09-08 RX ORDER — ALBUTEROL SULFATE 90 UG/1
2 AEROSOL, METERED RESPIRATORY (INHALATION) EVERY 4 HOURS PRN
Status: DISCONTINUED | OUTPATIENT
Start: 2022-09-08 | End: 2022-09-11 | Stop reason: HOSPADM

## 2022-09-08 RX ADMIN — ASPIRIN 81 MG 81 MG: 81 TABLET ORAL at 09:27

## 2022-09-08 RX ADMIN — MAGNESIUM SULFATE HEPTAHYDRATE 4000 MG: 40 INJECTION, SOLUTION INTRAVENOUS at 02:18

## 2022-09-08 RX ADMIN — SODIUM CHLORIDE: 9 INJECTION, SOLUTION INTRAVENOUS at 12:33

## 2022-09-08 RX ADMIN — AMLODIPINE BESYLATE 5 MG: 5 TABLET ORAL at 09:27

## 2022-09-08 RX ADMIN — INSULIN LISPRO 1 UNITS: 100 INJECTION, SOLUTION INTRAVENOUS; SUBCUTANEOUS at 09:30

## 2022-09-08 RX ADMIN — INSULIN LISPRO 1 UNITS: 100 INJECTION, SOLUTION INTRAVENOUS; SUBCUTANEOUS at 18:20

## 2022-09-08 RX ADMIN — INSULIN GLARGINE 10 UNITS: 100 INJECTION, SOLUTION SUBCUTANEOUS at 21:32

## 2022-09-08 RX ADMIN — Medication 10 ML: at 21:34

## 2022-09-08 RX ADMIN — SODIUM CHLORIDE: 9 INJECTION, SOLUTION INTRAVENOUS at 02:15

## 2022-09-08 RX ADMIN — LISINOPRIL 10 MG: 10 TABLET ORAL at 09:27

## 2022-09-08 RX ADMIN — PIPERACILLIN AND TAZOBACTAM 3375 MG: 3; .375 INJECTION, POWDER, FOR SOLUTION INTRAVENOUS at 00:21

## 2022-09-08 RX ADMIN — Medication 40 MG: at 09:27

## 2022-09-08 ASSESSMENT — ENCOUNTER SYMPTOMS
EYES NEGATIVE: 1
NAUSEA: 0
TROUBLE SWALLOWING: 0
CONSTIPATION: 0
GASTROINTESTINAL NEGATIVE: 1
BACK PAIN: 1
VOICE CHANGE: 0
RHINORRHEA: 0
SHORTNESS OF BREATH: 0
FACIAL SWELLING: 1
RESPIRATORY NEGATIVE: 1
BACK PAIN: 0
COLOR CHANGE: 0
SORE THROAT: 0
EYE PAIN: 0
DIARRHEA: 0
COUGH: 0
VOMITING: 0
EYE REDNESS: 0
SINUS PRESSURE: 0
ABDOMINAL PAIN: 0

## 2022-09-08 ASSESSMENT — PAIN SCALES - GENERAL
PAINLEVEL_OUTOF10: 0
PAINLEVEL_OUTOF10: 0

## 2022-09-08 NOTE — PROGRESS NOTES
Medication Reconciliation    List of medications for Arline Peabody is currently taking is complete. Source of Information:   Epic records  Conversation with patient at bedside    Notes Regarding Home Medications:   Patient did not receive any of their home medications prior to arrival to the emergency department  Per endocrine note on 8/31. ..   Basaglar changed to Semglee 22 untis QNT  Humalog increased to 19 units TID + 2:50>150    Denies other otc/herbal use    Kofi Dietrich, Pharmacy Intern  9/7/2022 10:04 PM

## 2022-09-08 NOTE — CARE COORDINATION
09/08/22 1606   Service Assessment   Patient Orientation Alert and Oriented   Cognition Alert   History Provided By Patient   Primary Caregiver Self   Patient's Healthcare Decision Maker is: Legal Next of Kin   PCP Verified by CM Yes   Prior Functional Level Independent in ADLs/IADLs   Current Functional Level Independent in ADLs/IADLs   Can patient return to prior living arrangement Yes   Ability to make needs known: Good   Family able to assist with home care needs: Yes   Social/Functional History   Lives With Family   Type of [address] One level   Home Access Level entry   ADL Assistance Independent   Homemaking Assistance Independent   Ambulation Assistance Independent   Transfer Assistance Independent   Active  Yes   Mode of Transportation Car   Discharge Planning   Current Services Prior To Admission [facility] [address] Needed N/A   DME Ordered? No   Potential Assistance Purchasing Medications No   Type of Home Care Services None   History of falls? 0   Services At/After Discharge   Services At/After Discharge None   [employer] Information Provided? No   Mode of Transport at Discharge Other (see comment)  (family to transport)   Confirm Follow Up Transport Self   Condition of Participation: Discharge Planning   The Plan for Transition of Care is related to the following treatment goals: home with family support   Patient lives with sister and a level-entry apartment. She plans to return to her prior setting at discharge. Patient denies any discharge needs at this time. Will continue to monitor.     Electronically signed by León Leiva RN on 9/8/2022 at 4:11 PM

## 2022-09-08 NOTE — PROGRESS NOTES
Comprehensive Nutrition Assessment    Type and Reason for Visit:  Initial, Positive Nutrition Screen    Nutrition Recommendations/Plan:   Continue to Monitor while inpatient  Continue 4 carb choice diet     Malnutrition Assessment:  Malnutrition Status: At risk for malnutrition (Comment) (09/08/22 2233)    Context:  Acute Illness     Findings of the 6 clinical characteristics of malnutrition:  Energy Intake:  Mild decrease in energy intake (Comment)  Weight Loss:  No significant weight loss     Body Fat Loss:  No significant body fat loss     Muscle Mass Loss:  No significant muscle mass loss    Fluid Accumulation:  No significant fluid accumulation     Strength:  Not Performed    Nutrition Assessment:    Positive Nutrition Screen MST +2. Pt admitted to hospital for fatigue and vomiting. Pt has hx of DM, SHAY, HTN, Gastroparesis, and Pancreatitis. Pt weight steady over the past year per EMR. Pt states she ate today and had no tolerance issues. Pt denied ONS because she does not tolerate them well. Will continue to monitor while inpatient and continue current 4 carb choice diet. Nutrition Related Findings:    Reviewed Labs. Elevated Glucose - 249, A1 - 9.7%. Wound Type: None       Current Nutrition Intake & Therapies:    Average Meal Intake: 26-50%, 51-75%  Average Supplements Intake: None Ordered  ADULT DIET; Regular; 4 carb choices (60 gm/meal)    Anthropometric Measures:  Height: 5' 7\" (170.2 cm)  Ideal Body Weight (IBW): 135 lbs (61 kg)    Admission Body Weight: 219 lb 12.8 oz (99.7 kg)  Current Body Weight: 222 lb 0.1 oz (100.7 kg), 164.4 % IBW. Weight Source: Standing Scale  Current BMI (kg/m2): 34.8        Weight Adjustment For: No Adjustment                 BMI Categories: Obese Class 1 (BMI 30.0-34. 9)    Estimated Daily Nutrient Needs:  Energy Requirements Based On: Kcal/kg  Weight Used for Energy Requirements: Current  Energy (kcal/day): 1511 kcals - 1813 kcals (15-18kcals/kg)  Weight Used for Protein Requirements: Ideal  Protein (g/day): 74 g - 123 g (1.2-2g/kg IBW)  Method Used for Fluid Requirements: 1 ml/kcal  Fluid (ml/day): 1511 ml - 1813 ml    Nutrition Diagnosis:   Inadequate oral intake (At Risk for Malnutrition) related to altered GI function as evidenced by vomiting    Nutrition Interventions:   Food and/or Nutrient Delivery: Continue Current Diet  Nutrition Education/Counseling: No recommendation at this time  Coordination of Nutrition Care: Continue to monitor while inpatient       Goals:     Goals: Meet at least 75% of estimated needs       Nutrition Monitoring and Evaluation:   Behavioral-Environmental Outcomes: None Identified  Food/Nutrient Intake Outcomes: Food and Nutrient Intake  Physical Signs/Symptoms Outcomes: Biochemical Data, Weight, Nutrition Focused Physical Findings    Discharge Planning:     Too soon to determine     [address]: [phone]

## 2022-09-08 NOTE — ED NOTES
Report called to [address], RN on 5N. Denies further questions.      Katina Cortes RN  09/08/22 9681

## 2022-09-08 NOTE — CONSULTS
[facility] End[facility]  Consultation Note    9/8/2022 9:07 AM    Chief Complaint: Gastroparesis     Reason for Consultation: Renal Infarct    History of Present Illness:  Patient is a 65 y.o. female who presented to the ED on 9/7/2022 with complaints of Fatigue and Vomiting. She has a significant PMH of SHAY, DM II, Gastroparesis, HLD, HTN and Pancreatitis. The patient tells us she has Gastroparesis and she believes this is what is causing her nausea and vomiting symptoms. The patient denies any hematuria and denies any flank pain at present - she does endorse occasional chronic back pain. She tells us she has a history of blood clotting disorders on her father's side and reports she has seen a specialist at [facility]. 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 In the ED, a CT Abdomen Pelvis was performed which revealed a questionable Left Renal Infarct. We have been consulted to evaluate the patient for a Renal Infarct. At present, she is seen sitting up in bed, she is alert and oriented, she does not appear to be in distress and she appears to be in stable condition. Review of Systems  Review of Systems   Constitutional: Negative. HENT: Negative. Eyes: Negative. Respiratory: Negative. Cardiovascular: Negative. Gastrointestinal: Negative. Genitourinary:  Negative for hematuria. Musculoskeletal:  Positive for back pain. Chronic Back Pain, denies Left Flank Pain   Skin:  Negative for color change. Neurological: Negative. Psychiatric/Behavioral: Negative.         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    Gastroparesis     Hirsutism     History of blood transfusion     Hyperlipidemia LDL goal < 70     Hypertension     Major depressive disorder with single episode 2009    Pancreatitis     Seasonal allergic rhinitis due to pollen 11/10/2016    Sleep apnea     Thyroid disease     Vitamin D deficiency        Past Surgical History:   Procedure Laterality Date     SECTION      ENDOMETRIAL ABLATION  5/3/12    ENDOSCOPY, COLON, DIAGNOSTIC      HYSTEROSCOPY  5/3/12    MANDIBLE SURGERY      SKIN BIOPSY      TUBAL LIGATION      UPPER GASTROINTESTINAL ENDOSCOPY N/A 2021    EGD BIOPSY performed by Han Zhou MD at [place] [address]   Allergen Reactions    Avelox [Moxifloxacin] Other (See Comments)     Weakness, tingling, tingling mouth    Bactrim      Remote h/o diarrhea, swollen eyes, and weakness, tachycardia and tongue swelling    Cefuroxime      Pt does not recall what happened with this    Codeine     Medrol [Methylprednisolone] Swelling     Ok to take nasal steroids    Morphine     Niacin Other (See Comments)    Oat      Throat swells    Percocet [Oxycodone-Acetaminophen]     Reglan [Metoclopramide] Other (See Comments)     States has something to do with a mass unknown reaction     Spironolactone      Increased potassium    Statins      Muscle cramps    Sulfamethoxazole-Trimethoprim Swelling    Vicodin [Hydrocodone-Acetaminophen]        Social History     Socioeconomic History    Marital status:       Spouse name: Not on file    Number of children: 3    Years of education: Not on file    Highest education level: Not on file   Occupational History    Occupation:    Tobacco Use    Smoking status: Former     Types: Cigarettes     Quit date: 1990     Years since quittin.6    Smokeless tobacco: Never   Vaping Use    Vaping Use: Never used   Substance and Sexual Activity    Alcohol use:  Yes     Alcohol/week: 0.0 standard drinks     Comment: less than once a month    Drug use: Yes     Frequency: 2.0 times per week     Types: Marijuana (Weed)     Comment: used yesterday    Sexual activity: Not Currently     Partners: Male   Other Topics Concern    Not on file   Social History Narrative    Not on file     Social Determinants of Health     Financial Resource Strain: Not on file   Food Insecurity: Not on file   Transportation Needs: Not on file   Physical Activity: Not on file   Stress: Not on file   Social Connections: Not on file   Intimate Partner Violence: Not on file   Housing Stability: Not on file       Family History   Problem Relation Age of Onset    Hypertension Mother     Breast Cancer Mother     Colon Cancer Mother     Cancer Father         Pancreatic    Prostate Cancer Father     No Known Problems Brother     No Known Problems Sister     Hypertension Maternal Aunt     Cancer Maternal Uncle     Hypertension Maternal Grandmother     Stroke Maternal Grandmother     Cancer Maternal Grandfather     No Known Problems Paternal Aunt     No Known Problems Paternal Uncle     No Known Problems Paternal Grandmother     No Known Problems Paternal Grandfather     No Known Problems Other     Rheum Arthritis Neg Hx     Osteoarthritis Neg Hx     Asthma Neg Hx     Diabetes Neg Hx     Heart Failure Neg Hx     High Cholesterol Neg Hx     Migraines Neg Hx     Ovarian Cancer Neg Hx     Rashes/Skin Problems Neg Hx     Seizures Neg Hx     Thyroid Disease Neg Hx        Vital Signs  Vitals:    09/07/22 2355 09/08/22 0035 09/08/22 0157 09/08/22 0403   BP:  (!) 180/83 (!) 185/85 (!) 181/86   Pulse:  97 (!) 106 89   Resp:  20 16 17   Temp: 98.4 °F (36.9 °C)  99 °F (37.2 °C) 99.5 °F (37.5 °C)   TempSrc: Oral  Oral Oral   SpO2:  98% 98% 98%   Weight:   222 lb 0.1 oz (100.7 kg)    Height:           I/O:    Intake/Output Summary (Last 24 hours) at 9/8/2022 0349  Last data filed at 9/8/2022 0807  Gross per 24 hour   Intake 2902.53 ml   Output 0 ml   Net 2902.53 ml       Physical Exam:  General: no apparent distress, alert and oriented, afebrile  Psychiatric: mood and affect are within normal limits  Head/Eyes/Ears/Nose/Throat: normocephalic and atraumatic, face symmetric, normal hearing, nose - negative  Neck: normal appearance and no deformity, supple, trachea midline  Chest/Lungs: non labored breathing no tachypnea, retractions or cyanosis  Abdomen: soft, nondistended, and nontender, no flank pain, no Left Flank bruising noted  Extremities: normal strength, tone, and muscle mass, no deformities    Labs  Lab Results   Component Value Date/Time    WBC 15.8 09/08/2022 05:53 AM    HGB 13.4 09/08/2022 05:53 AM    HCT 40.3 09/08/2022 05:53 AM    MCV 77.1 09/08/2022 05:53 AM     09/08/2022 05:53 AM     Lab Results   Component Value Date/Time     09/08/2022 05:53 AM    K 4.0 09/08/2022 05:53 AM    K 3.5 09/07/2022 11:09 PM    CL 96 09/08/2022 05:53 AM    CO2 23 09/08/2022 05:53 AM    PHOS 3.6 09/08/2022 05:53 AM    BUN 9 09/08/2022 05:53 AM    CREATININE 0.8 09/08/2022 05:53 AM      No results found for: GLU    Scheduled Meds:    amLODIPine  5 mg Oral Daily    aspirin  81 mg Oral Daily    insulin glargine  10 Units SubCUTAneous Nightly    lisinopril  10 mg Oral Daily    pantoprazole (PROTONIX) 40 mg injection  40 mg IntraVENous Daily    sodium chloride flush  5-40 mL IntraVENous 2 times per day    enoxaparin  40 mg SubCUTAneous Daily    insulin lispro  0-4 Units SubCUTAneous TID WC    insulin lispro  0-4 Units SubCUTAneous Nightly     Continuous Infusions:    sodium chloride 100 mL/hr at 09/08/22 7410    sodium chloride      dextrose         Imaging:   CT Abdomen Pelvis with Contrast - 9/7/2022  Impression  No evidence for mechanical obstructive process or small bowel dilatation.  Small hiatal hernia     Asymmetric enhancement left kidney with somewhat heterogeneous enhancement or hypo-enhancement wedge-shaped area partially in the left inferior pole concerning for renal infarct versus striated nephrogram with correlation of urinary studies as there is questionable asymmetric left perinephric stranding without renal collecting system dilatation somewhat indeterminate. Hepatic steatosis     Assessment:  -Nausea, Vomiting and Abdominal Pain on Admission  -CT Abdomen with questionable Left Renal Infarct - images reviewed with Dr. Rahul Chen and agree area is questionable  -Denies Left Flank pain - endorses occasional chronic back pain  -History of Gastroparesis  -Prior workup at [facility] with no mention of hypercoagulable state - per pt report    Plan:   -No further intervention planned from Vascular Surgery at this time  -Doubt benefits of Anticoagulation would outweigh the risks given questionable enhancement seen on CT    -We will sign off for now, but please do not hesitate to contact us with any questions. Thank you for the consultation. All pertinent information and plan of care discussed with Dr. Elana Green. All questions and concerns were addressed with the patient. I have discussed the above stated plan with the patient and the nurse. The patient verbalized understanding and agreed with the plan. Thank you for allowing to us to participate in the care of Lorie Desouza      Electronically signed by SEVEN Hernandez CNP on 9/8/2022 at 9:07 AM    In conjunction with my advance practice clinician I independently performed a history and physical examination of Lorie Desouza. Based on my evaluation the following findings were present:    Per my exam the patient has a soft abdomen, no flank pain/tenderness or ecchymoses present  Discussed with her that her CT scan was not a good example of examining for acute embolic events.    However there is no discrete evidence of embolization and the radiologic interpretation is not even definitive that this is infarct in the left kidney  She endorses maybe a family history of sickle cell type anemia and maybe a family history of \"clots\" but this was ill defined  Would not formally suggest that anticoagulation is necessary in this case as there is not a definitive sign of embolization or source  If she experiences clot in the future this would of course mandate therapy but risks outweigh the benefits at this time  All questions answered at bedside    Greater than 30 minutes of my time of which 50% of that time was spent counseling the patient/family about the condition's pathology and proposed/planned treatment as well as reviewing radiologic imaging and other relevant patient data.     Jelani Porras DO, FSVS, [address] [facility] End[facility]

## 2022-09-08 NOTE — H&P
Hospital Medicine History & Physical      PCP: CEASAR Hull    Date of Admission: 9/7/2022    Date of Service: Pt seen/examined on 9/7/2022 and admitted to inpatient    Chief Complaint: Abdominal pain, nausea and vomiting      History Of Present Illness: The patient is a 65 y.o. female with past medical history as below who presents to [facility] with distant nausea and recurrent vomiting that has been briskly worsening all day without any provocation. Patient did not eat anything and ordinary no one around her has had similar symptoms. She notes that she has bilateral back pain with the abdominal pain and has had intermittent recurrent nausea and nonbloody vomiting. She is not felt any other recent symptoms although did feel as though she was having some dysuria recently. She denies any other recent associate symptoms such as fever, chills, dizziness, syncope, chest pain, shortness of breath, leg swelling, rashes, blood in urine/stool/sputum/vomitus.     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    Gastroparesis     Hirsutism     History of blood transfusion     Hyperlipidemia LDL goal < 70     Hypertension     Major depressive disorder with single episode 2/4/2009    Pancreatitis     Seasonal allergic rhinitis due to pollen 11/10/2016    Sleep apnea     Thyroid disease     Vitamin D deficiency        Past Surgical History:        Procedure Laterality Date  SECTION      ENDOMETRIAL ABLATION  5/3/12    ENDOSCOPY, COLON, DIAGNOSTIC      HYSTEROSCOPY  5/3/12    MANDIBLE SURGERY      SKIN BIOPSY      TUBAL LIGATION      UPPER GASTROINTESTINAL ENDOSCOPY N/A 2021    EGD BIOPSY performed by Shalom Huang MD at [facility]       Medications Prior to Admission:    Prior to Admission medications    Medication Sig Start Date End Date Taking?  Authorizing Provider   insulin glargine (LANTUS) 100 UNIT/ML injection vial Inject 22 Units into the skin nightly   Yes Historical Provider, MD   ketorolac (TORADOL) 10 MG tablet Take 1 tablet by mouth every 6 hours as needed for Pain 22   Mario Osborn PA-C   acetaminophen (TYLENOL) 500 MG tablet Take 2 tablets by mouth 3 times daily as needed for Pain 22   SEVEN Crenshaw CNP   scopolamine (TRANSDERM-SCOP, 1.5 MG,) transdermal patch Place 1 patch onto the skin every 72 hours as needed for Nausea or Vomiting 22   Laina Hernandez MD   dicyclomine (BENTYL) 10 MG capsule Take 1 capsule by mouth every 6 hours as needed (cramps) 3/16/22   SEVEN Crenshaw CNP   albuterol sulfate HFA (VENTOLIN HFA) 108 (90 Base) MCG/ACT inhaler Inhale 2 puffs into the lungs 4 times daily as needed for Wheezing or Shortness of Breath 3/16/22   SEVEN Crenshaw CNP   ondansetron (ZOFRAN ODT) 4 MG disintegrating tablet Take 1-2 tablets by mouth every 12 hours as needed for Nausea or Vomiting May Sub regular tablet (non-ODT) if insurance does not cover ODT. 3/16/22   SEVEN Crenshaw CNP   omeprazole (PRILOSEC) 40 MG delayed release capsule Take 40 mg by mouth daily    Historical Provider, MD   lisinopril (PRINIVIL;ZESTRIL) 10 MG tablet Take 10 mg by mouth daily    Historical Provider, MD   ezetimibe (ZETIA) 10 MG tablet Take 10 mg by mouth daily    Historical Provider, MD   amLODIPine (NORVASC) 5 MG tablet Take 5 mg by mouth daily    Historical Provider, MD   insulin lispro (HUMALOG) 100 UNIT/ML injection vial Inject into the skin 3 times daily (before meals) Per sliding scale. 2 units >150 then additional 2 units for every 50    Historical Provider, MD   aspirin 81 MG tablet Take 81 mg by mouth daily    Historical Provider, MD   Insulin Syringe-Needle U-100 (INSULIN SYRINGE .5CC/31GX5/16\") 31G X 5/16\" 0.5 ML MISC 1 each by Does not apply route daily 4/14/17   Maurice Frank MD   timolol (TIMOPTIC) 0.5 % ophthalmic solution Place 1 drop into both eyes daily  8/7/16   Historical Provider, MD   ergocalciferol (DRISDOL) 94430 UNITS capsule Take 1 capsule by mouth once a week 5/24/16   Dian Sierra MD   Insulin Pen Needle 31G X 5 MM MISC Pt uses three times a day 9/17/15   Maurice Frank MD   glucose blood VI test strips (EXACTECH TEST) strip 4 times a day As needed. 3/23/15   Maurice Frank MD   Lancets MISC 3-4 times a day 2/26/15   Maurice Frank MD   latanoprost (XALATAN) 0.005 % ophthalmic solution Place 1 drop into both eyes nightly. Historical Provider, MD       Allergies: Avelox [moxifloxacin], Bactrim, Cefuroxime, Codeine, Medrol [methylprednisolone], Morphine, Niacin, Oat, Percocet [oxycodone-acetaminophen], Reglan [metoclopramide], Spironolactone, Statins, Sulfamethoxazole-trimethoprim, and Vicodin [hydrocodone-acetaminophen]    Social History:  The patient currently lives home    TOBACCO:   reports that she quit smoking about 32 years ago. Her smoking use included cigarettes. She has never used smokeless tobacco.  ETOH:   reports current alcohol use. Family History:  Reviewed in detail and negative for DM, Early CAD, Cancer, CVA.  Positive as follows:        Problem Relation Age of Onset    Hypertension Mother     Breast Cancer Mother     Colon Cancer Mother     Cancer Father         Pancreatic    Prostate Cancer Father     No Known Problems Brother     No Known Problems Sister     Hypertension Maternal Aunt     Cancer Maternal Uncle     Hypertension Maternal Grandmother Stroke Maternal Grandmother     Cancer Maternal Grandfather     No Known Problems Paternal Aunt     No Known Problems Paternal Uncle     No Known Problems Paternal Grandmother     No Known Problems Paternal Grandfather     No Known Problems Other     Rheum Arthritis Neg Hx     Osteoarthritis Neg Hx     Asthma Neg Hx     Diabetes Neg Hx     Heart Failure Neg Hx     High Cholesterol Neg Hx     Migraines Neg Hx     Ovarian Cancer Neg Hx     Rashes/Skin Problems Neg Hx     Seizures Neg Hx     Thyroid Disease Neg Hx        REVIEW OF SYSTEMS:    and as noted in the HPI. All other systems reviewed and negative. PHYSICAL EXAM:    BP (!) 181/86   Pulse 89   Temp 99.5 °F (37.5 °C) (Oral)   Resp 17   Ht 5' 7\" (1.702 m)   Wt 222 lb 0.1 oz (100.7 kg)   SpO2 98%   BMI 34.77 kg/m²     General appearance: Fatigued appearing, no acute respiratory distress, alert and oriented at this time, still complaining of mild back and abdominal pain  HEENT Normal cephalic, atraumatic without obvious deformity. Pupils equal, round, and reactive to light. Extra ocular muscles intact. Dry mucous membranes, anicteric sclera  Neck: Supple, no JVD  Lungs: Breath sounds bilaterally  Heart: Regular rate and rhythm, no murmurs noted  Abdomen: Soft abdomen overall, nondistended, some mild tenderness to the right and left flank as well as to the lower quadrants of the abdomen  Extremities: No edema  Skin: No rashes  Neurologic: Grossly intact neurologically  Mental status: Alert, oriented, thought content appropriate. Capillary Refill: Acceptable  < 3 seconds  Peripheral Pulses: +3 Easily felt, not easily obliterated with pressure      CT abdomen pelvis IV contrast:  No evidence for mechanical obstructive process or small bowel dilatation.    Small hiatal hernia       Asymmetric enhancement left kidney with somewhat heterogeneous enhancement or   hypoenhancement wedge-shaped area partially in the left inferior pole   concerning for renal infarct versus striated nephrogram with correlation of   urinary studies as there is questionable asymmetric left perinephric   stranding without renal collecting system dilatation somewhat indeterminate.        Hepatic steatosis   Chest x-ray: No acute process    CBC   Recent Labs     09/07/22 1931 09/08/22  0553   WBC 20.5* 15.8*   HGB 15.5 13.4   HCT 47.3 40.3    277      RENAL  Recent Labs     09/07/22 1931 09/07/22 2309 09/08/22  0553   * 137 135*   K 4.1 3.5 4.0   CL 93* 101 96*   CO2 17* 20* 23   PHOS  --   --  3.6   BUN 7 6* 9   CREATININE 0.7 0.6 0.8     LFT'S  Recent Labs     09/07/22 1931   AST 18   ALT 15   BILITOT 0.5   ALKPHOS 180*     COAG  Recent Labs     09/07/22 2309   INR 1.08     CARDIAC ENZYMES  Recent Labs     09/07/22 1931 09/07/22  2309   CKTOTAL  --  292*   TROPONINI <0.01  --        U/A:    Lab Results   Component Value Date/Time    COLORU Yellow 09/07/2022 10:09 PM    WBCUA 1 09/07/2022 10:09 PM    RBCUA 1 09/07/2022 10:09 PM    BACTERIA None Seen 09/07/2022 10:09 PM    CLARITYU Clear 09/07/2022 10:09 PM    SPECGRAV 1.025 09/07/2022 10:09 PM    LEUKOCYTESUR Negative 09/07/2022 10:09 PM    BLOODU MODERATE 09/07/2022 10:09 PM    GLUCOSEU 250 09/07/2022 10:09 PM    GLUCOSEU NEGATIVE 04/09/2012 09:00 PM       ABG  No results found for: [id number], BEART, N5ODPNXX, PHART, THGBART, ZRJ6LMQ, PO2ART, Cam[address] Problems    Diagnosis Date Noted    Sepsis ([facility] [address] [A41.9] 09/07/2022     Priority: Medium    Pyelonephritis [N12] 09/07/2022     Priority: Medium    Renal infarct ([facility] [address] [N28.0] 09/07/2022     Priority: Medium    Intractable nausea and vomiting [R11.2] 08/18/2021    Essential hypertension [I10] 08/18/2021         PHYSICIANS CERTIFICATION:    I certify that Balaji Owens is expected to be hospitalized for greater than 2 midnights based on the following assessment and plan:      ASSESSMENT/PLAN:  Sepsis  Pyelonephritis  Renal infarct  Intractable nausea and vomiting  Hypertension    Plan:  Uncertain as to the significance of the CT findings of possible renal infarct, some questionable thought to say that this is an infectious process although urine looks fairly clean  Start patient on Zosyn for suspected pyelonephritis with leukocytosis and intractable nausea and vomiting his symptoms  Vascular surgery was consulted, did not recommend heparinization of possible 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, vascular surgery to follow in the morning  Advance diet as tolerated  Every 8 renal function panel and magnesium, every 4 hours lactic acids x3, repeat CBC daily  Insulin sliding scale and Accu-Cheks  Restart other home medications  Hydralazine IV as needed for hypertension  Hydrating patient with 100/h of normal saline x20 hours    DVT Prophylaxis: Lovenox  Diet: ADULT DIET; Regular; 4 carb choices (60 gm/meal); Low Sodium (2 gm)  Code Status: Full Code  PT/OT Eval Status: Ambulatory    Dispo -pending clinical course       Cecil Gonzalez DO    Thank you CEASAR Hess for the opportunity to be involved in this patient's care. If you have any questions or concerns please feel free to contact me at [phone].

## 2022-09-08 NOTE — PROGRESS NOTES
Hospitalist Progress Note    Patient:  Kelli Grimes  Unit/Bed:U7Q-7613/5278-01   YOB: 1960       MRN: [id number] Acct: [de-identified]  PCP: CEASAR Riley    Date of Admission: 9/7/2022  --------------------------    Chief Complaint:     Nausea and vomiting    Hospital Course:     Kelli Grimes is a 65 y.o. female hospitalized on 9/7/2022   with nausea, vomiting which she attributed to diabetic gastroparesis, symptoms improved. She denied abdominal pain to me except when she has soreness from throwing up. No diarrhea or constipation. No hematemesis or hematochezia patient symptoms already improving, tolerating diet    Assessment/plan:     Nausea, vomiting. Likely secondary to cannabinoid hyperemesis syndrome versus diabetic gastroparesis    -Patient symptoms improved, advancing diet as tolerated    Incidental finding of asymmetrical enhancement of the left kidney? Wedge-shaped area concern for left inferior pole infarct.  -Patient denied flank pain, no urinary symptoms, UA without acute finding. No prior history of thromboembolism. She is sinus rhythm    -Appreciate vascular surgery input. No recommendation for anticoagulation. Outpatient follow-up.  -We will add echocardiogram for completion      Service, lactic acidosis, likely secondary to starvation  -No other clinical findings to support sepsis, currently improved. -Follow final  culture data    Adult marijuana use  -Patient may benefit from quitting      Essential hypertension  Continue Norvasc    Diabetes mellitus type 2, insulin-dependent  Continue current insulin, monitor blood sugar          Code Status: Full Code         DVT prophylaxis: Lovenox     Disposition: Expecting discharge in 1-2  days    I discussed my thought processes at length with patient/family and patient understood.  Question and concerns  Addressed      Discussed with RN      ----------------      Subjective:     Patient seen and examined  Overnight events noted  RN and ancillary staff note reviewed    Nausea, vomiting improved, denied abdominal pain, no diarrhea, hematemesis or hematochezia, no urinary symptoms. Denied flank pain. Diet: ADULT DIET; Regular; 4 carb choices (60 gm/meal)    OBJECTIVE     Exam:  BP (!) 103/55   Pulse 82   Temp 97.8 °F (36.6 °C) (Oral)   Resp 17   Ht 5' 7\" (1.702 m)   Wt 222 lb 0.1 oz (100.7 kg)   SpO2 97%   BMI 34.77 kg/m²            Gen: Not in distress. Alert. Obese  Head: Normocephalic. Atraumatic. Eyes: Conjunctivae/corneas clear. ENT: Oral mucosa moist  Neck: No JVD. No obvious thyromegaly. CVS: Nml S1S2, no murmur  , RRR  Pulmomary: Clear bilaterally. No crackles. No wheezes. Gastrointestinal: Soft, non tender, non distend, . Musculoskeletal: No edema. Warm  Neuro: No focal deficit. Moves extremity spontaneously. Psychiatry: Appropriate affect. Not agitated.         Medications:  Reviewed    Infusion Medications    sodium chloride 100 mL/hr at 09/08/22 1234    sodium chloride      dextrose       Scheduled Medications    amLODIPine  5 mg Oral Daily    aspirin  81 mg Oral Daily    insulin glargine  10 Units SubCUTAneous Nightly    lisinopril  10 mg Oral Daily    pantoprazole (PROTONIX) 40 mg injection  40 mg IntraVENous Daily    sodium chloride flush  5-40 mL IntraVENous 2 times per day    enoxaparin  40 mg SubCUTAneous Daily    insulin lispro  0-4 Units SubCUTAneous TID WC    insulin lispro  0-4 Units SubCUTAneous Nightly     PRN Meds: hydrALAZINE, albuterol sulfate HFA, sodium chloride flush, sodium chloride, acetaminophen **OR** acetaminophen, HYDROmorphone, ondansetron, glucose, dextrose bolus **OR** dextrose bolus, glucagon (rDNA), dextrose, prochlorperazine      Intake/Output Summary (Last 24 hours) at 9/8/2022 1311  Last data filed at 9/8/2022 5416  Gross per 24 hour   Intake 2902.53 ml   Output 0 ml   Net 2902.53 ml             Labs:   Recent Labs     09/07/22  1931 09/08/22  0553   WBC 20.5* 15.8* HGB 15.5 13.4   HCT 47.3 40.3    277     Recent Labs     09/07/22 1931 09/07/22 2309 09/08/22  0553   * 137 135*   K 4.1 3.5 4.0   CL 93* 101 96*   CO2 17* 20* 23   BUN 7 6* 9   CREATININE 0.7 0.6 0.8   CALCIUM 10.0 8.2* 8.9   PHOS  --   --  3.6     Recent Labs     09/07/22 1931   AST 18   ALT 15   BILITOT 0.5   ALKPHOS 180*     Recent Labs     09/07/22 2309   INR 1.08     Recent Labs     09/07/22 1931 09/07/22 2309   CKTOTAL  --  292*   TROPONINI <0.01  --        Urinalysis:      Lab Results   Component Value Date/Time    NITRU Negative 09/07/2022 10:09 PM    WBCUA 1 09/07/2022 10:09 PM    BACTERIA None Seen 09/07/2022 10:09 PM    RBCUA 1 09/07/2022 10:09 PM    BLOODU MODERATE 09/07/2022 10:09 PM    SPECGRAV 1.025 09/07/2022 10:09 PM    GLUCOSEU 250 09/07/2022 10:09 PM    GLUCOSEU NEGATIVE 04/09/2012 09:00 PM       Radiology:  CT ABDOMEN PELVIS W IV CONTRAST Additional Contrast? None   Final Result   No evidence for mechanical obstructive process or small bowel dilatation. Small hiatal hernia      Asymmetric enhancement left kidney with somewhat heterogeneous enhancement or   hypoenhancement wedge-shaped area partially in the left inferior pole   concerning for renal infarct versus striated nephrogram with correlation of   urinary studies as there is questionable asymmetric left perinephric   stranding without renal collecting system dilatation somewhat indeterminate.       Hepatic steatosis         XR CHEST (2 VW)   Final Result   No acute cardiopulmonary disease                     Electronically signed by Joey La MD on 9/8/2022 at 1:11 PM

## 2022-09-08 NOTE — ED PROVIDER NOTES
[address] [facility]        Pt Name: Lito Kidd  MRN: [id number]  [place] 1960  Date of evaluation: 8/22/2022  Provider: Silvia Pérez PA-C  PCP: CEASAR Espinoza  Note Started: 4:19 PM EDT      ALIZE. I have evaluated this patient. My supervising physician was available for consultation. Triage CHIEF COMPLAINT       Chief Complaint   Patient presents with    Dental Problem     Right facial swelling, pt is on 2nd round of abx         HISTORY OF PRESENT ILLNESS   (Location/Symptom, Timing/Onset, Context/Setting, Quality, Duration, Modifying Factors, Severity)  Note limiting factors. Chief Complaint: right facial pain, vomiting    Lito Kidd is a 65 y.o. female who presents the emergency department with complaint of right facial pain and swelling that has been present over the past week or so. Patient states that she was switched to Augmentin, however she has not been taking it because it is upsetting her stomach. States it has been making her nauseous and occasionally throw up every time after she takes it. She has not tried taking anything else to help alleviate her pain. She denies any fever, chills, difficulty swallowing, chest pain, shortness of breath, abdominal pain. Patient has chronic nausea, vomiting, likely due to marijuana use. She states that she feels little nauseous here, however she denies intractable nausea or vomiting. She has been taking her home medication and this has been helping. Nursing Notes were all reviewed and agreed with or any disagreements were addressed in the HPI. REVIEW OF SYSTEMS    (2-9 systems for level 4, 10 or more for level 5)     Review of Systems   Constitutional:  Negative for chills and fever. HENT:  Positive for dental problem and facial swelling. Negative for ear pain, rhinorrhea, sinus pressure, sore throat, trouble swallowing and voice change.     Eyes: the skin every 72 hours as needed for Nausea or Vomiting, Disp-10 patch, R-0Normal      dicyclomine (BENTYL) 10 MG capsule Take 1 capsule by mouth every 6 hours as needed (cramps), Disp-20 capsule, R-0Print      albuterol sulfate HFA (VENTOLIN HFA) 108 (90 Base) MCG/ACT inhaler Inhale 2 puffs into the lungs 4 times daily as needed for Wheezing or Shortness of Breath, Disp-18 g, R-0Print      ondansetron (ZOFRAN ODT) 4 MG disintegrating tablet Take 1-2 tablets by mouth every 12 hours as needed for Nausea or Vomiting May Sub regular tablet (non-ODT) if insurance does not cover ODT., Disp-30 tablet, R-0Print      omeprazole (PRILOSEC) 40 MG delayed release capsule Take 40 mg by mouth dailyHistorical Med      insulin glargine (BASAGLAR KWIKPEN) 100 UNIT/ML injection pen Inject 10 Units into the skin nightly, Disp-5 pen, R-0Normal      lisinopril (PRINIVIL;ZESTRIL) 10 MG tablet Take 10 mg by mouth dailyHistorical Med      Papaya Enzyme CHEW Take 3-4 tablets by mouth daily as needed (Indigestion)Historical Med      ezetimibe (ZETIA) 10 MG tablet Take 10 mg by mouth dailyHistorical Med      amLODIPine (NORVASC) 5 MG tablet Take 5 mg by mouth dailyHistorical Med      insulin lispro (HUMALOG) 100 UNIT/ML injection vial Inject into the skin 3 times daily (before meals) Per sliding scale.  2 units >150 then additional 2 units for every 50Historical Med      aspirin 81 MG tablet Take 81 mg by mouth dailyHistorical Med      Insulin Syringe-Needle U-100 (INSULIN SYRINGE .5CC/31GX5/16\") 31G X 5/16\" 0.5 ML MISC DAILY Starting 4/14/2017, Until Discontinued, Disp-100 Syringe, R-3, Normal      timolol (TIMOPTIC) 0.5 % ophthalmic solution Place 1 drop into both eyes daily       ergocalciferol (DRISDOL) 07290 UNITS capsule Take 1 capsule by mouth once a week, Disp-13 capsule, R-1      Insulin Pen Needle 31G X 5 MM MISC Disp-450 each, R-3, NormalPt uses three times a day      glucose blood VI test strips (EXACTECH TEST) strip Disp-400 each, R-3, Normal4 times a day As needed. Lancets MISC Disp-100 each, R-3, Normal3-4 times a day      latanoprost (XALATAN) 0.005 % ophthalmic solution Place 1 drop into both eyes nightly. ALLERGIES     Avelox [moxifloxacin], Bactrim, Cefuroxime, Codeine, Medrol [methylprednisolone], Morphine, Niacin, Oat, Percocet [oxycodone-acetaminophen], Reglan [metoclopramide], Spironolactone, Statins, Sulfamethoxazole-trimethoprim, and Vicodin [hydrocodone-acetaminophen]    FAMILYHISTORY       Family History   Problem Relation Age of Onset    Hypertension Mother     Breast Cancer Mother     Colon Cancer Mother     Cancer Father         Pancreatic    Prostate Cancer Father     No Known Problems Brother     No Known Problems Sister     Hypertension Maternal Aunt     Cancer Maternal Uncle     Hypertension Maternal Grandmother     Stroke Maternal Grandmother     Cancer Maternal Grandfather     No Known Problems Paternal Aunt     No Known Problems Paternal Uncle     No Known Problems Paternal Grandmother     No Known Problems Paternal Grandfather     No Known Problems Other     Rheum Arthritis Neg Hx     Osteoarthritis Neg Hx     Asthma Neg Hx     Diabetes Neg Hx     Heart Failure Neg Hx     High Cholesterol Neg Hx     Migraines Neg Hx     Ovarian Cancer Neg Hx     Rashes/Skin Problems Neg Hx     Seizures Neg Hx     Thyroid Disease Neg Hx         SOCIAL HISTORY       Social History     Socioeconomic History    Marital status:     Number of children: 3   Occupational History    Occupation:    Tobacco Use    Smoking status: Former     Types: Cigarettes     Quit date: 1990     Years since quittin.6    Smokeless tobacco: Never   Vaping Use    Vaping Use: Never used   Substance and Sexual Activity    Alcohol use:  Yes     Alcohol/week: 0.0 standard drinks     Comment: less than once a month    Drug use: Yes     Frequency: 2.0 times per week     Types: Marijuana Claudio Hay     Comment: used yesterday    Sexual activity: Not Currently     Partners: Male       SCREENINGS    Caleb Coma Scale  Eye Opening: Spontaneous  Best Verbal Response: Oriented  Best Motor Response: Obeys commands  Caleb Coma Scale Score: 15        PHYSICAL EXAM    (up to 7 for level 4, 8 or more for level 5)     ED Triage Vitals [08/22/22 2155]   BP Temp Temp Source Heart Rate Resp SpO2 Height Weight   116/72 98 °F (36.7 °C) Temporal 89 18 97 % -- --       Physical Exam  Constitutional:       General: She is not in acute distress. Appearance: Normal appearance. She is not ill-appearing, toxic-appearing or diaphoretic. HENT:      Head: Normocephalic and atraumatic. Right Ear: Tympanic membrane and external ear normal.      Left Ear: Tympanic membrane and external ear normal.      Nose: Nose normal. No congestion. Mouth/Throat:      Mouth: Mucous membranes are moist.      Pharynx: Oropharynx is clear. No oropharyngeal exudate. Comments: Uvula is midline  No obvious abscess, erythema, edema of the right lower upper teeth or cheek  No lymphadenopathy of patient's head, neck  No discomfort to parotid gland      Eyes:      General:         Right eye: No discharge. Left eye: No discharge. Extraocular Movements: Extraocular movements intact. Pupils: Pupils are equal, round, and reactive to light. Cardiovascular:      Rate and Rhythm: Normal rate and regular rhythm. Pulses: Normal pulses. Heart sounds: Normal heart sounds. No murmur heard. No gallop. Pulmonary:      Effort: Pulmonary effort is normal. No respiratory distress. Breath sounds: Normal breath sounds. No stridor. No wheezing, rhonchi or rales. Abdominal:      General: Bowel sounds are normal. There is no distension. Palpations: Abdomen is soft. Tenderness: There is no abdominal tenderness. There is no right CVA tenderness, left CVA tenderness or rebound.    Musculoskeletal:         General: Normal range of motion. Cervical back: Normal range of motion. Skin:     General: Skin is warm and dry. Neurological:      General: No focal deficit present. Mental Status: She is alert and oriented to person, place, and time. Psychiatric:         Mood and Affect: Mood normal.         Behavior: Behavior normal.       DIAGNOSTIC RESULTS   LABS:    Labs Reviewed   CBC WITH AUTO DIFFERENTIAL - Abnormal; Notable for the following components:       Result Value    WBC 11.6 (*)     MCV 77.6 (*)     All other components within normal limits   COMPREHENSIVE METABOLIC PANEL W/ REFLEX TO MG FOR LOW K - Abnormal; Notable for the following components:    Glucose 108 (*)     GFR Non- 56 (*)     ALT 8 (*)     AST 9 (*)     All other components within normal limits       When ordered, only abnormal lab results are displayed. All other labs were within normal range or not returned as of this dictation. EKG: When ordered, EKG's are interpreted by the Emergency Department Physician in the absence of a cardiologist.  Please see their note for interpretation of EKG. RADIOLOGY:   Non-plain film images such as CT, Ultrasound and MRI are read by the radiologist. Plain radiographic images are visualized andpreliminarily interpreted by the  ED Provider with the below findings:        Interpretation [facility] Radiologist below, if available at the time of this note:    No orders to display     XR CHEST (2 VW)    Result Date: 9/7/2022  EXAMINATION: TWO XRAY VIEWS OF THE CHEST 9/7/2022 3:06 pm COMPARISON: 08/06/2022 HISTORY: ORDERING SYSTEM PROVIDED HISTORY: vomiting TECHNOLOGIST PROVIDED HISTORY: Reason for exam:->vomiting Reason for Exam: Chest Pain FINDINGS: . The cardiac size is normal. No acute infiltrates or pleural effusions are seen. Pulmonary vascularity appears normal. There is mild ectasia of the thoracic aorta. . No acute bony abnormalities.  The hilar structures are normal.     No acute cardiopulmonary disease CT ABDOMEN PELVIS W IV CONTRAST Additional Contrast? None    Result Date: 9/7/2022  EXAMINATION: CT OF THE ABDOMEN AND PELVIS WITH CONTRAST 9/7/2022 8:27 pm TECHNIQUE: CT of the abdomen and pelvis was performed with the administration of intravenous contrast. Multiplanar reformatted images are provided for review. Automated exposure control, iterative reconstruction, and/or weight based adjustment of the mA/kV was utilized to reduce the radiation dose to as low as reasonably achievable. COMPARISON: CT abdomen and pelvis dated 08/06/2022 HISTORY: ORDERING SYSTEM PROVIDED HISTORY: vomiting TECHNOLOGIST PROVIDED HISTORY: Reason for exam:->vomiting Additional Contrast?->None Decision Support Exception - unselect if not a suspected or confirmed emergency medical condition->Emergency Medical Condition (MA) Reason for Exam: Emesis; Fatigue FINDINGS: Lower Chest: Lung bases are clear. Organs: Liver without suspicious lesion demonstrating subcentimeter low-attenuation focus in the right hepatic lobe of hepatic cysts with background diffuse low attenuation of hepatic steatosis. Gallbladder unremarkable. Pancreas and spleen unremarkable. Adrenals without nodule. Kidneys without hydronephrosis demonstrating asymmetry of nephrographic enhancement left renal parenchyma for example series 601, image 82 wedge-shaped area inferior pole left kidney concerning for renal infarct. Left kidney superior pole renal cortical cystic lesion. GI/Bowel: Small hiatal hernia. No focal thickening or disproportion dilatation of bowel. No inflammatory findings. Pelvis: No suspicious pelvic lesion or bulky pelvic adenopathy/free fluid. Peritoneum/Retroperitoneum: No bulky retroperitoneal adenopathy. No suspicious peritoneal or mesenteric process Vasculature: Grossly normal caliber of abdominal aorta and vasculature Bones/Soft Tissues: No acute osseous or soft tissue findings.      No evidence for mechanical obstructive process or small bowel dilatation. Small hiatal hernia Asymmetric enhancement left kidney with somewhat heterogeneous enhancement or hypoenhancement wedge-shaped area partially in the left inferior pole concerning for renal infarct versus striated nephrogram with correlation of urinary studies as there is questionable asymmetric left perinephric stranding without renal collecting system dilatation somewhat indeterminate. Hepatic steatosis         PROCEDURES   Unless otherwise noted below, none     Procedures    CRITICAL CARE TIME   N/A    CONSULTS:  None      EMERGENCY DEPARTMENT COURSE and DIFFERENTIAL DIAGNOSIS/MDM:   Vitals:    Vitals:    08/22/22 2155   BP: 116/72   Pulse: 89   Resp: 18   Temp: 98 °F (36.7 °C)   TempSrc: Temporal   SpO2: 97%       Patient was given thefollowing medications:  Medications   0.9 % sodium chloride bolus (0 mLs IntraVENous Stopped 8/23/22 0227)   ondansetron (ZOFRAN) injection 4 mg (4 mg IntraVENous Given 8/23/22 0126)   promethazine (PHENERGAN) 12.5mg in sodium chloride 0.9% 50 mL IVPB 12.5 mg (0 mg IntraVENous Stopped 8/23/22 0157)   ketorolac (TORADOL) injection 30 mg (30 mg IntraVENous Given 8/23/22 0209)         Is this patient to be included in the SEP-1 Core Measure due to severe sepsis or septic shock? No   Exclusion criteria - the patient is NOT to be included for SEP-1 Core Measure due to:  2+ SIRS criteria are not met    This is a 65y.o. year old female with PMH of dental infection, nausea, vomiting who presents to the ED with complaint of dental pain that has been present for the past couple of weeks or so. Patient has not been able to take her abx as it is bothering her stomach. Vitals upon arrival show within normal limits. Physical exam shows as above.    Blood work was performed:  -mild leukocytosis of 11.6, but does not meet sepsis criteria.   -imroved kidney function it seems  Urinalysis: not indicated  Differential diagnosis includes dental abscess, dental infection, dental caries, parotditis, tonsilar abscess. Medications given: zofran, phnergan toradol fluids  Symptoms much improved. Discussed with patient eating with her antibiotic or drinking lots of water to help her hold it down. We discussed following up with dentist for further evaluation. I did give her another list of dentist if she is not able to get into the 1 that she is going to see. She states that her nausea and vomiting are completely under control upon discharge and she does not need any refills on her medications for this. I did give her some medication for pain relief. Patient was agreeable to this plan. She would like to go home. Discussed return precautions. Discharged in stable condition. I am the Primary Clinician of Record. FINAL IMPRESSION      1. Intractable vomiting with nausea, unspecified vomiting type    2. Pain, dental          DISPOSITION/PLAN   DISPOSITION Decision To Discharge 08/23/2022 02:45:21 AM      PATIENT REFERREDTO:  Dental Clinics  A list of dental clinics was provided for you as well.         CEASAR Jacobson  [address] [place] [address]  [phone]    Go to   As needed    [facility] Emergency Department  [address] [place] [address]  [phone]  Go to   As needed, If symptoms worsen      DISCHARGE MEDICATIONS:  Discharge Medication List as of 8/23/2022  3:09 AM          DISCONTINUED MEDICATIONS:  Discharge Medication List as of 8/23/2022  3:09 AM        STOP taking these medications       ibuprofen (IBU) 800 MG tablet Comments:   Reason for Stopping:                      (Please note that portions ofthis note were completed with a voice recognition program.  Efforts were made to edit the dictations but occasionally words are mis-transcribed.)    Falguni Mcgee PA-C (electronically signed)             Falguni Mcgee PA-C  09/08/22 5509

## 2022-09-08 NOTE — CARE COORDINATION
Advance Care Planning     Advance Care Planning Activator (Inpatient)  Conversation Note      Date of ACP Conversation: 9/8/2022     Conversation Conducted with: Patient with Decision Making Capacity    ACP Activator: Chester Owens RN    Health Care Decision Maker:     Current Designated Health Care Decision Maker:     Primary Decision Maker: Ronald Lima - Brother/Sister - [phone]    Primary Decision Maker: Carmen Cote - Child - [phone]    Secondary Decision Maker: Ronnie Davis Child - [phone]  Care Preferences    Ventilation: \"If you were in your present state of health and suddenly became very ill and were unable to breathe on your own, what would your preference be about the use of a ventilator (breathing machine) if it were available to you? \"      Would the patient desire the use of ventilator (breathing machine)?: yes    \"If your health worsens and it becomes clear that your chance of recovery is unlikely, what would your preference be about the use of a ventilator (breathing machine) if it were available to you? \"     Would the patient desire the use of ventilator (breathing machine)?: No      Resuscitation  \"CPR works best to restart the heart when there is a sudden event, like a heart attack, in someone who is otherwise healthy. Unfortunately, CPR does not typically restart the heart for people who have serious health conditions or who are very sick. \"    \"In the event your heart stopped as a result of an underlying serious health condition, would you want attempts to be made to restart your heart (answer \"yes\" for attempt to resuscitate) or would you prefer a natural death (answer \"no\" for do not attempt to resuscitate)? \" yes       [] Yes   [] No   Educated Patient / Tim Naranjo regarding differences between Advance Directives and portable DNR orders.     Length of ACP Conversation in minutes:  5    Conversation Outcomes:  [x] ACP discussion completed  [] Existing advance directive reviewed with patient; no changes to patient's previously recorded wishes  [] New Advance Directive completed  [] Portable Do Not Rescitate prepared for Provider review and signature  [] POLST/POST/MOLST/MOST prepared for Provider review and signature      Follow-up plan:    [] Schedule follow-up conversation to continue planning  [] Referred individual to Provider for additional questions/concerns   [] Advised patient/agent/surrogate to review completed ACP document and update if needed with changes in condition, patient preferences or care setting    [x] This note routed to one or more involved healthcare providers    Electronically signed by Joao Carpenter RN on 9/8/2022 at 4:12 PM

## 2022-09-09 PROBLEM — A41.9 SEPSIS (HCC): Status: RESOLVED | Noted: 2022-09-07 | Resolved: 2022-09-09

## 2022-09-09 PROBLEM — N12 PYELONEPHRITIS: Status: RESOLVED | Noted: 2022-09-07 | Resolved: 2022-09-09

## 2022-09-09 LAB
ALBUMIN SERPL-MCNC: 4.2 G/DL (ref 3.4–5)
ALBUMIN SERPL-MCNC: 4.6 G/DL (ref 3.4–5)
ANION GAP SERPL CALCULATED.3IONS-SCNC: 13 MMOL/L (ref 3–16)
ANION GAP SERPL CALCULATED.3IONS-SCNC: 15 MMOL/L (ref 3–16)
BASOPHILS ABSOLUTE: 0.1 K/UL (ref 0–0.2)
BASOPHILS RELATIVE PERCENT: 0.8 %
BUN BLDV-MCNC: 12 MG/DL (ref 7–20)
BUN BLDV-MCNC: 16 MG/DL (ref 7–20)
CALCIUM SERPL-MCNC: 8.9 MG/DL (ref 8.3–10.6)
CALCIUM SERPL-MCNC: 9 MG/DL (ref 8.3–10.6)
CHLORIDE BLD-SCNC: 99 MMOL/L (ref 99–110)
CHLORIDE BLD-SCNC: 99 MMOL/L (ref 99–110)
CO2: 23 MMOL/L (ref 21–32)
CO2: 27 MMOL/L (ref 21–32)
CREAT SERPL-MCNC: 0.8 MG/DL (ref 0.6–1.2)
CREAT SERPL-MCNC: 0.9 MG/DL (ref 0.6–1.2)
EOSINOPHILS ABSOLUTE: 0.1 K/UL (ref 0–0.6)
EOSINOPHILS RELATIVE PERCENT: 0.5 %
GFR AFRICAN AMERICAN: >60
GFR AFRICAN AMERICAN: >60
GFR NON-AFRICAN AMERICAN: >60
GFR NON-AFRICAN AMERICAN: >60
GLUCOSE BLD-MCNC: 121 MG/DL (ref 70–99)
GLUCOSE BLD-MCNC: 146 MG/DL (ref 70–99)
GLUCOSE BLD-MCNC: 172 MG/DL (ref 70–99)
GLUCOSE BLD-MCNC: 234 MG/DL (ref 70–99)
GLUCOSE BLD-MCNC: 303 MG/DL (ref 70–99)
GLUCOSE BLD-MCNC: 317 MG/DL (ref 70–99)
HCT VFR BLD CALC: 42.7 % (ref 36–48)
HEMOGLOBIN: 13.9 G/DL (ref 12–16)
LV EF: 58 %
LVEF MODALITY: NORMAL
LYMPHOCYTES ABSOLUTE: 3.9 K/UL (ref 1–5.1)
LYMPHOCYTES RELATIVE PERCENT: 33.8 %
MAGNESIUM: 2.1 MG/DL (ref 1.8–2.4)
MAGNESIUM: 2.2 MG/DL (ref 1.8–2.4)
MCH RBC QN AUTO: 25.5 PG (ref 26–34)
MCHC RBC AUTO-ENTMCNC: 32.7 G/DL (ref 31–36)
MCV RBC AUTO: 78.1 FL (ref 80–100)
MONOCYTES ABSOLUTE: 0.8 K/UL (ref 0–1.3)
MONOCYTES RELATIVE PERCENT: 7.2 %
NEUTROPHILS ABSOLUTE: 6.7 K/UL (ref 1.7–7.7)
NEUTROPHILS RELATIVE PERCENT: 57.7 %
PDW BLD-RTO: 15 % (ref 12.4–15.4)
PERFORMED ON: ABNORMAL
PHOSPHORUS: 3.1 MG/DL (ref 2.5–4.9)
PHOSPHORUS: 3.1 MG/DL (ref 2.5–4.9)
PLATELET # BLD: 261 K/UL (ref 135–450)
PMV BLD AUTO: 7.5 FL (ref 5–10.5)
POTASSIUM SERPL-SCNC: 3.5 MMOL/L (ref 3.5–5.1)
POTASSIUM SERPL-SCNC: 4.2 MMOL/L (ref 3.5–5.1)
RBC # BLD: 5.46 M/UL (ref 4–5.2)
SODIUM BLD-SCNC: 137 MMOL/L (ref 136–145)
SODIUM BLD-SCNC: 139 MMOL/L (ref 136–145)
WBC # BLD: 11.7 K/UL (ref 4–11)

## 2022-09-09 PROCEDURE — 2060000000 HC ICU INTERMEDIATE R&B

## 2022-09-09 PROCEDURE — 6370000000 HC RX 637 (ALT 250 FOR IP): Performed by: STUDENT IN AN ORGANIZED HEALTH CARE EDUCATION/TRAINING PROGRAM

## 2022-09-09 PROCEDURE — C9113 INJ PANTOPRAZOLE SODIUM, VIA: HCPCS | Performed by: STUDENT IN AN ORGANIZED HEALTH CARE EDUCATION/TRAINING PROGRAM

## 2022-09-09 PROCEDURE — 6370000000 HC RX 637 (ALT 250 FOR IP): Performed by: HOSPITALIST

## 2022-09-09 PROCEDURE — 36415 COLL VENOUS BLD VENIPUNCTURE: CPT

## 2022-09-09 PROCEDURE — 6360000002 HC RX W HCPCS: Performed by: EMERGENCY MEDICINE

## 2022-09-09 PROCEDURE — 80069 RENAL FUNCTION PANEL: CPT

## 2022-09-09 PROCEDURE — 6360000002 HC RX W HCPCS: Performed by: STUDENT IN AN ORGANIZED HEALTH CARE EDUCATION/TRAINING PROGRAM

## 2022-09-09 PROCEDURE — 93306 TTE W/DOPPLER COMPLETE: CPT

## 2022-09-09 PROCEDURE — 83735 ASSAY OF MAGNESIUM: CPT

## 2022-09-09 PROCEDURE — 85025 COMPLETE CBC W/AUTO DIFF WBC: CPT

## 2022-09-09 PROCEDURE — 6360000002 HC RX W HCPCS: Performed by: HOSPITALIST

## 2022-09-09 PROCEDURE — 2580000003 HC RX 258: Performed by: STUDENT IN AN ORGANIZED HEALTH CARE EDUCATION/TRAINING PROGRAM

## 2022-09-09 RX ORDER — INSULIN GLARGINE 100 [IU]/ML
22 INJECTION, SOLUTION SUBCUTANEOUS NIGHTLY
Status: DISCONTINUED | OUTPATIENT
Start: 2022-09-09 | End: 2022-09-11 | Stop reason: HOSPADM

## 2022-09-09 RX ORDER — SODIUM PHOSPHATE, DIBASIC AND SODIUM PHOSPHATE, MONOBASIC 7; 19 G/133ML; G/133ML
1 ENEMA RECTAL DAILY PRN
Status: DISCONTINUED | OUTPATIENT
Start: 2022-09-09 | End: 2022-09-11 | Stop reason: HOSPADM

## 2022-09-09 RX ORDER — PROMETHAZINE HYDROCHLORIDE 25 MG/ML
12.5 INJECTION, SOLUTION INTRAMUSCULAR; INTRAVENOUS EVERY 6 HOURS PRN
Status: DISCONTINUED | OUTPATIENT
Start: 2022-09-09 | End: 2022-09-11 | Stop reason: HOSPADM

## 2022-09-09 RX ORDER — BISACODYL 10 MG
10 SUPPOSITORY, RECTAL RECTAL DAILY PRN
Status: DISCONTINUED | OUTPATIENT
Start: 2022-09-09 | End: 2022-09-11 | Stop reason: HOSPADM

## 2022-09-09 RX ORDER — POLYETHYLENE GLYCOL 3350 17 G/17G
17 POWDER, FOR SOLUTION ORAL DAILY
Status: DISCONTINUED | OUTPATIENT
Start: 2022-09-09 | End: 2022-09-11 | Stop reason: HOSPADM

## 2022-09-09 RX ADMIN — INSULIN GLARGINE 22 UNITS: 100 INJECTION, SOLUTION SUBCUTANEOUS at 20:37

## 2022-09-09 RX ADMIN — Medication 10 ML: at 12:26

## 2022-09-09 RX ADMIN — INSULIN LISPRO 3 UNITS: 100 INJECTION, SOLUTION INTRAVENOUS; SUBCUTANEOUS at 09:42

## 2022-09-09 RX ADMIN — Medication 10 ML: at 20:40

## 2022-09-09 RX ADMIN — BISACODYL 5 MG: 5 TABLET, COATED ORAL at 10:15

## 2022-09-09 RX ADMIN — PROMETHAZINE HYDROCHLORIDE 12.5 MG: 25 INJECTION INTRAMUSCULAR; INTRAVENOUS at 16:54

## 2022-09-09 RX ADMIN — INSULIN LISPRO 3 UNITS: 100 INJECTION, SOLUTION INTRAVENOUS; SUBCUTANEOUS at 13:58

## 2022-09-09 RX ADMIN — Medication 40 MG: at 09:39

## 2022-09-09 RX ADMIN — PROCHLORPERAZINE EDISYLATE 10 MG: 5 INJECTION INTRAMUSCULAR; INTRAVENOUS at 05:42

## 2022-09-09 RX ADMIN — ENOXAPARIN SODIUM 40 MG: 100 INJECTION SUBCUTANEOUS at 09:40

## 2022-09-09 RX ADMIN — ONDANSETRON 4 MG: 2 INJECTION INTRAMUSCULAR; INTRAVENOUS at 09:57

## 2022-09-09 RX ADMIN — BISACODYL 10 MG: 10 SUPPOSITORY RECTAL at 12:25

## 2022-09-09 RX ADMIN — INSULIN LISPRO 1 UNITS: 100 INJECTION, SOLUTION INTRAVENOUS; SUBCUTANEOUS at 18:29

## 2022-09-09 RX ADMIN — Medication 10 ML: at 09:58

## 2022-09-09 RX ADMIN — PROCHLORPERAZINE EDISYLATE 10 MG: 5 INJECTION INTRAMUSCULAR; INTRAVENOUS at 12:26

## 2022-09-09 ASSESSMENT — PAIN SCALES - GENERAL
PAINLEVEL_OUTOF10: 0
PAINLEVEL_OUTOF10: 0

## 2022-09-09 NOTE — ED PROVIDER NOTES
Attending Supervisory Note/Shared Visit   I have personally performed a face to face diagnostic evaluation on this patient. I have reviewed the mid-levels findings and agree. History and Exam by me shows well-appearing female no acute distress. Patient is a history of cannabis hyperemesis as well as gastroparesis in addition to diabetes. She reports persistent nausea and vomiting which got acutely worse over the last 24 hours. Reports symptoms are similar to previous episodes. She has not been able to tolerate her home medications. She reports trying to take antiemetics at home without improvement. Denies fevers chest pains or difficulties breathing. Denies headache or changes in vision. Patient noted to be tachycardic, hypothermic and hypertensive on arrival.     Time evaluation patient sitting upright appears to be in no acute distress. Cranial nerves II 12 are grossly intact. Strength and station intact in all extremities. Muscular auscultation. She has a regular rate and rhythm. Abdomen with mild diffuse tenderness. Patient answer questions appropriately. Patient recently seen by advanced practice provider. Laboratory work-up remarkable for BMP which demonstrated metabolic acidosis with anion gap. VBG demonstrated a respiratory acidosis, discrepancy between the status is unclear. Beta hydroxybutyrate slightly elevated. Blood glucose elevated. BMP was repeated and there was minimal anion gap without acidosis. Patient has a significant leukocytosis. Unclear if is related to infection or is reactive. Patient noted to have hypomagnesemia. Imaging studies demonstrated hypoattenuation of the kidney concerning for possible infarct. Vascular surgery consulted not recommend anticoagulation at this time. Urinalysis unremarkable. Patient remained persistently hypertensive, nausea did improve antiemetics. Patient did meet SIRS criteria but there is no clear evidence of infection.   patient started on broad-spectrum antibiotics per request of hospitalist medicine was admitted in stable condition. I agree with the ALIZE plan of care. Please ALIZE Note for full ED course and final disposition. Critical Care  There was a high probability of life-threatening clinical deterioration in the patient's condition requiring my urgent intervention. Total critical care time with the patient was 35 minutes excluding separately reportable procedures and time reported by the advanced practice provider. Critical care required due to patients hypertensive urgency, hyperglycemia, acid-base disturbance, and hypomagnesemia. Disposition:  1. Cannabinoid hyperemesis syndrome    2. Hyperglycemia    3.  Septicemia ([facility]          Awa Noguera MD  Attending Emergency Physician        Awa Noguera MD  09/09/22 8632

## 2022-09-09 NOTE — PROGRESS NOTES
Notified by phlebotomist that Lab has attempted twice to collect renal profile and magnesium level; patient declined.

## 2022-09-09 NOTE — PROGRESS NOTES
Hospitalist Progress Note    Patient:  Marie Benedict  Unit/Bed:J1O-8400/5278-01   YOB: 1960       MRN: [id number] Acct: [de-identified]  PCP: CEASAR aHmeed    Date of Admission: 9/7/2022  --------------------------    Chief Complaint:     Nausea and vomiting    Hospital Course:     Marei Benedict is a 65 y.o. female hospitalized on 9/7/2022   with nausea, vomiting which she attributed to diabetic gastroparesis, symptoms improved. She denied abdominal pain to me except when she has soreness from throwing up. No diarrhea or constipation. No hematemesis or hematochezia patient symptoms already improving, tolerating diet    Assessment/plan:     Nausea, vomiting. Likely secondary to cannabinoid hyperemesis syndrome versus diabetic gastroparesis    -Continue antiemetics, diet as tolerated  -May need to quit marijuana  -Bowel regimen    Incidental finding of asymmetrical enhancement of the left kidney? Wedge-shaped area concern for left inferior pole infarct.  -Patient denied flank pain, no urinary symptoms, UA without acute finding. No prior history of thromboembolism. She is sinus rhythm    -Appreciate vascular surgery input. No recommendation for anticoagulation. Outpatient follow-up. -Outpatient follow-up with hematology (seen at [facility] by hematology service for nonspecific leukocytosis, work-up for myeloproliferative disease was negative)  -Echo pending      Service, lactic acidosis, likely secondary to starvation  -No other clinical findings to support sepsis, currently improved.     -Improved, awaiting results    Adult marijuana use  -Patient may benefit from quitting      Essential hypertension  Continue Norvasc    Diabetes mellitus type 2, insulin-dependent  Hyperglycemia noted, increase Lantus to home dose          Code Status: Full Code         DVT prophylaxis: Lovenox     Disposition: Hopefully home over the weekend         ----------------      Subjective:     Patient seen and examined  Overnight events noted  RN and ancillary staff note reviewed  Report constipation today, did not meet eat much breakfast, no abdominal pain, fever or chills. Diet: ADULT DIET; Regular; 4 carb choices (60 gm/meal)    OBJECTIVE     Exam:  BP (!) 186/89   Pulse 82   Temp 98.5 °F (36.9 °C) (Oral)   Resp 20   Ht 5' 7\" (1.702 m)   Wt 221 lb 12.5 oz (100.6 kg)   SpO2 97%   BMI 34.74 kg/m²        Overall unchanged physical finding exam as documented below    Gen: Not in distress. Alert. Obese  Head: Normocephalic. Atraumatic. Eyes: Conjunctivae/corneas clear. ENT: Oral mucosa moist  Neck: No JVD. No obvious thyromegaly. CVS: Nml S1S2, no murmur  , RRR  Pulmomary: Clear bilaterally. No crackles. No wheezes. Gastrointestinal: Soft, non tender, non distend, . Musculoskeletal: No edema. Warm  Neuro: No focal deficit. Moves extremity spontaneously. Psychiatry: Appropriate affect. Not agitated.         Medications:  Reviewed    Infusion Medications    sodium chloride      dextrose       Scheduled Medications    polyethylene glycol  17 g Oral Daily    insulin glargine  22 Units SubCUTAneous Nightly    amLODIPine  5 mg Oral Daily    aspirin  81 mg Oral Daily    lisinopril  10 mg Oral Daily    pantoprazole (PROTONIX) 40 mg injection  40 mg IntraVENous Daily    sodium chloride flush  5-40 mL IntraVENous 2 times per day    enoxaparin  40 mg SubCUTAneous Daily    insulin lispro  0-4 Units SubCUTAneous TID WC    insulin lispro  0-4 Units SubCUTAneous Nightly     PRN Meds: bisacodyl, bisacodyl, promethazine, hydrALAZINE, albuterol sulfate HFA, sodium chloride flush, sodium chloride, acetaminophen **OR** acetaminophen, HYDROmorphone, ondansetron, glucose, dextrose bolus **OR** dextrose bolus, glucagon (rDNA), dextrose    No intake or output data in the 24 hours ending 09/09/22 1425            Labs:   Recent Labs     09/07/22  1931 09/08/22  0553 09/09/22  0511   WBC 20.5* 15.8* 11.7*   HGB 15.5 13.4 13.9   HCT 47.3 40.3 42.7    277 261       Recent Labs     09/08/22  1337 09/08/22  2211 09/09/22  0511    135* 137   K 3.6 3.9 4.2   CL 99 98* 99   CO2 23 25 23   BUN 12 15 16   CREATININE 1.0 1.2 0.8   CALCIUM 8.4 8.8 8.9   PHOS 3.4 3.5 3.1       Recent Labs     09/07/22  1931   AST 18   ALT 15   BILITOT 0.5   ALKPHOS 180*       Recent Labs     09/07/22  2309   INR 1.08       Recent Labs     09/07/22  1931 09/07/22  2309   CKTOTAL  --  292*   TROPONINI <0.01  --          Urinalysis:      Lab Results   Component Value Date/Time    NITRU Negative 09/07/2022 10:09 PM    WBCUA 1 09/07/2022 10:09 PM    BACTERIA None Seen 09/07/2022 10:09 PM    RBCUA 1 09/07/2022 10:09 PM    BLOODU MODERATE 09/07/2022 10:09 PM    SPECGRAV 1.025 09/07/2022 10:09 PM    GLUCOSEU 250 09/07/2022 10:09 PM    GLUCOSEU NEGATIVE 04/09/2012 09:00 PM       Radiology:  CT ABDOMEN PELVIS W IV CONTRAST Additional Contrast? None   Final Result   No evidence for mechanical obstructive process or small bowel dilatation. Small hiatal hernia      Asymmetric enhancement left kidney with somewhat heterogeneous enhancement or   hypoenhancement wedge-shaped area partially in the left inferior pole   concerning for renal infarct versus striated nephrogram with correlation of   urinary studies as there is questionable asymmetric left perinephric   stranding without renal collecting system dilatation somewhat indeterminate.       Hepatic steatosis         XR CHEST (2 VW)   Final Result   No acute cardiopulmonary disease                     Electronically signed by Danielle Cardenas MD on 9/9/2022 at 2:25 PM

## 2022-09-10 LAB
ALBUMIN SERPL-MCNC: 4.4 G/DL (ref 3.4–5)
ALBUMIN SERPL-MCNC: 4.4 G/DL (ref 3.4–5)
ANION GAP SERPL CALCULATED.3IONS-SCNC: 16 MMOL/L (ref 3–16)
ANION GAP SERPL CALCULATED.3IONS-SCNC: 17 MMOL/L (ref 3–16)
BASOPHILS ABSOLUTE: 0.1 K/UL (ref 0–0.2)
BASOPHILS RELATIVE PERCENT: 0.7 %
BUN BLDV-MCNC: 12 MG/DL (ref 7–20)
BUN BLDV-MCNC: 13 MG/DL (ref 7–20)
CALCIUM SERPL-MCNC: 9.2 MG/DL (ref 8.3–10.6)
CALCIUM SERPL-MCNC: 9.3 MG/DL (ref 8.3–10.6)
CHLORIDE BLD-SCNC: 96 MMOL/L (ref 99–110)
CHLORIDE BLD-SCNC: 96 MMOL/L (ref 99–110)
CO2: 23 MMOL/L (ref 21–32)
CO2: 23 MMOL/L (ref 21–32)
CREAT SERPL-MCNC: 0.9 MG/DL (ref 0.6–1.2)
CREAT SERPL-MCNC: 1 MG/DL (ref 0.6–1.2)
EOSINOPHILS ABSOLUTE: 0.1 K/UL (ref 0–0.6)
EOSINOPHILS RELATIVE PERCENT: 0.4 %
GFR AFRICAN AMERICAN: >60
GFR AFRICAN AMERICAN: >60
GFR NON-AFRICAN AMERICAN: 56
GFR NON-AFRICAN AMERICAN: >60
GLUCOSE BLD-MCNC: 139 MG/DL (ref 70–99)
GLUCOSE BLD-MCNC: 176 MG/DL (ref 70–99)
GLUCOSE BLD-MCNC: 187 MG/DL (ref 70–99)
GLUCOSE BLD-MCNC: 229 MG/DL (ref 70–99)
GLUCOSE BLD-MCNC: 250 MG/DL (ref 70–99)
GLUCOSE BLD-MCNC: 259 MG/DL (ref 70–99)
HCT VFR BLD CALC: 41.7 % (ref 36–48)
HEMOGLOBIN: 13.8 G/DL (ref 12–16)
LYMPHOCYTES ABSOLUTE: 4 K/UL (ref 1–5.1)
LYMPHOCYTES RELATIVE PERCENT: 32.9 %
MAGNESIUM: 1.9 MG/DL (ref 1.8–2.4)
MAGNESIUM: 2 MG/DL (ref 1.8–2.4)
MCH RBC QN AUTO: 25.8 PG (ref 26–34)
MCHC RBC AUTO-ENTMCNC: 32.9 G/DL (ref 31–36)
MCV RBC AUTO: 78.3 FL (ref 80–100)
MONOCYTES ABSOLUTE: 0.9 K/UL (ref 0–1.3)
MONOCYTES RELATIVE PERCENT: 7.4 %
NEUTROPHILS ABSOLUTE: 7 K/UL (ref 1.7–7.7)
NEUTROPHILS RELATIVE PERCENT: 58.6 %
PDW BLD-RTO: 14.7 % (ref 12.4–15.4)
PERFORMED ON: ABNORMAL
PHOSPHORUS: 3.2 MG/DL (ref 2.5–4.9)
PHOSPHORUS: 3.5 MG/DL (ref 2.5–4.9)
PLATELET # BLD: 267 K/UL (ref 135–450)
PMV BLD AUTO: 7.8 FL (ref 5–10.5)
POTASSIUM SERPL-SCNC: 3.8 MMOL/L (ref 3.5–5.1)
POTASSIUM SERPL-SCNC: 3.8 MMOL/L (ref 3.5–5.1)
RBC # BLD: 5.33 M/UL (ref 4–5.2)
SODIUM BLD-SCNC: 135 MMOL/L (ref 136–145)
SODIUM BLD-SCNC: 136 MMOL/L (ref 136–145)
WBC # BLD: 12 K/UL (ref 4–11)

## 2022-09-10 PROCEDURE — 6370000000 HC RX 637 (ALT 250 FOR IP): Performed by: STUDENT IN AN ORGANIZED HEALTH CARE EDUCATION/TRAINING PROGRAM

## 2022-09-10 PROCEDURE — 80069 RENAL FUNCTION PANEL: CPT

## 2022-09-10 PROCEDURE — 6370000000 HC RX 637 (ALT 250 FOR IP): Performed by: HOSPITALIST

## 2022-09-10 PROCEDURE — 83735 ASSAY OF MAGNESIUM: CPT

## 2022-09-10 PROCEDURE — 94760 N-INVAS EAR/PLS OXIMETRY 1: CPT

## 2022-09-10 PROCEDURE — 36415 COLL VENOUS BLD VENIPUNCTURE: CPT

## 2022-09-10 PROCEDURE — 2580000003 HC RX 258: Performed by: STUDENT IN AN ORGANIZED HEALTH CARE EDUCATION/TRAINING PROGRAM

## 2022-09-10 PROCEDURE — C9113 INJ PANTOPRAZOLE SODIUM, VIA: HCPCS | Performed by: STUDENT IN AN ORGANIZED HEALTH CARE EDUCATION/TRAINING PROGRAM

## 2022-09-10 PROCEDURE — 6360000002 HC RX W HCPCS: Performed by: STUDENT IN AN ORGANIZED HEALTH CARE EDUCATION/TRAINING PROGRAM

## 2022-09-10 PROCEDURE — 85025 COMPLETE CBC W/AUTO DIFF WBC: CPT

## 2022-09-10 PROCEDURE — 2060000000 HC ICU INTERMEDIATE R&B

## 2022-09-10 RX ORDER — LACTULOSE 10 G/15ML
20 SOLUTION ORAL 3 TIMES DAILY
Status: DISCONTINUED | OUTPATIENT
Start: 2022-09-10 | End: 2022-09-11 | Stop reason: HOSPADM

## 2022-09-10 RX ADMIN — POLYETHYLENE GLYCOL 3350 17 G: 17 POWDER, FOR SOLUTION ORAL at 09:14

## 2022-09-10 RX ADMIN — Medication 40 MG: at 09:14

## 2022-09-10 RX ADMIN — Medication 10 ML: at 09:15

## 2022-09-10 RX ADMIN — INSULIN GLARGINE 22 UNITS: 100 INJECTION, SOLUTION SUBCUTANEOUS at 20:24

## 2022-09-10 RX ADMIN — ENOXAPARIN SODIUM 40 MG: 100 INJECTION SUBCUTANEOUS at 09:15

## 2022-09-10 RX ADMIN — ASPIRIN 81 MG 81 MG: 81 TABLET ORAL at 09:15

## 2022-09-10 RX ADMIN — LACTULOSE 20 G: 20 SOLUTION ORAL at 14:29

## 2022-09-10 RX ADMIN — AMLODIPINE BESYLATE 5 MG: 5 TABLET ORAL at 09:14

## 2022-09-10 RX ADMIN — LISINOPRIL 10 MG: 10 TABLET ORAL at 09:15

## 2022-09-10 RX ADMIN — Medication 10 ML: at 20:29

## 2022-09-10 RX ADMIN — BISACODYL 5 MG: 5 TABLET, COATED ORAL at 11:04

## 2022-09-10 ASSESSMENT — PAIN SCALES - GENERAL
PAINLEVEL_OUTOF10: 0
PAINLEVEL_OUTOF10: 4
PAINLEVEL_OUTOF10: 0

## 2022-09-10 ASSESSMENT — PAIN DESCRIPTION - LOCATION: LOCATION: VAGINA

## 2022-09-10 ASSESSMENT — PAIN DESCRIPTION - DESCRIPTORS: DESCRIPTORS: SORE

## 2022-09-10 NOTE — PLAN OF CARE
Problem: Pain  Goal: Verbalizes/displays adequate comfort level or baseline comfort level  9/10/2022 0050 by Edita Renee RN  Outcome: Progressing  Flowsheets (Taken 9/10/2022 0050)  Verbalizes/displays adequate comfort level or baseline comfort level:   Encourage patient to monitor pain and request assistance   Assess pain using appropriate pain scale     Problem: Skin/Tissue Integrity  Goal: Absence of new skin breakdown  Description: 1. Monitor for areas of redness and/or skin breakdown  2. Assess vascular access sites hourly  3. Every 4-6 hours minimum:  Change oxygen saturation probe site  4. Every 4-6 hours:  If on nasal continuous positive airway pressure, respiratory therapy assess nares and determine need for appliance change or resting period.   9/10/2022 0050 by Edita Renee RN  Outcome: Progressing     Problem: Safety - Adult  Goal: Free from fall injury  9/10/2022 0050 by Edita Renee RN  Outcome: Progressing     Problem: ABCDS Injury Assessment  Goal: Absence of physical injury  9/10/2022 0050 by Edita Renee RN  Outcome: Progressing  Flowsheets (Taken 9/10/2022 0050)  Absence of Physical Injury: Implement safety measures based on patient assessment

## 2022-09-10 NOTE — PROGRESS NOTES
Hospitalist Progress Note    Patient:  Nadeem Romeo  Unit/Bed:Z0Y-0815/5278-01   YOB: 1960       MRN: [id number] Acct: [de-identified]  PCP: CEASAR Damian    Date of Admission: 9/7/2022  --------------------------    Chief Complaint:     Nausea and vomiting    Hospital Course:     Nadeem Romeo is a 65 y.o. female hospitalized on 9/7/2022   with nausea, vomiting which she attributed to diabetic gastroparesis, symptoms improved. She denied abdominal pain to me except when she has soreness from throwing up. No diarrhea or constipation. No hematemesis or hematochezia patient symptoms already improving, tolerating diet    Assessment/plan:     Nausea, vomiting. Likely secondary to cannabinoid hyperemesis syndrome versus diabetic gastroparesis    Overall nausea improving, continue antiemetics as required    Incidental finding of asymmetrical enhancement of the left kidney? Wedge-shaped area concern for left inferior pole infarct.  -Patient denied flank pain, no urinary symptoms, UA without acute finding. No prior history of thromboembolism. She is sinus rhythm,    -Appreciate vascular surgery input. No recommendation for anticoagulation. Outpatient follow-up. -Outpatient follow-up with hematology (seen at [facility] by hematology service for nonspecific leukocytosis, work-up for myeloproliferative disease was negative)  -Echo no intracardiac thrombus or LV dilation. Service, lactic acidosis, likely secondary to starvation  -No other clinical findings to support sepsis, currently improved.          Adult marijuana use  -Patient may benefit from quitting      Essential hypertension  Continue Norvasc    Diabetes mellitus type 2, insulin-dependent  Hyperglycemia noted, increase Lantus to home dose          Code Status: Full Code         DVT prophylaxis: Lovenox     Disposition: Home tomorrow after improvement of constipation         ----------------      Subjective:     Patient seen and examined  Reported constipation today  Nausea abdominal discomfort improving, tolerating diet    Discussed echocardiogram finding    She is concerned about the CT finding on her kidney, discuss she may benefit from repeating CT scan in 6 months        Diet: ADULT DIET; Regular; 4 carb choices (60 gm/meal)    OBJECTIVE     Exam:  BP (!) 145/77   Pulse 73   Temp 98.4 °F (36.9 °C) (Oral)   Resp 20   Ht 5' 7\" (1.702 m)   Wt 219 lb 12.8 oz (99.7 kg)   SpO2 98%   BMI 34.43 kg/m²          Gen: Not in distress. Alert. Head: Normocephalic. Atraumatic. Eyes: Conjunctivae/corneas clear. ENT: Oral mucosa moist  Neck: No JVD. No obvious thyromegaly. CVS: Nml S1S2, no murmur  , RRR  Pulmomary: Clear bilaterally. No crackles. No wheezes. Gastrointestinal: Soft, non tender, non distend, . Musculoskeletal: No edema. Warm  Neuro: No focal deficit. Moves extremity spontaneously. Psychiatry: Appropriate affect. Not agitated.         Medications:  Reviewed    Infusion Medications    sodium chloride      dextrose       Scheduled Medications    lactulose  20 g Oral TID    polyethylene glycol  17 g Oral Daily    insulin glargine  22 Units SubCUTAneous Nightly    amLODIPine  5 mg Oral Daily    aspirin  81 mg Oral Daily    lisinopril  10 mg Oral Daily    pantoprazole (PROTONIX) 40 mg injection  40 mg IntraVENous Daily    sodium chloride flush  5-40 mL IntraVENous 2 times per day    enoxaparin  40 mg SubCUTAneous Daily    insulin lispro  0-4 Units SubCUTAneous TID WC    insulin lispro  0-4 Units SubCUTAneous Nightly     PRN Meds: bisacodyl, bisacodyl, promethazine, fleet, hydrALAZINE, albuterol sulfate HFA, sodium chloride flush, sodium chloride, acetaminophen **OR** acetaminophen, HYDROmorphone, ondansetron, glucose, dextrose bolus **OR** dextrose bolus, glucagon (rDNA), dextrose      Intake/Output Summary (Last 24 hours) at 9/10/2022 1418  Last data filed at 9/10/2022 1108  Gross per 24 hour   Intake 660 ml   Output -- Net 660 ml               Labs:   Recent Labs     09/08/22  0553 09/09/22  0511 09/10/22  0913   WBC 15.8* 11.7* 12.0*   HGB 13.4 13.9 13.8   HCT 40.3 42.7 41.7    261 267       Recent Labs     09/09/22  2230 09/10/22  0913 09/10/22  1322    136 135*   K 3.5 3.8 3.8   CL 99 96* 96*   CO2 27 23 23   BUN 12 12 13   CREATININE 0.9 0.9 1.0   CALCIUM 9.0 9.3 9.2   PHOS 3.1 3.5 3.2       Recent Labs     09/07/22  1931   AST 18   ALT 15   BILITOT 0.5   ALKPHOS 180*       Recent Labs     09/07/22  2309   INR 1.08       Recent Labs     09/07/22  1931 09/07/22  2309   CKTOTAL  --  292*   TROPONINI <0.01  --          Urinalysis:      Lab Results   Component Value Date/Time    NITRU Negative 09/07/2022 10:09 PM    WBCUA 1 09/07/2022 10:09 PM    BACTERIA None Seen 09/07/2022 10:09 PM    RBCUA 1 09/07/2022 10:09 PM    BLOODU MODERATE 09/07/2022 10:09 PM    SPECGRAV 1.025 09/07/2022 10:09 PM    GLUCOSEU 250 09/07/2022 10:09 PM    GLUCOSEU NEGATIVE 04/09/2012 09:00 PM       Radiology:  CT ABDOMEN PELVIS W IV CONTRAST Additional Contrast? None   Final Result   No evidence for mechanical obstructive process or small bowel dilatation. Small hiatal hernia      Asymmetric enhancement left kidney with somewhat heterogeneous enhancement or   hypoenhancement wedge-shaped area partially in the left inferior pole   concerning for renal infarct versus striated nephrogram with correlation of   urinary studies as there is questionable asymmetric left perinephric   stranding without renal collecting system dilatation somewhat indeterminate.       Hepatic steatosis         XR CHEST (2 VW)   Final Result   No acute cardiopulmonary disease                     Electronically signed by Esthela Cuevas MD on 9/10/2022 at 2:18 PM

## 2022-09-11 VITALS
DIASTOLIC BLOOD PRESSURE: 69 MMHG | HEART RATE: 72 BPM | SYSTOLIC BLOOD PRESSURE: 132 MMHG | HEIGHT: 67 IN | TEMPERATURE: 98.2 F | WEIGHT: 221.34 LBS | OXYGEN SATURATION: 100 % | BODY MASS INDEX: 34.74 KG/M2 | RESPIRATION RATE: 18 BRPM

## 2022-09-11 LAB
ALBUMIN SERPL-MCNC: 4.2 G/DL (ref 3.4–5)
ANION GAP SERPL CALCULATED.3IONS-SCNC: 13 MMOL/L (ref 3–16)
BUN BLDV-MCNC: 13 MG/DL (ref 7–20)
CALCIUM SERPL-MCNC: 9.3 MG/DL (ref 8.3–10.6)
CHLORIDE BLD-SCNC: 101 MMOL/L (ref 99–110)
CO2: 24 MMOL/L (ref 21–32)
CREAT SERPL-MCNC: 1 MG/DL (ref 0.6–1.2)
GFR AFRICAN AMERICAN: >60
GFR NON-AFRICAN AMERICAN: 56
GLUCOSE BLD-MCNC: 132 MG/DL (ref 70–99)
GLUCOSE BLD-MCNC: 144 MG/DL (ref 70–99)
MAGNESIUM: 1.9 MG/DL (ref 1.8–2.4)
PERFORMED ON: ABNORMAL
PHOSPHORUS: 4 MG/DL (ref 2.5–4.9)
POTASSIUM SERPL-SCNC: 3.8 MMOL/L (ref 3.5–5.1)
SODIUM BLD-SCNC: 138 MMOL/L (ref 136–145)

## 2022-09-11 PROCEDURE — 6370000000 HC RX 637 (ALT 250 FOR IP): Performed by: HOSPITALIST

## 2022-09-11 PROCEDURE — C9113 INJ PANTOPRAZOLE SODIUM, VIA: HCPCS | Performed by: STUDENT IN AN ORGANIZED HEALTH CARE EDUCATION/TRAINING PROGRAM

## 2022-09-11 PROCEDURE — 83735 ASSAY OF MAGNESIUM: CPT

## 2022-09-11 PROCEDURE — 2580000003 HC RX 258: Performed by: STUDENT IN AN ORGANIZED HEALTH CARE EDUCATION/TRAINING PROGRAM

## 2022-09-11 PROCEDURE — 80069 RENAL FUNCTION PANEL: CPT

## 2022-09-11 PROCEDURE — 6370000000 HC RX 637 (ALT 250 FOR IP): Performed by: STUDENT IN AN ORGANIZED HEALTH CARE EDUCATION/TRAINING PROGRAM

## 2022-09-11 PROCEDURE — 6360000002 HC RX W HCPCS: Performed by: STUDENT IN AN ORGANIZED HEALTH CARE EDUCATION/TRAINING PROGRAM

## 2022-09-11 PROCEDURE — 36415 COLL VENOUS BLD VENIPUNCTURE: CPT

## 2022-09-11 RX ORDER — FLUCONAZOLE 100 MG/1
150 TABLET ORAL ONCE
Status: COMPLETED | OUTPATIENT
Start: 2022-09-11 | End: 2022-09-11

## 2022-09-11 RX ORDER — LACTULOSE 10 G/15ML
20 SOLUTION ORAL 3 TIMES DAILY PRN
Qty: 1 EACH | Refills: 1 | Status: SHIPPED | OUTPATIENT
Start: 2022-09-11 | End: 2022-09-11 | Stop reason: HOSPADM

## 2022-09-11 RX ORDER — LACTULOSE 10 G/15ML
10 SOLUTION ORAL EVERY EVENING
Qty: 450 ML | Refills: 0 | Status: SHIPPED | OUTPATIENT
Start: 2022-09-11 | End: 2022-10-11

## 2022-09-11 RX ADMIN — Medication 10 ML: at 09:25

## 2022-09-11 RX ADMIN — ACETAMINOPHEN 650 MG: 325 TABLET ORAL at 09:24

## 2022-09-11 RX ADMIN — AMLODIPINE BESYLATE 5 MG: 5 TABLET ORAL at 09:24

## 2022-09-11 RX ADMIN — ASPIRIN 81 MG 81 MG: 81 TABLET ORAL at 09:24

## 2022-09-11 RX ADMIN — FLUCONAZOLE 150 MG: 100 TABLET ORAL at 12:20

## 2022-09-11 RX ADMIN — ENOXAPARIN SODIUM 40 MG: 100 INJECTION SUBCUTANEOUS at 09:21

## 2022-09-11 RX ADMIN — LISINOPRIL 10 MG: 10 TABLET ORAL at 09:24

## 2022-09-11 RX ADMIN — Medication 40 MG: at 09:22

## 2022-09-11 ASSESSMENT — PAIN SCALES - GENERAL
PAINLEVEL_OUTOF10: 3
PAINLEVEL_OUTOF10: 0

## 2022-09-11 ASSESSMENT — PAIN DESCRIPTION - ORIENTATION: ORIENTATION: RIGHT;LOWER

## 2022-09-11 ASSESSMENT — PAIN DESCRIPTION - DESCRIPTORS: DESCRIPTORS: ACHING

## 2022-09-11 ASSESSMENT — PAIN DESCRIPTION - LOCATION: LOCATION: ABDOMEN

## 2022-09-11 ASSESSMENT — PAIN - FUNCTIONAL ASSESSMENT: PAIN_FUNCTIONAL_ASSESSMENT: ACTIVITIES ARE NOT PREVENTED

## 2022-09-11 NOTE — DISCHARGE INSTR - COC
Continuity of Care Form    Patient Name: Haim Tysno   :  1960  MRN:  [id number]    Admit date:  2022  Discharge date:  ***    Code Status Order: Full Code   Advance Directives:     Admitting Physician:  Jermaine De Paz DO  PCP: Bernabe Huerta    Discharging Nurse: [facility] Unit/Room#: D5V-8605/1145-32  Discharging Unit Phone Number: ***    Emergency Contact:   Extended Emergency Contact Information  Primary Emergency Contact: Marleny Adame  Address: [place] [address], [address] [facility] [address] Phone: [phone]  Relation: Brother/Sister  Secondary Emergency Contact: Mayte Quinn [address] Phone: [phone]  Relation: Child    Past Surgical History:  Past Surgical History:   Procedure Laterality Date     SECTION      ENDOMETRIAL ABLATION  5/3/12    ENDOSCOPY, COLON, DIAGNOSTIC      HYSTEROSCOPY  5/3/12    MANDIBLE SURGERY      SKIN BIOPSY      TUBAL LIGATION      UPPER GASTROINTESTINAL ENDOSCOPY N/A 2021    EGD BIOPSY performed by Sanford Marie MD at [facility] ENDOSCOPY       Immunization History:   Immunization History   Administered Date(s) Administered    COVID-19, MODERNA BLUE border, Primary or Immunocompromised, (age 12y+), IM, 100 mcg/0.5mL 2022, 2022    Influenza 2013    Influenza Vaccine, unspecified formulation 2017    Pneumococcal Polysaccharide ([id number]) 2016    Tdap (Boostrix, Adacel) 2016       Active Problems:  Patient Active Problem List   Diagnosis Code    PMB (postmenopausal bleeding) N95.0    Complex endometrial hyperplasia N85.01    Asthma J45.909    Vitamin D deficiency E55.9    Hyperlipidemia with target LDL less than 70 E78.5    Elevated testosterone level in female R79.89    COPD (chronic obstructive pulmonary disease) J44.9    Hirsutism L68.0    DMII (diabetes mellitus, type 2) ([facility] E11.9    Panlobular emphysema ([facility] J43.1    Hypertension complicating diabetes ([facility]) E11.59, I15.2    Vertigo R42    Gastroparesis diabeticorum ([facility]) E11.43, K31.84    Major depressive disorder with single episode F32.9    Morbid obesity due to excess calories ([facility]) E66.01    Atherosclerotic PVD with intermittent claudication ([facility]) I70.219    Sleep apnea G47.30    Gastroesophageal reflux disease without esophagitis K21.9    Seasonal allergic rhinitis due to pollen J30.1    Mild persistent asthma without complication W17.56    Chronic midline low back pain with bilateral sciatica M54.41, M54.42, G89.29    Pain in both lower extremities M79.604, M79.605    Intractable nausea and vomiting R11.2    Generalized abdominal pain R10.84    Essential hypertension I10    SHAY (acute kidney injury) ([facility] N17.9    Cannabinoid hyperemesis syndrome R11.2, F12.90    Renal infarct ([facility] N28.0       Isolation/Infection:   Isolation            No Isolation          Patient Infection Status       Infection Onset Added Last Indicated Last Indicated By Review Planned Expiration Resolved Resolved By    None active    Resolved    COVID-19 (Rule Out) 09/07/22 09/07/22 09/07/22 COVID-19, Rapid (Ordered)   09/08/22 Rule-Out Test Resulted    COVID-19 (Rule Out) 08/06/22 08/06/22 08/06/22 COVID-19, Rapid (Ordered)   08/06/22 Rule-Out Test Resulted    COVID-19 (Rule Out) 03/16/22 03/16/22 03/16/22 COVID-19, Rapid (Ordered)   03/16/22 Rule-Out Test Resulted            Nurse Assessment:  Last Vital Signs: /69   Pulse 72   Temp 98.2 °F (36.8 °C) (Oral)   Resp 18   Ht 5' 7\" (1.702 m)   Wt 221 lb 5.5 oz (100.4 kg)   SpO2 100%   BMI 34.67 kg/m²     Last documented pain score (0-10 scale): Pain Level: 0  Last Weight:   Wt Readings from Last 1 Encounters:   09/11/22 221 lb 5.5 oz (100.4 kg)     Mental Status:  {IP PT MENTAL STATUS:96649}    IV Access:  {[employer] [facility] [employer] IV ACCESS:082379565}    Nursing Mobility/ADLs:  Walking   {VAISHALI IBANEZKSM:858426557}  Transfer  {VAISHALI ABBASI TEKX:207182637}  Bathing  {VAISHALI ABBASI PWNJ:006887384}  Dressing  {P DME FWJP:982000816}  Toileting  {P DME YZZ}  Feeding  {CHP DME SVQT:650308595}  Med Admin  {CHP DME PVNX:517007115}  Med Delivery   { RUPAL MED Delivery:457323356}    Wound Care Documentation and Therapy:        Elimination:  Continence: Bowel: {YES / FO:59908}  Bladder: {YES / TK:63154}  Urinary Catheter: {Urinary Catheter:137558713}   Colostomy/Ileostomy/Ileal Conduit: {YES / HY:06411}       Date of Last BM: ***    Intake/Output Summary (Last 24 hours) at 2022 1107  Last data filed at 9/10/2022 2144  Gross per 24 hour   Intake 554 ml   Output --   Net 554 ml     I/O last 3 completed shifts:   In: 1094 [P.O.:1094]  Out: -     Safety Concerns:     [address] Safety Concerns:355140451}    Impairments/Disabilities:      [address] Impairments/Disabilities:188323831}    Nutrition Therapy:  Current Nutrition Therapy:   [address] Diet List:667072591}    Routes of Feeding: {[facility] DME Other Feedings:625440996}  Liquids: {Slp liquid thickness:00943}  Daily Fluid Restriction: {P DME Yes amt example:765396144}  Last Modified Barium Swallow with Video (Video Swallowing Test): {Done Not Done LUCINDA:442384018}    Treatments at the Time of Hospital Discharge:   Respiratory Treatments: ***  Oxygen Therapy:  {Therapy; copd oxygen:88829}  Ventilator:    { CC Vent NTHT:437224735}    Rehab Therapies: {THERAPEUTIC INTERVENTION:9055545424}  Weight Bearing Status/Restrictions: [address] [employer] Weight Bearin}  Other Medical Equipment (for information only, NOT a DME order):  {EQUIPMENT:374221606}  Other Treatments: ***    Patient's personal belongings (please select all that are sent with patient):  {[facility] DME Belongings:043163779}    RN SIGNATURE:  {Esignature:336821521}    CASE MANAGEMENT/SOCIAL WORK SECTION    Inpatient Status Date: ***    Readmission Risk Assessment Score:  Readmission Risk              Risk of Unplanned Readmission:  27           Discharging to Facility/ Agency   Name:

## 2022-09-11 NOTE — PLAN OF CARE
Problem: Discharge Planning  Goal: Discharge to home or other facility with appropriate resources  9/11/2022 1052 by Dawit Joy RN  Outcome: Completed     Problem: Pain  Goal: Verbalizes/displays adequate comfort level or baseline comfort level  9/11/2022 1052 by Dawit Joy RN  Outcome: Completed    Problem: Skin/Tissue Integrity  Goal: Absence of new skin breakdown  Description: 1. Monitor for areas of redness and/or skin breakdown  2. Assess vascular access sites hourly  3. Every 4-6 hours minimum:  Change oxygen saturation probe site  4. Every 4-6 hours:  If on nasal continuous positive airway pressure, respiratory therapy assess nares and determine need for appliance change or resting period.   9/11/2022 1052 by Dawit Joy RN  Outcome: Completed     Problem: Safety - Adult  Goal: Free from fall injury  9/11/2022 1052 by Dawit Joy RN  Outcome: Completed     Problem: ABCDS Injury Assessment  Goal: Absence of physical injury  9/11/2022 1052 by Dawit Joy RN  Outcome: Completed     Problem: Nutrition Deficit:  Goal: Optimize nutritional status  9/11/2022 1052 by Dawit Joy RN  Outcome: Completed     Problem: Metabolic/Fluid and Electrolytes - Adult  Goal: Electrolytes maintained within normal limits  9/11/2022 1052 by Dawit Joy RN  Outcome: Completed     Problem: Metabolic/Fluid and Electrolytes - Adult  Goal: Hemodynamic stability and optimal renal function maintained  9/11/2022 1052 by Dawit Joy RN  Outcome: Completed

## 2022-09-11 NOTE — CARE COORDINATION
CASE MANAGEMENT DISCHARGE SUMMARY: No needs    DISCHARGE DATE: 9/11/22    DISCHARGED TO HOME     TRANSPORTATION: Family/friend                 Electronically signed by MEGAN Bustos on 9/11/2022 at 1:14 PM

## 2022-09-11 NOTE — DISCHARGE SUMMARY
Discharge summary    Patient:  Lord Solorio  Unit/Bed:G5H-7038/5278-01   YOB: 1960       MRN: [id number] Acct: [de-identified]  PCP: CEASAR Webb    Date of Admission: 9/7/2022  --------------------------  Discharge Date:   9/11/2022    Admitting Physician: Samanta Vincent DO     Discharge Physician: Jas Alford MD       Consults:     IP CONSULT TO VASCULAR SURGERY  IP CONSULT TO VASCULAR SURGERY    Disposition: Home    Condition at Discharge: Stable    Code Status:  Full Code           Patient Instructions:    Discharge lab/important testing/finding that need follow up : Outpatient follow-up with hematology for the 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    Marijuana cessation      Activity: activity as tolerated    Diet: ADULT DIET; Regular; 4 carb choices (60 gm/meal)      Follow-up visits:   CEASAR Webb  [address] [place] [address]  [phone]    Schedule an appointment as soon as possible for a visit in 1 week(s)      Ike Vizcaino MD  [address] [place] [address]    Schedule an appointment as soon as possible for a visit in 1 week(s)  Left Kidney         Discharge Medications:     Current Discharge Medication List        START taking these medications    Details   lactulose (CHRONULAC) 10 GM/15ML solution Take 15 mLs by mouth every evening  Qty: 450 mL, Refills: 0           Current Discharge Medication List        Current Discharge Medication List        CONTINUE these medications which have NOT CHANGED    Details   insulin glargine (LANTUS) 100 UNIT/ML injection vial Inject 22 Units into the skin nightly      ketorolac (TORADOL) 10 MG tablet Take 1 tablet by mouth every 6 hours as needed for Pain  Qty: 20 tablet, Refills: 0      acetaminophen (TYLENOL) 500 MG tablet Take 2 tablets by mouth 3 times daily as needed for Pain  Qty: 180 tablet, Refills: 0      scopolamine (TRANSDERM-SCOP, 1.5 MG,) transdermal patch Place 1 patch onto the skin every 72 hours as needed for Nausea or Vomiting  Qty: 10 patch, Refills: 0      dicyclomine (BENTYL) 10 MG capsule Take 1 capsule by mouth every 6 hours as needed (cramps)  Qty: 20 capsule, Refills: 0      albuterol sulfate HFA (VENTOLIN HFA) 108 (90 Base) MCG/ACT inhaler Inhale 2 puffs into the lungs 4 times daily as needed for Wheezing or Shortness of Breath  Qty: 18 g, Refills: 0      ondansetron (ZOFRAN ODT) 4 MG disintegrating tablet Take 1-2 tablets by mouth every 12 hours as needed for Nausea or Vomiting May Sub regular tablet (non-ODT) if insurance does not cover ODT. Qty: 30 tablet, Refills: 0      omeprazole (PRILOSEC) 40 MG delayed release capsule Take 40 mg by mouth daily      lisinopril (PRINIVIL;ZESTRIL) 10 MG tablet Take 10 mg by mouth daily      ezetimibe (ZETIA) 10 MG tablet Take 10 mg by mouth daily      amLODIPine (NORVASC) 5 MG tablet Take 5 mg by mouth daily      insulin lispro (HUMALOG) 100 UNIT/ML injection vial Inject into the skin 3 times daily (before meals) Per sliding scale. 2 units >150 then additional 2 units for every 50      aspirin 81 MG tablet Take 81 mg by mouth daily      Insulin Syringe-Needle U-100 (INSULIN SYRINGE .5CC/31GX5/16\") 31G X 5/16\" 0.5 ML MISC 1 each by Does not apply route daily  Qty: 100 Syringe, Refills: 3      timolol (TIMOPTIC) 0.5 % ophthalmic solution Place 1 drop into both eyes daily       ergocalciferol (DRISDOL) 23413 UNITS capsule Take 1 capsule by mouth once a week  Qty: 13 capsule, Refills: 1      Insulin Pen Needle 31G X 5 MM MISC Pt uses three times a day  Qty: 450 each, Refills: 3    Associated Diagnoses: Type II or unspecified type diabetes mellitus without mention of complication, uncontrolled; Hypertension due to endocrine disorder; Hirsutism; Elevated testosterone level in female      glucose blood VI test strips (EXACTECH TEST) strip 4 times a day As needed.   Qty: 400 each, Refills: 3      Lancets MISC 3-4 times a day  Qty: 100 each, Refills: 3      latanoprost (XALATAN) 0.005 % ophthalmic solution Place 1 drop into both eyes nightly. Current Discharge Medication List        STOP taking these medications       insulin glargine (BASAGLAR KWIKPEN) 100 UNIT/ML injection pen Comments:   Reason for Stopping:         Papaya Enzyme CHEW Comments:   Reason for Stopping:               Patient seen and examined in the day of discharge. Vital signs reviewed. Patient reached the maximum benefit of this hospitalization. Patient  was hemodynamically stable on discharge   Available consultant are in agreement with discharge planning. Patient symptoms was controlled and in agreement with discharge planning. Time Spent on discharge is 35 minutes in the examination, evaluation, counseling and review of medications and discharge plan   Chief Complaint:     Nausea and vomiting    Hospital Course/discharge diagnoses:     Antonieta Andre is a 65 y.o. female hospitalized on 9/7/2022   with nausea, vomiting which she attributed to diabetic gastroparesis, symptoms improved. She denied abdominal pain to me except when she has soreness from throwing up. No diarrhea or constipation. No hematemesis or hematochezia         Nausea, vomiting. Likely secondary to cannabinoid hyperemesis syndrome versus diabetic gastroparesis    Treated with supportive care, antiemetics, IV fluid, symptoms improved, able to tolerate diet without difficulty. Incidental finding of asymmetrical enhancement of the left kidney? Wedge-shaped area concern for left inferior pole infarct.  -Patient denied flank pain, no urinary symptoms, UA without acute finding. No prior history of thromboembolism. She is sinus rhythm,, echocardiogram without intracardiac thrombosis or significant valvular abnormalities.   Patient evaluated by vascular surgery service, no recommendation for anticoagulation, outpatient follow-up with her hematologist at [address] [place] [address] Hospital recommended. Service, lactic acidosis, likely secondary to starvation , -No other clinical findings to support sepsis,      Diphtheroids pseudo bacteremia  1 out of 2 blood culture grew diphtheroid, likely contaminant. Adult marijuana use         Essential hypertension       Diabetes mellitus type 2, insulin-dependent                 ----------------      Subjective:   Patient seen and examined, has no complaint  Blood culture report reviewed  Able to tolerate diet without difficulty. Instructed to avoid marijuana. Had multiple bowel movements with lactulose (prescription provided)        Diet: ADULT DIET; Regular; 4 carb choices (60 gm/meal)    OBJECTIVE     Exam:  /69   Pulse 72   Temp 98.2 °F (36.8 °C) (Oral)   Resp 18   Ht 5' 7\" (1.702 m)   Wt 221 lb 5.5 oz (100.4 kg)   SpO2 100%   BMI 34.67 kg/m²         Gen: Not in distress. Alert. Head: Normocephalic. Atraumatic. Eyes: Conjunctivae/corneas clear. ENT: Oral mucosa moist  Neck: No JVD. No obvious thyromegaly. CVS: Nml S1S2, no murmur , RRR  Pulmomary: Clear bilaterally. No crackles. No wheezes. Gastrointestinal: Soft, non tender, non distend, . Musculoskeletal: No edema. Warm  Neuro: No focal deficit. Moves extremity spontaneously. Psychiatry: Appropriate affect. Not agitated.           Medications:  Reviewed    Infusion Medications    sodium chloride      dextrose       Scheduled Medications    fluconazole  150 mg Oral Once    lactulose  20 g Oral TID    polyethylene glycol  17 g Oral Daily    insulin glargine  22 Units SubCUTAneous Nightly    amLODIPine  5 mg Oral Daily    aspirin  81 mg Oral Daily    lisinopril  10 mg Oral Daily    pantoprazole (PROTONIX) 40 mg injection  40 mg IntraVENous Daily    sodium chloride flush  5-40 mL IntraVENous 2 times per day    enoxaparin  40 mg SubCUTAneous Daily    insulin lispro  0-4 Units SubCUTAneous TID WC    insulin lispro  0-4 Units SubCUTAneous Nightly     PRN Meds: bisacodyl, bisacodyl, promethazine, fleet, hydrALAZINE, albuterol sulfate HFA, sodium chloride flush, sodium chloride, acetaminophen **OR** acetaminophen, HYDROmorphone, ondansetron, glucose, dextrose bolus **OR** dextrose bolus, glucagon (rDNA), dextrose      Intake/Output Summary (Last 24 hours) at 9/11/2022 1219  Last data filed at 9/10/2022 2144  Gross per 24 hour   Intake 434 ml   Output --   Net 434 ml             Labs:   Recent Labs     09/09/22  0511 09/10/22  0913   WBC 11.7* 12.0*   HGB 13.9 13.8   HCT 42.7 41.7    267     Recent Labs     09/10/22  0913 09/10/22  1322 09/11/22  0044    135* 138   K 3.8 3.8 3.8   CL 96* 96* 101   CO2 23 23 24   BUN 12 13 13   CREATININE 0.9 1.0 1.0   CALCIUM 9.3 9.2 9.3   PHOS 3.5 3.2 4.0     No results for input(s): AST, ALT, BILIDIR, BILITOT, ALKPHOS in the last 72 hours. No results for input(s): INR in the last 72 hours. No results for input(s): Ardelle Chalk in the last 72 hours. Urinalysis:      Lab Results   Component Value Date/Time    NITRU Negative 09/07/2022 10:09 PM    WBCUA 1 09/07/2022 10:09 PM    BACTERIA None Seen 09/07/2022 10:09 PM    RBCUA 1 09/07/2022 10:09 PM    BLOODU MODERATE 09/07/2022 10:09 PM    SPECGRAV 1.025 09/07/2022 10:09 PM    GLUCOSEU 250 09/07/2022 10:09 PM    GLUCOSEU NEGATIVE 04/09/2012 09:00 PM       Radiology:  CT ABDOMEN PELVIS W IV CONTRAST Additional Contrast? None   Final Result   No evidence for mechanical obstructive process or small bowel dilatation. Small hiatal hernia      Asymmetric enhancement left kidney with somewhat heterogeneous enhancement or   hypoenhancement wedge-shaped area partially in the left inferior pole   concerning for renal infarct versus striated nephrogram with correlation of   urinary studies as there is questionable asymmetric left perinephric   stranding without renal collecting system dilatation somewhat indeterminate.       Hepatic steatosis         XR CHEST (2 VW)   Final Result No acute cardiopulmonary disease                     Electronically signed by Los Queen MD on 9/11/2022 at 12:19 PM

## 2022-09-11 NOTE — PROGRESS NOTES
Patient being D/C'd to home. This RN reviewed patient's D/C instructions, medications, and need for F/U appointments to be scheduled. Patient verbalized understanding.

## 2022-09-11 NOTE — PLAN OF CARE
Problem: Discharge Planning  Goal: Discharge to home or other facility with appropriate resources  9/10/2022 2214 by Jarrett Denney RN  Outcome: Progressing  Flowsheets (Taken 9/10/2022 2034)  Discharge to home or other facility with appropriate resources: Identify barriers to discharge with patient and caregiver     Problem: Pain  Goal: Verbalizes/displays adequate comfort level or baseline comfort level  9/10/2022 2214 by Jarrett Denney RN  Outcome: Progressing  Flowsheets (Taken 9/10/2022 2034)  Verbalizes/displays adequate comfort level or baseline comfort level:   Encourage patient to monitor pain and request assistance   Assess pain using appropriate pain scale   Administer analgesics based on type and severity of pain and evaluate response   Implement non-pharmacological measures as appropriate and evaluate response     Problem: Skin/Tissue Integrity  Goal: Absence of new skin breakdown  Description: 1. Monitor for areas of redness and/or skin breakdown  2. Assess vascular access sites hourly  3. Every 4-6 hours minimum:  Change oxygen saturation probe site  4. Every 4-6 hours:  If on nasal continuous positive airway pressure, respiratory therapy assess nares and determine need for appliance change or resting period. 9/10/2022 2214 by Jarrett Denney RN  Outcome: Progressing  Note: Skin assessment completed every shift. Pt assessed for incontinence, appropriate barrier cream applied prn. Pt encouraged to turn/rotate every 2 hours. Assistance provided if pt unable to do so themselves. Problem: Safety - Adult  Goal: Free from fall injury  9/10/2022 2214 by Jarrett Denney RN  Outcome: Progressing  Note: Patient assessed for fall risk; fall precautions initiated. Patient instructed about safety devices. Environment kept free of clutter and adequate lighting provided. Bed locked and in lowest position. Call light within reach. Will continue to monitor.        Problem: ABCDS Injury Assessment  Goal: Absence of physical injury  9/10/2022 2214 by Juan Rashid RN  Outcome: Progressing  Flowsheets (Taken 9/10/2022 2212)  Absence of Physical Injury: Implement safety measures based on patient assessment     Problem: Nutrition Deficit:  Goal: Optimize nutritional status  9/10/2022 2214 by Juan Rashid RN  Outcome: Progressing  Flowsheets (Taken 9/10/2022 2214)  Nutrient intake appropriate for improving, restoring, or maintaining nutritional needs:   Monitor oral intake, labs, and treatment plans   Assess nutritional status and recommend course of action     Problem: Metabolic/Fluid and Electrolytes - Adult  Goal: Electrolytes maintained within normal limits  9/10/2022 2214 by Juan Rashid RN  Outcome: Progressing  Flowsheets (Taken 9/10/2022 2034)  Electrolytes maintained within normal limits: Monitor labs and assess patient for signs and symptoms of electrolyte imbalances     Problem: Metabolic/Fluid and Electrolytes - Adult  Goal: Hemodynamic stability and optimal renal function maintained  9/10/2022 2214 by Juan Rashid RN  Outcome: Progressing  Flowsheets (Taken 9/10/2022 2034)  Hemodynamic stability and optimal renal function maintained:   Monitor labs and assess for signs and symptoms of volume excess or deficit   Monitor intake, output and patient weight

## 2022-09-12 LAB
BLOOD CULTURE, ROUTINE: ABNORMAL
CULTURE, BLOOD 2: NORMAL
ORGANISM: ABNORMAL

## 2022-10-24 ENCOUNTER — HOSPITAL ENCOUNTER (OUTPATIENT)
Age: 62
Setting detail: OBSERVATION
Discharge: HOME | End: 2022-10-26
Attending: EMERGENCY MEDICINE | Admitting: STUDENT IN AN ORGANIZED HEALTH CARE EDUCATION/TRAINING PROGRAM
Payer: COMMERCIAL

## 2022-10-24 ENCOUNTER — APPOINTMENT (OUTPATIENT)
Dept: CT IMAGING | Age: 62
End: 2022-10-24
Payer: COMMERCIAL

## 2022-10-24 DIAGNOSIS — R11.2 INTRACTABLE NAUSEA AND VOMITING: Primary | ICD-10-CM

## 2022-10-24 DIAGNOSIS — K31.84 GASTROPARESIS: ICD-10-CM

## 2022-10-24 LAB
A/G RATIO: 1.4 (ref 1.1–2.2)
ALBUMIN SERPL-MCNC: 5 G/DL (ref 3.4–5)
ALP BLD-CCNC: 142 U/L (ref 40–129)
ALT SERPL-CCNC: 20 U/L (ref 10–40)
ANION GAP SERPL CALCULATED.3IONS-SCNC: 16 MMOL/L (ref 3–16)
AST SERPL-CCNC: 18 U/L (ref 15–37)
BACTERIA: ABNORMAL /HPF
BASOPHILS ABSOLUTE: 0.1 K/UL (ref 0–0.2)
BASOPHILS RELATIVE PERCENT: 0.7 %
BILIRUB SERPL-MCNC: 0.8 MG/DL (ref 0–1)
BILIRUBIN URINE: NEGATIVE
BLOOD, URINE: NEGATIVE
BUN BLDV-MCNC: 27 MG/DL (ref 7–20)
CALCIUM SERPL-MCNC: 9.9 MG/DL (ref 8.3–10.6)
CHLORIDE BLD-SCNC: 89 MMOL/L (ref 99–110)
CLARITY: CLEAR
CO2: 25 MMOL/L (ref 21–32)
COLOR: YELLOW
CREAT SERPL-MCNC: 0.9 MG/DL (ref 0.6–1.2)
EOSINOPHILS ABSOLUTE: 0 K/UL (ref 0–0.6)
EOSINOPHILS RELATIVE PERCENT: 0.1 %
EPITHELIAL CELLS, UA: 6 /HPF (ref 0–5)
GFR SERPL CREATININE-BSD FRML MDRD: >60 ML/MIN/{1.73_M2}
GLUCOSE BLD-MCNC: 218 MG/DL (ref 70–99)
GLUCOSE BLD-MCNC: 324 MG/DL (ref 70–99)
GLUCOSE URINE: >=1000 MG/DL
HCG QUALITATIVE: NEGATIVE
HCT VFR BLD CALC: 46.2 % (ref 36–48)
HEMOGLOBIN: 15.5 G/DL (ref 12–16)
HYALINE CASTS: 1 /LPF (ref 0–8)
KETONES, URINE: ABNORMAL MG/DL
LEUKOCYTE ESTERASE, URINE: ABNORMAL
LIPASE: 51 U/L (ref 13–60)
LYMPHOCYTES ABSOLUTE: 3.4 K/UL (ref 1–5.1)
LYMPHOCYTES RELATIVE PERCENT: 21.9 %
MCH RBC QN AUTO: 25.8 PG (ref 26–34)
MCHC RBC AUTO-ENTMCNC: 33.4 G/DL (ref 31–36)
MCV RBC AUTO: 77.3 FL (ref 80–100)
MICROSCOPIC EXAMINATION: YES
MONOCYTES ABSOLUTE: 0.7 K/UL (ref 0–1.3)
MONOCYTES RELATIVE PERCENT: 4.5 %
NEUTROPHILS ABSOLUTE: 11.2 K/UL (ref 1.7–7.7)
NEUTROPHILS RELATIVE PERCENT: 72.8 %
NITRITE, URINE: NEGATIVE
PDW BLD-RTO: 14.8 % (ref 12.4–15.4)
PERFORMED ON: ABNORMAL
PH UA: 5 (ref 5–8)
PLATELET # BLD: 318 K/UL (ref 135–450)
PMV BLD AUTO: 8.3 FL (ref 5–10.5)
POTASSIUM REFLEX MAGNESIUM: 4.1 MMOL/L (ref 3.5–5.1)
PROTEIN UA: 30 MG/DL
RAPID INFLUENZA  B AGN: NEGATIVE
RAPID INFLUENZA A AGN: NEGATIVE
RBC # BLD: 5.98 M/UL (ref 4–5.2)
RBC UA: 5 /HPF (ref 0–4)
SARS-COV-2, NAAT: NOT DETECTED
SODIUM BLD-SCNC: 130 MMOL/L (ref 136–145)
SPECIFIC GRAVITY UA: 1.02 (ref 1–1.03)
TOTAL PROTEIN: 8.5 G/DL (ref 6.4–8.2)
URINE REFLEX TO CULTURE: ABNORMAL
URINE TYPE: ABNORMAL
UROBILINOGEN, URINE: 0.2 E.U./DL
WBC # BLD: 15.3 K/UL (ref 4–11)
WBC UA: 5 /HPF (ref 0–5)

## 2022-10-24 PROCEDURE — 96365 THER/PROPH/DIAG IV INF INIT: CPT

## 2022-10-24 PROCEDURE — 96361 HYDRATE IV INFUSION ADD-ON: CPT

## 2022-10-24 PROCEDURE — 96372 THER/PROPH/DIAG INJ SC/IM: CPT

## 2022-10-24 PROCEDURE — 2580000003 HC RX 258: Performed by: NURSE PRACTITIONER

## 2022-10-24 PROCEDURE — 6360000002 HC RX W HCPCS: Performed by: PHYSICIAN ASSISTANT

## 2022-10-24 PROCEDURE — 96375 TX/PRO/DX INJ NEW DRUG ADDON: CPT

## 2022-10-24 PROCEDURE — 2580000003 HC RX 258: Performed by: PHYSICIAN ASSISTANT

## 2022-10-24 PROCEDURE — G0378 HOSPITAL OBSERVATION PER HR: HCPCS

## 2022-10-24 PROCEDURE — 74177 CT ABD & PELVIS W/CONTRAST: CPT

## 2022-10-24 PROCEDURE — 93005 ELECTROCARDIOGRAM TRACING: CPT | Performed by: PHYSICIAN ASSISTANT

## 2022-10-24 PROCEDURE — 87804 INFLUENZA ASSAY W/OPTIC: CPT

## 2022-10-24 PROCEDURE — 85025 COMPLETE CBC W/AUTO DIFF WBC: CPT

## 2022-10-24 PROCEDURE — 81001 URINALYSIS AUTO W/SCOPE: CPT

## 2022-10-24 PROCEDURE — 83690 ASSAY OF LIPASE: CPT

## 2022-10-24 PROCEDURE — 6370000000 HC RX 637 (ALT 250 FOR IP): Performed by: NURSE PRACTITIONER

## 2022-10-24 PROCEDURE — 6360000004 HC RX CONTRAST MEDICATION: Performed by: PHYSICIAN ASSISTANT

## 2022-10-24 PROCEDURE — 87635 SARS-COV-2 COVID-19 AMP PRB: CPT

## 2022-10-24 PROCEDURE — 84703 CHORIONIC GONADOTROPIN ASSAY: CPT

## 2022-10-24 PROCEDURE — 2500000003 HC RX 250 WO HCPCS: Performed by: NURSE PRACTITIONER

## 2022-10-24 PROCEDURE — 99285 EMERGENCY DEPT VISIT HI MDM: CPT | Performed by: PHYSICIAN ASSISTANT

## 2022-10-24 PROCEDURE — 80053 COMPREHEN METABOLIC PANEL: CPT

## 2022-10-24 RX ORDER — AMLODIPINE BESYLATE 5 MG/1
5 TABLET ORAL DAILY
Status: DISCONTINUED | OUTPATIENT
Start: 2022-10-24 | End: 2022-10-26 | Stop reason: HOSPADM

## 2022-10-24 RX ORDER — POLYETHYLENE GLYCOL 3350 17 G/17G
17 POWDER, FOR SOLUTION ORAL DAILY PRN
Status: DISCONTINUED | OUTPATIENT
Start: 2022-10-24 | End: 2022-10-26 | Stop reason: HOSPADM

## 2022-10-24 RX ORDER — TIMOLOL MALEATE 5 MG/ML
1 SOLUTION/ DROPS OPHTHALMIC DAILY
Status: DISCONTINUED | OUTPATIENT
Start: 2022-10-25 | End: 2022-10-26 | Stop reason: HOSPADM

## 2022-10-24 RX ORDER — PROMETHAZINE HYDROCHLORIDE 25 MG/1
12.5 TABLET ORAL EVERY 6 HOURS PRN
Status: DISCONTINUED | OUTPATIENT
Start: 2022-10-24 | End: 2022-10-26 | Stop reason: HOSPADM

## 2022-10-24 RX ORDER — MAGNESIUM HYDROXIDE/ALUMINUM HYDROXICE/SIMETHICONE 120; 1200; 1200 MG/30ML; MG/30ML; MG/30ML
30 SUSPENSION ORAL EVERY 6 HOURS PRN
Status: DISCONTINUED | OUTPATIENT
Start: 2022-10-24 | End: 2022-10-26 | Stop reason: HOSPADM

## 2022-10-24 RX ORDER — POLYETHYLENE GLYCOL 3350 17 G/17G
17 POWDER, FOR SOLUTION ORAL DAILY PRN
COMMUNITY

## 2022-10-24 RX ORDER — LATANOPROST 50 UG/ML
1 SOLUTION/ DROPS OPHTHALMIC NIGHTLY
Status: DISCONTINUED | OUTPATIENT
Start: 2022-10-24 | End: 2022-10-26 | Stop reason: HOSPADM

## 2022-10-24 RX ORDER — ALBUTEROL SULFATE 90 UG/1
2 AEROSOL, METERED RESPIRATORY (INHALATION) 4 TIMES DAILY PRN
Status: DISCONTINUED | OUTPATIENT
Start: 2022-10-24 | End: 2022-10-26 | Stop reason: HOSPADM

## 2022-10-24 RX ORDER — DEXTROSE MONOHYDRATE 100 MG/ML
INJECTION, SOLUTION INTRAVENOUS CONTINUOUS PRN
Status: DISCONTINUED | OUTPATIENT
Start: 2022-10-24 | End: 2022-10-26 | Stop reason: HOSPADM

## 2022-10-24 RX ORDER — SODIUM CHLORIDE 0.9 % (FLUSH) 0.9 %
5-40 SYRINGE (ML) INJECTION EVERY 12 HOURS SCHEDULED
Status: DISCONTINUED | OUTPATIENT
Start: 2022-10-24 | End: 2022-10-26 | Stop reason: HOSPADM

## 2022-10-24 RX ORDER — INSULIN GLARGINE 100 [IU]/ML
22 INJECTION, SOLUTION SUBCUTANEOUS DAILY
Status: DISCONTINUED | OUTPATIENT
Start: 2022-10-25 | End: 2022-10-26 | Stop reason: HOSPADM

## 2022-10-24 RX ORDER — LISINOPRIL 10 MG/1
10 TABLET ORAL DAILY
Status: DISCONTINUED | OUTPATIENT
Start: 2022-10-25 | End: 2022-10-26 | Stop reason: HOSPADM

## 2022-10-24 RX ORDER — DICYCLOMINE HYDROCHLORIDE 10 MG/ML
20 INJECTION INTRAMUSCULAR 4 TIMES DAILY PRN
Status: DISCONTINUED | OUTPATIENT
Start: 2022-10-24 | End: 2022-10-26 | Stop reason: HOSPADM

## 2022-10-24 RX ORDER — SENNA PLUS 8.6 MG/1
1 TABLET ORAL DAILY PRN
Status: DISCONTINUED | OUTPATIENT
Start: 2022-10-24 | End: 2022-10-26 | Stop reason: HOSPADM

## 2022-10-24 RX ORDER — 0.9 % SODIUM CHLORIDE 0.9 %
1000 INTRAVENOUS SOLUTION INTRAVENOUS ONCE
Status: COMPLETED | OUTPATIENT
Start: 2022-10-24 | End: 2022-10-24

## 2022-10-24 RX ORDER — ENOXAPARIN SODIUM 100 MG/ML
40 INJECTION SUBCUTANEOUS DAILY
Status: DISCONTINUED | OUTPATIENT
Start: 2022-10-25 | End: 2022-10-26 | Stop reason: HOSPADM

## 2022-10-24 RX ORDER — ONDANSETRON 2 MG/ML
4 INJECTION INTRAMUSCULAR; INTRAVENOUS ONCE
Status: COMPLETED | OUTPATIENT
Start: 2022-10-24 | End: 2022-10-24

## 2022-10-24 RX ORDER — EZETIMIBE 10 MG/1
10 TABLET ORAL DAILY
Status: DISCONTINUED | OUTPATIENT
Start: 2022-10-25 | End: 2022-10-26 | Stop reason: HOSPADM

## 2022-10-24 RX ORDER — ONDANSETRON 2 MG/ML
4 INJECTION INTRAMUSCULAR; INTRAVENOUS EVERY 6 HOURS PRN
Status: DISCONTINUED | OUTPATIENT
Start: 2022-10-24 | End: 2022-10-26 | Stop reason: HOSPADM

## 2022-10-24 RX ORDER — ACETAMINOPHEN 650 MG/1
650 SUPPOSITORY RECTAL EVERY 6 HOURS PRN
Status: DISCONTINUED | OUTPATIENT
Start: 2022-10-24 | End: 2022-10-26 | Stop reason: HOSPADM

## 2022-10-24 RX ORDER — HALOPERIDOL 5 MG/ML
2 INJECTION INTRAMUSCULAR ONCE
Status: COMPLETED | OUTPATIENT
Start: 2022-10-24 | End: 2022-10-24

## 2022-10-24 RX ORDER — INSULIN LISPRO 100 [IU]/ML
0-8 INJECTION, SOLUTION INTRAVENOUS; SUBCUTANEOUS
Status: DISCONTINUED | OUTPATIENT
Start: 2022-10-25 | End: 2022-10-26 | Stop reason: HOSPADM

## 2022-10-24 RX ORDER — ASPIRIN 81 MG/1
81 TABLET, CHEWABLE ORAL DAILY
Status: DISCONTINUED | OUTPATIENT
Start: 2022-10-25 | End: 2022-10-26 | Stop reason: HOSPADM

## 2022-10-24 RX ORDER — ACETAMINOPHEN 325 MG/1
650 TABLET ORAL EVERY 6 HOURS PRN
Status: DISCONTINUED | OUTPATIENT
Start: 2022-10-24 | End: 2022-10-26 | Stop reason: HOSPADM

## 2022-10-24 RX ORDER — PROCHLORPERAZINE EDISYLATE 5 MG/ML
10 INJECTION INTRAMUSCULAR; INTRAVENOUS EVERY 6 HOURS PRN
Status: DISCONTINUED | OUTPATIENT
Start: 2022-10-24 | End: 2022-10-26 | Stop reason: HOSPADM

## 2022-10-24 RX ORDER — SODIUM CHLORIDE 9 MG/ML
INJECTION, SOLUTION INTRAVENOUS PRN
Status: DISCONTINUED | OUTPATIENT
Start: 2022-10-24 | End: 2022-10-26 | Stop reason: HOSPADM

## 2022-10-24 RX ORDER — INSULIN LISPRO 100 [IU]/ML
0-4 INJECTION, SOLUTION INTRAVENOUS; SUBCUTANEOUS NIGHTLY
Status: DISCONTINUED | OUTPATIENT
Start: 2022-10-24 | End: 2022-10-26 | Stop reason: HOSPADM

## 2022-10-24 RX ORDER — SODIUM CHLORIDE 0.9 % (FLUSH) 0.9 %
5-40 SYRINGE (ML) INJECTION PRN
Status: DISCONTINUED | OUTPATIENT
Start: 2022-10-24 | End: 2022-10-26 | Stop reason: HOSPADM

## 2022-10-24 RX ORDER — SODIUM CHLORIDE 9 MG/ML
INJECTION, SOLUTION INTRAVENOUS CONTINUOUS
Status: DISCONTINUED | OUTPATIENT
Start: 2022-10-24 | End: 2022-10-26 | Stop reason: HOSPADM

## 2022-10-24 RX ADMIN — LATANOPROST 1 DROP: 50 SOLUTION OPHTHALMIC at 23:34

## 2022-10-24 RX ADMIN — SODIUM CHLORIDE 1000 ML: 9 INJECTION, SOLUTION INTRAVENOUS at 16:07

## 2022-10-24 RX ADMIN — SODIUM CHLORIDE, PRESERVATIVE FREE 10 ML: 5 INJECTION INTRAVENOUS at 23:35

## 2022-10-24 RX ADMIN — AMLODIPINE BESYLATE 5 MG: 5 TABLET ORAL at 21:48

## 2022-10-24 RX ADMIN — Medication 12.5 MG: at 18:30

## 2022-10-24 RX ADMIN — ONDANSETRON 4 MG: 2 INJECTION INTRAMUSCULAR; INTRAVENOUS at 16:08

## 2022-10-24 RX ADMIN — HALOPERIDOL LACTATE 2 MG: 5 INJECTION, SOLUTION INTRAMUSCULAR at 16:43

## 2022-10-24 RX ADMIN — SODIUM CHLORIDE: 9 INJECTION, SOLUTION INTRAVENOUS at 23:39

## 2022-10-24 RX ADMIN — Medication 20 MG: at 23:30

## 2022-10-24 RX ADMIN — IOPAMIDOL 75 ML: 755 INJECTION, SOLUTION INTRAVENOUS at 18:57

## 2022-10-24 ASSESSMENT — ENCOUNTER SYMPTOMS
NAUSEA: 1
ABDOMINAL PAIN: 1
SHORTNESS OF BREATH: 0
VOMITING: 1
CHEST TIGHTNESS: 0

## 2022-10-24 ASSESSMENT — PAIN DESCRIPTION - ORIENTATION
ORIENTATION: RIGHT;LEFT

## 2022-10-24 ASSESSMENT — PAIN DESCRIPTION - DESCRIPTORS
DESCRIPTORS: CRAMPING

## 2022-10-24 ASSESSMENT — PAIN DESCRIPTION - PAIN TYPE
TYPE: ACUTE PAIN

## 2022-10-24 ASSESSMENT — PAIN SCALES - GENERAL
PAINLEVEL_OUTOF10: 8
PAINLEVEL_OUTOF10: 10
PAINLEVEL_OUTOF10: 8

## 2022-10-24 ASSESSMENT — PAIN DESCRIPTION - LOCATION
LOCATION: BACK;LEG;FLANK
LOCATION: LEG
LOCATION: LEG;FLANK

## 2022-10-24 ASSESSMENT — PAIN - FUNCTIONAL ASSESSMENT: PAIN_FUNCTIONAL_ASSESSMENT: 0-10

## 2022-10-24 NOTE — ED PROVIDER NOTES
[address] [facility] EMERGENCY DEPARTMENT  [address] [address]  Dept: [phone]  Loc: [phone]  eMERGENCYdEPARTMENT eNCOUnter      Pt Name: Brie Sigala  MRN: [id number]  Alokgfrisa 1960  Date of evaluation: 10/24/2022  Provider:Dimple Pickens PA-C    CHIEF COMPLAINT       Chief Complaint   Patient presents with    Emesis    Constipation     Pt c/o intermittent nausea and vomiting x 4 days with constipation x 2 days. Denies black, brown, or red emesis. States that she is prescribed PO phenergan and it is not working. States that she sees GI for same issues and has never been told why this happens. States GI doc is with . CRITICAL CARE TIME   Total Critical Care time was 14 minutes, excluding separately reportable procedures. There was a high probability of clinically significant/life threatening deterioration in the patient's condition which required my urgentintervention. HISTORY OF PRESENT ILLNESS  (Location/Symptom, Timing/Onset, Context/Setting, Quality, Duration,Modifying Factors, Severity.)   Brie Sigala is a 65 y.o. female who presents to the emergency department by private vehicle complaining of nausea, vomiting and abdominal discomfort past 4 days. Patient has history of cannabinoid hyperemesis syndrome and diabetic gastroparesis. She is followed by GI through . Patient states symptoms consistent with previous episodes of gastroparesis. She reports she is unable keep down any food or liquids for the last 4 days. She is concerned she becoming dehydrated. She is tried her home medications including oral Phenergan with no relief. Patient states she has numerous episodes of this per year. This episode is consistent with previous. No other complaints this time. Nursing Notes were reviewedand agreed with or any disagreements were addressed in the HPI.     REVIEW OF SYSTEMS    (2-9 systems for level 4, 10 or more for level 5)     Review of Systems   Constitutional:  Negative for chills and fever. HENT: Negative. Respiratory:  Negative for chest tightness and shortness of breath. Cardiovascular:  Negative for chest pain. Gastrointestinal:  Positive for abdominal pain, nausea and vomiting. Genitourinary: Negative. Musculoskeletal:  Negative for arthralgias and myalgias. Skin: Negative. Neurological: Negative. Psychiatric/Behavioral:  Negative for behavioral problems and confusion. Except as noted above the remainder of the review of systems was reviewed and negative.        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    Gastroparesis     Hirsutism     History of blood transfusion     Hyperlipidemia LDL goal < 70     Hypertension     Major depressive disorder with single episode 2009    Pancreatitis     Seasonal allergic rhinitis due to pollen 11/10/2016    Sleep apnea     Thyroid disease     Vitamin D deficiency        SURGICAL HISTORY           Procedure Laterality Date     SECTION      ENDOMETRIAL ABLATION  5/3/12    ENDOSCOPY, COLON, DIAGNOSTIC      HYSTEROSCOPY  5/3/12    MANDIBLE SURGERY      SKIN BIOPSY      TUBAL LIGATION      UPPER GASTROINTESTINAL ENDOSCOPY N/A 2021    EGD BIOPSY performed by Andrey Rowe MD at [address]     [unfilled]    ALLERGIES     Avelox [moxifloxacin], Bactrim, Cefuroxime, Codeine, Medrol [methylprednisolone], Morphine, Niacin, Oat, Percocet [oxycodone-acetaminophen], Reglan [metoclopramide], Spironolactone, Statins, Sulfamethoxazole-trimethoprim, and Vicodin [hydrocodone-acetaminophen]    FAMILY HISTORY           Problem Relation Age of Onset    Hypertension Mother     Breast Cancer Mother     Colon Cancer Mother     Cancer Father         Pancreatic    Prostate Cancer Father     No Known Problems Brother     No Known Problems Sister     Hypertension Maternal Aunt     Cancer Maternal Uncle     Hypertension Maternal Grandmother     Stroke Maternal Grandmother     Cancer Maternal Grandfather     No Known Problems Paternal Aunt     No Known Problems Paternal Uncle     No Known Problems Paternal Grandmother     No Known Problems Paternal Grandfather     No Known Problems Other     Rheum Arthritis Neg Hx     Osteoarthritis Neg Hx     Asthma Neg Hx     Diabetes Neg Hx     Heart Failure Neg Hx     High Cholesterol Neg Hx     Migraines Neg Hx     Ovarian Cancer Neg Hx     Rashes/Skin Problems Neg Hx     Seizures Neg Hx     Thyroid Disease Neg Hx      Family Status   Relation Name Status    Mother   at age 59        cancer     Father   at age 72        cancer    Brother  [de-identified]    Sister  Alive    MAunt  (Not Specified)    MUnc  (Not Specified)    MGM  (Not Specified)    MGF  (Not Specified)    PAunt  (Not Specified)    PUnc  (Not Specified)    PGM  (Not Specified)    PGF  (Not Specified)    Other  (Not Specified)    Neg Hx  (Not Specified)        SOCIAL HISTORY      reports that she quit smoking about 32 years ago. Her smoking use included cigarettes. She has never used smokeless tobacco. She reports current alcohol use. She reports current drug use. Frequency: 2.00 times per week. Drug: Marijuana Mirtha Forte).     PHYSICAL EXAM    (up to 7 for level 4, 8 or more for level 5)     ED Triage Vitals [10/24/22 1247]   Enc Vitals Group      BP (!) 177/83      Heart Rate 88      Resp 18      Temp 99.2 °F (37.3 °C)      Temp Source Oral      SpO2 100 %      Weight 219 lb 5.7 oz (99.5 kg)      Height       Head Circumference       Peak Flow       Pain Score       Pain Loc       Pain Edu? Excl. in [address]? Physical Exam  Vitals reviewed. Constitutional:       Appearance: Normal appearance. HENT:      Head: Normocephalic and atraumatic. Pulmonary:      Effort: Pulmonary effort is normal. No respiratory distress. Abdominal:      Palpations: Abdomen is soft. Tenderness: There is no abdominal tenderness. There is no guarding or rebound. Musculoskeletal:         General: Normal range of motion. Cervical back: Normal range of motion and neck supple. Skin:     General: Skin is warm. Neurological:      General: No focal deficit present. Mental Status: She is alert and oriented to person, place, and time.    Psychiatric:         Mood and Affect: Mood normal.         Behavior: Behavior normal.         DIAGNOSTIC RESULTS     EKG: All EKG's are interpreted by the Emergency Department Physician who either signs or Co-signs this chart in the absence of a cardiologist.    RADIOLOGY:   Non-plain film images such as CT, Ultrasound and MRI are read by the radiologist. Plain radiographic images are visualized and preliminarilyinterpreted by the emergency physician with the below findings:    Interpretation per the Radiologist below,if available at the time of this note:    CT ABDOMEN PELVIS W IV CONTRAST Additional Contrast? None    (Results Pending)         LABS:  Labs Reviewed   CBC WITH AUTO DIFFERENTIAL - Abnormal; Notable for the following components:       Result Value    WBC 15.3 (*)     RBC 5.98 (*)     MCV 77.3 (*)     MCH 25.8 (*)     Neutrophils Absolute 11.2 (*)     All other components within normal limits   COMPREHENSIVE METABOLIC PANEL W/ REFLEX TO MG FOR LOW K - Abnormal; Notable for the following components:    Sodium 130 (*)     Chloride 89 (*)     Glucose 324 (*)     BUN 27 (*)     Total Protein 8.5 (*)     Alkaline Phosphatase 142 (*)     All other components within normal limits   URINALYSIS WITH REFLEX TO CULTURE - Abnormal; Notable for the following components:    Glucose, Ur >=1000 (*)     Ketones, Urine TRACE (*)     Protein, UA 30 (*)     Leukocyte Esterase, Urine TRACE (*)     All other components within normal limits   MICROSCOPIC URINALYSIS - Abnormal; Notable for the following components:    Bacteria, UA Rare (*)     RBC, UA 5 (*)     Epithelial Cells, UA 6 (*)     All other components within normal limits   COVID-19, RAPID   RAPID INFLUENZA A/B ANTIGENS   HCG, SERUM, QUALITATIVE   LIPASE       All other labs were within normal range or not returned as of this dictation. EMERGENCY DEPARTMENT COURSE and DIFFERENTIAL DIAGNOSIS/MDM:   Vitals:    Vitals:    10/24/22 1247   BP: (!) 177/83   Pulse: 88   Resp: 18   Temp: 99.2 °F (37.3 °C)   TempSrc: Oral   SpO2: 100%   Weight: 219 lb 5.7 oz (99.5 kg)       MDM    Patient presents ED with HPI noted above. Patient with intractable nausea vomiting. She was given IM Haldol, IV Zofran and IV Phenergan in the ED with persistence of symptoms. She was given IV fluid as well. Symptoms have improved but she still dry heaving. Will require admission for hydration and further treatment. Initially held imaging given past medical history, however patient with temperature 99.2, reports chills and had leukocytosis at 15.3. CT Abdo pelvis IV contrast ordered and pending at time of admission. Please see my attendings note if any concerning findings warranting emergent ED address meant. Remainder of labs as above. Consultation made to hospitalist regarding admission. CONSULTS:  None    PROCEDURES:  Procedures    FINAL IMPRESSION      1. Intractable nausea and vomiting    2. Gastroparesis          DISPOSITION/PLAN   [unfilled]    PATIENT REFERRED TO:  No follow-up provider specified.     DISCHARGE MEDICATIONS:  New Prescriptions    No medications on file       (Please note that portions of this note were completed with a voice recognition program.  Efforts were made to edit the dictations but occasionally words are mis-transcribed.)    [facility]           [address] [place] [address]  10/24/22 1916

## 2022-10-24 NOTE — LETTER
[address]  Phone: [phone]             October 26, 2022    Patient: Aren Taylor   YOB: 1960   Date of Visit: 10/24/2022       To Whom It May Concern:    Aren Taylor was seen and treated in our facility  beginning 10/24/2022 until 10/26/2022. She may return to work on 10/28/2022.       Sincerely,       [facility] [place]        Signature:__________________________________

## 2022-10-24 NOTE — PROGRESS NOTES
Pharmacy Medication Reconciliation Note     List of medications Federico Cruz is currently taking is complete. Source of information:   1. Conversation with patient at bedside  2. EMR    Notes regarding home medications:   1. Patient reports not taking any home medications aside from long acting insulin PTA in the ED  2. Patient takes vitamin D 50,000 units weekly on Tuesdays-- due for next dose tomorrow    Patient denies taking any OTC or herbal medications other than those listed.      Parish Parham, Pharmacy Intern  10/24/2022  7:24 PM

## 2022-10-25 LAB
A/G RATIO: 1.4 (ref 1.1–2.2)
ALBUMIN SERPL-MCNC: 4.3 G/DL (ref 3.4–5)
ALP BLD-CCNC: 119 U/L (ref 40–129)
ALT SERPL-CCNC: 17 U/L (ref 10–40)
ANION GAP SERPL CALCULATED.3IONS-SCNC: 15 MMOL/L (ref 3–16)
AST SERPL-CCNC: 18 U/L (ref 15–37)
BASOPHILS ABSOLUTE: 0.1 K/UL (ref 0–0.2)
BASOPHILS RELATIVE PERCENT: 0.7 %
BILIRUB SERPL-MCNC: 0.9 MG/DL (ref 0–1)
BUN BLDV-MCNC: 15 MG/DL (ref 7–20)
CALCIUM SERPL-MCNC: 9.2 MG/DL (ref 8.3–10.6)
CHLORIDE BLD-SCNC: 97 MMOL/L (ref 99–110)
CO2: 27 MMOL/L (ref 21–32)
CREAT SERPL-MCNC: 0.8 MG/DL (ref 0.6–1.2)
EKG ATRIAL RATE: 115 BPM
EKG DIAGNOSIS: NORMAL
EKG P AXIS: 67 DEGREES
EKG P-R INTERVAL: 140 MS
EKG Q-T INTERVAL: 348 MS
EKG QRS DURATION: 84 MS
EKG QTC CALCULATION (BAZETT): 481 MS
EKG R AXIS: -10 DEGREES
EKG T AXIS: 75 DEGREES
EKG VENTRICULAR RATE: 115 BPM
EOSINOPHILS ABSOLUTE: 0.1 K/UL (ref 0–0.6)
EOSINOPHILS RELATIVE PERCENT: 0.4 %
GFR SERPL CREATININE-BSD FRML MDRD: >60 ML/MIN/{1.73_M2}
GLUCOSE BLD-MCNC: 136 MG/DL (ref 70–99)
GLUCOSE BLD-MCNC: 137 MG/DL (ref 70–99)
GLUCOSE BLD-MCNC: 145 MG/DL (ref 70–99)
GLUCOSE BLD-MCNC: 167 MG/DL (ref 70–99)
GLUCOSE BLD-MCNC: 208 MG/DL (ref 70–99)
HCT VFR BLD CALC: 45.4 % (ref 36–48)
HEMOGLOBIN: 14.8 G/DL (ref 12–16)
LYMPHOCYTES ABSOLUTE: 3.3 K/UL (ref 1–5.1)
LYMPHOCYTES RELATIVE PERCENT: 24.9 %
MAGNESIUM: 2.2 MG/DL (ref 1.8–2.4)
MCH RBC QN AUTO: 25.8 PG (ref 26–34)
MCHC RBC AUTO-ENTMCNC: 32.6 G/DL (ref 31–36)
MCV RBC AUTO: 79.1 FL (ref 80–100)
MONOCYTES ABSOLUTE: 1.1 K/UL (ref 0–1.3)
MONOCYTES RELATIVE PERCENT: 8 %
NEUTROPHILS ABSOLUTE: 8.7 K/UL (ref 1.7–7.7)
NEUTROPHILS RELATIVE PERCENT: 66 %
PDW BLD-RTO: 14.6 % (ref 12.4–15.4)
PERFORMED ON: ABNORMAL
PLATELET # BLD: 243 K/UL (ref 135–450)
PMV BLD AUTO: 7.7 FL (ref 5–10.5)
POTASSIUM REFLEX MAGNESIUM: 3.4 MMOL/L (ref 3.5–5.1)
RBC # BLD: 5.74 M/UL (ref 4–5.2)
SODIUM BLD-SCNC: 139 MMOL/L (ref 136–145)
TOTAL PROTEIN: 7.3 G/DL (ref 6.4–8.2)
WBC # BLD: 13.2 K/UL (ref 4–11)

## 2022-10-25 PROCEDURE — 6370000000 HC RX 637 (ALT 250 FOR IP): Performed by: NURSE PRACTITIONER

## 2022-10-25 PROCEDURE — 96376 TX/PRO/DX INJ SAME DRUG ADON: CPT

## 2022-10-25 PROCEDURE — 83735 ASSAY OF MAGNESIUM: CPT

## 2022-10-25 PROCEDURE — 6360000002 HC RX W HCPCS: Performed by: NURSE PRACTITIONER

## 2022-10-25 PROCEDURE — 85025 COMPLETE CBC W/AUTO DIFF WBC: CPT

## 2022-10-25 PROCEDURE — 93010 ELECTROCARDIOGRAM REPORT: CPT | Performed by: INTERNAL MEDICINE

## 2022-10-25 PROCEDURE — 2500000003 HC RX 250 WO HCPCS: Performed by: NURSE PRACTITIONER

## 2022-10-25 PROCEDURE — 96372 THER/PROPH/DIAG INJ SC/IM: CPT

## 2022-10-25 PROCEDURE — 96361 HYDRATE IV INFUSION ADD-ON: CPT

## 2022-10-25 PROCEDURE — 2580000003 HC RX 258: Performed by: NURSE PRACTITIONER

## 2022-10-25 PROCEDURE — 36415 COLL VENOUS BLD VENIPUNCTURE: CPT

## 2022-10-25 PROCEDURE — G0378 HOSPITAL OBSERVATION PER HR: HCPCS

## 2022-10-25 PROCEDURE — A4216 STERILE WATER/SALINE, 10 ML: HCPCS | Performed by: NURSE PRACTITIONER

## 2022-10-25 PROCEDURE — 80053 COMPREHEN METABOLIC PANEL: CPT

## 2022-10-25 RX ADMIN — EZETIMIBE 10 MG: 10 TABLET ORAL at 09:27

## 2022-10-25 RX ADMIN — SODIUM CHLORIDE: 9 INJECTION, SOLUTION INTRAVENOUS at 22:36

## 2022-10-25 RX ADMIN — ENOXAPARIN SODIUM 40 MG: 100 INJECTION SUBCUTANEOUS at 09:30

## 2022-10-25 RX ADMIN — LATANOPROST 1 DROP: 50 SOLUTION OPHTHALMIC at 22:37

## 2022-10-25 RX ADMIN — SODIUM CHLORIDE, PRESERVATIVE FREE 10 ML: 5 INJECTION INTRAVENOUS at 22:37

## 2022-10-25 RX ADMIN — Medication 20 MG: at 22:41

## 2022-10-25 RX ADMIN — Medication 20 MG: at 09:29

## 2022-10-25 RX ADMIN — SODIUM CHLORIDE: 9 INJECTION, SOLUTION INTRAVENOUS at 14:55

## 2022-10-25 RX ADMIN — AMLODIPINE BESYLATE 5 MG: 5 TABLET ORAL at 09:28

## 2022-10-25 RX ADMIN — INSULIN LISPRO 2 UNITS: 100 INJECTION, SOLUTION INTRAVENOUS; SUBCUTANEOUS at 12:13

## 2022-10-25 RX ADMIN — ASPIRIN 81 MG 81 MG: 81 TABLET ORAL at 09:27

## 2022-10-25 RX ADMIN — LISINOPRIL 10 MG: 10 TABLET ORAL at 09:27

## 2022-10-25 RX ADMIN — TIMOLOL MALEATE 1 DROP: 5 SOLUTION OPHTHALMIC at 12:12

## 2022-10-25 RX ADMIN — INSULIN GLARGINE 22 UNITS: 100 INJECTION, SOLUTION SUBCUTANEOUS at 09:23

## 2022-10-25 RX ADMIN — POLYETHYLENE GLYCOL 3350 17 G: 17 POWDER, FOR SOLUTION ORAL at 09:38

## 2022-10-25 RX ADMIN — SODIUM CHLORIDE, PRESERVATIVE FREE 10 ML: 5 INJECTION INTRAVENOUS at 09:32

## 2022-10-25 NOTE — PROGRESS NOTES
Hospitalist Progress Note      PCP: CEASAR Fagan    Date of Admission: 10/24/2022    Chief Complaint: intractable nausea and vomiting    Hospital Course: 65 y.o. female with PMHx of DM, HTN, HLD, cannabis hyperemesis syndrome and diabetic gastroparesis presented to [facility] with 4 day history of  nausea and  vomiting. Pt reports unable to keep down food or fluids in past 4 days. She is concerned about being dehydrated. She denies fever, chills or body aches. No diarrhea. Reports constipated stool for the last 2 days. Symptoms consistent with previous episodes of gastroparesis. + lightheadedness and weakness. No syncope. No chest pain or shortness of breath. Subjective: Pt seen and examined. Starting to feel better. Nausea has improved.        Medications:  Reviewed    Infusion Medications    dextrose      sodium chloride      sodium chloride 125 mL/hr at 10/24/22 1049     Scheduled Medications    amLODIPine  5 mg Oral Daily    aspirin  81 mg Oral Daily    ezetimibe  10 mg Oral Daily    insulin glargine  22 Units SubCUTAneous Daily    latanoprost  1 drop Both Eyes Nightly    lisinopril  10 mg Oral Daily    timolol  1 drop Both Eyes Daily    insulin lispro  0-8 Units SubCUTAneous TID WC    insulin lispro  0-4 Units SubCUTAneous Nightly    sodium chloride flush  5-40 mL IntraVENous 2 times per day    enoxaparin  40 mg SubCUTAneous Daily    famotidine (PEPCID) injection  20 mg IntraVENous BID     PRN Meds: albuterol sulfate HFA, polyethylene glycol, glucose, dextrose bolus **OR** dextrose bolus, glucagon (rDNA), dextrose, sodium chloride flush, sodium chloride, acetaminophen **OR** acetaminophen, promethazine **OR** ondansetron, aluminum & magnesium hydroxide-simethicone, prochlorperazine, dicyclomine, senna      Intake/Output Summary (Last 24 hours) at 10/25/2022 1437  Last data filed at 10/25/2022 0934  Gross per 24 hour   Intake 640 ml   Output --   Net 640 ml       Exam:    BP (!) 142/86   Pulse 80   Temp 98.1 °F (36.7 °C) (Oral)   Resp 17   Ht 5' 7\" (1.702 m)   Wt 217 lb 9.5 oz (98.7 kg)   SpO2 100%   BMI 34.08 kg/m²     General appearance: No apparent distress, appears stated age and cooperative. HEENT: Pupils equal, round, and reactive to light. Conjunctivae/corneas clear. Neck: Supple, with full range of motion. No jugular venous distention. Trachea midline. Respiratory:  Normal respiratory effort. Clear to auscultation, bilaterally without Rales/Wheezes/Rhonchi. Cardiovascular: Regular rate and rhythm with normal S1/S2 without murmurs, rubs or gallops. Abdomen: Soft, non-tender, non-distended with normal bowel sounds. Musculoskeletal: No clubbing, cyanosis or edema bilaterally. Full range of motion without deformity. Skin: Skin color, texture, turgor normal.  No rashes or lesions. Neurologic:  Neurovascularly intact without any focal sensory/motor deficits. Cranial nerves: II-XII intact, grossly non-focal.  Psychiatric: Alert and oriented, thought content appropriate, normal insight  Capillary Refill: Brisk,< 3 seconds   Peripheral Pulses: +2 palpable, equal bilaterally       Labs:   Recent Labs     10/24/22  1319 10/25/22  0543   WBC 15.3* 13.2*   HGB 15.5 14.8   HCT 46.2 45.4    243     Recent Labs     10/24/22  1319 10/25/22  0543   * 139   K 4.1 3.4*   CL 89* 97*   CO2 25 27   BUN 27* 15   CREATININE 0.9 0.8   CALCIUM 9.9 9.2     Recent Labs     10/24/22  1319 10/25/22  0543   AST 18 18   ALT 20 17   BILITOT 0.8 0.9   ALKPHOS 142* 119     No results for input(s): INR in the last 72 hours. No results for input(s): Katt Guillermina in the last 72 hours.     Urinalysis:      Lab Results   Component Value Date/Time    NITRU Negative 10/24/2022 01:19 PM    WBCUA 5 10/24/2022 01:19 PM    BACTERIA Rare 10/24/2022 01:19 PM    RBCUA 5 10/24/2022 01:19 PM    BLOODU Negative 10/24/2022 01:19 PM    SPECGRAV 1.024 10/24/2022 01:19 PM    GLUCOSEU >=1000 10/24/2022 01:19 PM    GLUCOSEU NEGATIVE 04/09/2012 09:00 PM       Radiology:  CT ABDOMEN PELVIS W IV CONTRAST Additional Contrast? None   Final Result   1. Mild diffuse wall thickening of the distal esophagus, which can be seen in   esophagitis including reflux esophagitis. 2.  Questioned subtle wedge-shaped hypoattenuation of the left upper pole   kidney, which can be seen in pyelonephritis. No significant perinephric   stranding. Correlate with urinalysis. Assessment/Plan:    Active Hospital Problems    Diagnosis Date Noted    Intractable nausea and vomiting [R11.2] 08/18/2021       Intractable nausea and vomiting  - hx diabetes gastroparesis and cannabis hyperemesis  - advised cessation of smoking marijuana  - encouraged frequent, small meals  - continue IV fluids  - anti-emetics prn  - clear liquid diet, adv as leslie; trial low fat diet this evening     Diabetes mellitus II   - Lantus, SSI and CCD     Essential (primary) hypertension   - monitor blood pressure  - continue home meds      Hyperlipidemia   - continue statin    Obesity - BMI 34.08  -nutritional counseling provided  -weight loss encouraged  -complicating medical management    DVT Prophylaxis: Lovenox  Diet: ADULT DIET;  Clear Liquid  Code Status: Full Code    PT/OT Eval Status: defer    Dispo - continue care    Melida Temple MD

## 2022-10-25 NOTE — ACP (ADVANCE CARE PLANNING)
Advance Care Planning     Advance Care Planning Activator (Inpatient)  Conversation Note      Date of ACP Conversation: 10/25/2022     Conversation Conducted with: Patient with Decision Making Capacity    ACP Activator: Katya Beebe RN    Health Care Decision Maker:     Current Designated Health Care Decision Maker:     Primary Decision Maker: Peter Levin Child - [phone]    Primary Decision Maker: Gracy Bradley Child - [phone]    Secondary Decision Maker: Adina Soto - Brother/Sister - [phone]    Care Preferences    Ventilation: \"If you were in your present state of health and suddenly became very ill and were unable to breathe on your own, what would your preference be about the use of a ventilator (breathing machine) if it were available to you? \"      Would the patient desire the use of ventilator (breathing machine)?: yes    \"If your health worsens and it becomes clear that your chance of recovery is unlikely, what would your preference be about the use of a ventilator (breathing machine) if it were available to you? \"     Would the patient desire the use of ventilator (breathing machine)?: No      Resuscitation  \"CPR works best to restart the heart when there is a sudden event, like a heart attack, in someone who is otherwise healthy. Unfortunately, CPR does not typically restart the heart for people who have serious health conditions or who are very sick. \"    \"In the event your heart stopped as a result of an underlying serious health condition, would you want attempts to be made to restart your heart (answer \"yes\" for attempt to resuscitate) or would you prefer a natural death (answer \"no\" for do not attempt to resuscitate)? \" yes       [] Yes   [x] No   Educated Patient / Traci Camacho regarding differences between Advance Directives and portable DNR orders.     Length of ACP Conversation in minutes:  5    Conversation Outcomes:  [x] ACP discussion completed  [] Existing advance directive reviewed with patient; no changes to patient's previously recorded wishes  [] New Advance Directive completed  [] Portable Do Not Rescitate prepared for Provider review and signature  [] POLST/POST/MOLST/MOST prepared for Provider review and signature      Follow-up plan:    [] Schedule follow-up conversation to continue planning  [] Referred individual to Provider for additional questions/concerns   [] Advised patient/agent/surrogate to review completed ACP document and update if needed with changes in condition, patient preferences or care setting    [] This note routed to one or more involved healthcare providers

## 2022-10-25 NOTE — FLOWSHEET NOTE
Pt arrived in room 4126 at 2205. Pt is alert and oriented x4. Pt was pacing in room and very nauseous and stated she wanted to shower before this RN could check vitals and blood sugar. Pt VSS. Pt is calm and cooperative. Pt stated she is feeling better after taking a shower. Pt oriented to room. Denies any needs at this time.

## 2022-10-25 NOTE — PLAN OF CARE
Problem: Pain  Goal: Verbalizes/displays adequate comfort level or baseline comfort level  10/25/2022 1116 by Ceasar Burroughs RN  Outcome: Progressing  10/24/2022 2353 by Venu Nguyen RN  Outcome: Progressing     Problem: ABCDS Injury Assessment  Goal: Absence of physical injury  10/25/2022 1116 by Ceasar Burroughs RN  Outcome: Progressing  10/24/2022 2353 by Venu Nguyen RN  Outcome: Progressing

## 2022-10-26 VITALS
DIASTOLIC BLOOD PRESSURE: 77 MMHG | HEART RATE: 65 BPM | OXYGEN SATURATION: 99 % | RESPIRATION RATE: 16 BRPM | BODY MASS INDEX: 34.78 KG/M2 | HEIGHT: 67 IN | WEIGHT: 221.56 LBS | SYSTOLIC BLOOD PRESSURE: 139 MMHG | TEMPERATURE: 97.8 F

## 2022-10-26 LAB
GLUCOSE BLD-MCNC: 127 MG/DL (ref 70–99)
GLUCOSE BLD-MCNC: 171 MG/DL (ref 70–99)
PERFORMED ON: ABNORMAL
PERFORMED ON: ABNORMAL

## 2022-10-26 PROCEDURE — 96361 HYDRATE IV INFUSION ADD-ON: CPT

## 2022-10-26 PROCEDURE — 6370000000 HC RX 637 (ALT 250 FOR IP): Performed by: NURSE PRACTITIONER

## 2022-10-26 PROCEDURE — G0378 HOSPITAL OBSERVATION PER HR: HCPCS

## 2022-10-26 PROCEDURE — 6360000002 HC RX W HCPCS: Performed by: NURSE PRACTITIONER

## 2022-10-26 PROCEDURE — 96372 THER/PROPH/DIAG INJ SC/IM: CPT

## 2022-10-26 PROCEDURE — 2580000003 HC RX 258: Performed by: NURSE PRACTITIONER

## 2022-10-26 PROCEDURE — 2500000003 HC RX 250 WO HCPCS: Performed by: NURSE PRACTITIONER

## 2022-10-26 PROCEDURE — 94760 N-INVAS EAR/PLS OXIMETRY 1: CPT

## 2022-10-26 PROCEDURE — 96376 TX/PRO/DX INJ SAME DRUG ADON: CPT

## 2022-10-26 PROCEDURE — A4216 STERILE WATER/SALINE, 10 ML: HCPCS | Performed by: NURSE PRACTITIONER

## 2022-10-26 RX ADMIN — SODIUM CHLORIDE: 9 INJECTION, SOLUTION INTRAVENOUS at 04:47

## 2022-10-26 RX ADMIN — ENOXAPARIN SODIUM 40 MG: 100 INJECTION SUBCUTANEOUS at 09:49

## 2022-10-26 RX ADMIN — LISINOPRIL 10 MG: 10 TABLET ORAL at 09:49

## 2022-10-26 RX ADMIN — Medication 20 MG: at 09:49

## 2022-10-26 RX ADMIN — TIMOLOL MALEATE 1 DROP: 5 SOLUTION OPHTHALMIC at 09:55

## 2022-10-26 RX ADMIN — ASPIRIN 81 MG 81 MG: 81 TABLET ORAL at 09:49

## 2022-10-26 RX ADMIN — AMLODIPINE BESYLATE 5 MG: 5 TABLET ORAL at 09:49

## 2022-10-26 RX ADMIN — INSULIN GLARGINE 22 UNITS: 100 INJECTION, SOLUTION SUBCUTANEOUS at 09:49

## 2022-10-26 RX ADMIN — EZETIMIBE 10 MG: 10 TABLET ORAL at 09:49

## 2022-10-26 ASSESSMENT — PAIN SCALES - GENERAL: PAINLEVEL_OUTOF10: 0

## 2022-10-26 NOTE — PROGRESS NOTES
Discharge instructions discussed with Pt. IV removed without complications. Dry dressing applied. Pt ambulatory for discharge.

## 2022-10-26 NOTE — PLAN OF CARE
Problem: Discharge Planning  Goal: Discharge to home or other facility with appropriate resources  10/26/2022 1217 by Meenu Roberts RN  Outcome: Completed  10/26/2022 1026 by Meenu Roberts RN  Outcome: Progressing  10/26/2022 0305 by Charito Orellana RN  Outcome: Progressing  Flowsheets (Taken 10/26/2022 0305)  Discharge to home or other facility with appropriate resources:   Identify barriers to discharge with patient and caregiver   Identify discharge learning needs (meds, wound care, etc)     Problem: Pain  Goal: Verbalizes/displays adequate comfort level or baseline comfort level  10/26/2022 1217 by Meenu Roberts RN  Outcome: Completed  10/26/2022 1026 by Meenu Roberts RN  Outcome: Progressing  10/26/2022 0305 by Charito Orellana RN  Outcome: Progressing  Flowsheets (Taken 10/26/2022 0305)  Verbalizes/displays adequate comfort level or baseline comfort level:   Encourage patient to monitor pain and request assistance   Administer analgesics based on type and severity of pain and evaluate response     Problem: ABCDS Injury Assessment  Goal: Absence of physical injury  10/26/2022 1217 by Meenu Roberts RN  Outcome: Completed  10/26/2022 1026 by Meenu Roberts RN  Outcome: Progressing     Problem: Gastrointestinal - Adult  Goal: Minimal or absence of nausea and vomiting  10/26/2022 1217 by Meenu Roberts RN  Outcome: Completed  10/26/2022 1026 by Meenu Roberts RN  Outcome: Progressing  Goal: Maintains or returns to baseline bowel function  10/26/2022 1217 by Meenu Roberts RN  Outcome: Completed  10/26/2022 1026 by Meenu Roberts RN  Outcome: Progressing  Goal: Maintains adequate nutritional intake  10/26/2022 1217 by Meenu Roberts RN  Outcome: Completed  10/26/2022 1026 by Meenu Roberts RN  Outcome: Progressing

## 2022-10-26 NOTE — PLAN OF CARE
Problem: Discharge Planning  Goal: Discharge to home or other facility with appropriate resources  Outcome: Progressing  Flowsheets (Taken 10/26/2022 0305)  Discharge to home or other facility with appropriate resources:   Identify barriers to discharge with patient and caregiver   Identify discharge learning needs (meds, wound care, etc)     Problem: Pain  Goal: Verbalizes/displays adequate comfort level or baseline comfort level  Outcome: Progressing  Flowsheets (Taken 10/26/2022 0305)  Verbalizes/displays adequate comfort level or baseline comfort level:   Encourage patient to monitor pain and request assistance   Administer analgesics based on type and severity of pain and evaluate response

## 2022-10-26 NOTE — DISCHARGE SUMMARY
Hospital Medicine Discharge Summary    Patient ID: Delta Mcduffie      Patient's PCP: CEASAR Hardwick    Admit Date: 10/24/2022     Discharge Date:   10/26/2022    Admitting Physician: Eitan Floyd DO     Discharge Physician: Melida Temple MD     Discharge Diagnoses: Active Hospital Problems    Diagnosis Date Noted    Intractable nausea and vomiting [R11.2] 08/18/2021       The patient was seen and examined on day of discharge and this discharge summary is in conjunction with any daily progress note from day of discharge. Hospital Course: 65 y.o. female with PMHx of DM, HTN, HLD, cannabis hyperemesis syndrome and diabetic gastroparesis presented to [facility] with 4 day history of  nausea and  vomiting. Pt reports unable to keep down food or fluids in past 4 days. She is concerned about being dehydrated. She denies fever, chills or body aches. No diarrhea. Reports constipated stool for the last 2 days. Symptoms consistent with previous episodes of gastroparesis. + lightheadedness and weakness. No syncope. No chest pain or shortness of breath. Intractable nausea and vomiting - improved  - hx diabetes gastroparesis and cannabis hyperemesis  - advised cessation of smoking marijuana  - encouraged frequent, small meals  - continue IV fluids  - anti-emetics prn  - clear liquid diet, adv as leslie; able to tolerate low fat diet     Diabetes mellitus II   - Lantus, SSI and CCD     Essential (primary) hypertension   - monitor blood pressure  - continue home meds      Hyperlipidemia   - continue statin     Obesity - BMI 34.08  -nutritional counseling provided  -weight loss encouraged  -complicating medical management      Exam:     /77   Pulse 65   Temp 97.8 °F (36.6 °C) (Oral)   Resp 16   Ht 5' 7\" (1.702 m)   Wt 221 lb 9 oz (100.5 kg)   SpO2 99%   BMI 34.70 kg/m²       General appearance:  No apparent distress, appears stated age and cooperative.   HEENT:  Normal cephalic, atraumatic without obvious deformity. Pupils equal, round, and reactive to light. Extra ocular muscles intact. Conjunctivae/corneas clear. Neck: Supple, with full range of motion. No jugular venous distention. Trachea midline. Respiratory:  Normal respiratory effort. Clear to auscultation, bilaterally without Rales/Wheezes/Rhonchi. Cardiovascular:  Regular rate and rhythm with normal S1/S2 without murmurs, rubs or gallops. Abdomen: Soft, non-tender, non-distended with normal bowel sounds. Musculoskeletal:  No clubbing, cyanosis or edema bilaterally. Full range of motion without deformity. Skin: Skin color, texture, turgor normal.  No rashes or lesions. Neurologic:  Neurovascularly intact without any focal sensory/motor deficits. Cranial nerves: II-XII intact, grossly non-focal.  Psychiatric:  Alert and oriented, thought content appropriate, normal insight  Capillary Refill: Brisk,< 3 seconds   Peripheral Pulses: +2 palpable, equal bilaterally       Labs: For convenience and continuity at follow-up the following most recent labs are provided:      CBC:    Lab Results   Component Value Date/Time    WBC 13.2 10/25/2022 05:43 AM    HGB 14.8 10/25/2022 05:43 AM    HCT 45.4 10/25/2022 05:43 AM     10/25/2022 05:43 AM       Renal:    Lab Results   Component Value Date/Time     10/25/2022 05:43 AM    K 3.4 10/25/2022 05:43 AM    CL 97 10/25/2022 05:43 AM    CO2 27 10/25/2022 05:43 AM    BUN 15 10/25/2022 05:43 AM    CREATININE 0.8 10/25/2022 05:43 AM    CALCIUM 9.2 10/25/2022 05:43 AM    PHOS 4.0 09/11/2022 12:44 AM         Significant Diagnostic Studies    Radiology:   CT ABDOMEN PELVIS W IV CONTRAST Additional Contrast? None   Final Result   1. Mild diffuse wall thickening of the distal esophagus, which can be seen in   esophagitis including reflux esophagitis. 2.  Questioned subtle wedge-shaped hypoattenuation of the left upper pole   kidney, which can be seen in pyelonephritis.   No significant perinephric   stranding. Correlate with urinalysis. Consults:     None    Disposition:  home     Condition at Discharge: Stable    Discharge Instructions/Follow-up:  meds as prescribed, follow-up with PCP    Code Status:  Full Code     Activity: activity as tolerated    Diet: diabetic diet      Discharge Medications:     Current Discharge Medication List             Details   polyethylene glycol (GLYCOLAX) 17 g packet Take 17 g by mouth daily as needed for Constipation      insulin glargine (LANTUS) 100 UNIT/ML injection vial Inject 22 Units into the skin daily      ketorolac (TORADOL) 10 MG tablet Take 1 tablet by mouth every 6 hours as needed for Pain  Qty: 20 tablet, Refills: 0      acetaminophen (TYLENOL) 500 MG tablet Take 2 tablets by mouth 3 times daily as needed for Pain  Qty: 180 tablet, Refills: 0      albuterol sulfate HFA (VENTOLIN HFA) 108 (90 Base) MCG/ACT inhaler Inhale 2 puffs into the lungs 4 times daily as needed for Wheezing or Shortness of Breath  Qty: 18 g, Refills: 0      ondansetron (ZOFRAN ODT) 4 MG disintegrating tablet Take 1-2 tablets by mouth every 12 hours as needed for Nausea or Vomiting May Sub regular tablet (non-ODT) if insurance does not cover ODT. Qty: 30 tablet, Refills: 0      omeprazole (PRILOSEC) 40 MG delayed release capsule Take 40 mg by mouth daily      lisinopril (PRINIVIL;ZESTRIL) 10 MG tablet Take 10 mg by mouth daily      ezetimibe (ZETIA) 10 MG tablet Take 10 mg by mouth daily      amLODIPine (NORVASC) 5 MG tablet Take 5 mg by mouth daily      insulin lispro (HUMALOG) 100 UNIT/ML injection vial Inject into the skin 3 times daily (before meals) Per sliding scale.  2 units >150 then additional 2 units for every 50      aspirin 81 MG tablet Take 81 mg by mouth daily      timolol (TIMOPTIC) 0.5 % ophthalmic solution Place 1 drop into both eyes daily       ergocalciferol (DRISDOL) 97421 UNITS capsule Take 1 capsule by mouth once a week  Qty: 13 capsule, Refills: 1      latanoprost (XALATAN) 0.005 % ophthalmic solution Place 1 drop into both eyes nightly. Time Spent on discharge is more than 30 minutes in the examination, evaluation, counseling and review of medications and discharge plan. Signed:    Evelyn Shannon MD   10/26/2022      Thank you CEASAR Shahid for the opportunity to be involved in this patient's care. If you have any questions or concerns please feel free to contact me at [phone].

## 2022-10-26 NOTE — PLAN OF CARE
Problem: Discharge Planning  Goal: Discharge to home or other facility with appropriate resources  10/26/2022 1026 by Supriya Antunez RN  Outcome: Progressing  10/26/2022 0305 by Falguni Sullivan RN  Outcome: Progressing  Flowsheets (Taken 10/26/2022 0305)  Discharge to home or other facility with appropriate resources:   Identify barriers to discharge with patient and caregiver   Identify discharge learning needs (meds, wound care, etc)     Problem: Pain  Goal: Verbalizes/displays adequate comfort level or baseline comfort level  10/26/2022 1026 by Supriya Antunez RN  Outcome: Progressing  10/26/2022 0305 by Falguni Sullivan RN  Outcome: Progressing  Flowsheets (Taken 10/26/2022 0305)  Verbalizes/displays adequate comfort level or baseline comfort level:   Encourage patient to monitor pain and request assistance   Administer analgesics based on type and severity of pain and evaluate response     Problem: ABCDS Injury Assessment  Goal: Absence of physical injury  Outcome: Progressing     Problem: Gastrointestinal - Adult  Goal: Minimal or absence of nausea and vomiting  Outcome: Progressing  Goal: Maintains or returns to baseline bowel function  Outcome: Progressing  Goal: Maintains adequate nutritional intake  Outcome: Progressing

## 2022-11-25 ENCOUNTER — HOSPITAL ENCOUNTER (EMERGENCY)
Age: 62
Discharge: HOME | End: 2022-11-25
Attending: EMERGENCY MEDICINE
Payer: COMMERCIAL

## 2022-11-25 VITALS
OXYGEN SATURATION: 98 % | RESPIRATION RATE: 14 BRPM | BODY MASS INDEX: 34.71 KG/M2 | TEMPERATURE: 98.2 F | HEIGHT: 67 IN | HEART RATE: 79 BPM | WEIGHT: 221.12 LBS | SYSTOLIC BLOOD PRESSURE: 139 MMHG | DIASTOLIC BLOOD PRESSURE: 61 MMHG

## 2022-11-25 DIAGNOSIS — N30.00 ACUTE CYSTITIS WITHOUT HEMATURIA: ICD-10-CM

## 2022-11-25 DIAGNOSIS — R11.2 NAUSEA AND VOMITING, UNSPECIFIED VOMITING TYPE: Primary | ICD-10-CM

## 2022-11-25 DIAGNOSIS — R10.84 GENERALIZED ABDOMINAL PAIN: ICD-10-CM

## 2022-11-25 LAB
A/G RATIO: 1.2 (ref 1.1–2.2)
ALBUMIN SERPL-MCNC: 4.5 G/DL (ref 3.4–5)
ALP BLD-CCNC: 144 U/L (ref 40–129)
ALT SERPL-CCNC: 20 U/L (ref 10–40)
ANION GAP SERPL CALCULATED.3IONS-SCNC: 16 MMOL/L (ref 3–16)
AST SERPL-CCNC: 17 U/L (ref 15–37)
BACTERIA: ABNORMAL /HPF
BASE EXCESS VENOUS: 9.3 MMOL/L
BASOPHILS ABSOLUTE: 0.1 K/UL (ref 0–0.2)
BASOPHILS RELATIVE PERCENT: 0.6 %
BETA-HYDROXYBUTYRATE: 0.17 MMOL/L (ref 0–0.27)
BILIRUB SERPL-MCNC: 0.6 MG/DL (ref 0–1)
BILIRUBIN URINE: ABNORMAL
BLOOD, URINE: NEGATIVE
BUN BLDV-MCNC: 22 MG/DL (ref 7–20)
CALCIUM SERPL-MCNC: 9.9 MG/DL (ref 8.3–10.6)
CARBOXYHEMOGLOBIN: 2.9 %
CHLORIDE BLD-SCNC: 87 MMOL/L (ref 99–110)
CLARITY: ABNORMAL
CO2: 30 MMOL/L (ref 21–32)
COARSE CASTS, UA: ABNORMAL /LPF (ref 0–2)
COLOR: ABNORMAL
COMMENT UA: ABNORMAL
CREAT SERPL-MCNC: 1 MG/DL (ref 0.6–1.2)
EOSINOPHILS ABSOLUTE: 0 K/UL (ref 0–0.6)
EOSINOPHILS RELATIVE PERCENT: 0 %
EPITHELIAL CELLS, UA: ABNORMAL /HPF (ref 0–5)
GFR SERPL CREATININE-BSD FRML MDRD: >60 ML/MIN/{1.73_M2}
GLUCOSE BLD-MCNC: 343 MG/DL (ref 70–99)
GLUCOSE BLD-MCNC: 345 MG/DL (ref 70–99)
GLUCOSE URINE: 500 MG/DL
HCO3 VENOUS: 35 MMOL/L (ref 23–29)
HCT VFR BLD CALC: 46.6 % (ref 36–48)
HEMOGLOBIN: 15.2 G/DL (ref 12–16)
HYALINE CASTS: ABNORMAL /LPF (ref 0–2)
KETONES, URINE: ABNORMAL MG/DL
LACTIC ACID: 2.1 MMOL/L (ref 0.4–2)
LACTIC ACID: 3.2 MMOL/L (ref 0.4–2)
LEUKOCYTE ESTERASE, URINE: ABNORMAL
LIPASE: 54 U/L (ref 13–60)
LYMPHOCYTES ABSOLUTE: 3 K/UL (ref 1–5.1)
LYMPHOCYTES RELATIVE PERCENT: 21.2 %
MCH RBC QN AUTO: 25.9 PG (ref 26–34)
MCHC RBC AUTO-ENTMCNC: 32.6 G/DL (ref 31–36)
MCV RBC AUTO: 79.5 FL (ref 80–100)
METHEMOGLOBIN VENOUS: 0.1 %
MICROSCOPIC EXAMINATION: YES
MONOCYTES ABSOLUTE: 0.8 K/UL (ref 0–1.3)
MONOCYTES RELATIVE PERCENT: 6 %
NEUTROPHILS ABSOLUTE: 10.2 K/UL (ref 1.7–7.7)
NEUTROPHILS RELATIVE PERCENT: 72.2 %
NITRITE, URINE: NEGATIVE
O2 SAT, VEN: 71 %
O2 THERAPY: ABNORMAL
PCO2, VEN: 47.9 MMHG (ref 40–50)
PDW BLD-RTO: 14.7 % (ref 12.4–15.4)
PERFORMED ON: ABNORMAL
PH UA: 5 (ref 5–8)
PH VENOUS: 7.47 (ref 7.35–7.45)
PLATELET # BLD: 293 K/UL (ref 135–450)
PMV BLD AUTO: 7.9 FL (ref 5–10.5)
PO2, VEN: 36 MMHG
POTASSIUM REFLEX MAGNESIUM: 3.7 MMOL/L (ref 3.5–5.1)
PROTEIN UA: 300 MG/DL
RBC # BLD: 5.86 M/UL (ref 4–5.2)
RBC UA: ABNORMAL /HPF (ref 0–4)
SODIUM BLD-SCNC: 133 MMOL/L (ref 136–145)
SPECIFIC GRAVITY UA: 1.03 (ref 1–1.03)
TCO2 CALC VENOUS: 36 MMOL/L
TOTAL PROTEIN: 8.3 G/DL (ref 6.4–8.2)
URINE REFLEX TO CULTURE: ABNORMAL
URINE TYPE: ABNORMAL
UROBILINOGEN, URINE: 1 E.U./DL
WBC # BLD: 14.1 K/UL (ref 4–11)
WBC UA: 7 /HPF (ref 0–5)

## 2022-11-25 PROCEDURE — 96376 TX/PRO/DX INJ SAME DRUG ADON: CPT

## 2022-11-25 PROCEDURE — 83690 ASSAY OF LIPASE: CPT

## 2022-11-25 PROCEDURE — 82803 BLOOD GASES ANY COMBINATION: CPT

## 2022-11-25 PROCEDURE — 2580000003 HC RX 258: Performed by: EMERGENCY MEDICINE

## 2022-11-25 PROCEDURE — 2500000003 HC RX 250 WO HCPCS: Performed by: EMERGENCY MEDICINE

## 2022-11-25 PROCEDURE — 85025 COMPLETE CBC W/AUTO DIFF WBC: CPT

## 2022-11-25 PROCEDURE — 81001 URINALYSIS AUTO W/SCOPE: CPT

## 2022-11-25 PROCEDURE — 96365 THER/PROPH/DIAG IV INF INIT: CPT

## 2022-11-25 PROCEDURE — 6370000000 HC RX 637 (ALT 250 FOR IP): Performed by: EMERGENCY MEDICINE

## 2022-11-25 PROCEDURE — 83605 ASSAY OF LACTIC ACID: CPT

## 2022-11-25 PROCEDURE — 96375 TX/PRO/DX INJ NEW DRUG ADDON: CPT

## 2022-11-25 PROCEDURE — 80053 COMPREHEN METABOLIC PANEL: CPT

## 2022-11-25 PROCEDURE — 99284 EMERGENCY DEPT VISIT MOD MDM: CPT

## 2022-11-25 PROCEDURE — 82010 KETONE BODYS QUAN: CPT

## 2022-11-25 PROCEDURE — 96372 THER/PROPH/DIAG INJ SC/IM: CPT

## 2022-11-25 PROCEDURE — 6360000002 HC RX W HCPCS: Performed by: EMERGENCY MEDICINE

## 2022-11-25 RX ORDER — ONDANSETRON 4 MG/1
4-8 TABLET, ORALLY DISINTEGRATING ORAL EVERY 12 HOURS PRN
Qty: 30 TABLET | Refills: 0 | Status: SHIPPED | OUTPATIENT
Start: 2022-11-25

## 2022-11-25 RX ORDER — INSULIN GLARGINE 100 [IU]/ML
10 INJECTION, SOLUTION SUBCUTANEOUS ONCE
Status: COMPLETED | OUTPATIENT
Start: 2022-11-25 | End: 2022-11-25

## 2022-11-25 RX ORDER — 0.9 % SODIUM CHLORIDE 0.9 %
1000 INTRAVENOUS SOLUTION INTRAVENOUS ONCE
Status: COMPLETED | OUTPATIENT
Start: 2022-11-25 | End: 2022-11-25

## 2022-11-25 RX ORDER — DIPHENHYDRAMINE HYDROCHLORIDE 50 MG/ML
25 INJECTION INTRAMUSCULAR; INTRAVENOUS ONCE
Status: DISCONTINUED | OUTPATIENT
Start: 2022-11-25 | End: 2022-11-25

## 2022-11-25 RX ORDER — FLUCONAZOLE 150 MG/1
150 TABLET ORAL ONCE
Qty: 1 TABLET | Refills: 0 | Status: SHIPPED | OUTPATIENT
Start: 2022-11-25 | End: 2022-11-25

## 2022-11-25 RX ORDER — ONDANSETRON 2 MG/ML
4 INJECTION INTRAMUSCULAR; INTRAVENOUS ONCE
Status: COMPLETED | OUTPATIENT
Start: 2022-11-25 | End: 2022-11-25

## 2022-11-25 RX ORDER — DOXYCYCLINE HYCLATE 100 MG
100 TABLET ORAL 2 TIMES DAILY
Qty: 14 TABLET | Refills: 0 | Status: SHIPPED | OUTPATIENT
Start: 2022-11-25 | End: 2022-12-02

## 2022-11-25 RX ORDER — FLUCONAZOLE 100 MG/1
200 TABLET ORAL ONCE
Status: COMPLETED | OUTPATIENT
Start: 2022-11-25 | End: 2022-11-25

## 2022-11-25 RX ADMIN — DOXYCYCLINE 100 MG: 100 INJECTION, POWDER, LYOPHILIZED, FOR SOLUTION INTRAVENOUS at 20:40

## 2022-11-25 RX ADMIN — SODIUM CHLORIDE 1000 ML: 9 INJECTION, SOLUTION INTRAVENOUS at 18:22

## 2022-11-25 RX ADMIN — INSULIN GLARGINE 10 UNITS: 100 INJECTION, SOLUTION SUBCUTANEOUS at 20:40

## 2022-11-25 RX ADMIN — FLUCONAZOLE 200 MG: 100 TABLET ORAL at 22:19

## 2022-11-25 RX ADMIN — ONDANSETRON 4 MG: 2 INJECTION INTRAMUSCULAR; INTRAVENOUS at 21:26

## 2022-11-25 RX ADMIN — ONDANSETRON 4 MG: 2 INJECTION INTRAMUSCULAR; INTRAVENOUS at 18:21

## 2022-11-25 ASSESSMENT — LIFESTYLE VARIABLES
HOW OFTEN DO YOU HAVE A DRINK CONTAINING ALCOHOL: NEVER
HOW MANY STANDARD DRINKS CONTAINING ALCOHOL DO YOU HAVE ON A TYPICAL DAY: PATIENT DOES NOT DRINK

## 2022-11-25 ASSESSMENT — PAIN SCALES - GENERAL: PAINLEVEL_OUTOF10: 8

## 2022-11-25 ASSESSMENT — PAIN DESCRIPTION - LOCATION: LOCATION: GENERALIZED

## 2022-11-25 ASSESSMENT — PAIN DESCRIPTION - DESCRIPTORS: DESCRIPTORS: CRAMPING

## 2022-11-25 ASSESSMENT — PAIN - FUNCTIONAL ASSESSMENT: PAIN_FUNCTIONAL_ASSESSMENT: 0-10

## 2022-11-26 NOTE — ED PROVIDER NOTES
CHIEF COMPLAINT  Abdominal Pain (C/o of abd pain with nausea and vomiting since Tuesday night. Pt states it is foul smelling vomit. States that she is not able to keep anything down or take her usual meds. The pt states d/t dehydration she is very weak. )      HISTORY OF PRESENT ILLNESS  Peter Thomas is a 65 y.o. female who  has a past medical history of Acid reflux, SHAY (acute kidney injury) ([address] Anxiety, ASCUS (atypical squamous cells of undetermined significance) on Pap smear, Asthma, Chronic midline low back pain with bilateral sciatica, Chronic midline low back pain with bilateral sciatica, Diabetes mellitus, type 2 ([address] Disease of blood and blood forming organ, Gastroparesis, Hirsutism, History of blood transfusion, Hyperlipidemia LDL goal < 70, Hypertension, Major depressive disorder with single episode, Pancreatitis, Seasonal allergic rhinitis due to pollen, Sleep apnea, Thyroid disease, and Vitamin D deficiency. presents to the ED complaining of \generalized abdominal pain with associated nausea and vomiting has been present over the past 3 days. Patient states that her emesis has a foul smell to it. States has been unable to keep anything down over the past 2 days and feels that she may be dehydrated. States she is also not been taking her medications because of her vomiting. Denies any documented fevers. States her abdominal pain is a cramping sensation that is diffuse. States the pain is intermittent and gets worse with vomiting better with resting. No urinary complaints. Normal bowel movements. Patient states she has frequent episodes of nausea and vomiting similar to this that she comes to the emergency room for. No other complaints, modifying factors or associated symptoms. Pt was seen during the [place] 19 pandemic. Appropriate PPE worn by ME during patient encounters.  Patient was cared for during a time with constrained hospital bed capacity with nationwide stress on resources and staffing. Nursing notes reviewed.    Past Medical History:   Diagnosis Date    Acid reflux     SHAY (acute kidney injury) ([facility] [address] 10/15/2021    Anxiety     ASCUS (atypical squamous cells of undetermined significance) on Pap smear 2013    Asthma     Chronic midline low back pain with bilateral sciatica 11/10/2016    Chronic midline low back pain with bilateral sciatica     Diabetes mellitus, type 2 ([facility] [address]     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    Gastroparesis     Hirsutism     History of blood transfusion     Hyperlipidemia LDL goal < 70     Hypertension     Major depressive disorder with single episode 2009    Pancreatitis     Seasonal allergic rhinitis due to pollen 11/10/2016    Sleep apnea     Thyroid disease     Vitamin D deficiency      Past Surgical History:   Procedure Laterality Date     SECTION      ENDOMETRIAL ABLATION  5/3/12    ENDOSCOPY, COLON, DIAGNOSTIC      HYSTEROSCOPY  5/3/12    MANDIBLE SURGERY      SKIN BIOPSY      TUBAL LIGATION      UPPER GASTROINTESTINAL ENDOSCOPY N/A 2021    EGD BIOPSY performed by Teresa Chou MD at [address] History   Problem Relation Age of Onset    Hypertension Mother     Breast Cancer Mother     Colon Cancer Mother     Cancer Father         Pancreatic    Prostate Cancer Father     No Known Problems Brother     No Known Problems Sister     Hypertension Maternal Aunt     Cancer Maternal Uncle     Hypertension Maternal Grandmother     Stroke Maternal Grandmother     Cancer Maternal Grandfather     No Known Problems Paternal Aunt     No Known Problems Paternal Uncle     No Known Problems Paternal Grandmother     No Known Problems Paternal Grandfather     No Known Problems Other     Rheum Arthritis Neg Hx     Osteoarthritis Neg Hx     Asthma Neg Hx     Diabetes Neg Hx     Heart Failure Neg Hx     High Cholesterol Neg Hx     Migraines Neg Hx     Ovarian Cancer Neg Hx     Rashes/Skin Problems Neg Hx Seizures Neg Hx     Thyroid Disease Neg Hx      Social History     Socioeconomic History    Marital status:      Spouse name: Not on file    Number of children: 3    Years of education: Not on file    Highest education level: Not on file   Occupational History    Occupation:    Tobacco Use    Smoking status: Former     Types: Cigarettes     Quit date: 1990     Years since quittin.8    Smokeless tobacco: Never   Vaping Use    Vaping Use: Never used   Substance and Sexual Activity    Alcohol use: Yes     Alcohol/week: 0.0 standard drinks     Comment: less than once a month    Drug use: Yes     Frequency: 2.0 times per week     Types: Marijuana [place])     Comment: used yesterday    Sexual activity: Not Currently     Partners: Male   Other Topics Concern    Not on file   Social History Narrative    Not on file     Social Determinants of Health     Financial Resource Strain: Not on file   Food Insecurity: Not on file   Transportation Needs: Not on file   Physical Activity: Not on file   Stress: Not on file   Social Connections: Not on file   Intimate Partner Violence: Not on file   Housing Stability: Not on file     Current Facility-Administered Medications   Medication Dose Route Frequency Provider Last Rate Last Admin    fluconazole (DIFLUCAN) tablet 200 mg  200 mg Oral Once Neftali Barnes MD         Current Outpatient Medications   Medication Sig Dispense Refill    ondansetron (ZOFRAN ODT) 4 MG disintegrating tablet Take 1-2 tablets by mouth every 12 hours as needed for Nausea or Vomiting May Sub regular tablet (non-ODT) if insurance does not cover ODT. 30 tablet 0    doxycycline hyclate (VIBRA-TABS) 100 MG tablet Take 1 tablet by mouth 2 times daily for 7 days 14 tablet 0    fluconazole (DIFLUCAN) 150 MG tablet Take 1 tablet by mouth once for 1 dose Take this dose of Diflucan after you finish your course of antibiotics.  1 tablet 0    polyethylene glycol (GLYCOLAX) 17 g packet Take 17 g by mouth daily as needed for Constipation      insulin glargine (LANTUS) 100 UNIT/ML injection vial Inject 22 Units into the skin daily      ketorolac (TORADOL) 10 MG tablet Take 1 tablet by mouth every 6 hours as needed for Pain 20 tablet 0    acetaminophen (TYLENOL) 500 MG tablet Take 2 tablets by mouth 3 times daily as needed for Pain 180 tablet 0    albuterol sulfate HFA (VENTOLIN HFA) 108 (90 Base) MCG/ACT inhaler Inhale 2 puffs into the lungs 4 times daily as needed for Wheezing or Shortness of Breath 18 g 0    omeprazole (PRILOSEC) 40 MG delayed release capsule Take 40 mg by mouth daily      lisinopril (PRINIVIL;ZESTRIL) 10 MG tablet Take 10 mg by mouth daily      ezetimibe (ZETIA) 10 MG tablet Take 10 mg by mouth daily      amLODIPine (NORVASC) 5 MG tablet Take 5 mg by mouth daily      insulin lispro (HUMALOG) 100 UNIT/ML injection vial Inject into the skin 3 times daily (before meals) Per sliding scale. 2 units >150 then additional 2 units for every 50      aspirin 81 MG tablet Take 81 mg by mouth daily      timolol (TIMOPTIC) 0.5 % ophthalmic solution Place 1 drop into both eyes daily       ergocalciferol (DRISDOL) 80171 UNITS capsule Take 1 capsule by mouth once a week 13 capsule 1    latanoprost (XALATAN) 0.005 % ophthalmic solution Place 1 drop into both eyes nightly.        Allergies   Allergen Reactions    Avelox [Moxifloxacin] Other (See Comments)     Weakness, tingling, tingling mouth    Bactrim      Remote h/o diarrhea, swollen eyes, and weakness, tachycardia and tongue swelling    Cefuroxime      Pt does not recall what happened with this    Codeine     Medrol [Methylprednisolone] Swelling     Ok to take nasal steroids    Morphine     Niacin Other (See Comments)    Oat      Throat swells    Percocet [Oxycodone-Acetaminophen]     Reglan [Metoclopramide] Other (See Comments)     States has something to do with a mass unknown reaction     Spironolactone      Increased potassium    Statins      Muscle cramps    Sulfamethoxazole-Trimethoprim Swelling    Vicodin [Hydrocodone-Acetaminophen]          REVIEW OF SYSTEMS  (up to 7 for level 4, 8 or more for level 5) pertinent positives per HPI otherwise noted to be negative    PHYSICAL EXAM  /61   Pulse 79   Temp 98.2 °F (36.8 °C) (Oral)   Resp 14   Ht 5' 7\" (1.702 m)   Wt 221 lb 1.9 oz (100.3 kg)   SpO2 98%   BMI 34.63 kg/m²      CONSTITUTIONAL: AOx4, cooperative with exam, afebrile   HEAD: normocephalic, atraumatic   EYES: PERRL, EOMI, anicteric sclera   ENT: Moist mucous membranes, uvula midline   LUNGS: Bilateral breath sounds, CTAB, no rales/ronchi/wheezes   CARDIOVASCULAR: RRR, normal S1/S2, no m/r/g, 2+ pulses throughout   ABDOMEN: Soft, generalized abdominal tenderness, no point tenderness, no rebound tenderness or guarding, no rigidity, non-distended, +BS   NEUROLOGIC:  MAEx4, 5/5 strength throughout; fine touch sensation intact throughout; normal gait; finger-to-nose testing normal;   MUSCULOSKELETAL: No clubbing, cyanosis or edema   SKIN: No rash, pallor or wounds on exposed surfaces         RADIOLOGY  X-RAYS:  I have reviewed radiologic plain film image(s). ALL OTHER NON-PLAIN FILM IMAGES SUCH AS CT, ULTRASOUND AND MRI HAVE BEEN READ BY THE RADIOLOGIST. No orders to display          EKG INTERPRETATION  None    PROCEDURES      ED COURSE/MDM  GERD, gastritis, PUD, gastroenteritis, hyperemesis, cannabinoid induced hyperemesis, UTI, other  Patient seen and evaluated. History and physical as above. Nontoxic, afebrile. Patient presents with complaints of nausea and vomiting over the past few days. Patient having abdominal cramping that is generalized as well. No peritoneal signs on abdominal exam.  Has frequent episodes of nausea and vomiting similar to this when she comes the emergency room for. ED Course as of 11/25/22 2215 Fri Nov 25, 2022 2005 Patient's white count elevated at 14.1. Hemoglobin 15.2. Creatinine 1.0 with BUN of 22. Sodium 133 but normal when corrected with serum glucose of 343. Potassium 3.7, chloride 87. CO2 of 30 and anion gap of 16. Lactic acid elevated 3.2 although suspicion is that this is from her vomiting and not an infectious cause. Urine does have some signs of infection with 1+ bacteria and 7 WBCs with trace leukocyte Estrace. Beta hydroxybutyrate 0.7.  0.17. Lipase 54. Will start patient on doxycycline secondary to her allergies to cephalosporins. Also will provide 10 units of long-acting insulin as she has not taken her nightly dose and also has not had it in a few days. Plan for oral challenge. Patient agreeable. [DS]   2149 Patient tolerating oral intake. Repeat lactic acid 2.1. Feeling better after fluids and Zofran. Plan for discharge with outpatient follow-up. Will discharge with doxycycline for UTI, Zofran and Diflucan because patient states she gets yeast infections with the antibiotics. Return instruction provided. Questions answered prior to discharge. [DS]      ED Course User Index  [DS] James Rosenthal MD       Is this patient to be included in the SEP-1 Core Measure due to severe sepsis or septic shock? No   Exclusion criteria - the patient is NOT to be included for SEP-1 Core Measure due to:  2+ SIRS criteria are not met      I estimate there is LOW risk for ACUTE APPENDICITIS, BOWEL OBSTRUCTION, CHOLECYSTITIS, DIVERTICULITIS, INCARCERATED HERNIA, PANCREATITIS, PELVIC INFLAMMATORY DISEASE, PERFORATED BOWEL or ULCER, PREGNANCY, or TUBO-OVARIAN ABSCESS, thus I consider the discharge disposition reasonable. Also, there is no evidence or peritonitis, sepsis, or toxicity. Brayden Sensor and I have discussed the diagnosis and risks, and we agree with discharging home to follow-up with their primary doctor. We also discussed returning to the Emergency Department immediately if new or worsening symptoms occur.  We have discussed the symptoms which are most concerning (e.g., bloody stool, fever, changing or worsening pain, vomiting) that necessitate immediate return. Patient was given scripts for the following medications. I counseled patient how to take these medications. New Prescriptions    DOXYCYCLINE HYCLATE (VIBRA-TABS) 100 MG TABLET    Take 1 tablet by mouth 2 times daily for 7 days    FLUCONAZOLE (DIFLUCAN) 150 MG TABLET    Take 1 tablet by mouth once for 1 dose Take this dose of Diflucan after you finish your course of antibiotics. CLINICAL IMPRESSION  1. Nausea and vomiting, unspecified vomiting type    2. Generalized abdominal pain    3. Acute cystitis without hematuria        Blood pressure 139/61, pulse 79, temperature 98.2 °F (36.8 °C), temperature source Oral, resp. rate 14, height 5' 7\" (1.702 m), weight 221 lb 1.9 oz (100.3 kg), SpO2 98 %, not currently breastfeeding. DISPOSITION  Patient was discharged to home in good condition. CEASAR Israel  [address] [place] [address]  [phone]    Call today  For a follow up appointment. Disclaimer: All medical record entries made by [employer] dictation.       (Please note that this note was completed with a voice recognition program. Every attempt was made to edit the dictations, but inevitably there remain words that are mis-transcribed.)            Enma Kiran MD  11/25/22 8615       Enma Kiran MD  11/25/22 [facility] [address], [place]  11/25/22 6124

## 2022-11-26 NOTE — DISCHARGE INSTRUCTIONS
Call today or tomorrow to follow up with [facility]  in 3-4 days. Take your medication as prescribed. Avoid drinking alcohol or drinks that have caffeine it. Drink plenty of water or fluids like Gatorade. Return to the Emergency Department for worsening of nausea or vomiting, fever > 101.5, abdominal pain, blood in stool, vomiting blood, unable to tolerate oral fluids, continue to have vomiting for more than 24 hours, any other care or concern.

## 2022-11-26 NOTE — ED NOTES
Patient would like something for nausea .  Karthik parrish notified     Janae Lake RN  11/25/22 2114

## 2022-11-26 NOTE — ED NOTES
Discharge and education instructions reviewed. Patient verbalized understanding, teach-back successful. Patient denied questions at this time. No acute distress noted. Patient instructed to follow-up as noted - return to emergency department if symptoms worsen. Patient verbalized understanding. Discharged per EDMD with discharge instructions.           Candy Enciso RN  11/25/22 0556

## 2023-01-19 ENCOUNTER — HOSPITAL ENCOUNTER (INPATIENT)
Age: 63
LOS: 3 days | Discharge: HOME | DRG: 073 | End: 2023-01-23
Attending: EMERGENCY MEDICINE | Admitting: INTERNAL MEDICINE
Payer: COMMERCIAL

## 2023-01-19 ENCOUNTER — APPOINTMENT (OUTPATIENT)
Dept: CT IMAGING | Age: 63
DRG: 073 | End: 2023-01-19
Payer: COMMERCIAL

## 2023-01-19 DIAGNOSIS — R03.0 ELEVATED BLOOD PRESSURE READING: ICD-10-CM

## 2023-01-19 DIAGNOSIS — R73.9 HYPERGLYCEMIA: ICD-10-CM

## 2023-01-19 DIAGNOSIS — R78.89 ELEVATED BETA-HYDROXYBUTYRATE: ICD-10-CM

## 2023-01-19 DIAGNOSIS — N17.9 AKI (ACUTE KIDNEY INJURY) (HCC): Primary | ICD-10-CM

## 2023-01-19 DIAGNOSIS — E86.0 DEHYDRATION: ICD-10-CM

## 2023-01-19 DIAGNOSIS — M62.838 SPASM OF MUSCLE: ICD-10-CM

## 2023-01-19 DIAGNOSIS — R11.2 NAUSEA AND VOMITING, UNSPECIFIED VOMITING TYPE: ICD-10-CM

## 2023-01-19 LAB
A/G RATIO: 1.2 (ref 1.1–2.2)
ALBUMIN SERPL-MCNC: 4.4 G/DL (ref 3.4–5)
ALP BLD-CCNC: 153 U/L (ref 40–129)
ALT SERPL-CCNC: 16 U/L (ref 10–40)
ANION GAP SERPL CALCULATED.3IONS-SCNC: 20 MMOL/L (ref 3–16)
AST SERPL-CCNC: 17 U/L (ref 15–37)
BASOPHILS ABSOLUTE: 0.1 K/UL (ref 0–0.2)
BASOPHILS RELATIVE PERCENT: 0.6 %
BETA-HYDROXYBUTYRATE: 0.36 MMOL/L (ref 0–0.27)
BILIRUB SERPL-MCNC: 0.6 MG/DL (ref 0–1)
BUN BLDV-MCNC: 32 MG/DL (ref 7–20)
CALCIUM SERPL-MCNC: 9.8 MG/DL (ref 8.3–10.6)
CHLORIDE BLD-SCNC: 86 MMOL/L (ref 99–110)
CO2: 27 MMOL/L (ref 21–32)
CREAT SERPL-MCNC: 1.5 MG/DL (ref 0.6–1.2)
EOSINOPHILS ABSOLUTE: 0 K/UL (ref 0–0.6)
EOSINOPHILS RELATIVE PERCENT: 0.1 %
GFR SERPL CREATININE-BSD FRML MDRD: 39 ML/MIN/{1.73_M2}
GLUCOSE BLD-MCNC: 269 MG/DL (ref 70–99)
HCT VFR BLD CALC: 49.1 % (ref 36–48)
HEMOGLOBIN: 15.7 G/DL (ref 12–16)
LACTIC ACID: 1.8 MMOL/L (ref 0.4–2)
LIPASE: 78 U/L (ref 13–60)
LYMPHOCYTES ABSOLUTE: 3.2 K/UL (ref 1–5.1)
LYMPHOCYTES RELATIVE PERCENT: 19.6 %
MCH RBC QN AUTO: 24.9 PG (ref 26–34)
MCHC RBC AUTO-ENTMCNC: 31.9 G/DL (ref 31–36)
MCV RBC AUTO: 77.9 FL (ref 80–100)
MONOCYTES ABSOLUTE: 1.1 K/UL (ref 0–1.3)
MONOCYTES RELATIVE PERCENT: 6.9 %
NEUTROPHILS ABSOLUTE: 11.8 K/UL (ref 1.7–7.7)
NEUTROPHILS RELATIVE PERCENT: 72.8 %
PDW BLD-RTO: 14.7 % (ref 12.4–15.4)
PLATELET # BLD: 285 K/UL (ref 135–450)
PMV BLD AUTO: 8.3 FL (ref 5–10.5)
POTASSIUM REFLEX MAGNESIUM: 3.8 MMOL/L (ref 3.5–5.1)
RAPID INFLUENZA  B AGN: NEGATIVE
RAPID INFLUENZA A AGN: NEGATIVE
RBC # BLD: 6.3 M/UL (ref 4–5.2)
SARS-COV-2, NAAT: NOT DETECTED
SODIUM BLD-SCNC: 133 MMOL/L (ref 136–145)
TOTAL PROTEIN: 8.1 G/DL (ref 6.4–8.2)
WBC # BLD: 16.2 K/UL (ref 4–11)

## 2023-01-19 PROCEDURE — 6360000002 HC RX W HCPCS: Performed by: EMERGENCY MEDICINE

## 2023-01-19 PROCEDURE — 85025 COMPLETE CBC W/AUTO DIFF WBC: CPT

## 2023-01-19 PROCEDURE — 82010 KETONE BODYS QUAN: CPT

## 2023-01-19 PROCEDURE — 6370000000 HC RX 637 (ALT 250 FOR IP): Performed by: EMERGENCY MEDICINE

## 2023-01-19 PROCEDURE — 87635 SARS-COV-2 COVID-19 AMP PRB: CPT

## 2023-01-19 PROCEDURE — 6360000004 HC RX CONTRAST MEDICATION: Performed by: EMERGENCY MEDICINE

## 2023-01-19 PROCEDURE — 83605 ASSAY OF LACTIC ACID: CPT

## 2023-01-19 PROCEDURE — 74177 CT ABD & PELVIS W/CONTRAST: CPT

## 2023-01-19 PROCEDURE — 87804 INFLUENZA ASSAY W/OPTIC: CPT

## 2023-01-19 PROCEDURE — 83690 ASSAY OF LIPASE: CPT

## 2023-01-19 PROCEDURE — 87040 BLOOD CULTURE FOR BACTERIA: CPT

## 2023-01-19 PROCEDURE — 80053 COMPREHEN METABOLIC PANEL: CPT

## 2023-01-19 PROCEDURE — 99285 EMERGENCY DEPT VISIT HI MDM: CPT

## 2023-01-19 PROCEDURE — 84145 PROCALCITONIN (PCT): CPT

## 2023-01-19 RX ORDER — METHOCARBAMOL 750 MG/1
1500 TABLET, FILM COATED ORAL ONCE
Status: DISCONTINUED | OUTPATIENT
Start: 2023-01-19 | End: 2023-01-23 | Stop reason: HOSPADM

## 2023-01-19 RX ORDER — 0.9 % SODIUM CHLORIDE 0.9 %
1000 INTRAVENOUS SOLUTION INTRAVENOUS ONCE
Status: COMPLETED | OUTPATIENT
Start: 2023-01-19 | End: 2023-01-20

## 2023-01-19 RX ORDER — IBUPROFEN 400 MG/1
800 TABLET ORAL ONCE
Status: DISCONTINUED | OUTPATIENT
Start: 2023-01-19 | End: 2023-01-22

## 2023-01-19 RX ORDER — ONDANSETRON 2 MG/ML
8 INJECTION INTRAMUSCULAR; INTRAVENOUS ONCE
Status: COMPLETED | OUTPATIENT
Start: 2023-01-19 | End: 2023-01-19

## 2023-01-19 RX ADMIN — ALUMINUM HYDROXIDE, MAGNESIUM HYDROXIDE, AND SIMETHICONE: 200; 200; 20 SUSPENSION ORAL at 21:58

## 2023-01-19 RX ADMIN — ONDANSETRON 8 MG: 2 INJECTION INTRAMUSCULAR; INTRAVENOUS at 20:38

## 2023-01-19 RX ADMIN — IOPAMIDOL 75 ML: 755 INJECTION, SOLUTION INTRAVENOUS at 22:10

## 2023-01-19 ASSESSMENT — PAIN SCALES - GENERAL: PAINLEVEL_OUTOF10: 0

## 2023-01-19 NOTE — LETTER
[facility]  [address] [address]  Dept: [phone]  Loc: [address]                                                                    [facility] [address]          RETURN TO WORK STATUS  1/23/2023    Diagnosis (optional ):    These are your [id number] Instructions. It may contain personal and confidential  information about your health. It is up to you to share  these instructions as necessary with your employer(s) as required by their policies for you to return to work. WORK STATUS:   Pt was admitted to [address] [place] [address] on 1/19/23.  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

## 2023-01-20 ENCOUNTER — APPOINTMENT (OUTPATIENT)
Dept: GENERAL RADIOLOGY | Age: 63
DRG: 073 | End: 2023-01-20
Payer: COMMERCIAL

## 2023-01-20 LAB
A/G RATIO: 1.2 (ref 1.1–2.2)
ALBUMIN SERPL-MCNC: 4.2 G/DL (ref 3.4–5)
ALP BLD-CCNC: 141 U/L (ref 40–129)
ALT SERPL-CCNC: 17 U/L (ref 10–40)
ANION GAP SERPL CALCULATED.3IONS-SCNC: 20 MMOL/L (ref 3–16)
AST SERPL-CCNC: 30 U/L (ref 15–37)
BACTERIA: ABNORMAL /HPF
BILIRUB SERPL-MCNC: 0.6 MG/DL (ref 0–1)
BILIRUBIN URINE: NEGATIVE
BLOOD, URINE: ABNORMAL
BUN BLDV-MCNC: 28 MG/DL (ref 7–20)
CALCIUM SERPL-MCNC: 9.5 MG/DL (ref 8.3–10.6)
CHLORIDE BLD-SCNC: 88 MMOL/L (ref 99–110)
CLARITY: CLEAR
CO2: 23 MMOL/L (ref 21–32)
COLOR: YELLOW
CREAT SERPL-MCNC: 1.2 MG/DL (ref 0.6–1.2)
EPITHELIAL CELLS, UA: 9 /HPF (ref 0–5)
GFR SERPL CREATININE-BSD FRML MDRD: 51 ML/MIN/{1.73_M2}
GLUCOSE BLD-MCNC: 206 MG/DL (ref 70–99)
GLUCOSE BLD-MCNC: 214 MG/DL (ref 70–99)
GLUCOSE BLD-MCNC: 222 MG/DL (ref 70–99)
GLUCOSE BLD-MCNC: 282 MG/DL (ref 70–99)
GLUCOSE BLD-MCNC: 287 MG/DL (ref 70–99)
GLUCOSE BLD-MCNC: 309 MG/DL (ref 70–99)
GLUCOSE URINE: NEGATIVE MG/DL
HYALINE CASTS: 14 /LPF (ref 0–8)
KETONES, URINE: ABNORMAL MG/DL
LEUKOCYTE ESTERASE, URINE: ABNORMAL
MICROSCOPIC EXAMINATION: YES
NITRITE, URINE: NEGATIVE
PERFORMED ON: ABNORMAL
PH UA: 5 (ref 5–8)
POTASSIUM REFLEX MAGNESIUM: 4.5 MMOL/L (ref 3.5–5.1)
PROCALCITONIN: 0.12 NG/ML (ref 0–0.15)
PROTEIN UA: 100 MG/DL
RBC UA: 4 /HPF (ref 0–4)
SODIUM BLD-SCNC: 131 MMOL/L (ref 136–145)
SPECIFIC GRAVITY UA: 1.08 (ref 1–1.03)
TOTAL PROTEIN: 7.7 G/DL (ref 6.4–8.2)
TROPONIN: <0.01 NG/ML
URINE REFLEX TO CULTURE: ABNORMAL
URINE TYPE: ABNORMAL
UROBILINOGEN, URINE: 1 E.U./DL
WBC UA: 7 /HPF (ref 0–5)

## 2023-01-20 PROCEDURE — A4216 STERILE WATER/SALINE, 10 ML: HCPCS | Performed by: INTERNAL MEDICINE

## 2023-01-20 PROCEDURE — C9113 INJ PANTOPRAZOLE SODIUM, VIA: HCPCS | Performed by: INTERNAL MEDICINE

## 2023-01-20 PROCEDURE — 1200000000 HC SEMI PRIVATE

## 2023-01-20 PROCEDURE — 6360000002 HC RX W HCPCS: Performed by: EMERGENCY MEDICINE

## 2023-01-20 PROCEDURE — 81001 URINALYSIS AUTO W/SCOPE: CPT

## 2023-01-20 PROCEDURE — 2580000003 HC RX 258: Performed by: INTERNAL MEDICINE

## 2023-01-20 PROCEDURE — 96375 TX/PRO/DX INJ NEW DRUG ADDON: CPT

## 2023-01-20 PROCEDURE — 6370000000 HC RX 637 (ALT 250 FOR IP): Performed by: EMERGENCY MEDICINE

## 2023-01-20 PROCEDURE — 2580000003 HC RX 258: Performed by: EMERGENCY MEDICINE

## 2023-01-20 PROCEDURE — 87040 BLOOD CULTURE FOR BACTERIA: CPT

## 2023-01-20 PROCEDURE — 6370000000 HC RX 637 (ALT 250 FOR IP): Performed by: INTERNAL MEDICINE

## 2023-01-20 PROCEDURE — 6360000002 HC RX W HCPCS: Performed by: INTERNAL MEDICINE

## 2023-01-20 PROCEDURE — 84484 ASSAY OF TROPONIN QUANT: CPT

## 2023-01-20 PROCEDURE — 80053 COMPREHEN METABOLIC PANEL: CPT

## 2023-01-20 PROCEDURE — 96365 THER/PROPH/DIAG IV INF INIT: CPT

## 2023-01-20 PROCEDURE — 71046 X-RAY EXAM CHEST 2 VIEWS: CPT

## 2023-01-20 RX ORDER — SODIUM CHLORIDE 9 MG/ML
INJECTION, SOLUTION INTRAVENOUS PRN
Status: DISCONTINUED | OUTPATIENT
Start: 2023-01-20 | End: 2023-01-23 | Stop reason: HOSPADM

## 2023-01-20 RX ORDER — POTASSIUM CHLORIDE 7.45 MG/ML
10 INJECTION INTRAVENOUS PRN
Status: DISCONTINUED | OUTPATIENT
Start: 2023-01-20 | End: 2023-01-23 | Stop reason: HOSPADM

## 2023-01-20 RX ORDER — PROMETHAZINE HYDROCHLORIDE 25 MG/ML
25 INJECTION, SOLUTION INTRAMUSCULAR; INTRAVENOUS EVERY 6 HOURS PRN
Status: DISCONTINUED | OUTPATIENT
Start: 2023-01-20 | End: 2023-01-23 | Stop reason: HOSPADM

## 2023-01-20 RX ORDER — ACETAMINOPHEN 650 MG/1
650 SUPPOSITORY RECTAL EVERY 6 HOURS PRN
Status: DISCONTINUED | OUTPATIENT
Start: 2023-01-20 | End: 2023-01-23 | Stop reason: HOSPADM

## 2023-01-20 RX ORDER — INSULIN GLARGINE 100 [IU]/ML
22 INJECTION, SOLUTION SUBCUTANEOUS NIGHTLY
COMMUNITY

## 2023-01-20 RX ORDER — PROMETHAZINE HYDROCHLORIDE 25 MG/1
12.5 TABLET ORAL EVERY 6 HOURS PRN
Status: DISCONTINUED | OUTPATIENT
Start: 2023-01-20 | End: 2023-01-23 | Stop reason: HOSPADM

## 2023-01-20 RX ORDER — ACETAMINOPHEN 325 MG/1
650 TABLET ORAL EVERY 6 HOURS PRN
Status: DISCONTINUED | OUTPATIENT
Start: 2023-01-20 | End: 2023-01-23 | Stop reason: HOSPADM

## 2023-01-20 RX ORDER — INSULIN LISPRO 100 [IU]/ML
0-8 INJECTION, SOLUTION INTRAVENOUS; SUBCUTANEOUS
Status: DISCONTINUED | OUTPATIENT
Start: 2023-01-20 | End: 2023-01-23 | Stop reason: HOSPADM

## 2023-01-20 RX ORDER — ONDANSETRON 2 MG/ML
4 INJECTION INTRAMUSCULAR; INTRAVENOUS EVERY 6 HOURS PRN
Status: DISCONTINUED | OUTPATIENT
Start: 2023-01-20 | End: 2023-01-23 | Stop reason: HOSPADM

## 2023-01-20 RX ORDER — SODIUM CHLORIDE 9 MG/ML
INJECTION, SOLUTION INTRAVENOUS CONTINUOUS
Status: DISCONTINUED | OUTPATIENT
Start: 2023-01-20 | End: 2023-01-23

## 2023-01-20 RX ORDER — MAGNESIUM SULFATE IN WATER 40 MG/ML
2000 INJECTION, SOLUTION INTRAVENOUS PRN
Status: DISCONTINUED | OUTPATIENT
Start: 2023-01-20 | End: 2023-01-23 | Stop reason: HOSPADM

## 2023-01-20 RX ORDER — INSULIN LISPRO 100 [IU]/ML
0-4 INJECTION, SOLUTION INTRAVENOUS; SUBCUTANEOUS NIGHTLY
Status: DISCONTINUED | OUTPATIENT
Start: 2023-01-20 | End: 2023-01-23 | Stop reason: HOSPADM

## 2023-01-20 RX ORDER — DEXTROSE MONOHYDRATE 100 MG/ML
INJECTION, SOLUTION INTRAVENOUS CONTINUOUS PRN
Status: DISCONTINUED | OUTPATIENT
Start: 2023-01-20 | End: 2023-01-23 | Stop reason: HOSPADM

## 2023-01-20 RX ORDER — SODIUM CHLORIDE 0.9 % (FLUSH) 0.9 %
10 SYRINGE (ML) INJECTION EVERY 12 HOURS SCHEDULED
Status: DISCONTINUED | OUTPATIENT
Start: 2023-01-20 | End: 2023-01-23 | Stop reason: HOSPADM

## 2023-01-20 RX ORDER — SODIUM CHLORIDE 0.9 % (FLUSH) 0.9 %
10 SYRINGE (ML) INJECTION PRN
Status: DISCONTINUED | OUTPATIENT
Start: 2023-01-20 | End: 2023-01-23 | Stop reason: HOSPADM

## 2023-01-20 RX ADMIN — INSULIN LISPRO 6 UNITS: 100 INJECTION, SOLUTION INTRAVENOUS; SUBCUTANEOUS at 12:10

## 2023-01-20 RX ADMIN — SODIUM CHLORIDE: 9 INJECTION, SOLUTION INTRAVENOUS at 23:00

## 2023-01-20 RX ADMIN — ONDANSETRON 4 MG: 2 INJECTION INTRAMUSCULAR; INTRAVENOUS at 12:03

## 2023-01-20 RX ADMIN — INSULIN LISPRO 2 UNITS: 100 INJECTION, SOLUTION INTRAVENOUS; SUBCUTANEOUS at 18:52

## 2023-01-20 RX ADMIN — Medication 25 MG: at 00:33

## 2023-01-20 RX ADMIN — SODIUM CHLORIDE: 9 INJECTION, SOLUTION INTRAVENOUS at 14:35

## 2023-01-20 RX ADMIN — INSULIN HUMAN 7 UNITS: 100 INJECTION, SOLUTION PARENTERAL at 01:07

## 2023-01-20 RX ADMIN — PROMETHAZINE HYDROCHLORIDE 12.5 MG: 25 TABLET ORAL at 21:05

## 2023-01-20 RX ADMIN — Medication 40 MG: at 14:39

## 2023-01-20 RX ADMIN — SODIUM CHLORIDE, PRESERVATIVE FREE 10 ML: 5 INJECTION INTRAVENOUS at 21:07

## 2023-01-20 RX ADMIN — ONDANSETRON 4 MG: 2 INJECTION INTRAMUSCULAR; INTRAVENOUS at 04:50

## 2023-01-20 RX ADMIN — SODIUM CHLORIDE 1000 ML: 9 INJECTION, SOLUTION INTRAVENOUS at 00:32

## 2023-01-20 RX ADMIN — SODIUM CHLORIDE, PRESERVATIVE FREE 10 ML: 5 INJECTION INTRAVENOUS at 08:52

## 2023-01-20 RX ADMIN — INSULIN LISPRO 4 UNITS: 100 INJECTION, SOLUTION INTRAVENOUS; SUBCUTANEOUS at 09:00

## 2023-01-20 ASSESSMENT — PAIN DESCRIPTION - ONSET
ONSET: ON-GOING
ONSET: ON-GOING

## 2023-01-20 ASSESSMENT — PAIN DESCRIPTION - PAIN TYPE
TYPE: ACUTE PAIN
TYPE: ACUTE PAIN

## 2023-01-20 ASSESSMENT — PAIN DESCRIPTION - DESCRIPTORS
DESCRIPTORS: ACHING
DESCRIPTORS: ACHING;DISCOMFORT

## 2023-01-20 ASSESSMENT — PAIN SCALES - GENERAL
PAINLEVEL_OUTOF10: 5
PAINLEVEL_OUTOF10: 5

## 2023-01-20 ASSESSMENT — PAIN DESCRIPTION - FREQUENCY
FREQUENCY: CONTINUOUS
FREQUENCY: CONTINUOUS

## 2023-01-20 ASSESSMENT — PAIN DESCRIPTION - ORIENTATION
ORIENTATION: MID;LOWER
ORIENTATION: MID;LOWER

## 2023-01-20 ASSESSMENT — PAIN - FUNCTIONAL ASSESSMENT
PAIN_FUNCTIONAL_ASSESSMENT: ACTIVITIES ARE NOT PREVENTED
PAIN_FUNCTIONAL_ASSESSMENT: ACTIVITIES ARE NOT PREVENTED

## 2023-01-20 ASSESSMENT — PAIN DESCRIPTION - LOCATION
LOCATION: ABDOMEN
LOCATION: ABDOMEN

## 2023-01-20 NOTE — ED NOTES
Pt reports GERD with vomiting after eating or drinking. Pt also reports headache at this time. Pt alert and oriented at this time with no signs of distress noted. Pt rates headache as a 8/10.        Tobi Chaparro RN  01/20/23 1132

## 2023-01-20 NOTE — ED NOTES
Pt still in shower. She states it makes her feel better and would not like to leave. Pt accommodated as it is not interfering with treatment at this time.       James Zurita RN  01/20/23 1127

## 2023-01-20 NOTE — ED PROVIDER NOTES
[address] [place] [address]      Pt Name: Malachi Lal  MRN: [id number]  [place] 1960  Date of evaluation: 1/19/2023  Provider: Surinder Simmons, [address] [place] [address]  Chief Complaint   Patient presents with    Emesis     Pt reports GERD with vomiting after eating or drinking. Pt also reports headache at this time. Pt alert and oriented at this time with no signs of distress noted. Pt rates headache as a 8/10. This patient is at risk for a communicable infection. Therefore, personal protection equipment consisting of a mask was worn for the exam.    HPI  Malachi Lal is a 65 y.o. female who presents with nausea and vomiting has been present for 3 days. She had to leave work on Tuesday. She states that she vomited Wednesday and got better on Thursday. However, she got worse today. She states she is feels dehydrated and needs IV fluids. She states whenever she drinks water she vomits. She states her blood sugars been running around 200-300. She has a headache on the right side. She is having body spasms with fevers and chills. ? REVIEW OF SYSTEMS  All systems negative except as noted in the HPI. Reviewed Nurses' notes and concur. No LMP recorded. Patient has had an ablation.     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    Gastroparesis     Hirsutism     History of blood transfusion     Hyperlipidemia LDL goal < 70     Hypertension     Major depressive disorder with single episode 2/4/2009    Pancreatitis     Seasonal allergic rhinitis due to pollen 11/10/2016    Sleep apnea     Thyroid disease     Vitamin D deficiency        FAMILY HISTORY  Family History   Problem Relation Age of Onset    Hypertension Mother     Breast Cancer Mother     Colon Cancer Mother     Cancer Father         Pancreatic    Prostate Cancer Father     No Known Problems Brother     No Known Problems Sister     Hypertension Maternal Aunt     Cancer Maternal Uncle     Hypertension Maternal Grandmother     Stroke Maternal Grandmother     Cancer Maternal Grandfather     No Known Problems Paternal Aunt     No Known Problems Paternal Uncle     No Known Problems Paternal Grandmother     No Known Problems Paternal Grandfather     No Known Problems Other     Rheum Arthritis Neg Hx     Osteoarthritis Neg Hx     Asthma Neg Hx     Diabetes Neg Hx     Heart Failure Neg Hx     High Cholesterol Neg Hx     Migraines Neg Hx     Ovarian Cancer Neg Hx     Rashes/Skin Problems Neg Hx     Seizures Neg Hx     Thyroid Disease Neg Hx        SOCIAL HISTORY   reports that she quit smoking about 33 years ago. Her smoking use included cigarettes. She has never used smokeless tobacco. She reports current alcohol use. She reports current drug use. Frequency: 2.00 times per week. Drug: Marijuana Tahmina Postin). SURGICAL HISTORY  Past Surgical History:   Procedure Laterality Date     SECTION      ENDOMETRIAL ABLATION  5/3/12    ENDOSCOPY, COLON, DIAGNOSTIC      HYSTEROSCOPY  5/3/12    MANDIBLE SURGERY      SKIN BIOPSY      TUBAL LIGATION      UPPER GASTROINTESTINAL ENDOSCOPY N/A 2021    EGD BIOPSY performed by Leila Maldonado MD at [address] [place] [address]  Current Outpatient Rx   Medication Sig Dispense Refill    ondansetron (ZOFRAN ODT) 4 MG disintegrating tablet Take 1-2 tablets by mouth every 12 hours as needed for Nausea or Vomiting May Sub regular tablet (non-ODT) if insurance does not cover ODT.  30 tablet 0    polyethylene glycol (GLYCOLAX) 17 g packet Take 17 g by mouth daily as needed for Constipation      insulin glargine (LANTUS) 100 UNIT/ML injection vial Inject 22 Units into the skin daily      ketorolac (TORADOL) 10 MG tablet Take 1 tablet by mouth every 6 hours as needed for Pain 20 tablet 0    acetaminophen (TYLENOL) 500 MG tablet Take 2 tablets by mouth 3 times daily as needed for Pain 180 tablet 0    albuterol sulfate HFA (VENTOLIN HFA) 108 (90 Base) MCG/ACT inhaler Inhale 2 puffs into the lungs 4 times daily as needed for Wheezing or Shortness of Breath 18 g 0    omeprazole (PRILOSEC) 40 MG delayed release capsule Take 40 mg by mouth daily      lisinopril (PRINIVIL;ZESTRIL) 10 MG tablet Take 10 mg by mouth daily      ezetimibe (ZETIA) 10 MG tablet Take 10 mg by mouth daily      amLODIPine (NORVASC) 5 MG tablet Take 5 mg by mouth daily      insulin lispro (HUMALOG) 100 UNIT/ML injection vial Inject into the skin 3 times daily (before meals) Per sliding scale. 2 units >150 then additional 2 units for every 50      aspirin 81 MG tablet Take 81 mg by mouth daily      timolol (TIMOPTIC) 0.5 % ophthalmic solution Place 1 drop into both eyes daily       ergocalciferol (DRISDOL) 44561 UNITS capsule Take 1 capsule by mouth once a week 13 capsule 1    latanoprost (XALATAN) 0.005 % ophthalmic solution Place 1 drop into both eyes nightly.          ALLERGIES  Allergies   Allergen Reactions    Avelox [Moxifloxacin] Other (See Comments)     Weakness, tingling, tingling mouth    Bactrim      Remote h/o diarrhea, swollen eyes, and weakness, tachycardia and tongue swelling    Cefuroxime      Pt does not recall what happened with this    Codeine     Ibuprofen Itching    Medrol [Methylprednisolone] Swelling     Ok to take nasal steroids    Morphine     Niacin Other (See Comments)    Oat      Throat swells    Percocet [Oxycodone-Acetaminophen]     Reglan [Metoclopramide] Other (See Comments)     States has something to do with a mass unknown reaction     Spironolactone Increased potassium    Statins      Muscle cramps    Sulfamethoxazole-Trimethoprim Swelling    Vicodin [Hydrocodone-Acetaminophen]          PHYSICAL EXAM  VITAL SIGNS: BP (!) 199/92   Pulse 93   Temp 98.6 °F (37 °C) (Oral)   Resp 18   Ht 5' 7\" (1.702 m)   Wt 223 lb 15.8 oz (101.6 kg)   SpO2 100%   BMI 35.08 kg/m²   Constitutional: Well-developed, well-nourished, appears mildly ill, nontoxic, activity: Lying on the cart, emesis bag in front of her, patient did not have any vomiting while was in the room. HENT: Normocephalic, Atraumatic, Bilateral external ears normal, TM's were normal, Mucus membranes are mildly dry and oropharynx is patent and clear, No oral exudates, Nose normal.  Eyes: No scleral icterus. Neck: Normal range of motion, No tenderness, Supple. Lymphatic: No lymphadenopathy noted. Cardiovascular: Normal heart rate, Normal rhythm, no murmurs, no gallops, no rubs. Thorax & Lungs: Normal breath sounds, no respiratory distress, no wheezing, no rales, no rhonchi, no Kussmaul respirations are noted. Abdomen: Soft, Nontender, No hepatosplenomegaly, No masses, No pulsatile masses, No distension, normal bowel sounds  Skin: Warm, Dry, No erythema, No rash. Extremities: No edema, no major deformities noted. Neurologic: Alert & oriented x 3,   Psychiatric: Affect normal, Mood mildly depressed. COURSE & MEDICAL DECISION MAKING  Pertinent Labs and imaging studies were reviewed.  (See chart for details)    LABORATORY  Labs Reviewed   CBC WITH AUTO DIFFERENTIAL - Abnormal; Notable for the following components:       Result Value    WBC 16.2 (*)     RBC 6.30 (*)     Hematocrit 49.1 (*)     MCV 77.9 (*)     MCH 24.9 (*)     Neutrophils Absolute 11.8 (*)     All other components within normal limits   COMPREHENSIVE METABOLIC PANEL W/ REFLEX TO MG FOR LOW K - Abnormal; Notable for the following components:    Sodium 133 (*)     Chloride 86 (*)     Anion Gap 20 (*)     Glucose 269 (*)     BUN 32 (*) Creatinine 1.5 (*)     Est, Glom Filt Rate 39 (*)     Alkaline Phosphatase 153 (*)     All other components within normal limits   LIPASE - Abnormal; Notable for the following components:    Lipase 78.0 (*)     All other components within normal limits   BETA-HYDROXYBUTYRATE - Abnormal; Notable for the following components:    Beta-Hydroxybutyrate 0.36 (*)     All other components within normal limits   COVID-19, RAPID   RAPID INFLUENZA A/B ANTIGENS   CULTURE, BLOOD 1   CULTURE, BLOOD 2   LACTIC ACID   URINALYSIS WITH REFLEX TO CULTURE   POCT GLUCOSE       RADIOLOGY/PROCEDURES  I personally reviewed the images for this case. CT ABDOMEN PELVIS W IV CONTRAST Additional Contrast? None   Final Result   1. Distal esophageal thickening related to esophagitis is again noted,   possibly related to reflux esophagitis. No eccentric wall thickening or   definite mass. 2. Mild fatty liver infiltration. 3. No other acute process seen in the abdomen or pelvis.               Vitals:    01/19/23 1952   BP: (!) 199/92   Pulse: 93   Resp: 18   Temp: 98.6 °F (37 °C)   TempSrc: Oral   SpO2: 100%   Weight: 223 lb 15.8 oz (101.6 kg)   Height: 5' 7\" (1.702 m)       Medications   ibuprofen (ADVIL;MOTRIN) tablet 800 mg (800 mg Oral Not Given 1/19/23 2037)   0.9 % sodium chloride bolus (1,000 mLs IntraVENous New Bag 1/20/23 0032)   methocarbamol (ROBAXIN) tablet 1,500 mg (1,500 mg Oral Not Given 1/19/23 2157)   insulin regular (HUMULIN R;NOVOLIN R) injection 7 Units (has no administration in time range)   promethazine (PHENERGAN) in sodium chloride 0.9% 50 mL IVPB 25 mg (25 mg IntraVENous New Bag 1/20/23 0033)   ondansetron (ZOFRAN) injection 8 mg (8 mg IntraVENous Given 1/19/23 2038)   aluminum & magnesium hydroxide-simethicone (MAALOX) 30 mL, lidocaine viscous hcl (XYLOCAINE) 5 mL (GI COCKTAIL) ( Oral Given 1/19/23 2158)   iopamidol (ISOVUE-370) 76 % injection 75 mL (75 mLs IntraVENous Given 1/19/23 2210)       New Prescriptions    No medications on file       SEP-1 CORE MEASURE DATA  Exclusion criteria: the patient is NOT to be included for sepsis due to: Infection is not suspected    Patient remained stable in the ED. IV fluids were ordered but had not been given 3 hours after patient had arrived. They are being started at this time. Patient did not improve with Zofran 8 mg IV. She did improve with muscle spasms with the Robaxin 1500 mg p.o. Patient was given a GI cocktail, and did not improve with that. She was able to keep it down. She was given Humulin R 7 units IV. Her beta hydroxybutyrate was elevated at 23 6. Her CO2 was normal.  Her white count was elevated 16,000. Lipase is elevated at 78. Lactic acid was normal.  Therefore, patient was admitted to the hospital for further evaluation and treatment. Hospitalist was notified of the admission at [place] [address]. Diagnostic considerations include but are not limited to: Abdominal Aortic Aneurysm, Acute Coronary Syndrome, Ischemic Bowel, Bowel Obstruction (including Gastric Outlet Obstruction), cyclical vomiting syndrome, PUD, GERD, Gastritis, Acute Cholecystitis, appendicitis, pancreatitis, Hepatitis, Colitis, SMA Syndrome, Mesenteric Steal Syndrome, Splanchnic Vein Thrombosis, cyclical vomiting syndrome, other      EKG-ordered to rule out myocardial infarction, rhythm disturbances, conduction disturbances, LVH, DREAD, pericarditis, voltage abnormalities, or other pathology that might be causing the patient's symptoms.     Chest x-ray-chest x-ray was ordered to rule out pneumonia, pneumothorax, congestive heart failure, cardiomegaly, chest masses, aortic aneurysm, hiatal hernia, rib fractures, or any other pathology that might be causing the patient's symptoms    CBC-CBC was ordered to rule out anemia, infection, abnormal platelet count, polycythemia, abnormal Red cell pathology, or any other pathology that might be causing the patient's symptoms    CMP-CMP was ordered to rule out electrolyte abnormalities, liver dysfunction, kidney dysfunction, electrolyte imbalance, abnormal transaminases, or any other pathology that might be causing the patient's symptoms. Urine-urinalysis was ordered to rule out infection, renal failure, dehydration, pregnancy, proteinuria, bilirubinuria, or any other pathology that might be causing the patient's symptoms    The patient's blood pressure was found to be elevated according to CMS/Medicare and the Affordable Care Act/[facility] criteria. Elevated blood pressure could occur because of pain or anxiety or other reasons and does not mean that they need to have their blood pressure treated or medications otherwise adjusted. However, this could also be a sign that they will need to have their blood pressure treated or medications changed. The patient was instructed to follow up closely with their personal physician to have their blood pressure rechecked. The patient was instructed to take a list of recent blood pressure readings to their next visit with their personal physician. See discharge instructions for specific medications, discharge information, and treatments. They were verbally instructed to return to emergency if any problems. (This chart has been completed using [address]. Although attempts have been made to ensure accuracy, words and/or phrases may not be transcribed as intended.)    Patient refused pain medicines at the time of her exam.    IMPRESSION(S):  1. SHAY (acute kidney injury) ([facility] [address]    2. Dehydration    3. Nausea and vomiting, unspecified vomiting type    4. Elevated beta-hydroxybutyrate    5. Hyperglycemia    6. Elevated blood pressure reading    7. Spasm of muscle        ?   Recheck Times: 0040  Critical Care Time: 20 minutes       Ronnie Shen DO  01/20/23 0040

## 2023-01-20 NOTE — ED NOTES
Report given to [facility], [address] [place] [address]. SBAR discussed. All questions answered. RN verbalized understanding.       Jen Garcia RN  01/20/23 0416

## 2023-01-20 NOTE — ED NOTES
Pt took a shower in her room. Tolerated ambulation well. Pt placed in gown and socks. Pt reconnected to monitor and fluids. Call light in reach. No additional needs at this time.       Joseline Hobbs RN  01/20/23 2161

## 2023-01-20 NOTE — ED NOTES
Upon rounding pt was found in shower again. Pt sitting in a chair in the shower and denies any other needs at this time. Pt reminded of call light in shower. She expressed understanding.       Min Guillaume RN  01/20/23 1081

## 2023-01-20 NOTE — PROGRESS NOTES
Medication Reconciliation    List of medications patient is currently taking is complete. Source of information: 1.  Conversation with patient at bedside                                      2. EPIC records           Jamie Castle [facility]   1/20/2023  7:21 AM

## 2023-01-20 NOTE — PROGRESS NOTES
4 Eyes Skin Assessment     NAME:  Balaji Owens  YOB: 1960  MEDICAL RECORD NUMBER:  [id number]    The patient is being assessed for  Admission    I agree that One RN have performed a thorough Head to Toe Skin Assessment on the patient. ALL assessment sites listed below have been assessed. Areas assessed by both nurses:    Head, Face, Ears, Shoulders, Back, Chest, Arms, Elbows, Hands, Sacrum. Buttock, Coccyx, Ischium, and Legs. Feet and Heels        Does the Patient have a Wound?  No noted wound(s)       Steve Prevention initiated by RN: No   Wound Care Orders initiated by RN: No    Pressure Injury (Stage 3,4, Unstageable, DTI, NWPT, and Complex wounds) if present place referral order by RN under : No    New and Established Ostomies, if present place, referral order under : No      Nurse 1 eSignature: Electronically signed by Mary Grace Rick RN on 1/20/23 at 5:58 PM EST    **SHARE this note so that the co-signing nurse is able to place an eSignature**    Nurse 2 eSignature: Electronically signed by Nathanael Alexander RN on 1/20/23 at 7:07 PM EST

## 2023-01-20 NOTE — PROGRESS NOTES
Patient Admitted. VSS stable. Alert and oriented. No skin issues noted. Answer all questions; denies any need at this time; bed in lowest position, call light at reach, patient resting.

## 2023-01-20 NOTE — ED NOTES
Pt alert and oriented VSS. Report called to floor. No questions verbalized by RN. PT and family updated on plan of care. Transport request placed.       Hilary Cárdenas RN  01/20/23 3096

## 2023-01-20 NOTE — ED NOTES
Upon rounding pt found up and out of bed and attempting to take another shower. Pt states that she is very nauseated and the shower helps with the nausea and that she ambulates and performs ADL's with no assistance at home. Pt assisted to the shower, her IV wrapped with coband and pt instructed to try to keep the site dry. Towels placed on the floor to prevent slipping and chair placed in shower for the pt to sit on. Pt provided with call light while sitting in the shower so that she can call out for assistance at any time.       Viral Baig RN  01/20/23 5068

## 2023-01-21 LAB
A/G RATIO: 1.6 (ref 1.1–2.2)
ALBUMIN SERPL-MCNC: 3.7 G/DL (ref 3.4–5)
ALP BLD-CCNC: 118 U/L (ref 40–129)
ALT SERPL-CCNC: 15 U/L (ref 10–40)
ANION GAP SERPL CALCULATED.3IONS-SCNC: 12 MMOL/L (ref 3–16)
AST SERPL-CCNC: 19 U/L (ref 15–37)
BASOPHILS ABSOLUTE: 0.1 K/UL (ref 0–0.2)
BASOPHILS RELATIVE PERCENT: 0.7 %
BILIRUB SERPL-MCNC: 0.5 MG/DL (ref 0–1)
BUN BLDV-MCNC: 15 MG/DL (ref 7–20)
CALCIUM SERPL-MCNC: 8.7 MG/DL (ref 8.3–10.6)
CHLORIDE BLD-SCNC: 96 MMOL/L (ref 99–110)
CO2: 25 MMOL/L (ref 21–32)
CREAT SERPL-MCNC: 0.9 MG/DL (ref 0.6–1.2)
EOSINOPHILS ABSOLUTE: 0.1 K/UL (ref 0–0.6)
EOSINOPHILS RELATIVE PERCENT: 0.6 %
ESTIMATED AVERAGE GLUCOSE: 226 MG/DL
GFR SERPL CREATININE-BSD FRML MDRD: >60 ML/MIN/{1.73_M2}
GLUCOSE BLD-MCNC: 174 MG/DL (ref 70–99)
GLUCOSE BLD-MCNC: 175 MG/DL (ref 70–99)
GLUCOSE BLD-MCNC: 210 MG/DL (ref 70–99)
GLUCOSE BLD-MCNC: 242 MG/DL (ref 70–99)
GLUCOSE BLD-MCNC: 267 MG/DL (ref 70–99)
HBA1C MFR BLD: 9.5 %
HCT VFR BLD CALC: 41 % (ref 36–48)
HEMOGLOBIN: 13.3 G/DL (ref 12–16)
LYMPHOCYTES ABSOLUTE: 3.2 K/UL (ref 1–5.1)
LYMPHOCYTES RELATIVE PERCENT: 32.3 %
MCH RBC QN AUTO: 25.8 PG (ref 26–34)
MCHC RBC AUTO-ENTMCNC: 32.5 G/DL (ref 31–36)
MCV RBC AUTO: 79.2 FL (ref 80–100)
MONOCYTES ABSOLUTE: 0.8 K/UL (ref 0–1.3)
MONOCYTES RELATIVE PERCENT: 8.4 %
NEUTROPHILS ABSOLUTE: 5.7 K/UL (ref 1.7–7.7)
NEUTROPHILS RELATIVE PERCENT: 58 %
PDW BLD-RTO: 14.7 % (ref 12.4–15.4)
PERFORMED ON: ABNORMAL
PLATELET # BLD: 218 K/UL (ref 135–450)
PMV BLD AUTO: 8.1 FL (ref 5–10.5)
POTASSIUM REFLEX MAGNESIUM: 3.6 MMOL/L (ref 3.5–5.1)
RBC # BLD: 5.17 M/UL (ref 4–5.2)
SODIUM BLD-SCNC: 133 MMOL/L (ref 136–145)
TOTAL PROTEIN: 6 G/DL (ref 6.4–8.2)
WBC # BLD: 9.9 K/UL (ref 4–11)

## 2023-01-21 PROCEDURE — 80053 COMPREHEN METABOLIC PANEL: CPT

## 2023-01-21 PROCEDURE — 36415 COLL VENOUS BLD VENIPUNCTURE: CPT

## 2023-01-21 PROCEDURE — C9113 INJ PANTOPRAZOLE SODIUM, VIA: HCPCS | Performed by: INTERNAL MEDICINE

## 2023-01-21 PROCEDURE — 85025 COMPLETE CBC W/AUTO DIFF WBC: CPT

## 2023-01-21 PROCEDURE — 1200000000 HC SEMI PRIVATE

## 2023-01-21 PROCEDURE — 2580000003 HC RX 258: Performed by: INTERNAL MEDICINE

## 2023-01-21 PROCEDURE — 6360000002 HC RX W HCPCS: Performed by: INTERNAL MEDICINE

## 2023-01-21 PROCEDURE — 83036 HEMOGLOBIN GLYCOSYLATED A1C: CPT

## 2023-01-21 PROCEDURE — 6370000000 HC RX 637 (ALT 250 FOR IP): Performed by: INTERNAL MEDICINE

## 2023-01-21 RX ORDER — ENOXAPARIN SODIUM 100 MG/ML
30 INJECTION SUBCUTANEOUS 2 TIMES DAILY
Status: DISCONTINUED | OUTPATIENT
Start: 2023-01-21 | End: 2023-01-23 | Stop reason: HOSPADM

## 2023-01-21 RX ORDER — ENOXAPARIN SODIUM 100 MG/ML
40 INJECTION SUBCUTANEOUS DAILY
Status: DISCONTINUED | OUTPATIENT
Start: 2023-01-21 | End: 2023-01-21 | Stop reason: DRUGHIGH

## 2023-01-21 RX ORDER — POLYETHYLENE GLYCOL 3350 17 G/17G
17 POWDER, FOR SOLUTION ORAL DAILY
Status: DISCONTINUED | OUTPATIENT
Start: 2023-01-21 | End: 2023-01-23 | Stop reason: HOSPADM

## 2023-01-21 RX ADMIN — SODIUM CHLORIDE: 9 INJECTION, SOLUTION INTRAVENOUS at 19:55

## 2023-01-21 RX ADMIN — PROMETHAZINE HYDROCHLORIDE 25 MG: 25 INJECTION INTRAMUSCULAR; INTRAVENOUS at 12:57

## 2023-01-21 RX ADMIN — ENOXAPARIN SODIUM 30 MG: 100 INJECTION SUBCUTANEOUS at 18:14

## 2023-01-21 RX ADMIN — Medication 40 MG: at 08:52

## 2023-01-21 RX ADMIN — POLYETHYLENE GLYCOL 3350 17 G: 17 POWDER, FOR SOLUTION ORAL at 19:49

## 2023-01-21 RX ADMIN — SODIUM CHLORIDE: 9 INJECTION, SOLUTION INTRAVENOUS at 05:03

## 2023-01-21 RX ADMIN — SODIUM CHLORIDE, PRESERVATIVE FREE 10 ML: 5 INJECTION INTRAVENOUS at 19:49

## 2023-01-21 ASSESSMENT — PAIN DESCRIPTION - DESCRIPTORS
DESCRIPTORS: CRAMPING
DESCRIPTORS: CRAMPING
DESCRIPTORS: SQUEEZING

## 2023-01-21 ASSESSMENT — PAIN SCALES - GENERAL
PAINLEVEL_OUTOF10: 4
PAINLEVEL_OUTOF10: 7
PAINLEVEL_OUTOF10: 3
PAINLEVEL_OUTOF10: 0

## 2023-01-21 ASSESSMENT — PAIN DESCRIPTION - ORIENTATION
ORIENTATION: RIGHT;LEFT

## 2023-01-21 ASSESSMENT — PAIN - FUNCTIONAL ASSESSMENT
PAIN_FUNCTIONAL_ASSESSMENT: ACTIVITIES ARE NOT PREVENTED

## 2023-01-21 ASSESSMENT — PAIN DESCRIPTION - ONSET: ONSET: ON-GOING

## 2023-01-21 ASSESSMENT — PAIN DESCRIPTION - LOCATION
LOCATION: ABDOMEN

## 2023-01-21 NOTE — PLAN OF CARE
Problem: Discharge Planning  Goal: Discharge to home or other facility with appropriate resources  1/21/2023 1443 by Claudio Samaniego RN  Outcome: Progressing  1/21/2023 0302 by Gabriela Hardy RN  Outcome: Progressing  Flowsheets (Taken 1/20/2023 2105)  Discharge to home or other facility with appropriate resources:   Identify barriers to discharge with patient and caregiver   Arrange for needed discharge resources and transportation as appropriate   Identify discharge learning needs (meds, wound care, etc)     Problem: Safety - Adult  Goal: Free from fall injury  1/21/2023 1443 by Claudio Samaniego RN  Outcome: Progressing  1/21/2023 0302 by Gabriela Hardy RN  Outcome: Progressing     Problem: ABCDS Injury Assessment  Goal: Absence of physical injury  1/21/2023 1443 by Claudio Samaniego RN  Outcome: Progressing  1/21/2023 0302 by Gabriela Hardy RN  Outcome: Progressing     Problem: Metabolic/Fluid and Electrolytes - Adult  Goal: Electrolytes maintained within normal limits  1/21/2023 1443 by Claudio Samaniego RN  Outcome: Progressing  1/21/2023 0302 by Gabriela Hardy RN  Outcome: Progressing  Flowsheets (Taken 1/20/2023 2105)  Electrolytes maintained within normal limits: Monitor labs and assess patient for signs and symptoms of electrolyte imbalances  Goal: Hemodynamic stability and optimal renal function maintained  1/21/2023 1443 by Claudio Samaniego RN  Outcome: Progressing  1/21/2023 0302 by Gabriela Hardy RN  Outcome: Progressing  Flowsheets (Taken 1/20/2023 2105)  Hemodynamic stability and optimal renal function maintained: Monitor labs and assess for signs and symptoms of volume excess or deficit  Goal: Glucose maintained within prescribed range  1/21/2023 1443 by Claudio Samaniego RN  Outcome: Progressing  1/21/2023 0302 by Gabriela Hardy RN  Outcome: Progressing  Flowsheets (Taken 1/20/2023 2105)  Glucose maintained within prescribed range: Monitor blood glucose as ordered     Problem: Neurosensory - Adult  Goal: Achieves stable or improved neurological status  1/21/2023 1443 by Claudio Samaniego RN  Outcome: Progressing  1/21/2023 0302 by Gabriela Hardy RN  Outcome: Progressing  Flowsheets (Taken 1/20/2023 2105)  Achieves stable or improved neurological status: Assess for and report changes in neurological status  Goal: Absence of seizures  1/21/2023 1443 by Claudio Samaniego RN  Outcome: Progressing  1/21/2023 0302 by Gabriela Hardy RN  Outcome: Progressing  Flowsheets (Taken 1/20/2023 2105)  Absence of seizures: Monitor for seizure activity.   If seizure occurs, document type and location of movements and any associated apnea  Goal: Remains free of injury related to seizures activity  1/21/2023 1443 by Claudio Samaniego RN  Outcome: Progressing  1/21/2023 0302 by Gabriela Hardy RN  Outcome: Progressing  [address] Chan (Taken 1/20/2023 2105)  Remains free of injury related to seizure activity: Maintain airway, patient safety  and administer oxygen as ordered  Goal: Achieves maximal functionality and self care  1/21/2023 1443 by Claudio Samaniego RN  Outcome: Progressing  1/21/2023 0302 by Gabriela Hardy RN  Outcome: Progressing  Flowsheets (Taken 1/20/2023 2105)  Achieves maximal functionality and self care: Monitor swallowing and airway patency with patient fatigue and changes in neurological status     Problem: Respiratory - Adult  Goal: Achieves optimal ventilation and oxygenation  1/21/2023 1443 by Claudio Samaniego RN  Outcome: Progressing  1/21/2023 0302 by Gabriela Hardy RN  Outcome: Progressing  Flowsheets (Taken 1/20/2023 2105)  Achieves optimal ventilation and oxygenation: Assess for changes in respiratory status     Problem: Cardiovascular - Adult  Goal: Maintains optimal cardiac output and hemodynamic stability  1/21/2023 1443 by Claudio Samaniego RN  Outcome: Progressing  1/21/2023 0302 by Gabriela Hardy RN  Outcome: Progressing  Flowsheets (Taken 1/20/2023 2105)  Maintains optimal cardiac output and hemodynamic stability: Monitor blood pressure and heart rate  Goal: Absence of cardiac dysrhythmias or at baseline  1/21/2023 1443 by Luann Singh RN  Outcome: Progressing  1/21/2023 0302 by Sulema Melendez RN  Outcome: Progressing  Flowsheets (Taken 1/20/2023 2105)  Absence of cardiac dysrhythmias or at baseline: Monitor cardiac rate and rhythm     Problem: Skin/Tissue Integrity - Adult  Goal: Skin integrity remains intact  1/21/2023 1443 by Luann Singh RN  Outcome: Progressing  Flowsheets (Taken 1/21/2023 0303 by Sulema Melendez RN)  Skin Integrity Remains Intact:   Monitor for areas of redness and/or skin breakdown   Assess vascular access sites hourly  1/21/2023 0302 by Sulema Melendez RN  Outcome: Progressing  Flowsheets (Taken 1/20/2023 2105)  Skin Integrity Remains Intact:   Monitor for areas of redness and/or skin breakdown   Assess vascular access sites hourly  Goal: Incisions, wounds, or drain sites healing without S/S of infection  1/21/2023 1443 by Luann Singh RN  Outcome: Progressing  1/21/2023 0302 by Sulema Melendez RN  Outcome: Progressing  Flowsheets (Taken 1/20/2023 2105)  Incisions, Wounds, or Drain Sites Healing Without Sign and Symptoms of Infection: TWICE DAILY: Assess and document dressing/incision, wound bed, drain sites and surrounding tissue  Goal: Oral mucous membranes remain intact  1/21/2023 1443 by Luann Singh RN  Outcome: Progressing  1/21/2023 0302 by Sulema Melendez RN  Outcome: Progressing  Flowsheets (Taken 1/20/2023 2105)  Oral Mucous Membranes Remain Intact: Assess oral mucosa and hygiene practices     Problem: Musculoskeletal - Adult  Goal: Return mobility to safest level of function  1/21/2023 1443 by Luann Singh RN  Outcome: Progressing  1/21/2023 0302 by Sulema Melendez RN  Outcome: Progressing  Flowsheets (Taken 1/20/2023 2105)  Return Mobility to Safest Level of Function: Assess patient stability and activity tolerance for standing, transferring and ambulating with or without assistive devices  Goal: Return ADL status to a safe level of function  1/21/2023 1443 by Jonathan Bill RN  Outcome: Progressing  1/21/2023 0302 by Breanne Chicas RN  Outcome: Progressing  Flowsheets (Taken 1/20/2023 2105)  Return ADL Status to a Safe Level of Function: Administer medication as ordered     Problem: Gastrointestinal - Adult  Goal: Minimal or absence of nausea and vomiting  1/21/2023 1443 by Jonathan Bill RN  Outcome: Progressing  1/21/2023 0302 by Breanne Chicas RN  Outcome: Progressing  Flowsheets (Taken 1/20/2023 2105)  Minimal or absence of nausea and vomiting: Administer IV fluids as ordered to ensure adequate hydration  Goal: Maintains adequate nutritional intake  1/21/2023 1443 by Jonathan Bill RN  Outcome: Progressing  1/21/2023 0302 by Breanne Chicas RN  Outcome: Progressing  Flowsheets (Taken 1/20/2023 2105)  Maintains adequate nutritional intake: Monitor percentage of each meal consumed     Problem: Nutrition Deficit:  Goal: Optimize nutritional status  Outcome: Progressing

## 2023-01-21 NOTE — PROGRESS NOTES
Comprehensive Nutrition Assessment    Type and Reason for Visit:  Initial, Positive Nutrition Screen    Nutrition Recommendations/Plan:   Continue regular diet     Malnutrition Assessment:  Malnutrition Status: At risk for malnutrition (Comment) (01/21/23 7739)    Context:  Acute Illness     Findings of the 6 clinical characteristics of malnutrition:  Energy Intake:  Mild decrease in energy intake (Comment)  Weight Loss:  No significant weight loss     Body Fat Loss:  No significant body fat loss     Muscle Mass Loss:  No significant muscle mass loss    Fluid Accumulation:  No significant fluid accumulation     Strength:  Not Performed    Nutrition Assessment:    Positive nutrition screen. Pt presented with abdominal pain and N/V. PMH includes gastroparesis, DM2, HLD, HTN. Pt reports inadequate oral intake during admission d/t restrictive diet. Previously receiving 3 Carb, Cardiac diet- MD liberalized diet to regular today. Pt declined ONS at this time, hopeful that regular diet order will aid in improving PO intakes. C/o nausea and vomiting continue, states she believes eating more will improve her symptoms. No significant weight loss noted. Will continue to follow and monitor intakes on regular diet. Nutrition Related Findings:    No edema noted. Labs reviewed; glucose 174-214 x 24 hrs Wound Type: None       Current Nutrition Intake & Therapies:    Average Meal Intake: 1-25%  Average Supplements Intake: None Ordered  ADULT DIET; Regular    Anthropometric Measures:  Height: 5' 7\" (170.2 cm)  Ideal Body Weight (IBW): 135 lbs (61 kg)    Current Body Weight: 226 lb (102.5 kg), 167.4 % IBW.   Current BMI (kg/m2): 35.4  BMI Categories: Obese Class 2 (BMI 35.0 -39.9)    Estimated Daily Nutrient Needs:  Energy Requirements Based On: Kcal/kg  Weight Used for Energy Requirements: Current  Energy (kcal/day): 2225-8737 kcal (15-18 kcal/kg)  Weight Used for Protein Requirements: Ideal  Protein (g/day):  gm (1.2-2 g/kg IBW)  Method Used for Fluid Requirements: 1 ml/kcal    Nutrition Diagnosis:   Inadequate oral intake related to inadequate protein-energy intake as evidenced by poor intake prior to admission, nausea, intake 0-25%    Nutrition Interventions:   Food and/or Nutrient Delivery: Continue Current Diet  Nutrition Education/Counseling: No recommendation at this time  Coordination of Nutrition Care: Continue to monitor while inpatient    Goals:  Goals: PO intake 50% or greater, by next RD assessment    Nutrition Monitoring and Evaluation:   Behavioral-Environmental Outcomes: None Identified  Food/Nutrient Intake Outcomes: Food and Nutrient Intake  Physical Signs/Symptoms Outcomes: Biochemical Data, Nutrition Focused Physical Findings, Skin, Weight, GI Status, Nausea or Vomiting, Meal Time Behavior    Discharge Planning:     Too soon to determine     Stefani Jackson, [address], [place]  Contact: [phone]

## 2023-01-21 NOTE — H&P
Seen and examined.     H and P to follow    64 yo female presents to hospital with abd pain and n/v  Hx of gastroparesis  Not keeping anything down      Impression    ARF pre-renal  Dm type 2 uncontrolled  Diabetic gastroparesis with hyperemesis        Plan  Fluids  Insulin   Phenergan im  Allergic to [facility]  Check labs in am  Avoid nephrotoxins      Kingsley Huizar MD  Electronically signed by Carlos Luther MD on 1/20/2023 at 7:44 PM

## 2023-01-21 NOTE — PROGRESS NOTES
Hospitalist Progress Note      PCP: CEASAR Sibley    Date of Admission: 1/19/2023    Chief Complaint: Nausea vomiting hyperemesis    Hospital Course: 62-year-old female admitted to the hospital with hyperemesis and acute renal failure treated with antiemetics and fluids    Subjective: Feels slightly better today.  Still having lots of nausea.  She feels like she is about to throw up.      Medications:  Reviewed    Infusion Medications    sodium chloride      dextrose      sodium chloride 125 mL/hr at 01/21/23 0522     Scheduled Medications    sodium chloride flush  10 mL IntraVENous 2 times per day    insulin lispro  0-8 Units SubCUTAneous TID WC    insulin lispro  0-4 Units SubCUTAneous Nightly    pantoprazole (PROTONIX) 40 mg injection  40 mg IntraVENous Daily    ibuprofen  800 mg Oral Once    methocarbamol  1,500 mg Oral Once     PRN Meds: sodium chloride flush, sodium chloride, potassium chloride, magnesium sulfate, promethazine **OR** ondansetron, acetaminophen **OR** acetaminophen, glucose, dextrose bolus **OR** dextrose bolus, glucagon (rDNA), dextrose, promethazine      Intake/Output Summary (Last 24 hours) at 1/21/2023 1528  Last data filed at 1/21/2023 0527  Gross per 24 hour   Intake 2447.88 ml   Output --   Net 2447.88 ml       Physical Exam Performed:    /80   Pulse 73   Temp 98 °F (36.7 °C) (Oral)   Resp 18   Ht 5' 7\" (1.702 m)   Wt 226 lb 10.1 oz (102.8 kg)   SpO2 98%   BMI 35.50 kg/m²     General appearance: No apparent distress, appears stated age and cooperative.  HEENT: Pupils equal, round, and reactive to light. Conjunctivae/corneas clear.  Neck: Supple, with full range of motion. No jugular venous distention. Trachea midline.  Respiratory:  Normal respiratory effort. Clear to auscultation, bilaterally without Rales/Wheezes/Rhonchi.  Cardiovascular: Regular rate and rhythm with normal S1/S2 without murmurs, rubs or gallops.  Abdomen: Soft, non-tender, non-distended with  normal bowel sounds. Musculoskeletal: No clubbing, cyanosis or edema bilaterally. Full range of motion without deformity. Skin: Skin color, texture, turgor normal.  No rashes or lesions. Neurologic:  Neurovascularly intact without any focal sensory/motor deficits. Cranial nerves: II-XII intact, grossly non-focal.  Psychiatric: Alert and oriented, thought content appropriate, normal insight  Capillary Refill: Brisk, 3 seconds, normal   Peripheral Pulses: +2 palpable, equal bilaterally       Labs:   Recent Labs     01/19/23 2043 01/21/23 0633   WBC 16.2* 9.9   HGB 15.7 13.3   HCT 49.1* 41.0    218     Recent Labs     01/19/23 2043 01/20/23 0329 01/21/23 0633   * 131* 133*   K 3.8 4.5 3.6   CL 86* 88* 96*   CO2 27 23 25   BUN 32* 28* 15   CREATININE 1.5* 1.2 0.9   CALCIUM 9.8 9.5 8.7     Recent Labs     01/19/23 2043 01/20/23 0329 01/21/23  0633   AST 17 30 19   ALT 16 17 15   BILITOT 0.6 0.6 0.5   ALKPHOS 153* 141* 118     No results for input(s): INR in the last 72 hours. Recent Labs     01/20/23 0329   TROPONINI <0.01       Urinalysis:      Lab Results   Component Value Date/Time    NITRU Negative 01/20/2023 05:11 AM    WBCUA 7 01/20/2023 05:11 AM    BACTERIA Rare 01/20/2023 05:11 AM    RBCUA 4 01/20/2023 05:11 AM    BLOODU TRACE 01/20/2023 05:11 AM    SPECGRAV 1.080 01/20/2023 05:11 AM    GLUCOSEU Negative 01/20/2023 05:11 AM    GLUCOSEU NEGATIVE 04/09/2012 09:00 PM       Radiology:  XR CHEST (2 VW)   Final Result   No acute cardiopulmonary process. CT ABDOMEN PELVIS W IV CONTRAST Additional Contrast? None   Final Result   1. Distal esophageal thickening related to esophagitis is again noted,   possibly related to reflux esophagitis. No eccentric wall thickening or   definite mass. 2. Mild fatty liver infiltration. 3. No other acute process seen in the abdomen or pelvis.              None    Assessment/Plan:    Active Hospital Problems    Diagnosis     SHAY (acute kidney injury) ([facility] [address] [N17.9]      Acute renal failure  Resolved. Creatinine is normalized  Decrease IV fluids to 50 an hour    Intractable nausea and vomiting  Suspect this is all gastroparesis we will continue Protonix IV  If this does not resolve we will consult GI for possible endoscopy. Candidal esophagitis also on differential as she is a diabetic  Continue IM Phenergan    Hypertension  Adequately controlled on current med        DVT Prophylaxis: Lovenox  Diet: ADULT DIET;  Regular  Code Status: Full Code  PT/OT Eval Status:     Dispo -discharge in 1 to 2 days        Carlos Luther MD

## 2023-01-21 NOTE — H&P
Hospital Medicine History & Physical      PCP: CEASAR Damian    Date of Admission: 2023    Date of Service: Pt seen/examined on 2023   and Admitted to Inpatient with expected LOS greater than two midnights due to medical therapy. Chief Complaint: Nausea vomiting      History Of Present Illness:   65 y.o. female who presented to [facility] [place] with intractable nausea and vomiting. Patient reports several days of inability to keep food down. She has had nonbilious nonbloody vomiting. Patient admits to crampy abdominal pain. She admits that she has had very high blood sugars. Patient reports mild headache. She feels just ill overall. Patient states that she had previous similar episodes. She has a history of diabetic gastroparesis.     Past Medical History:          Diagnosis Date    Acid reflux     SHAY (acute kidney injury) (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    Gastroparesis     Hirsutism     History of blood transfusion     Hyperlipidemia LDL goal < 70     Hypertension     Major depressive disorder with single episode 2009    Pancreatitis     Seasonal allergic rhinitis due to pollen 11/10/2016    Sleep apnea     Thyroid disease     Vitamin D deficiency        Past Surgical History:          Procedure Laterality Date     SECTION      ENDOMETRIAL ABLATION  5/3/12    ENDOSCOPY, COLON, DIAGNOSTIC      HYSTEROSCOPY  5/3/12    MANDIBLE SURGERY      SKIN BIOPSY      TUBAL LIGATION      UPPER GASTROINTESTINAL ENDOSCOPY N/A 2021    EGD BIOPSY performed by Nirav Barros MD at [facility]       Medications Prior to Admission:      Prior to Admission medications Medication Sig Start Date End Date Taking? Authorizing Provider   insulin glargine (LANTUS) 100 UNIT/ML injection vial Inject 22 Units into the skin nightly   Yes Historical Provider, MD   ondansetron (ZOFRAN ODT) 4 MG disintegrating tablet Take 1-2 tablets by mouth every 12 hours as needed for Nausea or Vomiting May Sub regular tablet (non-ODT) if insurance does not cover ODT. 11/25/22   Genia Maharaj MD   polyethylene glycol (GLYCOLAX) 17 g packet Take 17 g by mouth daily as needed for Constipation    Historical Provider, MD   ketorolac (TORADOL) 10 MG tablet Take 1 tablet by mouth every 6 hours as needed for Pain 8/23/22   Denny Kincaid PA-C   acetaminophen (TYLENOL) 500 MG tablet Take 2 tablets by mouth 3 times daily as needed for Pain 8/6/22   SEVEN Bundy CNP   albuterol sulfate HFA (VENTOLIN HFA) 108 (90 Base) MCG/ACT inhaler Inhale 2 puffs into the lungs 4 times daily as needed for Wheezing or Shortness of Breath 3/16/22   SEVEN Bundy CNP   omeprazole (PRILOSEC) 40 MG delayed release capsule Take 40 mg by mouth daily    Historical Provider, MD   lisinopril (PRINIVIL;ZESTRIL) 10 MG tablet Take 10 mg by mouth daily    Historical Provider, MD   ezetimibe (ZETIA) 10 MG tablet Take 10 mg by mouth daily    Historical Provider, MD   amLODIPine (NORVASC) 5 MG tablet Take 5 mg by mouth daily    Historical Provider, MD   insulin lispro (HUMALOG) 100 UNIT/ML injection vial Inject into the skin 3 times daily (before meals) Per sliding scale.  2 units >150 then additional 2 units for every 50    Historical Provider, MD   aspirin 81 MG tablet Take 81 mg by mouth daily    Historical Provider, MD   timolol (TIMOPTIC) 0.5 % ophthalmic solution Place 1 drop into both eyes daily  8/7/16   Historical Provider, MD   ergocalciferol (DRISDOL) 26714 UNITS capsule Take 1 capsule by mouth once a week 5/24/16   Jayla Palomo MD   latanoprost (XALATAN) 0.005 % ophthalmic solution Place 1 drop into both eyes nightly. Historical Provider, MD       Allergies: Avelox [moxifloxacin], Bactrim, Cefuroxime, Codeine, Ibuprofen, Medrol [methylprednisolone], Morphine, Niacin, Oat, Percocet [oxycodone-acetaminophen], Reglan [metoclopramide], Spironolactone, Statins, Sulfamethoxazole-trimethoprim, and Vicodin [hydrocodone-acetaminophen]    Social History:      The patient currently lives with family    TOBACCO:   reports that she quit smoking about 33 years ago. Her smoking use included cigarettes. She has never used smokeless tobacco.  ETOH:   reports current alcohol use. E-cigarette/Vaping       Questions Responses    E-cigarette/Vaping Use Never User    Start Date     Passive Exposure     Quit Date     Counseling Given     Comments               Family History:       Reviewed and negative in regards to presenting illness/complaint. Problem Relation Age of Onset    Hypertension Mother     Breast Cancer Mother     Colon Cancer Mother     Cancer Father         Pancreatic    Prostate Cancer Father     No Known Problems Brother     No Known Problems Sister     Hypertension Maternal Aunt     Cancer Maternal Uncle     Hypertension Maternal Grandmother     Stroke Maternal Grandmother     Cancer Maternal Grandfather     No Known Problems Paternal Aunt     No Known Problems Paternal Uncle     No Known Problems Paternal Grandmother     No Known Problems Paternal Grandfather     No Known Problems Other     Rheum Arthritis Neg Hx     Osteoarthritis Neg Hx     Asthma Neg Hx     Diabetes Neg Hx     Heart Failure Neg Hx     High Cholesterol Neg Hx     Migraines Neg Hx     Ovarian Cancer Neg Hx     Rashes/Skin Problems Neg Hx     Seizures Neg Hx     Thyroid Disease Neg Hx        REVIEW OF SYSTEMS COMPLETED:   Pertinent positives as noted in the HPI. All other systems reviewed and negative.     PHYSICAL EXAM PERFORMED:    BP (!) 172/84   Pulse 82   Temp 98.9 °F (37.2 °C) (Oral)   Resp 16   Ht 5' 7\" (1.702 m)   Wt 229 lb 8 oz (104.1 kg)   SpO2 96%   BMI 35.94 kg/m²     General appearance: Pleasant female in no acute distress she is toxic appearing. HEENT:  Normal cephalic, atraumatic without obvious deformity. Pupils equal, round, and reactive to light. Extra ocular muscles intact. Conjunctivae/corneas clear. Neck: Supple, with full range of motion. No jugular venous distention. Trachea midline. Oral cavity dry tacky mucous membrane  Respiratory:  Normal respiratory effort. Clear to auscultation, bilaterally without Rales/Wheezes/Rhonchi. Cardiovascular:  Regular rate and rhythm with normal S1/S2 without murmurs, rubs or gallops. Abdomen: Soft, non-tender, non-distended with normal bowel sounds. Musculoskeletal:  No clubbing, cyanosis or edema bilaterally. Full range of motion without deformity. Skin: Skin color, texture, turgor normal.  No rashes or lesions. Neurologic:  Neurovascularly intact without any focal sensory/motor deficits. Cranial nerves: II-XII intact, grossly non-focal.  Psychiatric:  Alert and oriented, thought content appropriate, normal insight    Peripheral Pulses: Poor capillary refill    Labs:     Recent Labs     01/19/23 2043   WBC 16.2*   HGB 15.7   HCT 49.1*        Recent Labs     01/19/23 2043   *   K 3.8   CL 86*   CO2 27   BUN 32*   CREATININE 1.5*   CALCIUM 9.8     Recent Labs     01/19/23 2043   AST 17   ALT 16   BILITOT 0.6   ALKPHOS 153*     No results for input(s): INR in the last 72 hours.   Recent Labs     01/20/23  0329   TROPONINI <0.01       Urinalysis:      Lab Results   Component Value Date/Time    NITRU Negative 01/20/2023 05:11 AM    WBCUA 7 01/20/2023 05:11 AM    BACTERIA Rare 01/20/2023 05:11 AM    RBCUA 4 01/20/2023 05:11 AM    BLOODU TRACE 01/20/2023 05:11 AM    SPECGRAV 1.080 01/20/2023 05:11 AM    GLUCOSEU Negative 01/20/2023 05:11 AM    GLUCOSEU NEGATIVE 04/09/2012 09:00 PM       Radiology:         XR CHEST (2 VW)   Final Result   No acute cardiopulmonary process. CT ABDOMEN PELVIS W IV CONTRAST Additional Contrast? None   Final Result   1. Distal esophageal thickening related to esophagitis is again noted,   possibly related to reflux esophagitis. No eccentric wall thickening or   definite mass. 2. Mild fatty liver infiltration. 3. No other acute process seen in the abdomen or pelvis. Consults:    None    ASSESSMENT:    Intractable nausea and vomiting likely gastroparesis possible component of esophagitis    Acute renal failure    Diabetes mellitus type 2 poorly controlled    Hypertension        PLAN:    We will treat patient with Phenergan Im  She does not do well with Reglan and has an allergy to this  Renal failure is likely prerenal.  Patient has hyaline casts in urine suggestive of dehydration. I have made adjustments to her medications we will give her IV fluids normal saline at 150 MLS per hour  Avoiding nephrotoxins  Patient's ACE inhibitor been held  Check urine sodium and urine creatinine  Hold long-acting insulin for now to avoid hypoglycemia. I have ordered correction insulin NPO protocol sliding scale      DVT Prophylaxis: Heparin subcu  Diet: ADULT DIET; Regular; 3 carb choices (45 gm/meal); Low Fat/Low Chol/High Fiber/2 gm Na; Low Sodium (2 gm)  Code Status: Full Code      Dispo -1 to 2 days       Chan Hutchinson MD    Thank you CEASAR Echeverria for the opportunity to be involved in this patient's care. If you have any questions or concerns please feel free to contact me at [phone].

## 2023-01-21 NOTE — PLAN OF CARE
Problem: Discharge Planning  Goal: Discharge to home or other facility with appropriate resources  1/21/2023 0302 by Chris Silva RN  Outcome: Progressing  1/20/2023 1953 by Pablo Osullivan RN  Outcome: Progressing     Problem: Safety - Adult  Goal: Free from fall injury  1/21/2023 0302 by Chris Silva RN  Outcome: Progressing  1/20/2023 1953 by Pbalo Osullivan RN  Outcome: Progressing     Problem: ABCDS Injury Assessment  Goal: Absence of physical injury  1/21/2023 0302 by Chris Silva RN  Outcome: Progressing  1/20/2023 1953 by Pablo Osullivan RN  Outcome: Progressing     Problem: Metabolic/Fluid and Electrolytes - Adult  Goal: Electrolytes maintained within normal limits  Outcome: Progressing  Goal: Hemodynamic stability and optimal renal function maintained  Outcome: Progressing  Goal: Glucose maintained within prescribed range  Outcome: Progressing     Problem: Neurosensory - Adult  Goal: Achieves stable or improved neurological status  Outcome: Progressing  Goal: Absence of seizures  Outcome: Progressing  Goal: Remains free of injury related to seizures activity  Outcome: Progressing  Goal: Achieves maximal functionality and self care  Outcome: Progressing     Problem: Respiratory - Adult  Goal: Achieves optimal ventilation and oxygenation  Outcome: Progressing     Problem: Cardiovascular - Adult  Goal: Maintains optimal cardiac output and hemodynamic stability  Outcome: Progressing  Goal: Absence of cardiac dysrhythmias or at baseline  Outcome: Progressing     Problem: Skin/Tissue Integrity - Adult  Goal: Skin integrity remains intact  Outcome: Progressing  Goal: Incisions, wounds, or drain sites healing without S/S of infection  Outcome: Progressing  Goal: Oral mucous membranes remain intact  Outcome: Progressing     Problem: Musculoskeletal - Adult  Goal: Return mobility to safest level of function  Outcome: Progressing  Goal: Return ADL status to a safe level of function  Outcome: Progressing Problem: Gastrointestinal - Adult  Goal: Minimal or absence of nausea and vomiting  Outcome: Progressing  Goal: Maintains adequate nutritional intake  Outcome: Progressing

## 2023-01-22 LAB
A/G RATIO: 1.4 (ref 1.1–2.2)
ALBUMIN SERPL-MCNC: 3.6 G/DL (ref 3.4–5)
ALP BLD-CCNC: 106 U/L (ref 40–129)
ALT SERPL-CCNC: 16 U/L (ref 10–40)
ANION GAP SERPL CALCULATED.3IONS-SCNC: 13 MMOL/L (ref 3–16)
AST SERPL-CCNC: 16 U/L (ref 15–37)
BILIRUB SERPL-MCNC: 0.4 MG/DL (ref 0–1)
BUN BLDV-MCNC: 11 MG/DL (ref 7–20)
CALCIUM SERPL-MCNC: 8.6 MG/DL (ref 8.3–10.6)
CHLORIDE BLD-SCNC: 96 MMOL/L (ref 99–110)
CO2: 25 MMOL/L (ref 21–32)
CREAT SERPL-MCNC: 0.8 MG/DL (ref 0.6–1.2)
GFR SERPL CREATININE-BSD FRML MDRD: >60 ML/MIN/{1.73_M2}
GLUCOSE BLD-MCNC: 160 MG/DL (ref 70–99)
GLUCOSE BLD-MCNC: 168 MG/DL (ref 70–99)
GLUCOSE BLD-MCNC: 168 MG/DL (ref 70–99)
GLUCOSE BLD-MCNC: 207 MG/DL (ref 70–99)
GLUCOSE BLD-MCNC: 216 MG/DL (ref 70–99)
PERFORMED ON: ABNORMAL
POTASSIUM REFLEX MAGNESIUM: 3.6 MMOL/L (ref 3.5–5.1)
SODIUM BLD-SCNC: 134 MMOL/L (ref 136–145)
TOTAL PROTEIN: 6.2 G/DL (ref 6.4–8.2)

## 2023-01-22 PROCEDURE — 36415 COLL VENOUS BLD VENIPUNCTURE: CPT

## 2023-01-22 PROCEDURE — 1200000000 HC SEMI PRIVATE

## 2023-01-22 PROCEDURE — 2580000003 HC RX 258: Performed by: INTERNAL MEDICINE

## 2023-01-22 PROCEDURE — 80053 COMPREHEN METABOLIC PANEL: CPT

## 2023-01-22 PROCEDURE — C9113 INJ PANTOPRAZOLE SODIUM, VIA: HCPCS | Performed by: INTERNAL MEDICINE

## 2023-01-22 PROCEDURE — 6360000002 HC RX W HCPCS: Performed by: INTERNAL MEDICINE

## 2023-01-22 PROCEDURE — 6370000000 HC RX 637 (ALT 250 FOR IP): Performed by: INTERNAL MEDICINE

## 2023-01-22 RX ADMIN — SODIUM CHLORIDE, PRESERVATIVE FREE 10 ML: 5 INJECTION INTRAVENOUS at 20:19

## 2023-01-22 RX ADMIN — ENOXAPARIN SODIUM 30 MG: 100 INJECTION SUBCUTANEOUS at 20:19

## 2023-01-22 RX ADMIN — POLYETHYLENE GLYCOL 3350 17 G: 17 POWDER, FOR SOLUTION ORAL at 09:24

## 2023-01-22 RX ADMIN — ENOXAPARIN SODIUM 30 MG: 100 INJECTION SUBCUTANEOUS at 08:05

## 2023-01-22 RX ADMIN — SODIUM CHLORIDE: 9 INJECTION, SOLUTION INTRAVENOUS at 02:03

## 2023-01-22 RX ADMIN — INSULIN LISPRO 2 UNITS: 100 INJECTION, SOLUTION INTRAVENOUS; SUBCUTANEOUS at 17:23

## 2023-01-22 RX ADMIN — INSULIN LISPRO 2 UNITS: 100 INJECTION, SOLUTION INTRAVENOUS; SUBCUTANEOUS at 12:54

## 2023-01-22 RX ADMIN — Medication 40 MG: at 08:05

## 2023-01-22 RX ADMIN — SODIUM CHLORIDE, PRESERVATIVE FREE 10 ML: 5 INJECTION INTRAVENOUS at 08:11

## 2023-01-22 NOTE — FLOWSHEET NOTE
Patient has been snacking on Jello and crackers with peanut butter through this shift. No complaints of nausea. patient did state she think the nausea was coming on so she got a warm shower. Resting in bed, refusing PRN nausea medications.

## 2023-01-22 NOTE — PROGRESS NOTES
Physician Progress Note      PATIENTDaamanda Garcia  CSN #:                  [id number]  :                       1960  ADMIT DATE:       2023 7:46 PM  [address] [place] DATE:  RESPONDING  PROVIDER #:        Zak Martínez MD          QUERY TEXT:    Patient admitted with Diabetic gastroparesis with hyperemesis ? and noted to   have HR,  WBC . If possible, please document in progress notes and discharge   summary if you are evaluating and/or treating any of the following: The medical record reflects the following:  Risk Factors: Diabetic gastroparesis with hyperemesis? DM w/ hyperglycemia  Clinical Indicators:  HR  93---105   ,  WBC 16.2  Treatment: In ED 1L NS Bolus , IVFs NS @ 125ml/hr. and lab monitoring  Options provided:  -- SIRS of non-infectious origin due to Diabetic gastroparesis with   hyperemesis ? with SHAY (associated acute organ dysfunction)  -- Other - I will add my own diagnosis  -- Disagree - Not applicable / Not valid  -- Disagree - Clinically unable to determine / Unknown  -- Refer to Clinical Documentation Reviewer    PROVIDER RESPONSE TEXT:    This patient has SIRS of non-infectious origin due to Diabetic gastroparesis   with hyperemesis ? with SHAY (associated acute organ dysfunction).     Query created by: Carlos Manuel Camargo on 2023 8:43 AM      Electronically signed by:  Zak Martínez MD 2023 1:59 PM

## 2023-01-22 NOTE — PLAN OF CARE
Problem: Discharge Planning  Goal: Discharge to home or other facility with appropriate resources  1/21/2023 2319 by Ольга Campos RN  Outcome: Progressing  Flowsheets (Taken 1/21/2023 1950)  Discharge to home or other facility with appropriate resources:   Identify barriers to discharge with patient and caregiver   Arrange for needed discharge resources and transportation as appropriate   Identify discharge learning needs (meds, wound care, etc)  1/21/2023 1443 by Samm Holman RN  Outcome: Progressing     Problem: Safety - Adult  Goal: Free from fall injury  1/21/2023 2319 by Ольга Campos RN  Outcome: Progressing  1/21/2023 1443 by Samm Holman RN  Outcome: Progressing     Problem: ABCDS Injury Assessment  Goal: Absence of physical injury  1/21/2023 2319 by Ольга Campos RN  Outcome: Progressing  1/21/2023 1443 by Samm Holman RN  Outcome: Progressing     Problem: Metabolic/Fluid and Electrolytes - Adult  Goal: Electrolytes maintained within normal limits  1/21/2023 2319 by Ольга Campos RN  Outcome: Progressing  Flowsheets (Taken 1/21/2023 1950)  Electrolytes maintained within normal limits: Monitor labs and assess patient for signs and symptoms of electrolyte imbalances  1/21/2023 1443 by Samm Holman RN  Outcome: Progressing  Goal: Hemodynamic stability and optimal renal function maintained  1/21/2023 2319 by Ольга Campos RN  Outcome: Progressing  Flowsheets (Taken 1/21/2023 1950)  Hemodynamic stability and optimal renal function maintained: Monitor labs and assess for signs and symptoms of volume excess or deficit  1/21/2023 1443 by Samm Holman RN  Outcome: Progressing  Goal: Glucose maintained within prescribed range  1/21/2023 2319 by Ольга Campos RN  Outcome: Progressing  Flowsheets (Taken 1/21/2023 1950)  Glucose maintained within prescribed range: Monitor blood glucose as ordered  1/21/2023 1443 by Samm Holman RN  Outcome: Progressing     Problem: Neurosensory - Adult  Goal:  Achieves stable or improved neurological status  1/21/2023 2319 by Misa Bowling RN  Outcome: Progressing  Flowsheets (Taken 1/21/2023 1950)  Achieves stable or improved neurological status: Assess for and report changes in neurological status  1/21/2023 1443 by Nathanael Alexander RN  Outcome: Progressing  Goal: Absence of seizures  1/21/2023 2319 by Misa Bowling RN  Outcome: Progressing  Flowsheets (Taken 1/21/2023 1950)  Absence of seizures: Monitor for seizure activity.   If seizure occurs, document type and location of movements and any associated apnea  1/21/2023 1443 by Nathanael Alexander RN  Outcome: Progressing  Goal: Remains free of injury related to seizures activity  1/21/2023 2319 by Misa Bowling RN  Outcome: Burns Shone (Taken 1/21/2023 1950)  Remains free of injury related to seizure activity: Maintain airway, patient safety  and administer oxygen as ordered  1/21/2023 1443 by Nathanael Alexander RN  Outcome: Progressing  Goal: Achieves maximal functionality and self care  1/21/2023 2319 by Misa Bowling RN  Outcome: Progressing  Flowsheets (Taken 1/21/2023 1950)  Achieves maximal functionality and self care: Monitor swallowing and airway patency with patient fatigue and changes in neurological status  1/21/2023 1443 by Nathanael Alexander RN  Outcome: Progressing     Problem: Respiratory - Adult  Goal: Achieves optimal ventilation and oxygenation  1/21/2023 2319 by Misa Bowling RN  Outcome: Progressing  Flowsheets (Taken 1/21/2023 1950)  Achieves optimal ventilation and oxygenation:   Assess for changes in respiratory status   Assess for changes in mentation and behavior   Position to facilitate oxygenation and minimize respiratory effort   Oxygen supplementation based on oxygen saturation or arterial blood gases  1/21/2023 1443 by Nathanael Alexander RN  Outcome: Progressing     Problem: Cardiovascular - Adult  Goal: Maintains optimal cardiac output and hemodynamic stability  1/21/2023 2319 by Manfred Alcazar JOHAN Campos  Outcome: Progressing  Flowsheets (Taken 1/21/2023 1950)  Maintains optimal cardiac output and hemodynamic stability: Monitor blood pressure and heart rate  1/21/2023 1443 by Nathaniel Paris RN  Outcome: Progressing  Goal: Absence of cardiac dysrhythmias or at baseline  1/21/2023 2319 by Anny Larson RN  Outcome: Progressing  Flowsheets (Taken 1/21/2023 1950)  Absence of cardiac dysrhythmias or at baseline: Monitor cardiac rate and rhythm  1/21/2023 1443 by Nathaniel Paris RN  Outcome: Progressing     Problem: Skin/Tissue Integrity - Adult  Goal: Skin integrity remains intact  1/21/2023 2319 by Anny Larson RN  Outcome: Progressing  Flowsheets (Taken 1/21/2023 1950)  Skin Integrity Remains Intact:   Monitor for areas of redness and/or skin breakdown   Assess vascular access sites hourly  1/21/2023 1443 by Nathaniel Paris RN  Outcome: Progressing  Flowsheets (Taken 1/21/2023 0303 by Anny Larson RN)  Skin Integrity Remains Intact:   Monitor for areas of redness and/or skin breakdown   Assess vascular access sites hourly  Goal: Incisions, wounds, or drain sites healing without S/S of infection  1/21/2023 2319 by Anny Larson RN  Outcome: Progressing  Flowsheets (Taken 1/21/2023 1950)  Incisions, Wounds, or Drain Sites Healing Without Sign and Symptoms of Infection: TWICE DAILY: Assess and document dressing/incision, wound bed, drain sites and surrounding tissue  1/21/2023 1443 by Nathaniel Paris RN  Outcome: Progressing  Goal: Oral mucous membranes remain intact  1/21/2023 2319 by Anny Larson RN  Outcome: Progressing  Flowsheets (Taken 1/21/2023 1950)  Oral Mucous Membranes Remain Intact: Assess oral mucosa and hygiene practices  1/21/2023 1443 by Nathaniel Paris RN  Outcome: Progressing     Problem: Musculoskeletal - Adult  Goal: Return mobility to safest level of function  1/21/2023 2319 by Anny Larson RN  Outcome: Progressing  Flowsheets (Taken 1/21/2023 1950)  Return Mobility to [facility] Level of Function:   Assess patient stability and activity tolerance for standing, transferring and ambulating with or without assistive devices   Assist with transfers and ambulation using safe patient handling equipment as needed   Ensure adequate protection for wounds/incisions during mobilization   Obtain physical therapy/occupational therapy consults as needed   Instruct patient/family in ordered activity level  1/21/2023 1443 by Christina Pastor RN  Outcome: Progressing  Goal: Return ADL status to a safe level of function  1/21/2023 2319 by Rachel Park RN  Outcome: Nataly Anderson (Taken 1/21/2023 1950)  Return ADL Status to a Safe Level of Function: Administer medication as ordered  1/21/2023 1443 by Christina Pastor RN  Outcome: Progressing     Problem: Gastrointestinal - Adult  Goal: Minimal or absence of nausea and vomiting  1/21/2023 2319 by Rachel Park RN  Outcome: Progressing  Flowsheets (Taken 1/21/2023 1950)  Minimal or absence of nausea and vomiting: Administer IV fluids as ordered to ensure adequate hydration  1/21/2023 1443 by Christina Pastor RN  Outcome: Progressing  Goal: Maintains adequate nutritional intake  1/21/2023 2319 by Rachel Park RN  Outcome: Progressing  Flowsheets (Taken 1/21/2023 1950)  Maintains adequate nutritional intake: Monitor percentage of each meal consumed  1/21/2023 1443 by Christina Pastor RN  Outcome: Progressing     Problem: Nutrition Deficit:  Goal: Optimize nutritional status  1/21/2023 2319 by Rachel Park RN  Outcome: Progressing  1/21/2023 1443 by Christina Pastor RN  Outcome: Progressing

## 2023-01-22 NOTE — PLAN OF CARE
Problem: Discharge Planning  Goal: Discharge to home or other facility with appropriate resources  1/22/2023 0928 by Candelario Mays RN  Outcome: Progressing     Problem: Safety - Adult  Goal: Free from fall injury  1/22/2023 0928 by Candelario Mays RN  Outcome: Progressing     Problem: ABCDS Injury Assessment  Goal: Absence of physical injury  1/22/2023 3329 by Candelario Mays RN  Outcome: Progressing     Problem: Metabolic/Fluid and Electrolytes - Adult  Goal: Electrolytes maintained within normal limits  1/22/2023 0928 by Candelario Mays RN  Outcome: Progressing     Problem: Metabolic/Fluid and Electrolytes - Adult  Goal: Hemodynamic stability and optimal renal function maintained  1/22/2023 0928 by Candelario Mays RN  Outcome: Progressing     Problem: Metabolic/Fluid and Electrolytes - Adult  Goal: Glucose maintained within prescribed range  1/22/2023 0928 by Candelario Mays RN  Outcome: Progressing     Problem: Neurosensory - Adult  Goal: Achieves stable or improved neurological status  1/22/2023 0928 by Candelario Mays RN  Outcome: Progressing  Flowsheets (Taken 1/22/2023 0804)  Achieves stable or improved neurological status: Assess for and report changes in neurological status     Problem: Neurosensory - Adult  Goal: Absence of seizures  1/22/2023 2967 by Candelario Mays RN  Outcome: Progressing  Flowsheets (Taken 1/22/2023 0804)  Absence of seizures: Monitor for seizure activity.   If seizure occurs, document type and location of movements and any associated apnea     Problem: Neurosensory - Adult  Goal: Remains free of injury related to seizures activity  1/22/2023 0928 by Candelario Mays RN  Outcome: Progressing  Flowsheets (Taken 1/22/2023 0804)  Remains free of injury related to seizure activity: Maintain airway, patient safety  and administer oxygen as ordered     Problem: Neurosensory - Adult  Goal: Achieves maximal functionality and self care  1/22/2023 0928 by Candelario Mays RN  Outcome: Progressing  Flowsheets (Taken 1/22/2023 0804)  Achieves maximal functionality and self care: Monitor swallowing and airway patency with patient fatigue and changes in neurological status     Problem: Respiratory - Adult  Goal: Achieves optimal ventilation and oxygenation  1/22/2023 0928 by Candance Floras, RN  Outcome: Progressing     Problem: Cardiovascular - Adult  Goal: Maintains optimal cardiac output and hemodynamic stability  1/22/2023 0928 by Candance Floras, RN  Outcome: Progressing  Flowsheets (Taken 1/22/2023 0804)  Maintains optimal cardiac output and hemodynamic stability: Monitor blood pressure and heart rate

## 2023-01-22 NOTE — PROGRESS NOTES
Hospitalist Progress Note      PCP: CEASAR Blackwood    Date of Admission: 1/19/2023    Chief Complaint: Nausea vomiting hyperemesis    Hospital Course: 80-year-old female admitted to the hospital with hyperemesis and acute renal failure treated with antiemetics and fluids  Patient is a diabetic and has a history of gastroparesis  Subjective: Patient in good spirits today. Less nauseous. She is eating slowly. Pleasant mood. Hoping to go home soon. Medications:  Reviewed    Infusion Medications    sodium chloride      dextrose      sodium chloride Stopped (01/22/23 0339)     Scheduled Medications    enoxaparin  30 mg SubCUTAneous BID    polyethylene glycol  17 g Oral Daily    sodium chloride flush  10 mL IntraVENous 2 times per day    insulin lispro  0-8 Units SubCUTAneous TID WC    insulin lispro  0-4 Units SubCUTAneous Nightly    pantoprazole (PROTONIX) 40 mg injection  40 mg IntraVENous Daily    ibuprofen  800 mg Oral Once    methocarbamol  1,500 mg Oral Once     PRN Meds: sodium chloride flush, sodium chloride, potassium chloride, magnesium sulfate, promethazine **OR** ondansetron, acetaminophen **OR** acetaminophen, glucose, dextrose bolus **OR** dextrose bolus, glucagon (rDNA), dextrose, promethazine      Intake/Output Summary (Last 24 hours) at 1/22/2023 1306  Last data filed at 1/22/2023 0948  Gross per 24 hour   Intake 1442.52 ml   Output --   Net 1442.52 ml         Physical Exam Performed:    /88   Pulse 78   Temp 98.2 °F (36.8 °C) (Oral)   Resp 18   Ht 5' 7\" (1.702 m)   Wt 226 lb 10.1 oz (102.8 kg)   SpO2 97%   BMI 35.50 kg/m²     General appearance: No apparent distress, appears stated age and cooperative. HEENT: Pupils equal, round, and reactive to light. Conjunctivae/corneas clear. Neck: Supple, with full range of motion. No jugular venous distention. Trachea midline. Respiratory:  Normal respiratory effort.  Clear to auscultation, bilaterally without Rales/Wheezes/Rhonchi. Cardiovascular: Regular rate and rhythm with normal S1/S2 without murmurs, rubs or gallops. Abdomen: Soft, non-tender, non-distended with normal bowel sounds. Musculoskeletal: No clubbing, cyanosis or edema bilaterally. Full range of motion without deformity. Skin: Skin color, texture, turgor normal.  No rashes or lesions. Neurologic:  Neurovascularly intact without any focal sensory/motor deficits. Cranial nerves: II-XII intact, grossly non-focal.  Psychiatric: Alert and oriented, thought content appropriate, normal insight  Capillary Refill: Brisk, 3 seconds, normal   Peripheral Pulses: +2 palpable, equal bilaterally       Labs:   Recent Labs     01/19/23 2043 01/21/23  0633   WBC 16.2* 9.9   HGB 15.7 13.3   HCT 49.1* 41.0    218       Recent Labs     01/20/23 0329 01/21/23  0633 01/22/23  0541   * 133* 134*   K 4.5 3.6 3.6   CL 88* 96* 96*   CO2 23 25 25   BUN 28* 15 11   CREATININE 1.2 0.9 0.8   CALCIUM 9.5 8.7 8.6       Recent Labs     01/20/23 0329 01/21/23  0633 01/22/23  0541   AST 30 19 16   ALT 17 15 16   BILITOT 0.6 0.5 0.4   ALKPHOS 141* 118 106       No results for input(s): INR in the last 72 hours. Recent Labs     01/20/23 0329   TROPONINI <0.01         Urinalysis:      Lab Results   Component Value Date/Time    NITRU Negative 01/20/2023 05:11 AM    WBCUA 7 01/20/2023 05:11 AM    BACTERIA Rare 01/20/2023 05:11 AM    RBCUA 4 01/20/2023 05:11 AM    BLOODU TRACE 01/20/2023 05:11 AM    SPECGRAV 1.080 01/20/2023 05:11 AM    GLUCOSEU Negative 01/20/2023 05:11 AM    GLUCOSEU NEGATIVE 04/09/2012 09:00 PM       Radiology:  XR CHEST (2 VW)   Final Result   No acute cardiopulmonary process. CT ABDOMEN PELVIS W IV CONTRAST Additional Contrast? None   Final Result   1. Distal esophageal thickening related to esophagitis is again noted,   possibly related to reflux esophagitis. No eccentric wall thickening or   definite mass.    2. Mild fatty liver infiltration. 3. No other acute process seen in the abdomen or pelvis. None    Assessment/Plan:    Active Hospital Problems    Diagnosis     SHAY (acute kidney injury) ([facility] [address] [N17.9]      Acute renal failure  Resolved. Creatinine is normalized  DC fluids today    Intractable nausea and vomiting  Much improved. Continue proton pump inhibitor. See how she does with lunch. If has recurrent nausea vomiting. Will make n.p.o. after midnight and see if GI can scope her  If symptoms improve she can likely be sent home on proton pump inhibitor therapy. Needs to avoid NSAIDs  Continue IM Phenergan    Hypertension  Adequately controlled on current med        DVT Prophylaxis: Lovenox  Diet: ADULT DIET;  Regular  Code Status: Full Code  PT/OT Eval Status:     Dispo -discharge possibly tomorrow        Linh Harrington MD

## 2023-01-23 VITALS
HEIGHT: 67 IN | OXYGEN SATURATION: 96 % | SYSTOLIC BLOOD PRESSURE: 169 MMHG | HEART RATE: 63 BPM | TEMPERATURE: 97.9 F | DIASTOLIC BLOOD PRESSURE: 79 MMHG | WEIGHT: 226.41 LBS | BODY MASS INDEX: 35.54 KG/M2 | RESPIRATION RATE: 18 BRPM

## 2023-01-23 LAB
A/G RATIO: 1.5 (ref 1.1–2.2)
ALBUMIN SERPL-MCNC: 3.7 G/DL (ref 3.4–5)
ALP BLD-CCNC: 113 U/L (ref 40–129)
ALT SERPL-CCNC: 15 U/L (ref 10–40)
ANION GAP SERPL CALCULATED.3IONS-SCNC: 13 MMOL/L (ref 3–16)
AST SERPL-CCNC: 14 U/L (ref 15–37)
BILIRUB SERPL-MCNC: 0.4 MG/DL (ref 0–1)
BUN BLDV-MCNC: 9 MG/DL (ref 7–20)
CALCIUM SERPL-MCNC: 8.6 MG/DL (ref 8.3–10.6)
CHLORIDE BLD-SCNC: 100 MMOL/L (ref 99–110)
CO2: 26 MMOL/L (ref 21–32)
CREAT SERPL-MCNC: 0.8 MG/DL (ref 0.6–1.2)
CULTURE, BLOOD 2: NORMAL
GFR SERPL CREATININE-BSD FRML MDRD: >60 ML/MIN/{1.73_M2}
GLUCOSE BLD-MCNC: 180 MG/DL (ref 70–99)
GLUCOSE BLD-MCNC: 230 MG/DL (ref 70–99)
GLUCOSE BLD-MCNC: 265 MG/DL (ref 70–99)
PERFORMED ON: ABNORMAL
PERFORMED ON: ABNORMAL
POTASSIUM REFLEX MAGNESIUM: 3.8 MMOL/L (ref 3.5–5.1)
SODIUM BLD-SCNC: 139 MMOL/L (ref 136–145)
TOTAL PROTEIN: 6.2 G/DL (ref 6.4–8.2)

## 2023-01-23 PROCEDURE — 6370000000 HC RX 637 (ALT 250 FOR IP): Performed by: INTERNAL MEDICINE

## 2023-01-23 PROCEDURE — 2580000003 HC RX 258: Performed by: INTERNAL MEDICINE

## 2023-01-23 PROCEDURE — 6360000002 HC RX W HCPCS: Performed by: INTERNAL MEDICINE

## 2023-01-23 PROCEDURE — C9113 INJ PANTOPRAZOLE SODIUM, VIA: HCPCS | Performed by: INTERNAL MEDICINE

## 2023-01-23 PROCEDURE — 80053 COMPREHEN METABOLIC PANEL: CPT

## 2023-01-23 PROCEDURE — A4216 STERILE WATER/SALINE, 10 ML: HCPCS | Performed by: INTERNAL MEDICINE

## 2023-01-23 PROCEDURE — 36415 COLL VENOUS BLD VENIPUNCTURE: CPT

## 2023-01-23 RX ORDER — PANTOPRAZOLE SODIUM 40 MG/1
40 TABLET, DELAYED RELEASE ORAL DAILY
Qty: 60 TABLET | Refills: 0 | Status: SHIPPED | OUTPATIENT
Start: 2023-01-23

## 2023-01-23 RX ORDER — ONDANSETRON 4 MG/1
4 TABLET, ORALLY DISINTEGRATING ORAL 3 TIMES DAILY PRN
Qty: 21 TABLET | Refills: 0 | Status: SHIPPED | OUTPATIENT
Start: 2023-01-23

## 2023-01-23 RX ADMIN — INSULIN LISPRO 2 UNITS: 100 INJECTION, SOLUTION INTRAVENOUS; SUBCUTANEOUS at 12:22

## 2023-01-23 RX ADMIN — Medication 40 MG: at 08:27

## 2023-01-23 RX ADMIN — ENOXAPARIN SODIUM 30 MG: 100 INJECTION SUBCUTANEOUS at 08:27

## 2023-01-23 RX ADMIN — SODIUM CHLORIDE, PRESERVATIVE FREE 10 ML: 5 INJECTION INTRAVENOUS at 11:52

## 2023-01-23 RX ADMIN — POLYETHYLENE GLYCOL 3350 17 G: 17 POWDER, FOR SOLUTION ORAL at 08:30

## 2023-01-23 NOTE — DISCHARGE SUMMARY
Hospital Medicine Discharge Summary    Patient ID: Haim Tyson      Patient's PCP: CEASAR Huerta    Admit Date: 1/19/2023     Discharge Date:   01/23/23      Admitting Physician: Lauren Burns DO     Discharge Physician: Williams Don. Haleigh Forbes - NP     Discharge Diagnoses: Active Hospital Problems    Diagnosis     SHAY (acute kidney injury) ([facility] [address] [N17.9]        The patient was seen and examined on day of discharge and this discharge summary is in conjunction with any daily progress note from day of discharge. Hospital Course:   72-year-old female admitted to the hospital with hyperemesis and acute renal failure treated with antiemetics and fluids  Patient is a diabetic and has a history of gastroparesis    Patient is feeling much better today. She states that she thinks it was probably her gastroparesis that was causing her issues. She has an upcoming appointment with a GI doctor as outpatient and she feels comfortable with following up with them on discharge. Will send with prescription for Zofran and Protonix. Acute renal failure  Resolved. Creatinine is normalized  Encouraged fluids at home  Spoke with patient about stopping Toradol upon discharge until she sees her GI doctor     Intractable nausea and vomiting  Much improved. Continue proton pump inhibitor. Follow-up with GI doctor as outpatient     Hypertension  Adequately controlled on current med      Physical Exam Performed:     BP (!) 169/79   Pulse 63   Temp 97.9 °F (36.6 °C) (Oral)   Resp 18   Ht 5' 7\" (1.702 m)   Wt 226 lb 6.6 oz (102.7 kg)   SpO2 96%   BMI 35.46 kg/m²       General appearance:  No apparent distress, appears stated age and cooperative. HEENT:  Normal cephalic, atraumatic without obvious deformity. Pupils equal, round, and reactive to light. Extra ocular muscles intact. Conjunctivae/corneas clear. Neck: Supple, with full range of motion. No jugular venous distention.  Trachea midline. Respiratory:  Normal respiratory effort. Clear to auscultation, bilaterally without Rales/Wheezes/Rhonchi. Cardiovascular:  Regular rate and rhythm with normal S1/S2 without murmurs, rubs or gallops. Abdomen: Soft, non-tender, non-distended with normal bowel sounds. Musculoskeletal:  No clubbing, cyanosis or edema bilaterally. Full range of motion without deformity. Skin: Skin color, texture, turgor normal.  No rashes or lesions. Neurologic:  Neurovascularly intact without any focal sensory/motor deficits. Cranial nerves: II-XII intact, grossly non-focal.  Psychiatric:  Alert and oriented, thought content appropriate, normal insight  Capillary Refill: Brisk,< 3 seconds   Peripheral Pulses: +2 palpable, equal bilaterally       Labs: For convenience and continuity at follow-up the following most recent labs are provided:      CBC:    Lab Results   Component Value Date/Time    WBC 9.9 01/21/2023 06:33 AM    HGB 13.3 01/21/2023 06:33 AM    HCT 41.0 01/21/2023 06:33 AM     01/21/2023 06:33 AM       Renal:    Lab Results   Component Value Date/Time     01/22/2023 05:41 AM    K 3.6 01/22/2023 05:41 AM    CL 96 01/22/2023 05:41 AM    CO2 25 01/22/2023 05:41 AM    BUN 11 01/22/2023 05:41 AM    CREATININE 0.8 01/22/2023 05:41 AM    CALCIUM 8.6 01/22/2023 05:41 AM    PHOS 4.0 09/11/2022 12:44 AM         Significant Diagnostic Studies    Radiology:   XR CHEST (2 VW)   Final Result   No acute cardiopulmonary process. CT ABDOMEN PELVIS W IV CONTRAST Additional Contrast? None   Final Result   1. Distal esophageal thickening related to esophagitis is again noted,   possibly related to reflux esophagitis. No eccentric wall thickening or   definite mass. 2. Mild fatty liver infiltration. 3. No other acute process seen in the abdomen or pelvis.                 Consults:     None    Disposition:  Home     Condition at Discharge: Stable    Discharge Instructions/Follow-up:      Follow up with PCP in 1 week for hospital follow up    Follow-up with GI doctor as outpatient    Code Status:  Full Code     Activity: activity as tolerated    Diet: diabetic diet      Discharge Medications:     Current Discharge Medication List             Details   insulin glargine (LANTUS) 100 UNIT/ML injection vial Inject 22 Units into the skin nightly      ondansetron (ZOFRAN ODT) 4 MG disintegrating tablet Take 1-2 tablets by mouth every 12 hours as needed for Nausea or Vomiting May Sub regular tablet (non-ODT) if insurance does not cover ODT. Qty: 30 tablet, Refills: 0      polyethylene glycol (GLYCOLAX) 17 g packet Take 17 g by mouth daily as needed for Constipation      ketorolac (TORADOL) 10 MG tablet Take 1 tablet by mouth every 6 hours as needed for Pain  Qty: 20 tablet, Refills: 0      acetaminophen (TYLENOL) 500 MG tablet Take 2 tablets by mouth 3 times daily as needed for Pain  Qty: 180 tablet, Refills: 0      albuterol sulfate HFA (VENTOLIN HFA) 108 (90 Base) MCG/ACT inhaler Inhale 2 puffs into the lungs 4 times daily as needed for Wheezing or Shortness of Breath  Qty: 18 g, Refills: 0      omeprazole (PRILOSEC) 40 MG delayed release capsule Take 40 mg by mouth daily      lisinopril (PRINIVIL;ZESTRIL) 10 MG tablet Take 10 mg by mouth daily      ezetimibe (ZETIA) 10 MG tablet Take 10 mg by mouth daily      amLODIPine (NORVASC) 5 MG tablet Take 5 mg by mouth daily      insulin lispro (HUMALOG) 100 UNIT/ML injection vial Inject into the skin 3 times daily (before meals) Per sliding scale. 2 units >150 then additional 2 units for every 50      aspirin 81 MG tablet Take 81 mg by mouth daily      timolol (TIMOPTIC) 0.5 % ophthalmic solution Place 1 drop into both eyes daily       ergocalciferol (DRISDOL) 92772 UNITS capsule Take 1 capsule by mouth once a week  Qty: 13 capsule, Refills: 1      latanoprost (XALATAN) 0.005 % ophthalmic solution Place 1 drop into both eyes nightly.              Time Spent on discharge is more than 45 minutes in the examination, evaluation, counseling and review of medications and discharge plan. Signed:    Denise Jackson. Jessica Hendrix NP   1/23/2023      Thank you CEASAR Stark for the opportunity to be involved in this patient's care. If you have any questions or concerns please feel free to contact me at [phone].

## 2023-01-23 NOTE — PLAN OF CARE
Problem: Discharge Planning  Goal: Discharge to home or other facility with appropriate resources  1/22/2023 2228 by Kaitlyn Anglin RN  Outcome: Progressing  1/22/2023 0928 by Jose Eduardo Grigsby RN  Outcome: Progressing     Problem: Safety - Adult  Goal: Free from fall injury  1/22/2023 2228 by Kaitlyn Anglin RN  Outcome: Progressing  1/22/2023 0928 by Jose Eduardo Grigsby RN  Outcome: Progressing     Problem: ABCDS Injury Assessment  Goal: Absence of physical injury  1/22/2023 2228 by Kaitlyn Anglin RN  Outcome: Progressing  1/22/2023 0928 by Jose Eduardo Grigsby RN  Outcome: Progressing     Problem: Metabolic/Fluid and Electrolytes - Adult  Goal: Electrolytes maintained within normal limits  1/22/2023 2228 by Kaitlyn Anglin RN  Outcome: Progressing  1/22/2023 0928 by Jose Eduardo Grigsby RN  Outcome: Progressing  Goal: Hemodynamic stability and optimal renal function maintained  1/22/2023 2228 by Kaitlyn Anglin RN  Outcome: Progressing  1/22/2023 0928 by Jose Eduardo Grigsby RN  Outcome: Progressing  Goal: Glucose maintained within prescribed range  1/22/2023 2228 by Kaitlyn Anglin RN  Outcome: Progressing  1/22/2023 0928 by Jose Eduardo Grigsby RN  Outcome: Progressing     Problem: Neurosensory - Adult  Goal: Achieves stable or improved neurological status  1/22/2023 2228 by Kaitlyn Anglin RN  Outcome: Progressing  1/22/2023 0928 by Jose Eduardo Grigsby RN  Outcome: Progressing  Flowsheets (Taken 1/22/2023 0804)  Achieves stable or improved neurological status: Assess for and report changes in neurological status  Goal: Absence of seizures  1/22/2023 2228 by Kaitlyn Anglin RN  Outcome: Progressing  1/22/2023 0928 by Jose Eduardo Grigsby RN  Outcome: Progressing  Flowsheets (Taken 1/22/2023 0804)  Absence of seizures: Monitor for seizure activity.   If seizure occurs, document type and location of movements and any associated apnea  Goal: Remains free of injury related to seizures activity  1/22/2023 2228 by Kaitlyn Anglin RN  Outcome: Progressing  1/22/2023 0928 by Annika Fontana RN  Outcome: Progressing  Flowsheets (Taken 1/22/2023 3131)  Remains free of injury related to seizure activity: Maintain airway, patient safety  and administer oxygen as ordered  Goal: Achieves maximal functionality and self care  1/22/2023 2228 by Radha Chiang RN  Outcome: Progressing  1/22/2023 0928 by Annika Fontana RN  Outcome: Progressing  Flowsheets (Taken 1/22/2023 0804)  Achieves maximal functionality and self care: Monitor swallowing and airway patency with patient fatigue and changes in neurological status     Problem: Respiratory - Adult  Goal: Achieves optimal ventilation and oxygenation  1/22/2023 2228 by Radha Chiang RN  Outcome: Progressing  1/22/2023 0928 by Annika Fontana RN  Outcome: Progressing     Problem: Cardiovascular - Adult  Goal: Maintains optimal cardiac output and hemodynamic stability  1/22/2023 2228 by Radha Chiang RN  Outcome: Progressing  1/22/2023 0928 by Annika Fontana RN  Outcome: Progressing  Flowsheets (Taken 1/22/2023 2467)  Maintains optimal cardiac output and hemodynamic stability: Monitor blood pressure and heart rate  Goal: Absence of cardiac dysrhythmias or at baseline  1/22/2023 2228 by Radha Chiang RN  Outcome: Progressing  1/22/2023 0928 by Annika Fontana RN  Outcome: Progressing  Flowsheets (Taken 1/22/2023 8904)  Absence of cardiac dysrhythmias or at baseline: Monitor cardiac rate and rhythm     Problem: Skin/Tissue Integrity - Adult  Goal: Skin integrity remains intact  1/22/2023 2228 by Radha Chiang RN  Outcome: Progressing  1/22/2023 0928 by Annika Fontana RN  Outcome: Progressing  Flowsheets (Taken 1/21/2023 2320 by Radha Chiang RN)  Skin Integrity Remains Intact:   Monitor for areas of redness and/or skin breakdown   Assess vascular access sites hourly  Goal: Incisions, wounds, or drain sites healing without S/S of infection  1/22/2023 2228 by Radha Chiang RN  Outcome: Progressing  1/22/2023 8592 by Kari Mancini RN  Outcome: Progressing  Flowsheets (Taken 1/21/2023 2320 by Malik Acosta RN)  Incisions, Wounds, or Drain Sites Healing Without Sign and Symptoms of Infection: TWICE DAILY: Assess and document dressing/incision, wound bed, drain sites and surrounding tissue  Goal: Oral mucous membranes remain intact  1/22/2023 2228 by Malik Acosta RN  Outcome: Progressing  1/22/2023 0928 by Kari Mancini RN  Outcome: Progressing  Flowsheets (Taken 1/21/2023 2320 by Malik Acosta RN)  Oral Mucous Membranes Remain Intact:   Assess oral mucosa and hygiene practices   Implement oral medicated treatments as ordered   Implement preventative oral hygiene regimen     Problem: Musculoskeletal - Adult  Goal: Return mobility to safest level of function  1/22/2023 2228 by Malik Acosta RN  Outcome: Progressing  1/22/2023 0928 by Kari Mancini RN  Outcome: Progressing  Goal: Return ADL status to a safe level of function  1/22/2023 2228 by Malik Acosta RN  Outcome: Progressing  1/22/2023 0928 by Kari Mancini RN  Outcome: Progressing     Problem: Gastrointestinal - Adult  Goal: Minimal or absence of nausea and vomiting  1/22/2023 2228 by Malik Acosta RN  Outcome: Progressing  1/22/2023 0928 by Kari Mancini RN  Outcome: Progressing  Goal: Maintains adequate nutritional intake  1/22/2023 2228 by Malik Acosta RN  Outcome: Progressing  1/22/2023 0928 by Kari Mancini RN  Outcome: Progressing     Problem: Nutrition Deficit:  Goal: Optimize nutritional status  1/22/2023 2228 by Malik Acosta RN  Outcome: Progressing  1/22/2023 0928 by Kari Mancini RN  Outcome: Progressing

## 2023-01-23 NOTE — FLOWSHEET NOTE
No complaints of nausea or vomiting so far this shift. Patient eating jello and ice cream without issues.

## 2023-01-23 NOTE — PLAN OF CARE
Problem: Discharge Planning  Goal: Discharge to home or other facility with appropriate resources  Outcome: Completed  Flowsheets (Taken 1/23/2023 0800)  Discharge to home or other facility with appropriate resources: Identify barriers to discharge with patient and caregiver     Problem: Safety - Adult  Goal: Free from fall injury  Outcome: Completed     Problem: ABCDS Injury Assessment  Goal: Absence of physical injury  Outcome: Completed     Problem: Metabolic/Fluid and Electrolytes - Adult  Goal: Electrolytes maintained within normal limits  Outcome: Completed  Flowsheets (Taken 1/23/2023 0800)  Electrolytes maintained within normal limits: Monitor labs and assess patient for signs and symptoms of electrolyte imbalances  Goal: Hemodynamic stability and optimal renal function maintained  Outcome: Completed  Flowsheets (Taken 1/23/2023 0800)  Hemodynamic stability and optimal renal function maintained: Monitor labs and assess for signs and symptoms of volume excess or deficit  Goal: Glucose maintained within prescribed range  Outcome: Completed  Flowsheets (Taken 1/23/2023 0800)  Glucose maintained within prescribed range: Monitor blood glucose as ordered     Problem: Neurosensory - Adult  Goal: Achieves stable or improved neurological status  Outcome: Completed  Flowsheets (Taken 1/23/2023 0800)  Achieves stable or improved neurological status: Assess for and report changes in neurological status  Goal: Absence of seizures  Outcome: Completed  Flowsheets (Taken 1/23/2023 0800)  Absence of seizures: Monitor for seizure activity.   If seizure occurs, document type and location of movements and any associated apnea  Goal: Remains free of injury related to seizures activity  Outcome: Completed  Flowsheets (Taken 1/23/2023 0800)  Remains free of injury related to seizure activity: Maintain airway, patient safety  and administer oxygen as ordered  Goal: Achieves maximal functionality and self care  Outcome: Completed  Flowsheets (Taken 1/23/2023 0800)  Achieves maximal functionality and self care: Monitor swallowing and airway patency with patient fatigue and changes in neurological status     Problem: Cardiovascular - Adult  Goal: Maintains optimal cardiac output and hemodynamic stability  Outcome: Completed  Flowsheets (Taken 1/23/2023 0800)  Maintains optimal cardiac output and hemodynamic stability: Monitor blood pressure and heart rate  Goal: Absence of cardiac dysrhythmias or at baseline  Outcome: Completed  Flowsheets (Taken 1/23/2023 0800)  Absence of cardiac dysrhythmias or at baseline: Monitor cardiac rate and rhythm     Problem: Respiratory - Adult  Goal: Achieves optimal ventilation and oxygenation  Outcome: Completed  Flowsheets (Taken 1/23/2023 0800)  Achieves optimal ventilation and oxygenation: Assess for changes in respiratory status     Problem: Skin/Tissue Integrity - Adult  Goal: Skin integrity remains intact  Outcome: Completed  Flowsheets (Taken 1/23/2023 0800)  Skin Integrity Remains Intact: Monitor for areas of redness and/or skin breakdown  Goal: Incisions, wounds, or drain sites healing without S/S of infection  Outcome: Completed  Flowsheets (Taken 1/23/2023 0800)  Incisions, Wounds, or Drain Sites Healing Without Sign and Symptoms of Infection: TWICE DAILY: Assess and document skin integrity  Goal: Oral mucous membranes remain intact  Outcome: Completed  Flowsheets (Taken 1/23/2023 0800)  Oral Mucous Membranes Remain Intact: Assess oral mucosa and hygiene practices     Problem: Musculoskeletal - Adult  Goal: Return mobility to safest level of function  Outcome: Completed  Flowsheets (Taken 1/23/2023 0800)  Return Mobility to Safest Level of Function: Assess patient stability and activity tolerance for standing, transferring and ambulating with or without assistive devices  Goal: Return ADL status to a safe level of function  Outcome: Completed  Flowsheets (Taken 1/23/2023 0800)  Return ADL  Status to a Safe Level of Function: Administer medication as ordered     Problem: Gastrointestinal - Adult  Goal: Minimal or absence of nausea and vomiting  Outcome: Completed  Flowsheets (Taken 1/23/2023 0800)  Minimal or absence of nausea and vomiting: Administer IV fluids as ordered to ensure adequate hydration  Goal: Maintains adequate nutritional intake  Outcome: Completed  Flowsheets (Taken 1/23/2023 0800)  Maintains adequate nutritional intake: Monitor percentage of each meal consumed     Problem: Nutrition Deficit:  Goal: Optimize nutritional status  Outcome: Completed     Problem: Chronic Conditions and Co-morbidities  Goal: Patient's chronic conditions and co-morbidity symptoms are monitored and maintained or improved  Outcome: Completed  Flowsheets (Taken 1/23/2023 0800)  Care Plan - Patient's Chronic Conditions and Co-Morbidity Symptoms are Monitored and Maintained or Improved: Monitor and assess patient's chronic conditions and comorbid symptoms for stability, deterioration, or improvement

## 2023-01-23 NOTE — PROGRESS NOTES
She tolerated lunch ok. Denies nausea, just bloating and reports she has alot of gas, not alot of stomach pain about 1-2/10.  Dr. Sarah Da Silva updated

## 2023-01-23 NOTE — CARE COORDINATION
Dc order placed prior to being assessed by CM; verified address, denies needs, plans to dc home, will call an Adine Meter, waiting for DC paperwork & work note.           Case Management Discharge Note          Date / Time of Note: 1/23/2023 12:14 PM                  Patient Name: Pierre Mora   YOB: 1960  Diagnosis: Spasm of muscle [M62.838]  Dehydration [E86.0]  Elevated blood pressure reading [R03.0]  Hyperglycemia [R73.9]  SHAY (acute kidney injury) ([address] [N17.9]  Elevated beta-hydroxybutyrate [R78.89]  Nausea and vomiting, unspecified vomiting type [R11.2]   Date / Time: 1/19/2023  7:46 PM    Financial:  Payor: [employer] / Plan: [employer]AN / Product Type: *No Product type* /      Pharmacy:    [facility] [address] [phone] - f [phone]  [address] [place] [address]  Phone: [phone] Fax: [phone] [phone] - F [phone]  [address]  Phone: [phone] Fax: [address], [address] [phone] - F [phone]  [address] [place] [address]  Phone: [phone] Fax: [phone]    [facility] [address] [address] [address], [address] [facility] [place] [phone] - F [phone]  [address] [facility] [address]  Phone: [phone] Fax: [phone]    [facility] [address] [place] [address]  Phone: [phone] Fax: [phone]      Assistance purchasing medications?:    Assistance provided by Case Management: None at this time    DISCHARGE Disposition: Home- No Services Needed      Transportation:  Transportation PLAN for discharge:  calling an [employer]    Mode of Transport:  U[employer]    Additional CM Notes: denied needs, lives with sister, no DMEs, still works, will call for an Nima David once received dc paperwork    The Plan for Transition of Care is related to the following treatment goals of Spasm of muscle [M62.838]  Dehydration [E86.0]  Elevated blood pressure reading [R03.0]  Hyperglycemia [R73.9]  SHAY (acute kidney injury) ([facility] [address] [N17.9]  Elevated beta-hydroxybutyrate [R78.89]  Nausea and vomiting, unspecified vomiting type [R11.2]    Enedina Israel RN, BSN,   [phone]  Electronically signed by Enedina Israel RN on 1/23/2023 at 12:16 PM

## 2023-01-23 NOTE — PROGRESS NOTES
Discussed discharge instructions with pt. All questions answered. Pt provided with return to work note per NP. IV removed w/o complication. Dressing applied Pt ambulated to d/c.

## 2023-01-24 LAB — BLOOD CULTURE, ROUTINE: NORMAL

## 2023-01-25 NOTE — ADT AUTH CERT
Patient Class History INPATIENT ADMIT DATE 01/20/23    Date / Time Event Patient Class   01/20/2023 01:16 AM Patient Update Inpatient   01/19/2023 07:46 PM Admission Emergency

## 2023-03-15 ENCOUNTER — APPOINTMENT (OUTPATIENT)
Dept: ULTRASOUND IMAGING | Age: 63
End: 2023-03-15
Payer: COMMERCIAL

## 2023-03-15 ENCOUNTER — HOSPITAL ENCOUNTER (EMERGENCY)
Age: 63
Discharge: HOME | End: 2023-03-15
Payer: COMMERCIAL

## 2023-03-15 VITALS
RESPIRATION RATE: 17 BRPM | OXYGEN SATURATION: 100 % | BODY MASS INDEX: 34.88 KG/M2 | WEIGHT: 222.22 LBS | DIASTOLIC BLOOD PRESSURE: 78 MMHG | SYSTOLIC BLOOD PRESSURE: 156 MMHG | HEIGHT: 67 IN | TEMPERATURE: 98 F | HEART RATE: 84 BPM

## 2023-03-15 DIAGNOSIS — R11.2 NAUSEA AND VOMITING, UNSPECIFIED VOMITING TYPE: Primary | ICD-10-CM

## 2023-03-15 DIAGNOSIS — R10.10 PAIN OF UPPER ABDOMEN: ICD-10-CM

## 2023-03-15 LAB
ALBUMIN SERPL-MCNC: 4.3 G/DL (ref 3.4–5)
ALP SERPL-CCNC: 137 U/L (ref 40–129)
ALT SERPL-CCNC: 20 U/L (ref 10–40)
ANION GAP SERPL CALCULATED.3IONS-SCNC: 18 MMOL/L (ref 3–16)
AST SERPL-CCNC: 18 U/L (ref 15–37)
BACTERIA URNS QL MICRO: ABNORMAL /HPF
BASE EXCESS BLDV CALC-SCNC: 8.4 MMOL/L
BASOPHILS # BLD: 0.1 K/UL (ref 0–0.2)
BASOPHILS NFR BLD: 0.5 %
BETA-HYDROXYBUTYRATE: 1.07 MMOL/L (ref 0–0.27)
BILIRUB DIRECT SERPL-MCNC: <0.2 MG/DL (ref 0–0.3)
BILIRUB INDIRECT SERPL-MCNC: ABNORMAL MG/DL (ref 0–1)
BILIRUB SERPL-MCNC: 0.5 MG/DL (ref 0–1)
BILIRUB UR QL STRIP.AUTO: NEGATIVE
BUN SERPL-MCNC: 22 MG/DL (ref 7–20)
CALCIUM SERPL-MCNC: 9.6 MG/DL (ref 8.3–10.6)
CHLORIDE SERPL-SCNC: 84 MMOL/L (ref 99–110)
CHP ED QC CHECK: YES
CHP ED QC CHECK: YES
CLARITY UR: ABNORMAL
CO2 BLDV-SCNC: 35 MMOL/L
CO2 SERPL-SCNC: 27 MMOL/L (ref 21–32)
COHGB MFR BLDV: 1.1 %
COLOR UR: YELLOW
CREAT SERPL-MCNC: 1 MG/DL (ref 0.6–1.2)
DEPRECATED RDW RBC AUTO: 14.4 % (ref 12.4–15.4)
EOSINOPHIL # BLD: 0 K/UL (ref 0–0.6)
EOSINOPHIL NFR BLD: 0 %
EPI CELLS #/AREA URNS AUTO: 18 /HPF (ref 0–5)
GFR SERPLBLD CREATININE-BSD FMLA CKD-EPI: >60 ML/MIN/{1.73_M2}
GLUCOSE BLD-MCNC: 320 MG/DL
GLUCOSE BLD-MCNC: 320 MG/DL (ref 70–99)
GLUCOSE BLD-MCNC: 378 MG/DL
GLUCOSE BLD-MCNC: 378 MG/DL (ref 70–99)
GLUCOSE SERPL-MCNC: 381 MG/DL (ref 70–99)
GLUCOSE UR STRIP.AUTO-MCNC: >=1000 MG/DL
HCO3 BLDV-SCNC: 34 MMOL/L (ref 23–29)
HCT VFR BLD AUTO: 45.2 % (ref 36–48)
HGB BLD-MCNC: 15.1 G/DL (ref 12–16)
HGB UR QL STRIP.AUTO: ABNORMAL
HYALINE CASTS #/AREA URNS AUTO: 7 /LPF (ref 0–8)
HYALINE CASTS: PRESENT
KETONES UR STRIP.AUTO-MCNC: 40 MG/DL
LEUKOCYTE ESTERASE UR QL STRIP.AUTO: NEGATIVE
LIPASE SERPL-CCNC: 40 U/L (ref 13–60)
LYMPHOCYTES # BLD: 1.6 K/UL (ref 1–5.1)
LYMPHOCYTES NFR BLD: 9.9 %
MCH RBC QN AUTO: 25.6 PG (ref 26–34)
MCHC RBC AUTO-ENTMCNC: 33.4 G/DL (ref 31–36)
MCV RBC AUTO: 76.8 FL (ref 80–100)
METHGB MFR BLDV: 0.4 %
MONOCYTES # BLD: 0.6 K/UL (ref 0–1.3)
MONOCYTES NFR BLD: 4.1 %
MUCUS: PRESENT
NEUTROPHILS # BLD: 13.5 K/UL (ref 1.7–7.7)
NEUTROPHILS NFR BLD: 85.5 %
NITRITE UR QL STRIP.AUTO: NEGATIVE
O2 THERAPY: ABNORMAL
PCO2 BLDV: 47.7 MMHG (ref 40–50)
PERFORMED ON: ABNORMAL
PERFORMED ON: ABNORMAL
PH BLDV: 7.46 [PH] (ref 7.35–7.45)
PH UR STRIP.AUTO: 5.5 [PH] (ref 5–8)
PLATELET # BLD AUTO: 281 K/UL (ref 135–450)
PMV BLD AUTO: 8.2 FL (ref 5–10.5)
PO2 BLDV: 61 MMHG
POTASSIUM SERPL-SCNC: 3.9 MMOL/L (ref 3.5–5.1)
PROT SERPL-MCNC: 7.5 G/DL (ref 6.4–8.2)
PROT UR STRIP.AUTO-MCNC: 100 MG/DL
RBC # BLD AUTO: 5.89 M/UL (ref 4–5.2)
RBC CLUMPS #/AREA URNS AUTO: 6 /HPF (ref 0–4)
SAO2 % BLDV: 90 %
SODIUM SERPL-SCNC: 129 MMOL/L (ref 136–145)
SP GR UR STRIP.AUTO: 1.03 (ref 1–1.03)
UA COMPLETE W REFLEX CULTURE PNL UR: ABNORMAL
UA DIPSTICK W REFLEX MICRO PNL UR: YES
URN SPEC COLLECT METH UR: ABNORMAL
UROBILINOGEN UR STRIP-ACNC: 0.2 E.U./DL
WBC # BLD AUTO: 15.8 K/UL (ref 4–11)
WBC #/AREA URNS AUTO: 3 /HPF (ref 0–5)

## 2023-03-15 PROCEDURE — 83690 ASSAY OF LIPASE: CPT

## 2023-03-15 PROCEDURE — 6360000002 HC RX W HCPCS: Performed by: NURSE PRACTITIONER

## 2023-03-15 PROCEDURE — 76705 ECHO EXAM OF ABDOMEN: CPT

## 2023-03-15 PROCEDURE — 99284 EMERGENCY DEPT VISIT MOD MDM: CPT

## 2023-03-15 PROCEDURE — 82010 KETONE BODYS QUAN: CPT

## 2023-03-15 PROCEDURE — 81001 URINALYSIS AUTO W/SCOPE: CPT

## 2023-03-15 PROCEDURE — 6370000000 HC RX 637 (ALT 250 FOR IP): Performed by: NURSE PRACTITIONER

## 2023-03-15 PROCEDURE — 96374 THER/PROPH/DIAG INJ IV PUSH: CPT

## 2023-03-15 PROCEDURE — 36415 COLL VENOUS BLD VENIPUNCTURE: CPT

## 2023-03-15 PROCEDURE — 80048 BASIC METABOLIC PNL TOTAL CA: CPT

## 2023-03-15 PROCEDURE — 2580000003 HC RX 258: Performed by: NURSE PRACTITIONER

## 2023-03-15 PROCEDURE — 85025 COMPLETE CBC W/AUTO DIFF WBC: CPT

## 2023-03-15 PROCEDURE — 82803 BLOOD GASES ANY COMBINATION: CPT

## 2023-03-15 PROCEDURE — 80076 HEPATIC FUNCTION PANEL: CPT

## 2023-03-15 PROCEDURE — 87086 URINE CULTURE/COLONY COUNT: CPT

## 2023-03-15 RX ORDER — 0.9 % SODIUM CHLORIDE 0.9 %
1000 INTRAVENOUS SOLUTION INTRAVENOUS ONCE
Status: COMPLETED | OUTPATIENT
Start: 2023-03-15 | End: 2023-03-15

## 2023-03-15 RX ORDER — PROMETHAZINE HYDROCHLORIDE 25 MG/1
25 TABLET ORAL ONCE
Status: DISCONTINUED | OUTPATIENT
Start: 2023-03-15 | End: 2023-03-15 | Stop reason: HOSPADM

## 2023-03-15 RX ORDER — DROPERIDOL 2.5 MG/ML
1.25 INJECTION, SOLUTION INTRAMUSCULAR; INTRAVENOUS ONCE
Status: COMPLETED | OUTPATIENT
Start: 2023-03-15 | End: 2023-03-15

## 2023-03-15 RX ORDER — PANTOPRAZOLE SODIUM 40 MG/1
40 TABLET, DELAYED RELEASE ORAL 2 TIMES DAILY
Qty: 60 TABLET | Refills: 0 | Status: SHIPPED | OUTPATIENT
Start: 2023-03-15

## 2023-03-15 RX ORDER — LISINOPRIL 10 MG/1
10 TABLET ORAL ONCE
Status: COMPLETED | OUTPATIENT
Start: 2023-03-15 | End: 2023-03-15

## 2023-03-15 RX ORDER — ONDANSETRON 4 MG/1
4 TABLET, ORALLY DISINTEGRATING ORAL 3 TIMES DAILY PRN
Qty: 21 TABLET | Refills: 0 | Status: SHIPPED | OUTPATIENT
Start: 2023-03-15

## 2023-03-15 RX ORDER — ONDANSETRON 4 MG/1
4 TABLET, ORALLY DISINTEGRATING ORAL ONCE
Status: DISCONTINUED | OUTPATIENT
Start: 2023-03-15 | End: 2023-03-15 | Stop reason: HOSPADM

## 2023-03-15 RX ORDER — DICYCLOMINE HYDROCHLORIDE 10 MG/1
10 CAPSULE ORAL 4 TIMES DAILY PRN
Qty: 60 CAPSULE | Refills: 0 | Status: SHIPPED | OUTPATIENT
Start: 2023-03-15

## 2023-03-15 RX ADMIN — SODIUM CHLORIDE 1000 ML: 9 INJECTION, SOLUTION INTRAVENOUS at 13:45

## 2023-03-15 RX ADMIN — DROPERIDOL 1.25 MG: 2.5 INJECTION, SOLUTION INTRAMUSCULAR; INTRAVENOUS at 13:42

## 2023-03-15 RX ADMIN — SODIUM CHLORIDE 1000 ML: 9 INJECTION, SOLUTION INTRAVENOUS at 16:02

## 2023-03-15 RX ADMIN — LISINOPRIL 10 MG: 10 TABLET ORAL at 19:19

## 2023-03-15 ASSESSMENT — PAIN DESCRIPTION - LOCATION
LOCATION: ABDOMEN

## 2023-03-15 ASSESSMENT — PAIN SCALES - WONG BAKER: WONGBAKER_NUMERICALRESPONSE: 0

## 2023-03-15 ASSESSMENT — PAIN SCALES - GENERAL
PAINLEVEL_OUTOF10: 10
PAINLEVEL_OUTOF10: 3

## 2023-03-15 ASSESSMENT — PAIN DESCRIPTION - PAIN TYPE: TYPE: ACUTE PAIN

## 2023-03-15 ASSESSMENT — PAIN DESCRIPTION - FREQUENCY
FREQUENCY: CONTINUOUS
FREQUENCY: INTERMITTENT

## 2023-03-15 ASSESSMENT — PAIN DESCRIPTION - ONSET: ONSET: ON-GOING

## 2023-03-15 ASSESSMENT — PAIN - FUNCTIONAL ASSESSMENT: PAIN_FUNCTIONAL_ASSESSMENT: WONG-BAKER FACES

## 2023-03-15 ASSESSMENT — PAIN DESCRIPTION - DESCRIPTORS: DESCRIPTORS: TEARING;BURNING

## 2023-03-15 NOTE — ED PROVIDER NOTES
[facility] EMERGENCY DEPARTMENT  [address] [facility] [address]  Dept: [phone]  Loc: [phone]    EMERGENCY DEPARTMENT ENCOUNTER        This patient was not seen or evaluated by the attending physician.  I evaluated this patient, the attending physician was available for consultation.    CHIEF COMPLAINT    Chief Complaint   Patient presents with    Abdominal Pain     All over abd pain w/ emesis x4 days.        HPI    Renetta Tadeo is a 63 y.o. female who presents with abdominal pain localized in the upper regions of the abdomen. Onset was approximately 3 days ago.  The duration has been intermittent since the onset. The pain is associated with nausea and vomiting.  The pain is 10/10 in severity. The quality of the pain is sharp and cramping. The context is that the symptoms started spontaneously.  There are no alleviating factors.  Tried to take her home Phenergan without any relief and therefore came to the ED.  She is a type II diabetic, has been at least able to keep down some water intermittently has been checking her blood sugars, they have been running high but she has been taking her insulin    REVIEW OF SYSTEMS    GI: see HPI, + vomiting, resolved diarrhea, no hematochezia  Cardiac: No chest pain, shortness of breath, palpitations or syncope  Pulmonary: No difficulty breathing or new cough  General: No fevers  : No dysuria, No hematuria  See HPI for further details.   All other systems reviewed and are negative.    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    Gastroparesis     Hirsutism     History of blood transfusion     Hyperlipidemia LDL goal < 70     Hypertension     Major depressive disorder with single episode 2009    Pancreatitis     Seasonal allergic rhinitis due to pollen 11/10/2016    Sleep apnea     Thyroid disease     Vitamin D deficiency      Past Surgical History:   Procedure Laterality Date     SECTION      ENDOMETRIAL ABLATION  5/3/12    ENDOSCOPY, COLON, DIAGNOSTIC      HYSTEROSCOPY  5/3/12    MANDIBLE SURGERY      SKIN BIOPSY      TUBAL LIGATION      UPPER GASTROINTESTINAL ENDOSCOPY N/A 2021    EGD BIOPSY performed by Trav Whelan MD at [address] [facility]  (may include discharge medications prescribed in the ED)  Current Outpatient Rx   Medication Sig Dispense Refill    ondansetron (ZOFRAN-ODT) 4 MG disintegrating tablet Take 1 tablet by mouth 3 times daily as needed for Nausea or Vomiting 21 tablet 0    dicyclomine (BENTYL) 10 MG capsule Take 1 capsule by mouth 4 times daily as needed (abdominal pain/cramping) 60 capsule 0    pantoprazole (PROTONIX) 40 MG tablet Take 1 tablet by mouth in the morning and at bedtime 60 tablet 0    insulin glargine (LANTUS) 100 UNIT/ML injection vial Inject 22 Units into the skin nightly      ondansetron (ZOFRAN ODT) 4 MG disintegrating tablet Take 1-2 tablets by mouth every 12 hours as needed for Nausea or Vomiting May Sub regular tablet (non-ODT) if insurance does not cover ODT.  30 tablet 0    polyethylene glycol (GLYCOLAX) 17 g packet Take 17 g by mouth daily as needed for Constipation      acetaminophen (TYLENOL) 500 MG tablet Take 2 tablets by mouth 3 times daily as needed for Pain 180 tablet 0    albuterol sulfate HFA (VENTOLIN HFA) 108 (90 Base) MCG/ACT inhaler Inhale 2 puffs into the lungs 4 times daily as needed for Wheezing or Shortness of Breath 18 g 0    lisinopril (PRINIVIL;ZESTRIL) 10 MG tablet Take 10 mg by mouth daily ezetimibe (ZETIA) 10 MG tablet Take 10 mg by mouth daily      amLODIPine (NORVASC) 5 MG tablet Take 5 mg by mouth daily      insulin lispro (HUMALOG) 100 UNIT/ML injection vial Inject into the skin 3 times daily (before meals) Per sliding scale. 2 units >150 then additional 2 units for every 50      aspirin 81 MG tablet Take 81 mg by mouth daily      timolol (TIMOPTIC) 0.5 % ophthalmic solution Place 1 drop into both eyes daily       ergocalciferol (DRISDOL) 00352 UNITS capsule Take 1 capsule by mouth once a week 13 capsule 1    latanoprost (XALATAN) 0.005 % ophthalmic solution Place 1 drop into both eyes nightly. ALLERGIES    Allergies   Allergen Reactions    Avelox [Moxifloxacin] Other (See Comments)     Weakness, tingling, tingling mouth    Bactrim      Remote h/o diarrhea, swollen eyes, and weakness, tachycardia and tongue swelling    Cefuroxime      Pt does not recall what happened with this    Codeine     Ibuprofen Itching    Medrol [Methylprednisolone] Swelling     Ok to take nasal steroids    Morphine     Niacin Other (See Comments)    Oat      Throat swells    Percocet [Oxycodone-Acetaminophen]     Reglan [Metoclopramide] Other (See Comments)     States has something to do with a mass unknown reaction     Spironolactone      Increased potassium    Statins      Muscle cramps    Sulfamethoxazole-Trimethoprim Swelling    Vicodin [Hydrocodone-Acetaminophen]        SOCIAL AND FAMILY HISTORY    Social History     Socioeconomic History    Marital status:      Spouse name: None    Number of children: 3    Years of education: None    Highest education level: None   Occupational History    Occupation:    Tobacco Use    Smoking status: Former     Types: Cigarettes     Quit date: 1990     Years since quittin.2    Smokeless tobacco: Never   Vaping Use    Vaping Use: Never used   Substance and Sexual Activity    Alcohol use:  Yes     Alcohol/week: 0.0 standard drinks     Comment: less than once a month    Drug use: Yes     Frequency: 2.0 times per week     Types: Marijuana Arnulfo Dhaval)     Comment: used yesterday    Sexual activity: Not Currently     Partners: Male     Family History   Problem Relation Age of Onset    Hypertension Mother     Breast Cancer Mother     Colon Cancer Mother     Cancer Father         Pancreatic    Prostate Cancer Father     No Known Problems Brother     No Known Problems Sister     Hypertension Maternal Aunt     Cancer Maternal Uncle     Hypertension Maternal Grandmother     Stroke Maternal Grandmother     Cancer Maternal Grandfather     No Known Problems Paternal Aunt     No Known Problems Paternal Uncle     No Known Problems Paternal Grandmother     No Known Problems Paternal Grandfather     No Known Problems Other     Rheum Arthritis Neg Hx     Osteoarthritis Neg Hx     Asthma Neg Hx     Diabetes Neg Hx     Heart Failure Neg Hx     High Cholesterol Neg Hx     Migraines Neg Hx     Ovarian Cancer Neg Hx     Rashes/Skin Problems Neg Hx     Seizures Neg Hx     Thyroid Disease Neg Hx        PHYSICAL EXAM    VITAL SIGNS: /65   Pulse 73   Temp 98 °F (36.7 °C) (Oral)   Resp 14   Ht 5' 7\" (1.702 m)   Wt 222 lb 3.6 oz (100.8 kg)   SpO2 97%   BMI 34.81 kg/m²   Constitutional:  Well developed, well nourished, no acute distress  Eyes:  Sclera nonicteric, conjunctiva moist  HENT:  Atraumatic, nose normal  Neck: no JVD  Respiratory:  No retractions, no accessory muscle use, normal breath sounds   Cardiovascular:  regular rate, no murmurs  GI: +mild RUQ and epigastric abdominal tenderness to palpation, soft, no guarding, bowel sounds present, no audible bruits or palpable pulsatile masses  Back: no CVA tenderness  Musculoskeletal:  No edema, no acute deformities  Vascular: Radial and DP pulses 2+ and equal bilaterally  Integument: No rashes, skin dry  Neurologic:  Alert & oriented, no slurred speech  Psychiatric: Cooperative, pleasant affect     EKG    Please see the ED Physician note for EKG interpretation.       LABS  Results for orders placed or performed during the hospital encounter of 03/15/23   CBC with Auto Differential   Result Value Ref [place]    WBC 15.8 (H) 4.0 - 11.0 K/uL    RBC 5.89 (H) 4.00 - 5.20 M/uL    Hemoglobin 15.1 12.0 - 16.0 g/dL    Hematocrit 45.2 36.0 - 48.0 %    MCV 76.8 (L) 80.0 - 100.0 fL    MCH 25.6 (L) 26.0 - 34.0 pg    MCHC 33.4 31.0 - 36.0 g/dL    RDW 14.4 12.4 - 15.4 %    Platelets 008 598 - 854 K/uL    MPV 8.2 5.0 - 10.5 fL    Neutrophils % 85.5 %    Lymphocytes % 9.9 %    Monocytes % 4.1 %    Eosinophils % 0.0 %    Basophils % 0.5 %    Neutrophils Absolute 13.5 (H) 1.7 - 7.7 K/uL    Lymphocytes Absolute 1.6 1.0 - 5.1 K/uL    Monocytes Absolute 0.6 0.0 - 1.3 K/uL    Eosinophils Absolute 0.0 0.0 - 0.6 K/uL    Basophils Absolute 0.1 0.0 - 0.2 K/uL   BMP w/ Reflex to MG   Result Value Ref [place]    Sodium 129 (L) 136 - 145 mmol/L    Potassium reflex Magnesium 3.9 3.5 - 5.1 mmol/L    Chloride 84 (L) 99 - 110 mmol/L    CO2 27 21 - 32 mmol/L    Anion Gap 18 (H) 3 - 16    Glucose 381 (H) 70 - 99 mg/dL    BUN 22 (H) 7 - 20 mg/dL    Creatinine 1.0 0.6 - 1.2 mg/dL    Est, Glom Filt Rate >60 >60    Calcium 9.6 8.3 - 10.6 mg/dL   Hepatic Function Panel   Result Value Ref [place]    Total Protein 7.5 6.4 - 8.2 g/dL    Albumin 4.3 3.4 - 5.0 g/dL    Alkaline Phosphatase 137 (H) 40 - 129 U/L    ALT 20 10 - 40 U/L    AST 18 15 - 37 U/L    Total Bilirubin 0.5 0.0 - 1.0 mg/dL    Bilirubin, Direct <0.2 0.0 - 0.3 mg/dL    Bilirubin, Indirect see below 0.0 - 1.0 mg/dL   Lipase   Result Value Ref [place]    Lipase 40.0 13.0 - 60.0 U/L   Urinalysis with Reflex to Culture    Specimen: Urine   Result Value Ref [place]    Color, UA Yellow Straw/Yellow    Clarity, UA CLOUDY (A) Clear    Glucose, Ur >=1000 (A) Negative mg/dL    Bilirubin Urine Negative Negative    Ketones, Urine 40 (A) Negative mg/dL    Specific Gravity, UA 1.034 1.005 - 1.030    Blood, Urine TRACE (A) Negative    pH, UA 5.5 5.0 - 8.0    Protein,  (A) Negative mg/dL    Urobilinogen, Urine 0.2 <2.0 E.U./dL    Nitrite, Urine Negative Negative    Leukocyte Esterase, Urine Negative Negative    Microscopic Examination YES     Urine Type NotGiven     Urine Reflex to Culture Not Indicated    Blood Gas, Venous   Result Value Ref [place]    pH, Bernard 7.459 (H) 7.350 - 7.450    pCO2, Bernard 47.7 40.0 - 50.0 mmHg    pO2, Bernard 61 Not Established mmHg    HCO3, Venous 34 (H) 23 - 29 mmol/L    Base Excess, Bernard 8.4 Not Established mmol/L    O2 Sat, Bernard 90 Not Established %    Carboxyhemoglobin 1.1 %    MetHgb, Bernard 0.4 <1.5 %    TC02 (Calc), Bernard 35 Not Established mmol/L    O2 Therapy Unknown    Beta-Hydroxybutyrate   Result Value Ref [place]    Beta-Hydroxybutyrate 1.07 (H) 0.00 - 0.27 mmol/L   Microscopic Urinalysis   Result Value Ref [place]    Bacteria, UA 2+ (A) None Seen /HPF    Hyaline Casts, UA 7 0 - 8 /LPF    WBC, UA 3 0 - 5 /HPF    RBC, UA 6 (H) 0 - 4 /HPF    Epithelial Cells, UA 18 (H) 0 - 5 /HPF    Hyaline Casts, UA Present (A) None Seen    Mucus, UA Present (A) None Seen   POCT Glucose   Result Value Ref [place]    Glucose 378 mg/dL    QC OK? yes    POCT Glucose   Result Value Ref [place]    POC Glucose 378 (H) 70 - 99 mg/dl    Performed on ACCU-CHEK    POCT Glucose   Result Value Ref [place]    Glucose 320 mg/dL    QC OK? Yes    POCT Glucose   Result Value Ref [place]    POC Glucose 320 (H) 70 - 99 mg/dl    Performed on ACCU-CHEK          RADIOLOGY/PROCEDURES    US GALLBLADDER RUQ   Final Result   Increased echogenicity of the liver likely reflecting hepatic steatosis. Otherwise, unremarkable right upper quadrant ultrasound. ED COURSE & MEDICAL DECISION MAKING    Pertinent Labs & Imaging studies reviewed and interpreted. (See chart for details)     See chart for details of medications given during the ED stay.     Vitals:    03/15/23 1645 03/15/23 1650 03/15/23 1654 03/15/23 1658   BP: 102/65   102/65   Pulse: 73 68 73 73   Resp: 14 13 14 14   Temp:       TempSrc:       SpO2:  98% 96% 97%   Weight:       Height:               History from : Patient    Limitations to history : None    Chronic Conditions:   Past Medical History:   Diagnosis Date    Acid reflux     SHAY (acute kidney injury) ([facility] [address] 10/15/2021    Anxiety     ASCUS (atypical squamous cells of undetermined significance) on Pap smear 6/2013    Asthma     Chronic midline low back pain with bilateral sciatica 11/10/2016    Chronic midline low back pain with bilateral sciatica     Diabetes mellitus, type 2 (HCC)     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    Gastroparesis     Hirsutism     History of blood transfusion     Hyperlipidemia LDL goal < 70     Hypertension     Major depressive disorder with single episode 2/4/2009    Pancreatitis     Seasonal allergic rhinitis due to pollen 11/10/2016    Sleep apnea     Thyroid disease     Vitamin D deficiency        CONSULTS: (Who and What was discussed)  None    Discussion with Other Profesionals : None    Social Determinants : None    Records Reviewed : None    CC/HPI Summary, DDx, ED Course, and Reassessment: HPI and above for full presentation and physical exam.    Patient is a 78-year-old female who presents to the ED today with she states \"a flareup of my nausea and vomiting. \"  Started Sunday. Had some diarrhea initially with this, she has not had any diarrhea over the past 24 hours. She took 2 dosages of her oral Phenergan without any relief. She is type II diabetic. Uncertain her last blood sugar. Does have upper abdominal pain associated with this. States that \"I do my  gastroenterologist too much money and I need to make an appointment somewhere else. \"  No chest pain or shortness of breath. No fevers or chills. Came to the ED for further evaluation and treatment. On arrival she is afebrile and hemodynamically stable. Nontoxic in appearance. Mucous membranes are pink and moist.  Cardiac RRR. Lung sounds clear. Abdomen not overtly tender but she is describing right upper quadrant and epigastric tenderness mildly with palpation. Abdomen is soft, nonsurgical appearing. Bowel sounds present. We will obtain right upper quadrant ultrasound, imaging and blood work. We will reevaluate after medication with IV droperidol and IV fluids. Differential diagnosis: Abdominal Aortic Aneurysm, Acute Coronary Syndrome, Ischemic Bowel, Bowel Obstruction (including Gastric Outlet Obstruction), PUD, GERD, Acute Cholecystitis, Pancreatitis, Hepatitis, Colitis, other    CRITICAL CARE NOTE:  There was a high probability of clinically significant life-threatening deterioration of the patient's condition requiring my urgent intervention. Total critical care time was at least  20 minutes. This includes vital sign monitoring, pulse oximetry monitoring, telemetry monitoring, clinical response to the IV medications, reviewing the nursing notes, consultation time, dictation/documentation time, and interpretation of the labwork. This excludes any separately billable procedures performed. Also includes multiple reevaluations of the patient given that she was p.o. challenged, and reevaluation of her glucose and vital signs. Patient is afebrile and nontoxic in appearance. Labs reveal leukocytosis of 15.8. Likely stress related due to her nausea and vomiting. She is afebrile. No signs of intra-abdominal infection. She has no abdominal tenderness on exam.  Right upper quadrant ultrasound is read by the radiologist as above. Metabolic panel does not show any significant electrolyte derangements. Glucose is 381. This did decrease with IV fluids. She is not acidotic. Although she does have ketones in her urine is likely secondary to her nausea and vomiting and dehydration. She was given 2 L of IV fluids for this. Radha Arciniega LFTs and lipase within normal limits.   Urinalysis does show 2+ bacteria with 18 epi cells's. We will send for culture and sensitivity and deferring treatment for culture results that she was denying any urinary tract complaints such as dysuria, gross hematuria, urinary urgency or frequency. Reevaluation Patient is resting comfortably. She states that she feels markedly improved. Is able to tolerate p.o. intake. Will be discharged home in stable condition with medications on an outpatient basis for symptoms. She is to follow-up with PCP. She is return immediately for any new or worsening symptoms. She verbalized understanding, has no further questions or concerns and will be discharged home in stable condition. I estimate there is LOW risk for ACUTE APPENDICITIS, BOWEL OBSTRUCTION, CHOLECYSTITIS, DIVERTICULITIS, INCARCERATED HERNIA, PANCREATITIS, or PERFORATED BOWEL or ULCER, thus I consider the discharge disposition reasonable. Also, there is no evidence or peritonitis, sepsis, or toxicity.  Askew and I have discussed the diagnosis and risks, and we agree with discharging home to follow-up with their primary doctor in 2-3 days. We also discussed returning to the Emergency Department immediately if new or worsening symptoms occur. We have discussed the symptoms which are most concerning (e.g., bloody stool, fever, changing or worsening pain, vomiting) that necessitate immediate return. Verbalized understanding, they have no further questions or concerns in agreement with this plan as well as the plan of discharge. Disposition Considerations (Tests not ordered but considered, Shared Decision Making, Pt Expectation of Test or Tx.): n/a      I am the Primary Clinician of Record. FINAL Impression    1. Nausea and vomiting, unspecified vomiting type    2. Pain of upper abdomen        Blood pressure 102/65, pulse 73, temperature 98 °F (36.7 °C), temperature source Oral, resp.  rate 14, height 5' 7\" (1.702 m), weight 222 lb 3.6 oz (100.8 kg), SpO2 97 %, not currently breastfeeding.      PLAN  Discharge with outpatient follow-up    (Please note that this note was completed with a voice recognition program.  Every attempt was made to edit the dictations, but inevitably there remain words that are mis-transcribed.)         SEVEN Mckinney - CNP  03/15/23 6275

## 2023-03-15 NOTE — ED NOTES
Discharge and education instructions reviewed. Patient verbalized understanding, teach-back successful. Patient denied questions at this time. No acute distress noted. Patient instructed to follow-up as noted - return to emergency department if symptoms worsen. Patient verbalized understanding. Discharged per EDMD with discharged instructions.        Sheela Rivas RN  03/15/23 8999

## 2023-03-15 NOTE — ED NOTES
Lab phoned and asked to add on the Urine Culture.  stated it would be done.       Edilma Hameed RN  03/15/23 2695

## 2023-03-15 NOTE — ED NOTES
Patient to [address] via ED stretcher and then going over to ED 40 once returned      Chris Lobato RN  03/15/23 0618

## 2023-03-15 NOTE — Clinical Note
Jordin Sarmiento was seen and treated in our emergency department on 3/15/2023. She may return to work on 03/18/2023. If you have any questions or concerns, please don't hesitate to call.       Michelle Hawkins, APRN - CNP

## 2023-03-16 LAB — BACTERIA UR CULT: NORMAL

## 2023-04-01 ENCOUNTER — HOSPITAL ENCOUNTER (EMERGENCY)
Age: 63
Discharge: HOME | End: 2023-04-01
Payer: COMMERCIAL

## 2023-04-01 VITALS
HEIGHT: 67 IN | DIASTOLIC BLOOD PRESSURE: 64 MMHG | RESPIRATION RATE: 16 BRPM | SYSTOLIC BLOOD PRESSURE: 102 MMHG | WEIGHT: 216.93 LBS | HEART RATE: 84 BPM | OXYGEN SATURATION: 99 % | TEMPERATURE: 98.1 F | BODY MASS INDEX: 34.05 KG/M2

## 2023-04-01 DIAGNOSIS — R11.2 NAUSEA AND VOMITING, UNSPECIFIED VOMITING TYPE: Primary | ICD-10-CM

## 2023-04-01 DIAGNOSIS — R10.84 GENERALIZED ABDOMINAL PAIN: ICD-10-CM

## 2023-04-01 DIAGNOSIS — R03.0 ELEVATED BLOOD PRESSURE READING: ICD-10-CM

## 2023-04-01 LAB
ALBUMIN SERPL-MCNC: 4.3 G/DL (ref 3.4–5)
ALBUMIN/GLOB SERPL: 1.1 {RATIO} (ref 1.1–2.2)
ALP SERPL-CCNC: 148 U/L (ref 40–129)
ALT SERPL-CCNC: 16 U/L (ref 10–40)
ANION GAP SERPL CALCULATED.3IONS-SCNC: 17 MMOL/L (ref 3–16)
AST SERPL-CCNC: 17 U/L (ref 15–37)
BACTERIA URNS QL MICRO: ABNORMAL /HPF
BASOPHILS # BLD: 0.1 K/UL (ref 0–0.2)
BASOPHILS NFR BLD: 0.4 %
BILIRUB SERPL-MCNC: 0.6 MG/DL (ref 0–1)
BILIRUB UR QL STRIP.AUTO: NEGATIVE
BUN SERPL-MCNC: 15 MG/DL (ref 7–20)
CALCIUM SERPL-MCNC: 10.2 MG/DL (ref 8.3–10.6)
CHLORIDE SERPL-SCNC: 84 MMOL/L (ref 99–110)
CHP ED QC CHECK: YES
CLARITY UR: ABNORMAL
CO2 SERPL-SCNC: 28 MMOL/L (ref 21–32)
COLOR UR: ABNORMAL
CREAT SERPL-MCNC: 1 MG/DL (ref 0.6–1.2)
DEPRECATED RDW RBC AUTO: 15.2 % (ref 12.4–15.4)
EOSINOPHIL # BLD: 0 K/UL (ref 0–0.6)
EOSINOPHIL NFR BLD: 0.1 %
EPI CELLS #/AREA URNS AUTO: 11 /HPF (ref 0–5)
GFR SERPLBLD CREATININE-BSD FMLA CKD-EPI: >60 ML/MIN/{1.73_M2}
GLUCOSE BLD-MCNC: 284 MG/DL
GLUCOSE BLD-MCNC: 284 MG/DL (ref 70–99)
GLUCOSE SERPL-MCNC: 268 MG/DL (ref 70–99)
GLUCOSE UR STRIP.AUTO-MCNC: >=1000 MG/DL
GRANULAR CASTS: PRESENT
HCT VFR BLD AUTO: 47.3 % (ref 36–48)
HGB BLD-MCNC: 15.6 G/DL (ref 12–16)
HGB UR QL STRIP.AUTO: ABNORMAL
HYALINE CASTS #/AREA URNS AUTO: 87 /LPF (ref 0–8)
HYALINE CASTS: PRESENT
KETONES UR STRIP.AUTO-MCNC: 15 MG/DL
LEUKOCYTE ESTERASE UR QL STRIP.AUTO: NEGATIVE
LIPASE SERPL-CCNC: 55 U/L (ref 13–60)
LYMPHOCYTES # BLD: 2.3 K/UL (ref 1–5.1)
LYMPHOCYTES NFR BLD: 17.9 %
MCH RBC QN AUTO: 25.4 PG (ref 26–34)
MCHC RBC AUTO-ENTMCNC: 33 G/DL (ref 31–36)
MCV RBC AUTO: 77 FL (ref 80–100)
MONOCYTES # BLD: 0.9 K/UL (ref 0–1.3)
MONOCYTES NFR BLD: 7.3 %
NEUTROPHILS # BLD: 9.5 K/UL (ref 1.7–7.7)
NEUTROPHILS NFR BLD: 74.3 %
NITRITE UR QL STRIP.AUTO: NEGATIVE
PERFORMED ON: ABNORMAL
PH UR STRIP.AUTO: 5.5 [PH] (ref 5–8)
PLATELET # BLD AUTO: 322 K/UL (ref 135–450)
PMV BLD AUTO: 8 FL (ref 5–10.5)
POTASSIUM SERPL-SCNC: 4.2 MMOL/L (ref 3.5–5.1)
PROT SERPL-MCNC: 8.3 G/DL (ref 6.4–8.2)
PROT UR STRIP.AUTO-MCNC: >=1000 MG/DL
RBC # BLD AUTO: 6.14 M/UL (ref 4–5.2)
RBC CLUMPS #/AREA URNS AUTO: 6 /HPF (ref 0–4)
SODIUM SERPL-SCNC: 129 MMOL/L (ref 136–145)
SP GR UR STRIP.AUTO: 1.03 (ref 1–1.03)
TROPONIN T SERPL-MCNC: <0.01 NG/ML
UA COMPLETE W REFLEX CULTURE PNL UR: ABNORMAL
UA DIPSTICK W REFLEX MICRO PNL UR: YES
URN SPEC COLLECT METH UR: ABNORMAL
UROBILINOGEN UR STRIP-ACNC: 1 E.U./DL
WBC # BLD AUTO: 12.8 K/UL (ref 4–11)
WBC #/AREA URNS AUTO: 6 /HPF (ref 0–5)

## 2023-04-01 PROCEDURE — 85025 COMPLETE CBC W/AUTO DIFF WBC: CPT

## 2023-04-01 PROCEDURE — 81001 URINALYSIS AUTO W/SCOPE: CPT

## 2023-04-01 PROCEDURE — 83690 ASSAY OF LIPASE: CPT

## 2023-04-01 PROCEDURE — 93005 ELECTROCARDIOGRAM TRACING: CPT | Performed by: PHYSICIAN ASSISTANT

## 2023-04-01 PROCEDURE — 96374 THER/PROPH/DIAG INJ IV PUSH: CPT

## 2023-04-01 PROCEDURE — 96375 TX/PRO/DX INJ NEW DRUG ADDON: CPT

## 2023-04-01 PROCEDURE — C9113 INJ PANTOPRAZOLE SODIUM, VIA: HCPCS | Performed by: PHYSICIAN ASSISTANT

## 2023-04-01 PROCEDURE — 80053 COMPREHEN METABOLIC PANEL: CPT

## 2023-04-01 PROCEDURE — 6370000000 HC RX 637 (ALT 250 FOR IP): Performed by: PHYSICIAN ASSISTANT

## 2023-04-01 PROCEDURE — 6360000002 HC RX W HCPCS: Performed by: PHYSICIAN ASSISTANT

## 2023-04-01 PROCEDURE — 99284 EMERGENCY DEPT VISIT MOD MDM: CPT

## 2023-04-01 PROCEDURE — 2580000003 HC RX 258: Performed by: PHYSICIAN ASSISTANT

## 2023-04-01 PROCEDURE — 84484 ASSAY OF TROPONIN QUANT: CPT

## 2023-04-01 RX ORDER — PANTOPRAZOLE SODIUM 40 MG/10ML
80 INJECTION, POWDER, LYOPHILIZED, FOR SOLUTION INTRAVENOUS ONCE
Status: COMPLETED | OUTPATIENT
Start: 2023-04-01 | End: 2023-04-01

## 2023-04-01 RX ORDER — DROPERIDOL 2.5 MG/ML
1.25 INJECTION, SOLUTION INTRAMUSCULAR; INTRAVENOUS ONCE
Status: COMPLETED | OUTPATIENT
Start: 2023-04-01 | End: 2023-04-01

## 2023-04-01 RX ORDER — PANTOPRAZOLE SODIUM 40 MG/1
40 TABLET, DELAYED RELEASE ORAL DAILY
Qty: 30 TABLET | Refills: 0 | Status: SHIPPED | OUTPATIENT
Start: 2023-04-01 | End: 2023-05-01

## 2023-04-01 RX ORDER — 0.9 % SODIUM CHLORIDE 0.9 %
1000 INTRAVENOUS SOLUTION INTRAVENOUS ONCE
Status: COMPLETED | OUTPATIENT
Start: 2023-04-01 | End: 2023-04-01

## 2023-04-01 RX ADMIN — DROPERIDOL 1.25 MG: 2.5 INJECTION, SOLUTION INTRAMUSCULAR; INTRAVENOUS at 16:26

## 2023-04-01 RX ADMIN — SODIUM CHLORIDE 1000 ML: 9 INJECTION, SOLUTION INTRAVENOUS at 16:26

## 2023-04-01 RX ADMIN — ALUMINUM HYDROXIDE, MAGNESIUM HYDROXIDE, AND SIMETHICONE: 200; 200; 20 SUSPENSION ORAL at 16:24

## 2023-04-01 RX ADMIN — PANTOPRAZOLE SODIUM 80 MG: 40 INJECTION, POWDER, FOR SOLUTION INTRAVENOUS at 16:27

## 2023-04-01 ASSESSMENT — LIFESTYLE VARIABLES
HOW MANY STANDARD DRINKS CONTAINING ALCOHOL DO YOU HAVE ON A TYPICAL DAY: PATIENT DOES NOT DRINK
HOW OFTEN DO YOU HAVE A DRINK CONTAINING ALCOHOL: NEVER

## 2023-04-01 ASSESSMENT — PAIN DESCRIPTION - DESCRIPTORS: DESCRIPTORS: ACHING

## 2023-04-01 ASSESSMENT — PAIN DESCRIPTION - ORIENTATION: ORIENTATION: LEFT;RIGHT

## 2023-04-01 ASSESSMENT — PAIN SCALES - GENERAL: PAINLEVEL_OUTOF10: 8

## 2023-04-01 ASSESSMENT — PAIN DESCRIPTION - LOCATION: LOCATION: ABDOMEN

## 2023-04-01 NOTE — ED NOTES
SBAR received from Montse Betancur RN. Assumed care of pt for lunch relief.       Rodney Paige RN  04/01/23 8668

## 2023-04-01 NOTE — ED TRIAGE NOTES
Patient arrived ambulatory to ED w/ complaints of vomiting and abdominal pain Started feeling nauseated Wednesday but felt fine Thursday but Friday when she woke up she had continuous episodes of vomiting w/ the last episode being around 1250 today ; experiencing a lot of body spasms and having LLQ protrusions states thinks it is a hernia coming back; states Blood sugar has been in the 200's and normally it runs 150's;    Patient A&O x4; VSS w/ exception of elevated BP which is chronic; pain is 8/10 in abdominal region;

## 2023-04-01 NOTE — ED PROVIDER NOTES
BP: (!) 179/89 (!) 169/92 102/64   Pulse: 97 87 84   Resp: 18 16 16   Temp: 98.1 °F (36.7 °C)     TempSrc: Oral     SpO2: 97% 97% 99%   Weight: 216 lb 14.9 oz (98.4 kg)     Height: 5' 7\" (1.702 m)         Patient was given the following medications:  Medications   0.9 % sodium chloride bolus (0 mLs IntraVENous Stopped 4/1/23 1807)   pantoprazole (PROTONIX) injection 80 mg (80 mg IntraVENous Given 4/1/23 1627)   droperidol (INAPSINE) injection 1.25 mg (1.25 mg IntraVENous Given 4/1/23 1626)   aluminum & magnesium hydroxide-simethicone (MAALOX) 30 mL, lidocaine viscous hcl (XYLOCAINE) 5 mL (GI COCKTAIL) ( Oral Given 4/1/23 1624)             Patient was nontoxic, well appearing, with normal vital signs with exception of hypertension 179/89. Saturating well on room air. Presents for inability to tolerate p.o. with nausea vomiting abdominal pain for the past 2 days. Reports multiple prior episodes of this related to gastroparesis. Reports she is here all the time for this. On exam, abdomen is soft nontender. She was given a normal saline bolus, droperidol and Protonix IV. Also given a GI cocktail. Records reviewed:  -reviewed prior ED visit from 2 weeks ago for simlar    Differential diagnosis includes gastroparesis, cyclic vomiting syndrome, cannabis hyperemesis, cholecystitis, appendicitis, bowel obstruction, colitis, perforation, other    Labs show leukocytosis 12.8, similar to prior. Suspect this is reactive. No anemia. Metabolic panel shows sodium of 129, consistent with prior. Glucose 268, anion gap 17. Bicarb 28. No evidence of DKA. LFTs grossly normal.  Lipase normal.  Urinalysis does not appear infected. Trope negative. Upon reevaluation she is lying sleeping in the stretcher in no distress. She is easily arousable to voice. She reports she is feeling better.   I considered CT scan of her abdomen but given the chronicity of the symptoms and her abdomen being soft nontender on exam and grossly normal laboratory work-up, I have low suspicion for acute intra-abdominal process and do not believe any further work-up is indicated. She already has antiemetics at home. Requested a prescription for Protonix which was given. Instructed to follow-up with PCP or GI doctor for reevaluation return for worsening. Chronic Conditions: gastroparesis, DM      I am the Primary Clinician of Record. Is this patient to be included in the SEP-1 Core Measure due to severe sepsis or septic shock? No   Exclusion criteria - the patient is NOT to be included for SEP-1 Core Measure due to: Infection is not suspected    PROCEDURES   Unless otherwise noted below, none     Procedures    FINAL IMPRESSION      1. Nausea and vomiting, unspecified vomiting type    2. Generalized abdominal pain    3.  Elevated blood pressure reading          DISPOSITION/PLAN       DISPOSITION Decision To Discharge 04/01/2023 05:49:52 PM      PATIENT REFERRED TO:  CEASAR Moody  [address] [place] [address]  [phone]    Schedule an appointment as soon as possible for a visit   for reevaluation and to recheck your blood pressure    [facility]  [address] [place] [address]  [phone]    As needed, If symptoms worsen    DISCHARGE MEDICATIONS:  New Prescriptions    PANTOPRAZOLE (PROTONIX) 40 MG TABLET    Take 1 tablet by mouth daily       DISCONTINUED MEDICATIONS:  Discontinued Medications    PANTOPRAZOLE (PROTONIX) 40 MG TABLET    Take 1 tablet by mouth in the morning and at bedtime              (Please note that portions of this note were completed with a voice recognition program.  Efforts were made to edit the dictations but occasionally words are mis-transcribed.)    CEASAR Carrasco (electronically signed)            CEASAR Carrasco  04/01/23 8231

## 2023-04-02 LAB
EKG ATRIAL RATE: 82 BPM
EKG DIAGNOSIS: NORMAL
EKG P AXIS: 67 DEGREES
EKG P-R INTERVAL: 146 MS
EKG Q-T INTERVAL: 394 MS
EKG QRS DURATION: 88 MS
EKG QTC CALCULATION (BAZETT): 460 MS
EKG R AXIS: 10 DEGREES
EKG T AXIS: 18 DEGREES
EKG VENTRICULAR RATE: 82 BPM

## 2023-04-02 PROCEDURE — 93010 ELECTROCARDIOGRAM REPORT: CPT | Performed by: INTERNAL MEDICINE

## 2023-04-24 ENCOUNTER — HOSPITAL ENCOUNTER (EMERGENCY)
Age: 63
Discharge: HOME | End: 2023-04-24
Payer: COMMERCIAL

## 2023-04-24 VITALS
TEMPERATURE: 98.1 F | DIASTOLIC BLOOD PRESSURE: 64 MMHG | SYSTOLIC BLOOD PRESSURE: 116 MMHG | WEIGHT: 216.93 LBS | RESPIRATION RATE: 18 BRPM | OXYGEN SATURATION: 100 % | HEART RATE: 88 BPM | HEIGHT: 67 IN | BODY MASS INDEX: 34.05 KG/M2

## 2023-04-24 DIAGNOSIS — R10.84 GENERALIZED ABDOMINAL PAIN: Primary | ICD-10-CM

## 2023-04-24 DIAGNOSIS — R11.2 NAUSEA AND VOMITING, UNSPECIFIED VOMITING TYPE: ICD-10-CM

## 2023-04-24 LAB
ALBUMIN SERPL-MCNC: 4.2 G/DL (ref 3.4–5)
ALBUMIN/GLOB SERPL: 1.3 {RATIO} (ref 1.1–2.2)
ALP SERPL-CCNC: 119 U/L (ref 40–129)
ALT SERPL-CCNC: 21 U/L (ref 10–40)
ANION GAP SERPL CALCULATED.3IONS-SCNC: 15 MMOL/L (ref 3–16)
AST SERPL-CCNC: 25 U/L (ref 15–37)
BASOPHILS # BLD: 0.1 K/UL (ref 0–0.2)
BASOPHILS NFR BLD: 0.4 %
BILIRUB SERPL-MCNC: 0.7 MG/DL (ref 0–1)
BILIRUB UR QL STRIP.AUTO: NEGATIVE
BUN SERPL-MCNC: 31 MG/DL (ref 7–20)
CALCIUM SERPL-MCNC: 9.3 MG/DL (ref 8.3–10.6)
CHLORIDE SERPL-SCNC: 89 MMOL/L (ref 99–110)
CLARITY UR: ABNORMAL
CO2 SERPL-SCNC: 24 MMOL/L (ref 21–32)
COLOR UR: YELLOW
CREAT SERPL-MCNC: 1.1 MG/DL (ref 0.6–1.2)
DEPRECATED RDW RBC AUTO: 15.2 % (ref 12.4–15.4)
EKG ATRIAL RATE: 111 BPM
EKG DIAGNOSIS: NORMAL
EKG P AXIS: 65 DEGREES
EKG P-R INTERVAL: 138 MS
EKG Q-T INTERVAL: 344 MS
EKG QRS DURATION: 78 MS
EKG QTC CALCULATION (BAZETT): 467 MS
EKG R AXIS: -12 DEGREES
EKG T AXIS: 72 DEGREES
EKG VENTRICULAR RATE: 111 BPM
EOSINOPHIL # BLD: 0 K/UL (ref 0–0.6)
EOSINOPHIL NFR BLD: 0 %
EPI CELLS #/AREA URNS HPF: NORMAL /HPF (ref 0–5)
GFR SERPLBLD CREATININE-BSD FMLA CKD-EPI: 57 ML/MIN/{1.73_M2}
GLUCOSE SERPL-MCNC: 301 MG/DL (ref 70–99)
GLUCOSE UR STRIP.AUTO-MCNC: 500 MG/DL
HCT VFR BLD AUTO: 53.8 % (ref 36–48)
HGB BLD-MCNC: 17.7 G/DL (ref 12–16)
HGB UR QL STRIP.AUTO: NEGATIVE
KETONES UR STRIP.AUTO-MCNC: 15 MG/DL
LEUKOCYTE ESTERASE UR QL STRIP.AUTO: NEGATIVE
LIPASE SERPL-CCNC: 59 U/L (ref 13–60)
LYMPHOCYTES # BLD: 1.7 K/UL (ref 1–5.1)
LYMPHOCYTES NFR BLD: 13.6 %
MCH RBC QN AUTO: 25.7 PG (ref 26–34)
MCHC RBC AUTO-ENTMCNC: 33 G/DL (ref 31–36)
MCV RBC AUTO: 78.1 FL (ref 80–100)
MONOCYTES # BLD: 0.4 K/UL (ref 0–1.3)
MONOCYTES NFR BLD: 3.2 %
NEUTROPHILS # BLD: 10.5 K/UL (ref 1.7–7.7)
NEUTROPHILS NFR BLD: 82.8 %
NITRITE UR QL STRIP.AUTO: NEGATIVE
PH UR STRIP.AUTO: 5 [PH] (ref 5–8)
PLATELET # BLD AUTO: 299 K/UL (ref 135–450)
PMV BLD AUTO: 8.1 FL (ref 5–10.5)
POTASSIUM SERPL-SCNC: 4.4 MMOL/L (ref 3.5–5.1)
PROT SERPL-MCNC: 7.4 G/DL (ref 6.4–8.2)
PROT UR STRIP.AUTO-MCNC: 30 MG/DL
RBC # BLD AUTO: 6.9 M/UL (ref 4–5.2)
RBC #/AREA URNS HPF: NORMAL /HPF (ref 0–4)
REASON FOR REJECTION: NORMAL
REJECTED TEST: NORMAL
SODIUM SERPL-SCNC: 128 MMOL/L (ref 136–145)
SP GR UR STRIP.AUTO: >=1.03 (ref 1–1.03)
UA COMPLETE W REFLEX CULTURE PNL UR: ABNORMAL
UA DIPSTICK W REFLEX MICRO PNL UR: YES
URN SPEC COLLECT METH UR: ABNORMAL
UROBILINOGEN UR STRIP-ACNC: 1 E.U./DL
WBC # BLD AUTO: 12.7 K/UL (ref 4–11)
WBC #/AREA URNS HPF: NORMAL /HPF (ref 0–5)

## 2023-04-24 PROCEDURE — 83690 ASSAY OF LIPASE: CPT

## 2023-04-24 PROCEDURE — 93005 ELECTROCARDIOGRAM TRACING: CPT | Performed by: PHYSICIAN ASSISTANT

## 2023-04-24 PROCEDURE — 85025 COMPLETE CBC W/AUTO DIFF WBC: CPT

## 2023-04-24 PROCEDURE — 93010 ELECTROCARDIOGRAM REPORT: CPT | Performed by: INTERNAL MEDICINE

## 2023-04-24 PROCEDURE — 96374 THER/PROPH/DIAG INJ IV PUSH: CPT

## 2023-04-24 PROCEDURE — 2580000003 HC RX 258: Performed by: PHYSICIAN ASSISTANT

## 2023-04-24 PROCEDURE — 80053 COMPREHEN METABOLIC PANEL: CPT

## 2023-04-24 PROCEDURE — C9113 INJ PANTOPRAZOLE SODIUM, VIA: HCPCS | Performed by: PHYSICIAN ASSISTANT

## 2023-04-24 PROCEDURE — 36415 COLL VENOUS BLD VENIPUNCTURE: CPT

## 2023-04-24 PROCEDURE — 6360000002 HC RX W HCPCS: Performed by: PHYSICIAN ASSISTANT

## 2023-04-24 PROCEDURE — 99284 EMERGENCY DEPT VISIT MOD MDM: CPT

## 2023-04-24 PROCEDURE — 81001 URINALYSIS AUTO W/SCOPE: CPT

## 2023-04-24 PROCEDURE — 96375 TX/PRO/DX INJ NEW DRUG ADDON: CPT

## 2023-04-24 RX ORDER — ONDANSETRON 4 MG/1
4 TABLET, ORALLY DISINTEGRATING ORAL EVERY 8 HOURS PRN
Qty: 20 TABLET | Refills: 0 | Status: SHIPPED | OUTPATIENT
Start: 2023-04-24

## 2023-04-24 RX ORDER — PANTOPRAZOLE SODIUM 40 MG/10ML
40 INJECTION, POWDER, LYOPHILIZED, FOR SOLUTION INTRAVENOUS ONCE
Status: COMPLETED | OUTPATIENT
Start: 2023-04-24 | End: 2023-04-24

## 2023-04-24 RX ORDER — DROPERIDOL 2.5 MG/ML
1.25 INJECTION, SOLUTION INTRAMUSCULAR; INTRAVENOUS ONCE
Status: COMPLETED | OUTPATIENT
Start: 2023-04-24 | End: 2023-04-24

## 2023-04-24 RX ORDER — 0.9 % SODIUM CHLORIDE 0.9 %
1000 INTRAVENOUS SOLUTION INTRAVENOUS ONCE
Status: COMPLETED | OUTPATIENT
Start: 2023-04-24 | End: 2023-04-24

## 2023-04-24 RX ADMIN — PANTOPRAZOLE SODIUM 40 MG: 40 INJECTION, POWDER, FOR SOLUTION INTRAVENOUS at 15:02

## 2023-04-24 RX ADMIN — DROPERIDOL 1.25 MG: 2.5 INJECTION, SOLUTION INTRAMUSCULAR; INTRAVENOUS at 15:24

## 2023-04-24 RX ADMIN — SODIUM CHLORIDE 1000 ML: 9 INJECTION, SOLUTION INTRAVENOUS at 15:01

## 2023-04-24 ASSESSMENT — PAIN - FUNCTIONAL ASSESSMENT: PAIN_FUNCTIONAL_ASSESSMENT: 0-10

## 2023-04-24 ASSESSMENT — PAIN SCALES - GENERAL: PAINLEVEL_OUTOF10: 10

## 2023-04-24 ASSESSMENT — PAIN DESCRIPTION - PAIN TYPE: TYPE: ACUTE PAIN

## 2023-04-24 ASSESSMENT — PAIN DESCRIPTION - DESCRIPTORS: DESCRIPTORS: ACHING;DISCOMFORT

## 2023-04-24 ASSESSMENT — PAIN DESCRIPTION - FREQUENCY: FREQUENCY: CONTINUOUS

## 2023-04-24 NOTE — ED PROVIDER NOTES
I did not perform an evaluation of Ravi Nicole but was asked to review her EKG. EKG interpreted by myself.    Sinus tachycardia with a rate of 111, normal axis, , QRS 78, QTc 467, no ST elevations, no ST depressions, nonspecific ST changes, no significant change compared EKG from 4/1/2023 except for increase in rate 29 bpm     Brandee Damon MD  04/24/23 0342

## 2023-04-24 NOTE — ED NOTES
Pt refuses gi cocktail until she receives medication to stop vomiting.      Raegan Brito, RN  04/24/23 7184

## 2023-04-24 NOTE — ED NOTES
Call received from lab about redraw of labs, asked them to send a tech down for redraw. Pt is hard stick.      Nancy Cuello RN  04/24/23 2904

## 2023-04-24 NOTE — ED NOTES
Unable to obtain blood x2. Noé Mena RN to try.       [facility], RN  04/24/23 1934

## 2023-04-24 NOTE — ED TRIAGE NOTES
Pt with SOB, body aches, chills, emesis, diarrhea since Tuesday. Pt states unable to keep anything down.

## 2023-04-24 NOTE — ED PROVIDER NOTES
[facility]        Pt Name: Federico Cruz  MRN: [id number]  [place] 1960  Date of evaluation: 2023  Provider: CEASAR Bella  PCP: CEASAR Villaseñor  Note Started: 1:21 PM EDT 23      ALIZE. I have evaluated this patient. My supervising physician was available for consultation. CHIEF COMPLAINT       Chief Complaint   Patient presents with    Illness     Pt with SOB, body aches, chills, emesis, diarrhea since Tuesday. Pt states unable to keep anything down. HISTORY OF PRESENT ILLNESS: 1 or more Elements     History From: patient    Federico Cruz is a 59 y.o. female who presents nausea, vomiting and abdominal pain for the past 1 week. She reports this is a flare of her diabetic gastroparesis. Has many nausea medications at home and reports nothing is working. She is unable to tolerate p.o. over the past 2 days. Denies hematemesis or coffee ground emesis. Reports had diarrhea initially when this started but it has resolved now she is constipated. No blood or black in stool. Denies fever but has has had chills. Denies chest pain shortness of breath. Abdominal surgery she had 2 C-sections and a tubal ligation. Nursing Notes were reviewed and agreed with or any disagreements were addressed in the HPI. REVIEW OF SYSTEMS :      Review of Systems    Positives and Pertinent negatives as per HPI.      SURGICAL HISTORY     Past Surgical History:   Procedure Laterality Date     SECTION      ENDOMETRIAL ABLATION  5/3/12    ENDOSCOPY, COLON, DIAGNOSTIC      HYSTEROSCOPY  5/3/12    MANDIBLE SURGERY      SKIN BIOPSY      TUBAL LIGATION      UPPER GASTROINTESTINAL ENDOSCOPY N/A 2021    EGD BIOPSY performed by Teresa Chou MD at [facility]. 192       Discharge Medication List as of 2023  7:31 PM        CONTINUE these medications which have NOT CHANGED

## 2023-06-06 ENCOUNTER — HOSPITAL ENCOUNTER (EMERGENCY)
Age: 63
Discharge: HOME | End: 2023-06-07
Attending: EMERGENCY MEDICINE
Payer: COMMERCIAL

## 2023-06-06 ENCOUNTER — APPOINTMENT (OUTPATIENT)
Dept: CT IMAGING | Age: 63
End: 2023-06-06
Payer: COMMERCIAL

## 2023-06-06 DIAGNOSIS — R16.0 HEPATOMEGALY: ICD-10-CM

## 2023-06-06 DIAGNOSIS — E11.65 TYPE 2 DIABETES MELLITUS WITH HYPERGLYCEMIA, UNSPECIFIED WHETHER LONG TERM INSULIN USE (HCC): ICD-10-CM

## 2023-06-06 DIAGNOSIS — R11.2 NAUSEA AND VOMITING, UNSPECIFIED VOMITING TYPE: Primary | ICD-10-CM

## 2023-06-06 DIAGNOSIS — K76.0 HEPATIC STEATOSIS: ICD-10-CM

## 2023-06-06 DIAGNOSIS — K20.90 ESOPHAGITIS: ICD-10-CM

## 2023-06-06 LAB
ALBUMIN SERPL-MCNC: 4.5 G/DL (ref 3.4–5)
ALBUMIN/GLOB SERPL: 1.2 {RATIO} (ref 1.1–2.2)
ALP SERPL-CCNC: 158 U/L (ref 40–129)
ALT SERPL-CCNC: 13 U/L (ref 10–40)
ANION GAP SERPL CALCULATED.3IONS-SCNC: 14 MMOL/L (ref 3–16)
AST SERPL-CCNC: 14 U/L (ref 15–37)
BACTERIA URNS QL MICRO: ABNORMAL /HPF
BASE EXCESS BLDV CALC-SCNC: 12 MMOL/L
BASOPHILS # BLD: 0.2 K/UL (ref 0–0.2)
BASOPHILS NFR BLD: 0.8 %
BETA-HYDROXYBUTYRATE: 0.5 MMOL/L (ref 0–0.27)
BILIRUB SERPL-MCNC: 0.7 MG/DL (ref 0–1)
BILIRUB UR QL STRIP.AUTO: NEGATIVE
BUN SERPL-MCNC: 35 MG/DL (ref 7–20)
CALCIUM SERPL-MCNC: 9.7 MG/DL (ref 8.3–10.6)
CHLORIDE SERPL-SCNC: 81 MMOL/L (ref 99–110)
CLARITY UR: ABNORMAL
CO2 BLDV-SCNC: 37 MMOL/L
CO2 SERPL-SCNC: 32 MMOL/L (ref 21–32)
COHGB MFR BLDV: 3.6 %
COLOR UR: YELLOW
CREAT SERPL-MCNC: 1 MG/DL (ref 0.6–1.2)
DEPRECATED RDW RBC AUTO: 15 % (ref 12.4–15.4)
EOSINOPHIL # BLD: 0 K/UL (ref 0–0.6)
EOSINOPHIL NFR BLD: 0 %
EPI CELLS #/AREA URNS AUTO: 6 /HPF (ref 0–5)
GFR SERPLBLD CREATININE-BSD FMLA CKD-EPI: >60 ML/MIN/{1.73_M2}
GLUCOSE BLD-MCNC: 347 MG/DL (ref 70–99)
GLUCOSE SERPL-MCNC: 359 MG/DL (ref 70–99)
GLUCOSE UR STRIP.AUTO-MCNC: >=1000 MG/DL
HCO3 BLDV-SCNC: 36 MMOL/L (ref 23–29)
HCT VFR BLD AUTO: 48 % (ref 36–48)
HGB BLD-MCNC: 16.1 G/DL (ref 12–16)
HGB UR QL STRIP.AUTO: ABNORMAL
HYALINE CASTS #/AREA URNS AUTO: 2 /LPF (ref 0–8)
KETONES UR STRIP.AUTO-MCNC: ABNORMAL MG/DL
LACTATE BLDV-SCNC: 1.5 MMOL/L (ref 0.4–1.9)
LEUKOCYTE ESTERASE UR QL STRIP.AUTO: NEGATIVE
LIPASE SERPL-CCNC: 51 U/L (ref 13–60)
LYMPHOCYTES # BLD: 2.4 K/UL (ref 1–5.1)
LYMPHOCYTES NFR BLD: 11.8 %
MCH RBC QN AUTO: 25.9 PG (ref 26–34)
MCHC RBC AUTO-ENTMCNC: 33.5 G/DL (ref 31–36)
MCV RBC AUTO: 77.4 FL (ref 80–100)
METHGB MFR BLDV: 0.2 %
MONOCYTES # BLD: 1 K/UL (ref 0–1.3)
MONOCYTES NFR BLD: 5 %
NEUTROPHILS # BLD: 16.8 K/UL (ref 1.7–7.7)
NEUTROPHILS NFR BLD: 82.4 %
NITRITE UR QL STRIP.AUTO: NEGATIVE
O2 THERAPY: ABNORMAL
PCO2 BLDV: 40.9 MMHG (ref 40–50)
PERFORMED ON: ABNORMAL
PH BLDV: 7.55 [PH] (ref 7.35–7.45)
PH UR STRIP.AUTO: 5.5 [PH] (ref 5–8)
PLATELET # BLD AUTO: 275 K/UL (ref 135–450)
PMV BLD AUTO: 8.4 FL (ref 5–10.5)
PO2 BLDV: 64 MMHG
POTASSIUM SERPL-SCNC: 3.9 MMOL/L (ref 3.5–5.1)
PROCALCITONIN SERPL IA-MCNC: 0.25 NG/ML (ref 0–0.15)
PROT SERPL-MCNC: 8.4 G/DL (ref 6.4–8.2)
PROT UR STRIP.AUTO-MCNC: 100 MG/DL
RBC # BLD AUTO: 6.2 M/UL (ref 4–5.2)
RBC CLUMPS #/AREA URNS AUTO: 6 /HPF (ref 0–4)
SAO2 % BLDV: 94 %
SODIUM SERPL-SCNC: 127 MMOL/L (ref 136–145)
SP GR UR STRIP.AUTO: 1.02 (ref 1–1.03)
TROPONIN, HIGH SENSITIVITY: 15 NG/L (ref 0–14)
UA DIPSTICK W REFLEX MICRO PNL UR: YES
URN SPEC COLLECT METH UR: ABNORMAL
UROBILINOGEN UR STRIP-ACNC: 1 E.U./DL
WBC # BLD AUTO: 20.3 K/UL (ref 4–11)
WBC #/AREA URNS AUTO: 3 /HPF (ref 0–5)

## 2023-06-06 PROCEDURE — 74177 CT ABD & PELVIS W/CONTRAST: CPT

## 2023-06-06 PROCEDURE — 82803 BLOOD GASES ANY COMBINATION: CPT

## 2023-06-06 PROCEDURE — 6370000000 HC RX 637 (ALT 250 FOR IP): Performed by: EMERGENCY MEDICINE

## 2023-06-06 PROCEDURE — 84145 PROCALCITONIN (PCT): CPT

## 2023-06-06 PROCEDURE — 82010 KETONE BODYS QUAN: CPT

## 2023-06-06 PROCEDURE — 6360000004 HC RX CONTRAST MEDICATION: Performed by: EMERGENCY MEDICINE

## 2023-06-06 PROCEDURE — 83690 ASSAY OF LIPASE: CPT

## 2023-06-06 PROCEDURE — 80053 COMPREHEN METABOLIC PANEL: CPT

## 2023-06-06 PROCEDURE — 2580000003 HC RX 258: Performed by: EMERGENCY MEDICINE

## 2023-06-06 PROCEDURE — 84484 ASSAY OF TROPONIN QUANT: CPT

## 2023-06-06 PROCEDURE — 6360000002 HC RX W HCPCS: Performed by: EMERGENCY MEDICINE

## 2023-06-06 PROCEDURE — 87040 BLOOD CULTURE FOR BACTERIA: CPT

## 2023-06-06 PROCEDURE — 83605 ASSAY OF LACTIC ACID: CPT

## 2023-06-06 PROCEDURE — 93005 ELECTROCARDIOGRAM TRACING: CPT | Performed by: EMERGENCY MEDICINE

## 2023-06-06 PROCEDURE — 85025 COMPLETE CBC W/AUTO DIFF WBC: CPT

## 2023-06-06 PROCEDURE — 81001 URINALYSIS AUTO W/SCOPE: CPT

## 2023-06-06 RX ORDER — MAGNESIUM HYDROXIDE/ALUMINUM HYDROXICE/SIMETHICONE 120; 1200; 1200 MG/30ML; MG/30ML; MG/30ML
30 SUSPENSION ORAL ONCE
Status: COMPLETED | OUTPATIENT
Start: 2023-06-06 | End: 2023-06-06

## 2023-06-06 RX ORDER — 0.9 % SODIUM CHLORIDE 0.9 %
1000 INTRAVENOUS SOLUTION INTRAVENOUS ONCE
Status: COMPLETED | OUTPATIENT
Start: 2023-06-06 | End: 2023-06-06

## 2023-06-06 RX ORDER — ONDANSETRON 2 MG/ML
4 INJECTION INTRAMUSCULAR; INTRAVENOUS ONCE
Status: COMPLETED | OUTPATIENT
Start: 2023-06-06 | End: 2023-06-06

## 2023-06-06 RX ADMIN — SODIUM CHLORIDE 1000 ML: 9 INJECTION, SOLUTION INTRAVENOUS at 21:38

## 2023-06-06 RX ADMIN — ONDANSETRON 4 MG: 2 INJECTION INTRAMUSCULAR; INTRAVENOUS at 21:38

## 2023-06-06 RX ADMIN — HYDROMORPHONE HYDROCHLORIDE 0.5 MG: 1 INJECTION, SOLUTION INTRAMUSCULAR; INTRAVENOUS; SUBCUTANEOUS at 21:56

## 2023-06-06 RX ADMIN — IOPAMIDOL 75 ML: 755 INJECTION, SOLUTION INTRAVENOUS at 22:35

## 2023-06-06 RX ADMIN — ALUMINUM HYDROXIDE, MAGNESIUM HYDROXIDE, AND SIMETHICONE 30 ML: 200; 200; 20 SUSPENSION ORAL at 21:38

## 2023-06-06 ASSESSMENT — PAIN SCALES - GENERAL
PAINLEVEL_OUTOF10: 10

## 2023-06-06 ASSESSMENT — PAIN DESCRIPTION - ORIENTATION: ORIENTATION: UPPER

## 2023-06-06 ASSESSMENT — PAIN DESCRIPTION - LOCATION: LOCATION: ABDOMEN

## 2023-06-06 ASSESSMENT — PAIN - FUNCTIONAL ASSESSMENT: PAIN_FUNCTIONAL_ASSESSMENT: 0-10

## 2023-06-06 ASSESSMENT — PAIN DESCRIPTION - DESCRIPTORS: DESCRIPTORS: BURNING

## 2023-06-07 VITALS
BODY MASS INDEX: 32.73 KG/M2 | DIASTOLIC BLOOD PRESSURE: 72 MMHG | HEIGHT: 67 IN | OXYGEN SATURATION: 95 % | SYSTOLIC BLOOD PRESSURE: 148 MMHG | TEMPERATURE: 98.3 F | WEIGHT: 208.56 LBS | RESPIRATION RATE: 17 BRPM | HEART RATE: 91 BPM

## 2023-06-07 LAB
EKG ATRIAL RATE: 100 BPM
EKG DIAGNOSIS: NORMAL
EKG P AXIS: 57 DEGREES
EKG P-R INTERVAL: 148 MS
EKG Q-T INTERVAL: 388 MS
EKG QRS DURATION: 86 MS
EKG QTC CALCULATION (BAZETT): 500 MS
EKG R AXIS: -1 DEGREES
EKG T AXIS: 49 DEGREES
EKG VENTRICULAR RATE: 100 BPM
LACTATE BLDV-SCNC: 1.4 MMOL/L (ref 0.4–1.9)

## 2023-06-07 PROCEDURE — 6360000002 HC RX W HCPCS: Performed by: EMERGENCY MEDICINE

## 2023-06-07 PROCEDURE — 6370000000 HC RX 637 (ALT 250 FOR IP): Performed by: PHYSICIAN ASSISTANT

## 2023-06-07 PROCEDURE — 83605 ASSAY OF LACTIC ACID: CPT

## 2023-06-07 RX ORDER — PROMETHAZINE HYDROCHLORIDE 25 MG/1
25 TABLET ORAL EVERY 6 HOURS PRN
Qty: 10 TABLET | Refills: 0 | Status: SHIPPED | OUTPATIENT
Start: 2023-06-07 | End: 2023-06-14

## 2023-06-07 RX ORDER — ONDANSETRON 4 MG/1
4 TABLET, ORALLY DISINTEGRATING ORAL EVERY 8 HOURS PRN
Qty: 30 TABLET | Refills: 0 | Status: SHIPPED | OUTPATIENT
Start: 2023-06-07

## 2023-06-07 RX ORDER — PANTOPRAZOLE SODIUM 40 MG/1
40 TABLET, DELAYED RELEASE ORAL DAILY
Qty: 30 TABLET | Refills: 0 | Status: SHIPPED | OUTPATIENT
Start: 2023-06-07 | End: 2023-06-07

## 2023-06-07 RX ORDER — OMEPRAZOLE 40 MG/1
40 CAPSULE, DELAYED RELEASE ORAL
Qty: 30 CAPSULE | Refills: 0 | Status: SHIPPED | OUTPATIENT
Start: 2023-06-07

## 2023-06-07 RX ADMIN — HYDROMORPHONE HYDROCHLORIDE 0.5 MG: 1 INJECTION, SOLUTION INTRAMUSCULAR; INTRAVENOUS; SUBCUTANEOUS at 00:01

## 2023-06-07 RX ADMIN — ALUMINUM HYDROXIDE, MAGNESIUM HYDROXIDE, AND SIMETHICONE: 200; 200; 20 SUSPENSION ORAL at 00:27

## 2023-06-07 ASSESSMENT — PAIN SCALES - GENERAL
PAINLEVEL_OUTOF10: 8
PAINLEVEL_OUTOF10: 3

## 2023-06-07 NOTE — ED PROVIDER NOTES
[address] [facility]        Pt Name: Fiordaliza Cotto  MRN: [id number]  [place] 1960  Date of evaluation: 2023  Provider: CEASAR Wiseman  PCP: CEASAR Holder  Note Started: 12:25 AM EDT 23       I have seen and evaluated this patient with my supervising physician Yazan Gonzalez MD.      [facility]       Chief Complaint   Patient presents with    Emesis    Hyperglycemia     Pt with emesis, cold sweats, and high blood sugar for the last 3 days. HISTORY OF PRESENT ILLNESS: 1 or more Elements     History From: patient    Fiordaliza Cotto is a 59 y.o. female who presents for abdominal pain in epigastric region, burning up her chest, and nausea and vomiting. Symptoms started 4 days ago. She denies hematemesis or coffee ground emesis. Denies diarrhea. Patient has hx gastroparesis and states that this feels different because that usually starts with diarrhea. Nursing Notes were reviewed and agreed with or any disagreements were addressed in the HPI. REVIEW OF SYSTEMS :      Review of Systems    Positives and Pertinent negatives as per HPI.      SURGICAL HISTORY     Past Surgical History:   Procedure Laterality Date     SECTION      ENDOMETRIAL ABLATION  5/3/12    ENDOSCOPY, COLON, DIAGNOSTIC      HYSTEROSCOPY  5/3/12    MANDIBLE SURGERY      SKIN BIOPSY      TUBAL LIGATION      UPPER GASTROINTESTINAL ENDOSCOPY N/A 2021    EGD BIOPSY performed by Che Pederson MD at [facility]. 192       Previous Medications    ACETAMINOPHEN (TYLENOL) 500 MG TABLET    Take 2 tablets by mouth 3 times daily as needed for Pain    ALBUTEROL SULFATE HFA (VENTOLIN HFA) 108 (90 BASE) MCG/ACT INHALER    Inhale 2 puffs into the lungs 4 times daily as needed for Wheezing or Shortness of Breath    AMLODIPINE (NORVASC) 5 MG TABLET    Take 5 mg by mouth daily    ASPIRIN 81 MG TABLET    Take 81 mg by
Microscopic Examination YES     Urine Type NotGiven     Bacteria, UA Rare (A) None Seen /HPF    Hyaline Casts, UA 2 0 - 8 /LPF    WBC, UA 3 0 - 5 /HPF    RBC, UA 6 (H) 0 - 4 /HPF    Epithelial Cells, UA 6 (H) 0 - 5 /HPF   Troponin   Result Value Ref Range    Troponin, High Sensitivity 15 (H) 0 - 14 ng/L   Lactate, Sepsis   Result Value Ref Range    Lactic Acid, Sepsis 1.5 0.4 - 1.9 mmol/L   Lactate, Sepsis   Result Value Ref Range    Lactic Acid, Sepsis 1.4 0.4 - 1.9 mmol/L   Procalcitonin   Result Value Ref Range    Procalcitonin 0.25 (H) 0.00 - 0.15 ng/mL   POCT Glucose   Result Value Ref Range    POC Glucose 347 (H) 70 - 99 mg/dl    [facility]formed on [facility]    EKG 12 Lead   Result Value Ref Range    Ventricular Rate 100 BPM    Atrial Rate 100 BPM    P-R Interval 148 ms    QRS Duration 86 ms    Q-T Interval 388 ms    QTc Calculation (Bazett) 500 ms    P Axis 57 degrees    R Axis -1 degrees    T Axis 49 degrees    Diagnosis       Normal sinus rhythmMinimal voltage criteria for LVH, may be normal variant ( R in aVL )Prolonged QTConfirmed by DAVID GAVIN, Eliezer Gunn ([id number]) on 6/7/2023 1:06:29 PM         RADIOLOGY  I have reviewed all radiographic studies for this visit. CT ABDOMEN PELVIS W IV CONTRAST Additional Contrast? None   Final Result   1. Diffuse hepatic steatosis and hepatomegaly again demonstrated   2. Circumferential wall thickening involving the distal esophagus, and may be   related to reflux esophagitis. Elective endoscopy recommended for further   evaluation   3. Nonobstructive bowel gas pattern   4. No acute findings within the abdomen or pelvis                ECG  EKG interpreted by myself.    Rate: 100  Rhythm: NSR  Axis: normal  Intervals: QTc 500 otherwise normal  ST Segments: no acute abnormality  T waves: no acute abnormality  Comparison: Compared to 4/24/23, rate slower by 11 bpm, QT has lengthened  Impression: NSR with minimal voltage criteria for LVH versus normal variant, prolonged QTc    ED

## 2023-06-07 NOTE — DISCHARGE INSTRUCTIONS
-CT scan today showed esophagitis (inflammation of your esophagus), which may be due to acid reflux. Take the medication protonix for this    -your liver is enlarged and fatty. You need to follow up with GI doctor about this and about the esophagitis.

## 2023-06-11 LAB — BACTERIA BLD CULT: NORMAL

## 2023-06-27 ENCOUNTER — HOSPITAL ENCOUNTER (EMERGENCY)
Age: 63
Discharge: HOME | End: 2023-06-27
Payer: COMMERCIAL

## 2023-06-27 VITALS
BODY MASS INDEX: 32.63 KG/M2 | HEIGHT: 67 IN | OXYGEN SATURATION: 100 % | WEIGHT: 207.89 LBS | HEART RATE: 96 BPM | DIASTOLIC BLOOD PRESSURE: 82 MMHG | SYSTOLIC BLOOD PRESSURE: 177 MMHG | RESPIRATION RATE: 19 BRPM | TEMPERATURE: 97.8 F

## 2023-06-27 DIAGNOSIS — R73.9 HYPERGLYCEMIA: ICD-10-CM

## 2023-06-27 DIAGNOSIS — R10.9 ABDOMINAL PAIN, VOMITING, AND DIARRHEA: Primary | ICD-10-CM

## 2023-06-27 DIAGNOSIS — R11.10 ABDOMINAL PAIN, VOMITING, AND DIARRHEA: Primary | ICD-10-CM

## 2023-06-27 DIAGNOSIS — R19.7 ABDOMINAL PAIN, VOMITING, AND DIARRHEA: Primary | ICD-10-CM

## 2023-06-27 LAB
ALBUMIN SERPL-MCNC: 4.5 G/DL (ref 3.4–5)
ALBUMIN/GLOB SERPL: 1.1 {RATIO} (ref 1.1–2.2)
ALP SERPL-CCNC: 144 U/L (ref 40–129)
ALT SERPL-CCNC: 15 U/L (ref 10–40)
ANION GAP SERPL CALCULATED.3IONS-SCNC: 16 MMOL/L (ref 3–16)
AST SERPL-CCNC: 14 U/L (ref 15–37)
BACTERIA URNS QL MICRO: ABNORMAL /HPF
BASOPHILS # BLD: 0.1 K/UL (ref 0–0.2)
BASOPHILS NFR BLD: 0.7 %
BILIRUB SERPL-MCNC: 0.7 MG/DL (ref 0–1)
BILIRUB UR QL STRIP.AUTO: NEGATIVE
BUN SERPL-MCNC: 22 MG/DL (ref 7–20)
CALCIUM SERPL-MCNC: 9.6 MG/DL (ref 8.3–10.6)
CHLORIDE SERPL-SCNC: 85 MMOL/L (ref 99–110)
CLARITY UR: ABNORMAL
CO2 SERPL-SCNC: 32 MMOL/L (ref 21–32)
COLOR UR: YELLOW
CREAT SERPL-MCNC: 0.9 MG/DL (ref 0.6–1.2)
DEPRECATED RDW RBC AUTO: 15.1 % (ref 12.4–15.4)
EKG ATRIAL RATE: 95 BPM
EKG DIAGNOSIS: NORMAL
EKG P AXIS: 70 DEGREES
EKG P-R INTERVAL: 146 MS
EKG Q-T INTERVAL: 396 MS
EKG QRS DURATION: 88 MS
EKG QTC CALCULATION (BAZETT): 497 MS
EKG R AXIS: 15 DEGREES
EKG T AXIS: 80 DEGREES
EKG VENTRICULAR RATE: 95 BPM
EOSINOPHIL # BLD: 0.1 K/UL (ref 0–0.6)
EOSINOPHIL NFR BLD: 0.4 %
EPI CELLS #/AREA URNS AUTO: 4 /HPF (ref 0–5)
GFR SERPLBLD CREATININE-BSD FMLA CKD-EPI: >60 ML/MIN/{1.73_M2}
GLUCOSE SERPL-MCNC: 328 MG/DL (ref 70–99)
GLUCOSE UR STRIP.AUTO-MCNC: 500 MG/DL
HCT VFR BLD AUTO: 46 % (ref 36–48)
HGB BLD-MCNC: 15.2 G/DL (ref 12–16)
HGB UR QL STRIP.AUTO: NEGATIVE
HYALINE CASTS #/AREA URNS AUTO: 9 /LPF (ref 0–8)
KETONES UR STRIP.AUTO-MCNC: ABNORMAL MG/DL
LEUKOCYTE ESTERASE UR QL STRIP.AUTO: NEGATIVE
LIPASE SERPL-CCNC: 51 U/L (ref 13–60)
LYMPHOCYTES # BLD: 2.3 K/UL (ref 1–5.1)
LYMPHOCYTES NFR BLD: 16.6 %
MCH RBC QN AUTO: 25.8 PG (ref 26–34)
MCHC RBC AUTO-ENTMCNC: 33.1 G/DL (ref 31–36)
MCV RBC AUTO: 77.9 FL (ref 80–100)
MONOCYTES # BLD: 0.8 K/UL (ref 0–1.3)
MONOCYTES NFR BLD: 6 %
NEUTROPHILS # BLD: 10.5 K/UL (ref 1.7–7.7)
NEUTROPHILS NFR BLD: 76.3 %
NITRITE UR QL STRIP.AUTO: NEGATIVE
PH UR STRIP.AUTO: 5.5 [PH] (ref 5–8)
PLATELET # BLD AUTO: 373 K/UL (ref 135–450)
PMV BLD AUTO: 7.9 FL (ref 5–10.5)
POTASSIUM SERPL-SCNC: 4.1 MMOL/L (ref 3.5–5.1)
PROT SERPL-MCNC: 8.6 G/DL (ref 6.4–8.2)
PROT UR STRIP.AUTO-MCNC: 300 MG/DL
RBC # BLD AUTO: 5.9 M/UL (ref 4–5.2)
RBC CLUMPS #/AREA URNS AUTO: 3 /HPF (ref 0–4)
SODIUM SERPL-SCNC: 133 MMOL/L (ref 136–145)
SP GR UR STRIP.AUTO: 1.02 (ref 1–1.03)
UA COMPLETE W REFLEX CULTURE PNL UR: ABNORMAL
UA DIPSTICK W REFLEX MICRO PNL UR: YES
URN SPEC COLLECT METH UR: ABNORMAL
UROBILINOGEN UR STRIP-ACNC: 1 E.U./DL
WBC # BLD AUTO: 13.7 K/UL (ref 4–11)
WBC #/AREA URNS AUTO: 3 /HPF (ref 0–5)

## 2023-06-27 PROCEDURE — 93005 ELECTROCARDIOGRAM TRACING: CPT | Performed by: PHYSICIAN ASSISTANT

## 2023-06-27 PROCEDURE — 96374 THER/PROPH/DIAG INJ IV PUSH: CPT

## 2023-06-27 PROCEDURE — 83690 ASSAY OF LIPASE: CPT

## 2023-06-27 PROCEDURE — 85025 COMPLETE CBC W/AUTO DIFF WBC: CPT

## 2023-06-27 PROCEDURE — 93010 ELECTROCARDIOGRAM REPORT: CPT | Performed by: INTERNAL MEDICINE

## 2023-06-27 PROCEDURE — 80053 COMPREHEN METABOLIC PANEL: CPT

## 2023-06-27 PROCEDURE — 81001 URINALYSIS AUTO W/SCOPE: CPT

## 2023-06-27 PROCEDURE — 2580000003 HC RX 258: Performed by: PHYSICIAN ASSISTANT

## 2023-06-27 PROCEDURE — 6360000002 HC RX W HCPCS: Performed by: PHYSICIAN ASSISTANT

## 2023-06-27 PROCEDURE — 99284 EMERGENCY DEPT VISIT MOD MDM: CPT

## 2023-06-27 RX ORDER — PROMETHAZINE HYDROCHLORIDE 25 MG/1
25 SUPPOSITORY RECTAL EVERY 6 HOURS PRN
Qty: 6 SUPPOSITORY | Refills: 0 | Status: SHIPPED | OUTPATIENT
Start: 2023-06-27 | End: 2023-07-04

## 2023-06-27 RX ORDER — 0.9 % SODIUM CHLORIDE 0.9 %
1000 INTRAVENOUS SOLUTION INTRAVENOUS ONCE
Status: COMPLETED | OUTPATIENT
Start: 2023-06-27 | End: 2023-06-27

## 2023-06-27 RX ORDER — LABETALOL HYDROCHLORIDE 5 MG/ML
20 INJECTION, SOLUTION INTRAVENOUS ONCE
Status: DISCONTINUED | OUTPATIENT
Start: 2023-06-27 | End: 2023-06-27 | Stop reason: HOSPADM

## 2023-06-27 RX ORDER — DROPERIDOL 2.5 MG/ML
2.5 INJECTION, SOLUTION INTRAMUSCULAR; INTRAVENOUS ONCE
Status: COMPLETED | OUTPATIENT
Start: 2023-06-27 | End: 2023-06-27

## 2023-06-27 RX ADMIN — SODIUM CHLORIDE 1000 ML: 9 INJECTION, SOLUTION INTRAVENOUS at 13:37

## 2023-06-27 RX ADMIN — DROPERIDOL 2.5 MG: 2.5 INJECTION, SOLUTION INTRAMUSCULAR; INTRAVENOUS at 13:39

## 2023-06-27 ASSESSMENT — PAIN - FUNCTIONAL ASSESSMENT
PAIN_FUNCTIONAL_ASSESSMENT: ACTIVITIES ARE NOT PREVENTED
PAIN_FUNCTIONAL_ASSESSMENT: 0-10

## 2023-06-27 ASSESSMENT — PAIN DESCRIPTION - FREQUENCY: FREQUENCY: CONTINUOUS

## 2023-06-27 ASSESSMENT — PAIN DESCRIPTION - DESCRIPTORS
DESCRIPTORS: SHARP;BURNING
DESCRIPTORS: ACHING

## 2023-06-27 ASSESSMENT — LIFESTYLE VARIABLES: HOW OFTEN DO YOU HAVE A DRINK CONTAINING ALCOHOL: NEVER

## 2023-06-27 ASSESSMENT — PAIN DESCRIPTION - ONSET: ONSET: ON-GOING

## 2023-06-27 ASSESSMENT — PAIN DESCRIPTION - ORIENTATION
ORIENTATION: MID
ORIENTATION: RIGHT;LEFT;MID

## 2023-06-27 ASSESSMENT — PAIN DESCRIPTION - PAIN TYPE: TYPE: ACUTE PAIN

## 2023-06-27 ASSESSMENT — PAIN SCALES - GENERAL
PAINLEVEL_OUTOF10: 4
PAINLEVEL_OUTOF10: 8
PAINLEVEL_OUTOF10: 9

## 2023-06-27 ASSESSMENT — PAIN DESCRIPTION - LOCATION: LOCATION: ABDOMEN

## 2023-08-02 ENCOUNTER — ANESTHESIA EVENT (OUTPATIENT)
Dept: ENDOSCOPY | Age: 63
End: 2023-08-02
Payer: COMMERCIAL

## 2023-08-02 NOTE — PROGRESS NOTES
ENDOSCOPY PREOP INSTRUCTIONS      You are scheduled for a procedure at [facility] on 8-3 @ 830. You will need to arrival by: 700 (at least an hour & a half prior to planned start time)  Report to the MAIN entrance on [address] and register at the surgery center on the left-hand side of the lobby  You will need your insurance card and photo id and a list of all medications taken on a regular basis. Please include the dose/frequency. For your procedure:     PLEASE FOLLOW ALL INSTRUCTIONS & PREPS GIVEN TO YOU BY YOUR DOCTOR'S OFFICE. If you have not received these instructions yet, please call the office immediately. Make sure to read them as soon as received. If you are taking blood thinners, Aspirin or diabetic medication, make sure to call your doctor as soon as possible for instructions prior to your procedure. Please dress comfortably and do not wear any lotion, powders or jewelry  If you use oxygen at home, please bring your oxygen tank with you to hospital.  Arrange for someone to be with you and sign you out & drive you home after your procedure. THIS PERSON MUST WAIT AT HOSPITAL THE ENTIRE TIME. We allow 2 adult visitors with you in the hospital & masks are strongly recommended.     WOMEN ONLY OF CHILDBEARING AGE: Please make sure to be able to give a urine sample on arrival      If you have further questions, you may contact your Endoscopist's office or Pre Admission Testing staff at [phone]

## 2023-08-03 ENCOUNTER — HOSPITAL ENCOUNTER (OUTPATIENT)
Age: 63
Setting detail: OUTPATIENT SURGERY
Discharge: HOME | End: 2023-08-03
Attending: INTERNAL MEDICINE | Admitting: INTERNAL MEDICINE
Payer: COMMERCIAL

## 2023-08-03 ENCOUNTER — ANESTHESIA (OUTPATIENT)
Dept: ENDOSCOPY | Age: 63
End: 2023-08-03
Payer: COMMERCIAL

## 2023-08-03 VITALS
HEIGHT: 67 IN | OXYGEN SATURATION: 99 % | TEMPERATURE: 97 F | SYSTOLIC BLOOD PRESSURE: 127 MMHG | HEART RATE: 79 BPM | DIASTOLIC BLOOD PRESSURE: 80 MMHG | BODY MASS INDEX: 35 KG/M2 | RESPIRATION RATE: 16 BRPM | WEIGHT: 223 LBS

## 2023-08-03 LAB
GLUCOSE BLD-MCNC: 255 MG/DL (ref 70–99)
GLUCOSE BLD-MCNC: 292 MG/DL (ref 70–99)
PERFORMED ON: ABNORMAL
PERFORMED ON: ABNORMAL

## 2023-08-03 PROCEDURE — 7100000010 HC PHASE II RECOVERY - FIRST 15 MIN: Performed by: INTERNAL MEDICINE

## 2023-08-03 PROCEDURE — 7100000011 HC PHASE II RECOVERY - ADDTL 15 MIN: Performed by: INTERNAL MEDICINE

## 2023-08-03 PROCEDURE — 3700000000 HC ANESTHESIA ATTENDED CARE: Performed by: INTERNAL MEDICINE

## 2023-08-03 PROCEDURE — 2580000003 HC RX 258: Performed by: NURSE ANESTHETIST, CERTIFIED REGISTERED

## 2023-08-03 PROCEDURE — 2580000003 HC RX 258: Performed by: ANESTHESIOLOGY

## 2023-08-03 PROCEDURE — 2500000003 HC RX 250 WO HCPCS

## 2023-08-03 PROCEDURE — 2709999900 HC NON-CHARGEABLE SUPPLY: Performed by: INTERNAL MEDICINE

## 2023-08-03 PROCEDURE — 6370000000 HC RX 637 (ALT 250 FOR IP): Performed by: ANESTHESIOLOGY

## 2023-08-03 PROCEDURE — 3700000001 HC ADD 15 MINUTES (ANESTHESIA): Performed by: INTERNAL MEDICINE

## 2023-08-03 PROCEDURE — 3609017100 HC EGD: Performed by: INTERNAL MEDICINE

## 2023-08-03 PROCEDURE — 6360000002 HC RX W HCPCS

## 2023-08-03 RX ORDER — LIDOCAINE HYDROCHLORIDE 20 MG/ML
INJECTION, SOLUTION EPIDURAL; INFILTRATION; INTRACAUDAL; PERINEURAL PRN
Status: DISCONTINUED | OUTPATIENT
Start: 2023-08-03 | End: 2023-08-03 | Stop reason: SDUPTHER

## 2023-08-03 RX ORDER — SODIUM CHLORIDE, SODIUM LACTATE, POTASSIUM CHLORIDE, CALCIUM CHLORIDE 600; 310; 30; 20 MG/100ML; MG/100ML; MG/100ML; MG/100ML
INJECTION, SOLUTION INTRAVENOUS CONTINUOUS PRN
Status: DISCONTINUED | OUTPATIENT
Start: 2023-08-03 | End: 2023-08-03 | Stop reason: SDUPTHER

## 2023-08-03 RX ORDER — PROPOFOL 10 MG/ML
INJECTION, EMULSION INTRAVENOUS PRN
Status: DISCONTINUED | OUTPATIENT
Start: 2023-08-03 | End: 2023-08-03 | Stop reason: SDUPTHER

## 2023-08-03 RX ORDER — SODIUM CHLORIDE, SODIUM LACTATE, POTASSIUM CHLORIDE, CALCIUM CHLORIDE 600; 310; 30; 20 MG/100ML; MG/100ML; MG/100ML; MG/100ML
INJECTION, SOLUTION INTRAVENOUS CONTINUOUS
Status: DISCONTINUED | OUTPATIENT
Start: 2023-08-03 | End: 2023-08-03 | Stop reason: HOSPADM

## 2023-08-03 RX ORDER — PROPOFOL 10 MG/ML
INJECTION, EMULSION INTRAVENOUS CONTINUOUS PRN
Status: DISCONTINUED | OUTPATIENT
Start: 2023-08-03 | End: 2023-08-03 | Stop reason: SDUPTHER

## 2023-08-03 RX ADMIN — PROPOFOL 100 MG: 10 INJECTION, EMULSION INTRAVENOUS at 09:55

## 2023-08-03 RX ADMIN — SODIUM CHLORIDE, SODIUM LACTATE, POTASSIUM CHLORIDE, AND CALCIUM CHLORIDE: .6; .31; .03; .02 INJECTION, SOLUTION INTRAVENOUS at 07:09

## 2023-08-03 RX ADMIN — PROPOFOL 150 MCG/KG/MIN: 10 INJECTION, EMULSION INTRAVENOUS at 09:55

## 2023-08-03 RX ADMIN — LIDOCAINE HYDROCHLORIDE 100 MG: 20 INJECTION, SOLUTION EPIDURAL; INFILTRATION; INTRACAUDAL; PERINEURAL at 09:55

## 2023-08-03 RX ADMIN — SODIUM CHLORIDE, SODIUM LACTATE, POTASSIUM CHLORIDE, AND CALCIUM CHLORIDE: .6; .31; .03; .02 INJECTION, SOLUTION INTRAVENOUS at 09:49

## 2023-08-03 RX ADMIN — INSULIN HUMAN 2 UNITS: 100 INJECTION, SOLUTION PARENTERAL at 08:23

## 2023-08-03 RX ADMIN — SODIUM CHLORIDE, POTASSIUM CHLORIDE, SODIUM LACTATE AND CALCIUM CHLORIDE: 600; 310; 30; 20 INJECTION, SOLUTION INTRAVENOUS at 08:20

## 2023-08-03 ASSESSMENT — PAIN SCALES - GENERAL
PAINLEVEL_OUTOF10: 0

## 2023-08-03 NOTE — PROGRESS NOTES
Ambulatory Surgery/Procedure Discharge Note    Vitals:    08/03/23 1030   BP: 127/80   Pulse: 79   Resp: 16   Temp:    SpO2: 99%       In: 400 [I.V.:400]  Out: - Drank apple juice    Restroom use offered before discharge. Yes    Pain assessment:  none  Pain Level: 0    Pt returned to Endo recovery. Pt alert; speech clear; breathing easily on RA; denies any pain; drank apple juice and tolerated well. Dr. Paul Day came to bedside to talk with pt and adult daughter. Discharge instructions discussed with pt and daughter, and both verbalized understanding. IV removed, and dressing applied. Patient discharged to home/self care. Patient discharged by ambulating to entrance with daughter and nurse Johanna Garcia at her side to waiting Carol Smith.        8/3/2023 11:26 AM

## 2023-08-03 NOTE — PROCEDURES
Endoscopy Note    Patient: Dalila Reese  : 1960  CSN:     Procedure: Esophagogastroduodenoscopy with     Date:  8/3/2023     Surgeon:  Judith Casper MD     Referring Physician:      Preoperative Diagnosis: Erosive esophagitis    Postoperative Diagnosis: Healed erosive esophagitis    2 cm hiatal hernia      No abnormalities of the stomach  No abnormalities of the duodenum    Anesthesia: Monitored anesthesia care    EBL: <5 mL    Indications: This is a 59y.o. year old female who was recently at the [facility] where she was found to have a severe erosive esophagitis today redoing a follow-up upper endoscopy she is continuing on her proton pump inhibitor    Description of Procedure:  Informed consent was obtained from the patient after explanation of indications, benefits and possible risks and complications of the procedure. The patient was then taken to the endoscopy suite, placed in the left lateral decubitus position and the above IV sedation was administrered. The Olympus videoendoscope was passed through the hypopharynx    Posterior pharynx was normal    Esophagus there investigation 3 times showed that there was no evidence of an esophagitis today all the erosive esophagitis has healed normally    Hiatal hernia the patient has a rolling type I-II centimeter hiatal hernia    Stomach there was really no overt abnormalities of the stomach today of the fundus the body and the antrum all appeared normal    The duodenum was normal in the bulb sweep distal duodenum    Retroflexion did show the hiatal hernia      Gastric or Duodenal ulcer present:       The patient tolerated the procedure well and was taken to the post anesthesia care unit in good condition. Impression: Healed erosive esophagitis  Hiatal hernia      Recommendations:  This time it is very good that the patient has healed her erosive esophagitis    Given the fact that she had a severe erosive esophagitis there is an 80% chance

## 2023-08-03 NOTE — ANESTHESIA POSTPROCEDURE EVALUATION
Department of Anesthesiology  Postprocedure Note    Patient: Otoniel Momin  MRN: [id number]  YOB: 1960  Date of evaluation: 8/3/2023      Procedure Summary     Date: 08/03/23 Room / Location: [facility] DAVIS 01 / The [address] [facility]    Anesthesia Start: 6005 Anesthesia Stop: 4026    Procedure: EGD DIAGNOSTIC ONLY Diagnosis:       Esophagitis      (Esophagitis [K20.90])    Surgeons: Marion Mcgee MD Responsible Provider: Lilly Alvarenga MD    Anesthesia Type: MAC ASA Status: 3          Anesthesia Type: No value filed.     Abril Phase I: Abril Score: 10    Abril Phase II: Abril Score: 10      Anesthesia Post Evaluation    Patient location during evaluation: PACU  Patient participation: complete - patient participated  Level of consciousness: awake  Pain score: 0  Airway patency: patent  Nausea & Vomiting: no nausea  Complications: no  Cardiovascular status: hemodynamically stable  Respiratory status: acceptable  Hydration status: stable  Pain management: satisfactory to patient

## 2024-01-29 NOTE — ED PROVIDER NOTES
[facility] EMERGENCY DEPARTMENT  [address] [facility] [address]  Dept: [phone]  Loc: [phone]  eMERGENCYdEPARTMENT eNCOUnter      Pt Name: Colonel Valera  MRN: [id number]  [place] 1960  Date of evaluation: 12/3/2021  Provider:Dimple Pickens PA-C    CHIEF COMPLAINT     No chief complaint on file. CRITICAL CARE TIME   Total Critical Care time was 10 minutes, excluding separately reportable procedures. There was a high probability of clinically significant/life threatening deterioration in the patient's condition which required my urgentintervention. HISTORY OF PRESENT ILLNESS  (Location/Symptom, Timing/Onset, Context/Setting, Quality, Duration,Modifying Factors, Severity.)   Colonel Valera is a 62 y.o. female with a past medical history of COPD, type 2 diabetes, gastroparesis, hypertension who presents to the emergency department by private vehicle complaining of right sided back/side pain. Patient states for the past 2 days she has had a constant deep pressure in the right side of her mid back. Pain then radiates around into her her chest and up towards shoulder. Patient states pain is constant but \"occasionally catches\", this occurs with certain positions changes. When this is present pain becomes sharp and stabbing in sensation. Pain become so severe it will double her over to make her nauseous. Patient states pain reminds her to previous time she had pneumonia and shingles. She denies any rash to skin, tingling sensation to skin. She denies any fevers, cough, chest pain, shortness of breath. No other significant alleviating or aggravating factors. Denies trauma or injury. Given severity and persistence of pain she sought evaluation in the ED. Nursing Notes were reviewedand agreed with or any disagreements were addressed in the HPI.     REVIEW OF SYSTEMS    (2-9 systems for level 4, 10 or more for level 5)     Review of Systems   Constitutional: Negative for chills and fever. HENT: Negative. Eyes: Negative. Respiratory: Negative for cough, chest tightness and shortness of breath. Cardiovascular: Positive for chest pain. Gastrointestinal: Negative for abdominal pain, nausea and vomiting. Genitourinary: Negative. Musculoskeletal: Positive for back pain and myalgias. Negative for arthralgias. Skin: Negative. Neurological: Negative for light-headedness. Psychiatric/Behavioral: Negative for behavioral problems and confusion. Except as noted above the remainder of the review of systems was reviewed and negative.        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    Gastroparesis     Hirsutism     History of blood transfusion     Hyperlipidemia LDL goal < 70     Hypertension     Major depressive disorder with single episode 2009    Pancreatitis     Seasonal allergic rhinitis due to pollen 11/10/2016    Sleep apnea     Thyroid disease     Vitamin D deficiency        SURGICAL HISTORY           Procedure Laterality Date     SECTION      ENDOMETRIAL ABLATION  5/3/12    ENDOSCOPY, COLON, DIAGNOSTIC      HYSTEROSCOPY  5/3/12    MANDIBLE SURGERY      SKIN BIOPSY      TUBAL LIGATION      UPPER GASTROINTESTINAL ENDOSCOPY N/A 2021    EGD BIOPSY performed by Pastor Katy MD at [address]     [unfilled]    ALLERGIES     Avelox [moxifloxacin], Bactrim, Cefuroxime, Codeine, Medrol [methylprednisolone], Morphine, Niacin, Oat, Percocet Head Circumference       Peak Flow       Pain Score       Pain Loc       Pain Edu? Excl. in [address]? Physical Exam  Vitals reviewed. Constitutional:       Appearance: Normal appearance. HENT:      Head: Normocephalic. Cardiovascular:      Rate and Rhythm: Normal rate and regular rhythm. Pulmonary:      Effort: Pulmonary effort is normal. No respiratory distress. Breath sounds: Normal breath sounds. Abdominal:      Palpations: Abdomen is soft. Tenderness: There is abdominal tenderness. Musculoskeletal:         General: Normal range of motion. Cervical back: Normal range of motion and neck supple. Comments: Back: No reproducible pain throughout back/lateral aspect of chest wall on right   Skin:     General: Skin is warm. Comments: No rash to skin throughout back or chest to affected region   Neurological:      General: No focal deficit present. Mental Status: She is alert and oriented to person, place, and time. Psychiatric:         Mood and Affect: Mood normal.         Behavior: Behavior normal.           DIAGNOSTIC RESULTS     EKG: All EKG's are interpreted by the Emergency Department Physician who either signs or Co-signs this chart in the absence of a cardiologist.    RADIOLOGY:   Non-plain film images such as CT, Ultrasound and MRI are read by the radiologist. Plain radiographic images are visualized and preliminarilyinterpreted by the emergency physician with the below findings:    Interpretation per the Radiologist below,if available at the time of this note:    CT CHEST PULMONARY EMBOLISM W CONTRAST   Final Result   No evidence of pulmonary embolism or acute pulmonary abnormality. CT ABDOMEN PELVIS W IV CONTRAST Additional Contrast? None   Final Result   1. No appendicitis, diverticulitis, or small bowel obstruction. 2. 0.8 cm low-attenuation focus in the pancreatic body would be better   evaluated by outpatient MRI.    3. Other findings as described. LABS:  Labs Reviewed   CBC WITH AUTO DIFFERENTIAL - Abnormal; Notable for the following components:       Result Value    WBC 11.7 (*)     RBC 5.53 (*)     MCV 79.4 (*)     MCH 25.2 (*)     All other components within normal limits    Narrative:     Performed at:  [address] [facility]   Phone [phone]   COMPREHENSIVE METABOLIC PANEL W/ REFLEX TO MG FOR LOW K - Abnormal; Notable for the following components:    Chloride 98 (*)     Glucose 170 (*)     AST 14 (*)     All other components within normal limits    Narrative:     Performed at:  [address] [facility]   Phone [phone]   LIPASE - Abnormal; Notable for the following components:    Lipase 73.0 (*)     All other components within normal limits    Narrative:     Performed at:  [address] [facility]   Phone [phone]   TROPONIN    Narrative:     Performed at:  [facility]  [address] [place] [address] [facility]   Phone [phone]   PREGNANCY, URINE    Narrative:     Performed at:  [address] [facility]   Phone [phone]       All other labs were within normal range or not returned as of this dictation. EMERGENCY DEPARTMENT COURSE and DIFFERENTIAL DIAGNOSIS/MDM:   Vitals:    Vitals:    12/03/21 1936 12/03/21 1955   BP: (!) 157/61    Pulse: 69    Resp: 16    Temp: 98.4 °F (36.9 °C)    TempSrc: Oral    SpO2: 99%    Weight:  235 lb 3.2 oz (106.7 kg)   Height:  5' 7\" (1.702 m)       MDM     Patient presents ED with HPI noted above. She is hemodynamically stable, afebrile and nontoxic-appearing. Oxygen saturation 99% on room air. Physical exam as above.     Given exam, history and severity of pain CT pulmonary embolism obtained, CT showed evidence of PE or acute pulmonary abnormality. CT abdomen pelvis obtained showed no acute process. Gallbladder incompletely distended. Patient incidental finding of lesion in pancreas. Patient informed of incidental finding need for follow-up and reevaluation/repeat imaging. Cardiac work-up obtained. EKG showed normal sinus rhythm, no significant ST elevation or depression, no STEMI. Troponin less than 0.01. Pain is atypical, do not suspect ACS. Remainder labs as above. Lipase minimally elevated, CT findings as above, no acute pancreatitis. Patient did have tenderness in epigastric region. No further emergent ED work-up, patient again aware of incidental finding. Patient given 1 L IV fluid and IV Norflex in the ED. At reevaluation she had improvement but still persistence of pain. At this time she was given IV Toradol and then 1 mg IV Dilaudid. At reevaluation she had adequate pain control. She was ready for discharge home. Pain is reproducible on exam.  She was discharged home with muscle doctor, naproxen and topical lidocaine patch. She is educated concerning symptoms that should prompt reevaluation in the ED. Patient to follow closely primary care doctor as outpatient. She was discharged home in stable condition. The patient tolerated their visit well. I saw the patient independently with physician available for consultation as needed. I have discussed the findings of today's workup with the patient and addressed the patient's questions and concerns. Important warning signs as well as new or worsening symptoms which would necessitate immediate return to the ED were discussed. The plan is to discharge from the ED at this time, and the patient is in stable condition. The patient acknowledged understanding is agreeable with this plan. CONSULTS:  None    PROCEDURES:  Procedures    FINAL IMPRESSION      1. Acute right-sided thoracic back pain    2. Pancreatic lesion          DISPOSITION/PLAN   [unfilled]    PATIENT REFERRED TO:  [address] [facility] Emergency Department  [address] [id number]  [phone]  Go to   If symptoms worsen    Tete Barrett [address]   [phone]    Call in 1 day  For follow up and reevaluation.       DISCHARGE MEDICATIONS:  Discharge Medication List as of 12/3/2021 10:02 PM      START taking these medications    Details   methocarbamol (ROBAXIN-750) 750 MG tablet Take 1-2 tablets by mouth 3 times daily for 5 days, Disp-30 tablet, R-0Print      lidocaine 4 % external patch Place 1 patch onto the skin daily, TransDERmal, DAILY Starting Fri 12/3/2021, Until Sun 1/2/2022, For 30 days, Disp-30 patch, R-0, Print      naproxen (NAPROSYN) 500 MG tablet Take 1 tablet by mouth 2 times daily (with meals), Disp-30 tablet, R-0Print             (Please note that portions of this note were completed with a voice recognition program.  Efforts were made to edit the dictations but occasionally words are mis-transcribed.)    IVELISSE Rooney, [place]  12/11/21 [address]  12/11/21 1804 [Annual Wellness Visit] : an annual wellness visit

## 2024-03-19 ENCOUNTER — HOSPITAL ENCOUNTER (EMERGENCY)
Age: 64
Discharge: HOME | End: 2024-03-20
Payer: COMMERCIAL

## 2024-03-19 ENCOUNTER — APPOINTMENT (OUTPATIENT)
Dept: GENERAL RADIOLOGY | Age: 64
End: 2024-03-19
Payer: COMMERCIAL

## 2024-03-19 DIAGNOSIS — R07.81 RIB PAIN ON RIGHT SIDE: ICD-10-CM
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
## 2024-03-19 DIAGNOSIS — M79.641 RIGHT HAND PAIN: Primary | ICD-10-CM

## 2024-03-19 PROCEDURE — 71101 X-RAY EXAM UNILAT RIBS/CHEST: CPT

## 2024-03-19 PROCEDURE — 73130 X-RAY EXAM OF HAND: CPT

## 2024-03-19 PROCEDURE — 99283 EMERGENCY DEPT VISIT LOW MDM: CPT

## 2024-03-19 PROCEDURE — 6370000000 HC RX 637 (ALT 250 FOR IP): Performed by: NURSE PRACTITIONER

## 2024-03-19 RX ORDER — ACETAMINOPHEN 500 MG
1000 TABLET ORAL ONCE
Status: COMPLETED | OUTPATIENT
Start: 2024-03-19 | End: 2024-03-19

## 2024-03-19 RX ORDER — LIDOCAINE 4 G/G
1 PATCH TOPICAL ONCE
Status: DISCONTINUED | OUTPATIENT
Start: 2024-03-19 | End: 2024-03-20 | Stop reason: HOSPADM

## 2024-03-19 RX ORDER — INSULIN GLARGINE-YFGN 100 [IU]/ML
INJECTION, SOLUTION SUBCUTANEOUS
COMMUNITY
Start: 2024-01-19

## 2024-03-19 RX ADMIN — ACETAMINOPHEN 1000 MG: 500 TABLET ORAL at 23:33

## 2024-03-19 ASSESSMENT — PAIN DESCRIPTION - PAIN TYPE: TYPE: ACUTE PAIN

## 2024-03-19 ASSESSMENT — LIFESTYLE VARIABLES
HOW MANY STANDARD DRINKS CONTAINING ALCOHOL DO YOU HAVE ON A TYPICAL DAY: 1 OR 2
HOW OFTEN DO YOU HAVE A DRINK CONTAINING ALCOHOL: MONTHLY OR LESS

## 2024-03-19 ASSESSMENT — PAIN DESCRIPTION - DESCRIPTORS
DESCRIPTORS: DISCOMFORT
DESCRIPTORS: ACHING

## 2024-03-19 ASSESSMENT — PAIN - FUNCTIONAL ASSESSMENT: PAIN_FUNCTIONAL_ASSESSMENT: 0-10

## 2024-03-19 ASSESSMENT — PAIN SCALES - GENERAL
PAINLEVEL_OUTOF10: 7
PAINLEVEL_OUTOF10: 7

## 2024-03-19 ASSESSMENT — PAIN DESCRIPTION - ORIENTATION: ORIENTATION: RIGHT

## 2024-03-19 ASSESSMENT — PAIN DESCRIPTION - FREQUENCY: FREQUENCY: CONTINUOUS

## 2024-03-20 ENCOUNTER — TELEPHONE (OUTPATIENT)
Dept: ORTHOPEDIC SURGERY | Age: 64
End: 2024-03-20

## 2024-03-20 VITALS
HEART RATE: 66 BPM | WEIGHT: 261.69 LBS | OXYGEN SATURATION: 95 % | BODY MASS INDEX: 41.07 KG/M2 | SYSTOLIC BLOOD PRESSURE: 122 MMHG | HEIGHT: 67 IN | TEMPERATURE: 97.7 F | RESPIRATION RATE: 19 BRPM | DIASTOLIC BLOOD PRESSURE: 58 MMHG

## 2024-03-20 RX ORDER — LIDOCAINE 50 MG/G
1 PATCH TOPICAL DAILY
Qty: 10 PATCH | Refills: 0 | Status: SHIPPED | OUTPATIENT
Start: 2024-03-20 | End: 2024-03-30

## 2024-03-20 RX ORDER — ACETAMINOPHEN 500 MG
500 TABLET ORAL 4 TIMES DAILY PRN
Qty: 28 TABLET | Refills: 0 | Status: SHIPPED | OUTPATIENT
Start: 2024-03-20 | End: 2024-03-27

## 2024-03-20 ASSESSMENT — PAIN SCALES - GENERAL: PAINLEVEL_OUTOF10: 0

## 2024-03-20 ASSESSMENT — PAIN - FUNCTIONAL ASSESSMENT: PAIN_FUNCTIONAL_ASSESSMENT: 0-10

## 2024-03-20 NOTE — ED PROVIDER NOTES
[facility] EMERGENCY DEPARTMENT  EMERGENCY DEPARTMENT ENCOUNTER        Pt Name: Renetta Tadeo  MRN: [id number]  Birthdate 1960  Date of evaluation: 3/19/2024  Provider: SEVEN Feliciano - CNP  PCP: Wilma Garzon PA  Note Started: 12:16 AM EDT 3/20/24      ALIZE. I have evaluated this patient.        CHIEF COMPLAINT       Chief Complaint   Patient presents with    Fall     Pt states that she fell down 4 steps around 1300 today. Pt states that she fell on her right side and is experiencing pain in right hand and right rib cage.pt states that she has been experiencing bruising and numbness in right fingers.  Pt denies hitting her head. Pt denies LOC. Pt AAO x 4.        HISTORY OF PRESENT ILLNESS: 1 or more Elements     History from : Patient    Limitations to history : None    Renetta Tadeo is a 64 y.o. nontoxic, well-appearing female with a medical history including, not limited to, vitamin D deficiency, thyroid disease, pancreatitis, hypertension, hyperlipidemia, hirsutism, gastroparesis, type 2 diabetes mellitus, chronic midline low back pain with bilateral sciatica, asthma, and SHAY, who presents to the emerged from for evaluation of right reevaluate hand pain status post she experienced a slip on new carpeting Dr. Miles down approximately 4 steps.  She denies head injury, loss of consciousness, blood thinner use, neck or back pain, other joint pain/injuries or other symptoms/concerns.  She endorses worsening right rib pain with deep inspiration.    Nursing Notes were all reviewed and agreed with or any disagreements were addressed in the HPI.    REVIEW OF SYSTEMS :      Review of Systems   Constitutional:  Negative for chills, diaphoresis, fatigue and fever.   HENT:  Negative for congestion and sore throat.    Eyes:  Negative for pain and visual disturbance.   Respiratory:  Negative for cough and shortness of breath.    Cardiovascular:  Negative for chest pain and leg

## 2024-03-20 NOTE — DISCHARGE INSTRUCTIONS
Return to the emergency department for new or worsening symptoms including, not limited to, developing shortness of breath, fever, chills, sweats, passing out, feeling severe going to pass out, change in the color or temperature of your fingers, loss sensation in your fingers, or other symptoms/concerns.      Medications as prescribed.  Utilize RICE to the affected areas    Utilize the incentive spirometer to perform 10 repetitions every hour while awake to ensure that you are adequately inflating your lungs after a right ribs injury.  You can otherwise develop pneumonia and/or collapsed lung due to inadequate inflation of your lungs after the rib injury.      Follow-up with your primary care provider for reevaluation next 1-2 days and with the hand specialist for symptoms that worsen or fail to improve in the next 1 week as you may require reimaging.

## 2024-03-20 NOTE — ED TRIAGE NOTES
Pt states that she fell down 4 steps around 1300 today. Pt states that she fell on her right side and is experiencing pain in right hand and right rib cage.pt states that she has been experiencing bruising and numbness in right fingers.  Pt denies hitting her head. Pt denies LOC. Pt AAO x 4.

## 2024-03-21 ASSESSMENT — ENCOUNTER SYMPTOMS
VOMITING: 0
ABDOMINAL PAIN: 0
EYE PAIN: 0
SORE THROAT: 0
BACK PAIN: 0
NAUSEA: 0
COUGH: 0
DIARRHEA: 0
SHORTNESS OF BREATH: 0

## (undated) DEVICE — CONTAINER SPEC 480ML CLR POLYSTYR 10% NEUT BUFF FRMLN ZN

## (undated) DEVICE — FORCEPS BX 240CM 2.4MM L NDL RAD JAW 4 M00513334

## (undated) DEVICE — BRUSH CYTO L140CM DIA1.5MM WRK CHN 2MM BRIST SHTH RNG HNDL

## (undated) DEVICE — MASK O2 ADULT POM PROCEDURAL OXYGEN MASK 7FT O2 TUBING 10FT

## (undated) DEVICE — BITE BLOCK ENDOSCP AD 60 FR W/ ADJ STRP PLAS GRN BLOX

## (undated) DEVICE — ENDOSCOPY KIT: Brand: MEDLINE INDUSTRIES, INC.